# Patient Record
Sex: MALE | Race: WHITE | NOT HISPANIC OR LATINO
[De-identification: names, ages, dates, MRNs, and addresses within clinical notes are randomized per-mention and may not be internally consistent; named-entity substitution may affect disease eponyms.]

---

## 2017-01-18 ENCOUNTER — APPOINTMENT (OUTPATIENT)
Dept: CARDIOLOGY | Facility: CLINIC | Age: 52
End: 2017-01-18

## 2017-01-18 VITALS
OXYGEN SATURATION: 95 % | SYSTOLIC BLOOD PRESSURE: 99 MMHG | HEART RATE: 79 BPM | DIASTOLIC BLOOD PRESSURE: 67 MMHG | BODY MASS INDEX: 39.84 KG/M2 | WEIGHT: 302 LBS

## 2017-01-20 LAB
ALBUMIN SERPL ELPH-MCNC: 2.7 G/DL
ALP BLD-CCNC: 256 U/L
ALT SERPL-CCNC: 22 U/L
ANION GAP SERPL CALC-SCNC: 12 MMOL/L
AST SERPL-CCNC: 90 U/L
BILIRUB SERPL-MCNC: 7.3 MG/DL
BUN SERPL-MCNC: 21 MG/DL
CALCIUM SERPL-MCNC: 9.2 MG/DL
CHLORIDE SERPL-SCNC: 102 MMOL/L
CO2 SERPL-SCNC: 22 MMOL/L
CREAT SERPL-MCNC: 0.92 MG/DL
GLUCOSE SERPL-MCNC: 166 MG/DL
HBA1C MFR BLD HPLC: 7.2 %
POTASSIUM SERPL-SCNC: 5.3 MMOL/L
PROT SERPL-MCNC: 7.5 G/DL
SODIUM SERPL-SCNC: 136 MMOL/L

## 2017-02-07 ENCOUNTER — OUTPATIENT (OUTPATIENT)
Dept: OUTPATIENT SERVICES | Facility: HOSPITAL | Age: 52
LOS: 1 days | End: 2017-02-07
Payer: MEDICAID

## 2017-02-07 ENCOUNTER — APPOINTMENT (OUTPATIENT)
Dept: GASTROENTEROLOGY | Facility: HOSPITAL | Age: 52
End: 2017-02-07

## 2017-02-07 VITALS
WEIGHT: 302 LBS | HEART RATE: 68 BPM | BODY MASS INDEX: 40.02 KG/M2 | HEIGHT: 73 IN | DIASTOLIC BLOOD PRESSURE: 72 MMHG | SYSTOLIC BLOOD PRESSURE: 109 MMHG | RESPIRATION RATE: 14 BRPM

## 2017-02-07 DIAGNOSIS — K46.9 UNSPECIFIED ABDOMINAL HERNIA WITHOUT OBSTRUCTION OR GANGRENE: Chronic | ICD-10-CM

## 2017-02-07 DIAGNOSIS — Z86.69 PERSONAL HISTORY OF OTHER DISEASES OF THE NERVOUS SYSTEM AND SENSE ORGANS: Chronic | ICD-10-CM

## 2017-02-07 DIAGNOSIS — K70.30 ALCOHOLIC CIRRHOSIS OF LIVER WITHOUT ASCITES: ICD-10-CM

## 2017-02-07 DIAGNOSIS — I85.00 ESOPHAGEAL VARICES WITHOUT BLEEDING: ICD-10-CM

## 2017-02-07 DIAGNOSIS — K70.31 ALCOHOLIC CIRRHOSIS OF LIVER WITH ASCITES: ICD-10-CM

## 2017-02-07 DIAGNOSIS — K31.9 DISEASE OF STOMACH AND DUODENUM, UNSPECIFIED: ICD-10-CM

## 2017-02-07 LAB
ALBUMIN SERPL ELPH-MCNC: 3 G/DL — LOW (ref 3.3–5)
ALP SERPL-CCNC: 253 U/L — HIGH (ref 40–120)
ALT FLD-CCNC: 25 U/L — SIGNIFICANT CHANGE UP (ref 10–45)
ANION GAP SERPL CALC-SCNC: 12 MMOL/L — SIGNIFICANT CHANGE UP (ref 5–17)
AST SERPL-CCNC: 95 U/L — HIGH (ref 10–40)
BILIRUB SERPL-MCNC: 8.4 MG/DL — HIGH (ref 0.2–1.2)
BUN SERPL-MCNC: 22 MG/DL — SIGNIFICANT CHANGE UP (ref 7–23)
CALCIUM SERPL-MCNC: 9.3 MG/DL — SIGNIFICANT CHANGE UP (ref 8.4–10.5)
CHLORIDE SERPL-SCNC: 101 MMOL/L — SIGNIFICANT CHANGE UP (ref 96–108)
CO2 SERPL-SCNC: 25 MMOL/L — SIGNIFICANT CHANGE UP (ref 22–31)
CREAT SERPL-MCNC: 0.9 MG/DL — SIGNIFICANT CHANGE UP (ref 0.5–1.3)
GLUCOSE SERPL-MCNC: 158 MG/DL — HIGH (ref 70–99)
INR BLD: 1.69 RATIO — HIGH (ref 0.88–1.16)
POTASSIUM SERPL-MCNC: 4.9 MMOL/L — SIGNIFICANT CHANGE UP (ref 3.5–5.3)
POTASSIUM SERPL-SCNC: 4.9 MMOL/L — SIGNIFICANT CHANGE UP (ref 3.5–5.3)
PROT SERPL-MCNC: 8.1 G/DL — SIGNIFICANT CHANGE UP (ref 6–8.3)
PROTHROM AB SERPL-ACNC: 19.2 SEC — HIGH (ref 10–13.1)
SODIUM SERPL-SCNC: 138 MMOL/L — SIGNIFICANT CHANGE UP (ref 135–145)

## 2017-02-07 PROCEDURE — 85610 PROTHROMBIN TIME: CPT

## 2017-02-07 PROCEDURE — 80053 COMPREHEN METABOLIC PANEL: CPT

## 2017-02-07 PROCEDURE — 36415 COLL VENOUS BLD VENIPUNCTURE: CPT

## 2017-02-07 PROCEDURE — G0463: CPT

## 2017-02-14 ENCOUNTER — RX RENEWAL (OUTPATIENT)
Age: 52
End: 2017-02-14

## 2017-03-15 ENCOUNTER — NON-APPOINTMENT (OUTPATIENT)
Age: 52
End: 2017-03-15

## 2017-03-15 ENCOUNTER — APPOINTMENT (OUTPATIENT)
Dept: CARDIOLOGY | Facility: CLINIC | Age: 52
End: 2017-03-15

## 2017-03-15 VITALS
OXYGEN SATURATION: 92 % | WEIGHT: 296 LBS | HEART RATE: 62 BPM | BODY MASS INDEX: 39.05 KG/M2 | DIASTOLIC BLOOD PRESSURE: 68 MMHG | TEMPERATURE: 98 F | SYSTOLIC BLOOD PRESSURE: 106 MMHG

## 2017-04-11 ENCOUNTER — APPOINTMENT (OUTPATIENT)
Dept: GASTROENTEROLOGY | Facility: HOSPITAL | Age: 52
End: 2017-04-11

## 2017-04-11 ENCOUNTER — OUTPATIENT (OUTPATIENT)
Dept: OUTPATIENT SERVICES | Facility: HOSPITAL | Age: 52
LOS: 1 days | End: 2017-04-11
Payer: MEDICAID

## 2017-04-11 VITALS
HEIGHT: 73 IN | BODY MASS INDEX: 38.17 KG/M2 | RESPIRATION RATE: 14 BRPM | HEART RATE: 98 BPM | SYSTOLIC BLOOD PRESSURE: 101 MMHG | WEIGHT: 288 LBS | DIASTOLIC BLOOD PRESSURE: 71 MMHG

## 2017-04-11 DIAGNOSIS — Z86.69 PERSONAL HISTORY OF OTHER DISEASES OF THE NERVOUS SYSTEM AND SENSE ORGANS: Chronic | ICD-10-CM

## 2017-04-11 DIAGNOSIS — K31.9 DISEASE OF STOMACH AND DUODENUM, UNSPECIFIED: ICD-10-CM

## 2017-04-11 DIAGNOSIS — K46.9 UNSPECIFIED ABDOMINAL HERNIA WITHOUT OBSTRUCTION OR GANGRENE: Chronic | ICD-10-CM

## 2017-04-11 DIAGNOSIS — K70.31 ALCOHOLIC CIRRHOSIS OF LIVER WITH ASCITES: ICD-10-CM

## 2017-04-11 PROCEDURE — G0463: CPT

## 2017-04-12 ENCOUNTER — APPOINTMENT (OUTPATIENT)
Dept: ULTRASOUND IMAGING | Facility: CLINIC | Age: 52
End: 2017-04-12

## 2017-04-12 ENCOUNTER — OUTPATIENT (OUTPATIENT)
Dept: OUTPATIENT SERVICES | Facility: HOSPITAL | Age: 52
LOS: 1 days | End: 2017-04-12
Payer: MEDICAID

## 2017-04-12 DIAGNOSIS — K46.9 UNSPECIFIED ABDOMINAL HERNIA WITHOUT OBSTRUCTION OR GANGRENE: Chronic | ICD-10-CM

## 2017-04-12 DIAGNOSIS — Z86.69 PERSONAL HISTORY OF OTHER DISEASES OF THE NERVOUS SYSTEM AND SENSE ORGANS: Chronic | ICD-10-CM

## 2017-04-12 DIAGNOSIS — K70.31 ALCOHOLIC CIRRHOSIS OF LIVER WITH ASCITES: ICD-10-CM

## 2017-04-12 PROCEDURE — 76700 US EXAM ABDOM COMPLETE: CPT

## 2017-05-18 ENCOUNTER — MEDICATION RENEWAL (OUTPATIENT)
Age: 52
End: 2017-05-18

## 2017-06-06 ENCOUNTER — OTHER (OUTPATIENT)
Age: 52
End: 2017-06-06

## 2017-06-07 ENCOUNTER — APPOINTMENT (OUTPATIENT)
Dept: CARDIOLOGY | Facility: CLINIC | Age: 52
End: 2017-06-07

## 2017-06-07 VITALS
OXYGEN SATURATION: 91 % | HEIGHT: 73 IN | HEART RATE: 66 BPM | SYSTOLIC BLOOD PRESSURE: 107 MMHG | TEMPERATURE: 97.9 F | WEIGHT: 281.2 LBS | BODY MASS INDEX: 37.27 KG/M2 | DIASTOLIC BLOOD PRESSURE: 67 MMHG

## 2017-06-08 LAB
ALBUMIN SERPL ELPH-MCNC: 3.1 G/DL
ALP BLD-CCNC: 158 U/L
ALT SERPL-CCNC: 17 U/L
ANION GAP SERPL CALC-SCNC: 20 MMOL/L
AST SERPL-CCNC: 75 U/L
BILIRUB SERPL-MCNC: 8.6 MG/DL
BUN SERPL-MCNC: 18 MG/DL
CALCIUM SERPL-MCNC: 9.6 MG/DL
CHLORIDE SERPL-SCNC: 105 MMOL/L
CO2 SERPL-SCNC: 20 MMOL/L
CREAT SERPL-MCNC: 0.91 MG/DL
GLUCOSE SERPL-MCNC: 176 MG/DL
HBA1C MFR BLD HPLC: 5.8 %
INR PPP: 1.85 RATIO
POTASSIUM SERPL-SCNC: 5 MMOL/L
PROT SERPL-MCNC: 7.2 G/DL
PT BLD: 21.2 SEC
SODIUM SERPL-SCNC: 145 MMOL/L

## 2017-07-11 ENCOUNTER — APPOINTMENT (OUTPATIENT)
Dept: GASTROENTEROLOGY | Facility: HOSPITAL | Age: 52
End: 2017-07-11

## 2017-07-11 ENCOUNTER — OUTPATIENT (OUTPATIENT)
Dept: OUTPATIENT SERVICES | Facility: HOSPITAL | Age: 52
LOS: 1 days | End: 2017-07-11
Payer: MEDICAID

## 2017-07-11 VITALS
RESPIRATION RATE: 16 BRPM | DIASTOLIC BLOOD PRESSURE: 65 MMHG | HEART RATE: 67 BPM | HEIGHT: 73 IN | SYSTOLIC BLOOD PRESSURE: 100 MMHG | WEIGHT: 279 LBS | BODY MASS INDEX: 36.98 KG/M2

## 2017-07-11 DIAGNOSIS — I85.00 ESOPHAGEAL VARICES WITHOUT BLEEDING: ICD-10-CM

## 2017-07-11 DIAGNOSIS — K31.9 DISEASE OF STOMACH AND DUODENUM, UNSPECIFIED: ICD-10-CM

## 2017-07-11 DIAGNOSIS — K70.31 ALCOHOLIC CIRRHOSIS OF LIVER WITH ASCITES: ICD-10-CM

## 2017-07-11 DIAGNOSIS — Z86.69 PERSONAL HISTORY OF OTHER DISEASES OF THE NERVOUS SYSTEM AND SENSE ORGANS: Chronic | ICD-10-CM

## 2017-07-11 DIAGNOSIS — K46.9 UNSPECIFIED ABDOMINAL HERNIA WITHOUT OBSTRUCTION OR GANGRENE: Chronic | ICD-10-CM

## 2017-07-11 PROCEDURE — G0463: CPT

## 2017-07-26 ENCOUNTER — APPOINTMENT (OUTPATIENT)
Dept: INTERNAL MEDICINE | Facility: CLINIC | Age: 52
End: 2017-07-26

## 2017-07-26 ENCOUNTER — OUTPATIENT (OUTPATIENT)
Dept: OUTPATIENT SERVICES | Facility: HOSPITAL | Age: 52
LOS: 1 days | End: 2017-07-26
Payer: MEDICAID

## 2017-07-26 ENCOUNTER — RESULT CHARGE (OUTPATIENT)
Age: 52
End: 2017-07-26

## 2017-07-26 VITALS
DIASTOLIC BLOOD PRESSURE: 64 MMHG | SYSTOLIC BLOOD PRESSURE: 110 MMHG | HEIGHT: 73 IN | BODY MASS INDEX: 36.05 KG/M2 | OXYGEN SATURATION: 96 % | WEIGHT: 272 LBS | HEART RATE: 60 BPM

## 2017-07-26 DIAGNOSIS — L98.9 DISORDER OF THE SKIN AND SUBCUTANEOUS TISSUE, UNSPECIFIED: ICD-10-CM

## 2017-07-26 DIAGNOSIS — H57.8 OTHER SPECIFIED DISORDERS OF EYE AND ADNEXA: ICD-10-CM

## 2017-07-26 DIAGNOSIS — Z86.69 PERSONAL HISTORY OF OTHER DISEASES OF THE NERVOUS SYSTEM AND SENSE ORGANS: Chronic | ICD-10-CM

## 2017-07-26 DIAGNOSIS — I10 ESSENTIAL (PRIMARY) HYPERTENSION: ICD-10-CM

## 2017-07-26 DIAGNOSIS — K46.9 UNSPECIFIED ABDOMINAL HERNIA WITHOUT OBSTRUCTION OR GANGRENE: Chronic | ICD-10-CM

## 2017-07-26 PROCEDURE — G0463: CPT

## 2017-07-27 LAB
ALBUMIN: 30
CREATININE: 50
MICROALBUMIN/CREAT UR TEST STR-RTO: NORMAL

## 2017-07-28 DIAGNOSIS — E11.9 TYPE 2 DIABETES MELLITUS WITHOUT COMPLICATIONS: ICD-10-CM

## 2017-08-02 PROBLEM — H57.8 EYE LUMP: Status: ACTIVE | Noted: 2017-07-26

## 2017-08-02 PROBLEM — L98.9 SKIN LESION OF FACE: Status: ACTIVE | Noted: 2017-08-02

## 2017-08-09 ENCOUNTER — APPOINTMENT (OUTPATIENT)
Dept: OPHTHALMOLOGY | Facility: CLINIC | Age: 52
End: 2017-08-09
Payer: MEDICAID

## 2017-08-09 ENCOUNTER — APPOINTMENT (OUTPATIENT)
Dept: OPHTHALMOLOGY | Facility: CLINIC | Age: 52
End: 2017-08-09

## 2017-08-09 PROCEDURE — 99203 OFFICE O/P NEW LOW 30 MIN: CPT

## 2017-08-14 ENCOUNTER — EMERGENCY (EMERGENCY)
Facility: HOSPITAL | Age: 52
LOS: 1 days | Discharge: HOME | End: 2017-08-14
Attending: EMERGENCY MEDICINE | Admitting: EMERGENCY MEDICINE
Payer: MEDICAID

## 2017-08-14 VITALS
DIASTOLIC BLOOD PRESSURE: 70 MMHG | RESPIRATION RATE: 19 BRPM | OXYGEN SATURATION: 95 % | HEART RATE: 66 BPM | SYSTOLIC BLOOD PRESSURE: 113 MMHG | TEMPERATURE: 98 F

## 2017-08-14 DIAGNOSIS — K46.9 UNSPECIFIED ABDOMINAL HERNIA WITHOUT OBSTRUCTION OR GANGRENE: Chronic | ICD-10-CM

## 2017-08-14 DIAGNOSIS — Z86.69 PERSONAL HISTORY OF OTHER DISEASES OF THE NERVOUS SYSTEM AND SENSE ORGANS: Chronic | ICD-10-CM

## 2017-08-14 LAB
ALBUMIN SERPL ELPH-MCNC: 2.8 G/DL — LOW (ref 3.3–5)
ALP SERPL-CCNC: 168 U/L — HIGH (ref 40–120)
ALT FLD-CCNC: 16 U/L RC — SIGNIFICANT CHANGE UP (ref 10–45)
ANION GAP SERPL CALC-SCNC: 11 MMOL/L — SIGNIFICANT CHANGE UP (ref 5–17)
APTT BLD: 43.5 SEC — HIGH (ref 27.5–37.4)
AST SERPL-CCNC: 65 U/L — HIGH (ref 10–40)
BILIRUB DIRECT SERPL-MCNC: 2.1 MG/DL — HIGH (ref 0–0.2)
BILIRUB INDIRECT FLD-MCNC: 5.6 MG/DL — HIGH (ref 0.2–1)
BILIRUB SERPL-MCNC: 7.7 MG/DL — HIGH (ref 0.2–1.2)
BILIRUB SERPL-MCNC: 7.7 MG/DL — HIGH (ref 0.2–1.2)
BUN SERPL-MCNC: 17 MG/DL — SIGNIFICANT CHANGE UP (ref 7–23)
CALCIUM SERPL-MCNC: 9.3 MG/DL — SIGNIFICANT CHANGE UP (ref 8.4–10.5)
CHLORIDE SERPL-SCNC: 101 MMOL/L — SIGNIFICANT CHANGE UP (ref 96–108)
CK MB CFR SERPL CALC: 1 NG/ML — SIGNIFICANT CHANGE UP (ref 0–6.7)
CK SERPL-CCNC: 24 U/L — LOW (ref 30–200)
CO2 SERPL-SCNC: 27 MMOL/L — SIGNIFICANT CHANGE UP (ref 22–31)
CREAT SERPL-MCNC: 0.84 MG/DL — SIGNIFICANT CHANGE UP (ref 0.5–1.3)
GLUCOSE SERPL-MCNC: 169 MG/DL — HIGH (ref 70–99)
HCT VFR BLD CALC: 35 % — LOW (ref 39–50)
HGB BLD-MCNC: 12.3 G/DL — LOW (ref 13–17)
INR BLD: 2.03 RATIO — HIGH (ref 0.88–1.16)
LIDOCAIN IGE QN: 15 U/L — SIGNIFICANT CHANGE UP (ref 7–60)
MCHC RBC-ENTMCNC: 35.2 GM/DL — SIGNIFICANT CHANGE UP (ref 32–36)
MCHC RBC-ENTMCNC: 37.7 PG — HIGH (ref 27–34)
MCV RBC AUTO: 107 FL — HIGH (ref 80–100)
POTASSIUM SERPL-MCNC: 4.5 MMOL/L — SIGNIFICANT CHANGE UP (ref 3.5–5.3)
POTASSIUM SERPL-SCNC: 4.5 MMOL/L — SIGNIFICANT CHANGE UP (ref 3.5–5.3)
PROT SERPL-MCNC: 6.7 G/DL — SIGNIFICANT CHANGE UP (ref 6–8.3)
PROTHROM AB SERPL-ACNC: 22.5 SEC — HIGH (ref 9.8–12.7)
RBC # BLD: 3.26 M/UL — LOW (ref 4.2–5.8)
RBC # FLD: 13.7 % — SIGNIFICANT CHANGE UP (ref 10.3–14.5)
SODIUM SERPL-SCNC: 139 MMOL/L — SIGNIFICANT CHANGE UP (ref 135–145)
TROPONIN T SERPL-MCNC: <0.01 NG/ML — SIGNIFICANT CHANGE UP (ref 0–0.06)
WBC # BLD: 7.1 K/UL — SIGNIFICANT CHANGE UP (ref 3.8–10.5)
WBC # FLD AUTO: 7.1 K/UL — SIGNIFICANT CHANGE UP (ref 3.8–10.5)

## 2017-08-14 PROCEDURE — 76705 ECHO EXAM OF ABDOMEN: CPT | Mod: 26

## 2017-08-14 PROCEDURE — 99285 EMERGENCY DEPT VISIT HI MDM: CPT | Mod: 25

## 2017-08-14 PROCEDURE — 71010: CPT | Mod: 26

## 2017-08-14 PROCEDURE — 93010 ELECTROCARDIOGRAM REPORT: CPT

## 2017-08-14 RX ORDER — FAMOTIDINE 10 MG/ML
20 INJECTION INTRAVENOUS ONCE
Qty: 0 | Refills: 0 | Status: COMPLETED | OUTPATIENT
Start: 2017-08-14 | End: 2017-08-14

## 2017-08-14 RX ORDER — SODIUM CHLORIDE 9 MG/ML
1000 INJECTION INTRAMUSCULAR; INTRAVENOUS; SUBCUTANEOUS ONCE
Qty: 0 | Refills: 0 | Status: COMPLETED | OUTPATIENT
Start: 2017-08-14 | End: 2017-08-14

## 2017-08-14 RX ADMIN — SODIUM CHLORIDE 1000 MILLILITER(S): 9 INJECTION INTRAMUSCULAR; INTRAVENOUS; SUBCUTANEOUS at 22:28

## 2017-08-14 NOTE — ED ADULT NURSE NOTE - OBJECTIVE STATEMENT
Pt is a 52YOM hx of liver cirrhosis and ascites received ambulatory A&Ox4 complaining of LUQ pain radiating to epigastric and right side of abdomen. Pt stated that felt similar symptoms without relief. . Pt denies any headache, dizziness chest pain SOB nausea or vomiting.  20G IVs B/L AC places, labs drawn.. Will continue to monitor closely. safety and support provided.

## 2017-08-14 NOTE — ED PROCEDURE NOTE - PROCEDURE ADDITIONAL DETAILS
POCUS: limited abdominal ultrasound. scant pericholecystitic fluid, distended gallbladder, no visible stone

## 2017-08-14 NOTE — ED PROVIDER NOTE - MEDICAL DECISION MAKING DETAILS
Attending Fisher: 53 y/o male with multiple medical problems including liver cirrhosis with h/o previous paracentesis presenting with epigastric pain and abdominal discomfort. no fever or chills. labs not sig changed from prior. no evidence of ascites on exam to suggest SBP. ultrasound shows no evidence of acute cholecystitis. will obtain CT scan of abdomen to further evaluate. no sob or chest discomfort to suggest cardiac etiology. will re-eval after imaging. pt denies any black or bloody stools to suggest gib

## 2017-08-14 NOTE — ED PROVIDER NOTE - CARE PLAN
Instructions for follow-up, activity and diet:	1. return for worsening symptoms or anything concerning to you  2. take all home meds as prescribed  3. follow up with your pmd call to make an appointment  4. follow up with your GI call to make an appointment Principal Discharge DX:	Abdominal pain  Instructions for follow-up, activity and diet:	1. return for worsening symptoms or anything concerning to you  2. take all home meds as prescribed  3. follow up with your pmd call to make an appointment  4. follow up with your GI call to make an appointment  Secondary Diagnosis:	Liver cirrhosis

## 2017-08-14 NOTE — ED PROVIDER NOTE - OBJECTIVE STATEMENT
Attending Fisher: 51 y/o male h/o liver cirrhosis with h/o ascites with previous previous IR drainage presenting with abdominal pain. pt states pain initially located in the lUQ and now radiating to the epigastric and right abdomen. no vomiting. pt states pain rogressively worse. no h/o similar. no black or bloody stools. GI is Dr rodriguez and cardiologist is dr morales. pt states he has worsening sob wth exertion. pt states has had similar in the past and unchanged. no weight loss. pt states denies any itchiness. no diarrhea. Attending Fisher: 51 y/o male h/o liver cirrhosis with h/o ascites with previous previous IR drainage presenting with abdominal pain. pt states pain initially located in the rUQ and now radiating to the epigastric and right abdomen. no vomiting. pt states pain rogressively worse. no h/o similar. no black or bloody stools. GI is Dr rodriguez and cardiologist is dr morales. pt states he has worsening sob wth exertion. pt states has had similar in the past and unchanged. no weight loss. pt states denies any itchiness. no diarrhea.

## 2017-08-14 NOTE — ED PROVIDER NOTE - PHYSICAL EXAMINATION
Attending Fisher: Gen: NAD, heent: atrauamtic, eomi, perrla, dry mucous membranes, op pink, uvula midline, neck; nttp, no nuchal rigidity, chest: nttp, no crepitus, cv: rrr, no murmurs, lungs: ctab, abd: soft, ttp epigastric and RUQ, no peritoneal signs, +BS, no guarding, ext: wwp, neg homans, skin: no rash, neuro: awake and alert, following commands, speech clear, sensation and strength intact, no focal deficits

## 2017-08-14 NOTE — ED PROVIDER NOTE - PLAN OF CARE
1. return for worsening symptoms or anything concerning to you  2. take all home meds as prescribed  3. follow up with your pmd call to make an appointment  4. follow up with your GI call to make an appointment

## 2017-08-14 NOTE — ED ADULT TRIAGE NOTE - CHIEF COMPLAINT QUOTE
pt c/o "abd pain (burning) x 1 day with sob while climbing stairs" pt c/o "abd pain (burning) x 1 day with sob while climbing stairs. had 1 episode of a shocking like discomfort to chest but none now"

## 2017-08-14 NOTE — ED PROVIDER NOTE - PROGRESS NOTE DETAILS
Patient was endorsed to me by Dr. Fisher    at 21     to follow up results of  labs/ct       and dispo pending these results and re evaluation. Upon my re evaluation of the patient I found that pt feeling better. tolerating po, ambulating. pt offered admission but feeling better wants to go home. repeated lactate went from 3.5 to 3.4. gave 1L - was going to repeat lactate but patient refusing more blood draws at this time -will follow up with GI doc today. Soham Ho M.D., Attending Physician

## 2017-08-15 VITALS
OXYGEN SATURATION: 100 % | DIASTOLIC BLOOD PRESSURE: 68 MMHG | RESPIRATION RATE: 17 BRPM | TEMPERATURE: 99 F | SYSTOLIC BLOOD PRESSURE: 125 MMHG | HEART RATE: 70 BPM

## 2017-08-15 LAB
APPEARANCE UR: CLEAR — SIGNIFICANT CHANGE UP
BACTERIA # UR AUTO: ABNORMAL /HPF
BASE EXCESS BLDV CALC-SCNC: 0.2 MMOL/L — SIGNIFICANT CHANGE UP (ref -2–2)
BASOPHILS # BLD AUTO: 0.1 K/UL — SIGNIFICANT CHANGE UP (ref 0–0.2)
BILIRUB UR-MCNC: NEGATIVE — SIGNIFICANT CHANGE UP
CA-I SERPL-SCNC: 1.16 MMOL/L — SIGNIFICANT CHANGE UP (ref 1.12–1.3)
CHLORIDE BLDV-SCNC: 106 MMOL/L — SIGNIFICANT CHANGE UP (ref 96–108)
CK MB CFR SERPL CALC: 1 NG/ML — SIGNIFICANT CHANGE UP (ref 0–6.7)
CK SERPL-CCNC: 29 U/L — LOW (ref 30–200)
CO2 BLDV-SCNC: 26 MMOL/L — SIGNIFICANT CHANGE UP (ref 22–30)
COLOR SPEC: YELLOW — SIGNIFICANT CHANGE UP
DIFF PNL FLD: NEGATIVE — SIGNIFICANT CHANGE UP
EOSINOPHIL # BLD AUTO: 0.5 K/UL — SIGNIFICANT CHANGE UP (ref 0–0.5)
EOSINOPHIL NFR BLD AUTO: 4 % — SIGNIFICANT CHANGE UP (ref 0–6)
EPI CELLS # UR: SIGNIFICANT CHANGE UP /HPF
GAS PNL BLDV: 133 MMOL/L — LOW (ref 136–145)
GAS PNL BLDV: SIGNIFICANT CHANGE UP
GLUCOSE BLDV-MCNC: 137 MG/DL — HIGH (ref 70–99)
GLUCOSE UR QL: NEGATIVE — SIGNIFICANT CHANGE UP
HCO3 BLDV-SCNC: 25 MMOL/L — SIGNIFICANT CHANGE UP (ref 21–29)
HCT VFR BLDA CALC: 34 % — LOW (ref 39–50)
HGB BLD CALC-MCNC: 11.2 G/DL — LOW (ref 13–17)
HOROWITZ INDEX BLDV+IHG-RTO: SIGNIFICANT CHANGE UP
KETONES UR-MCNC: NEGATIVE — SIGNIFICANT CHANGE UP
LACTATE BLDV-MCNC: 3.5 MMOL/L — HIGH (ref 0.7–2)
LEUKOCYTE ESTERASE UR-ACNC: NEGATIVE — SIGNIFICANT CHANGE UP
LYMPHOCYTES # BLD AUTO: 1.8 K/UL — SIGNIFICANT CHANGE UP (ref 1–3.3)
LYMPHOCYTES # BLD AUTO: 25 % — SIGNIFICANT CHANGE UP (ref 13–44)
MACROCYTES BLD QL: SLIGHT — SIGNIFICANT CHANGE UP
MONOCYTES # BLD AUTO: 0.9 K/UL — SIGNIFICANT CHANGE UP (ref 0–0.9)
MONOCYTES NFR BLD AUTO: 13 % — SIGNIFICANT CHANGE UP (ref 2–14)
MYELOCYTES NFR BLD: 1 % — HIGH (ref 0–0)
NEUTROPHILS # BLD AUTO: 3.9 K/UL — SIGNIFICANT CHANGE UP (ref 1.8–7.4)
NEUTROPHILS NFR BLD AUTO: 57 % — SIGNIFICANT CHANGE UP (ref 43–77)
NITRITE UR-MCNC: NEGATIVE — SIGNIFICANT CHANGE UP
PCO2 BLDV: 45 MMHG — SIGNIFICANT CHANGE UP (ref 35–50)
PH BLDV: 7.37 — SIGNIFICANT CHANGE UP (ref 7.35–7.45)
PH UR: 6.5 — SIGNIFICANT CHANGE UP (ref 5–8)
PLAT MORPH BLD: NORMAL — SIGNIFICANT CHANGE UP
PLATELET # BLD AUTO: 53 K/UL — LOW (ref 150–400)
PO2 BLDV: 37 MMHG — SIGNIFICANT CHANGE UP (ref 25–45)
POTASSIUM BLDV-SCNC: 4.2 MMOL/L — SIGNIFICANT CHANGE UP (ref 3.5–5)
PROT UR-MCNC: SIGNIFICANT CHANGE UP
RBC BLD AUTO: SIGNIFICANT CHANGE UP
RBC CASTS # UR COMP ASSIST: SIGNIFICANT CHANGE UP /HPF (ref 0–2)
SAO2 % BLDV: 60 % — LOW (ref 67–88)
SP GR SPEC: >1.03 — HIGH (ref 1.01–1.02)
TROPONIN T SERPL-MCNC: <0.01 NG/ML — SIGNIFICANT CHANGE UP (ref 0–0.06)
UROBILINOGEN FLD QL: NEGATIVE — SIGNIFICANT CHANGE UP
WBC UR QL: SIGNIFICANT CHANGE UP /HPF (ref 0–5)

## 2017-08-15 PROCEDURE — 96374 THER/PROPH/DIAG INJ IV PUSH: CPT | Mod: XU

## 2017-08-15 PROCEDURE — 82330 ASSAY OF CALCIUM: CPT

## 2017-08-15 PROCEDURE — 83690 ASSAY OF LIPASE: CPT

## 2017-08-15 PROCEDURE — 85610 PROTHROMBIN TIME: CPT

## 2017-08-15 PROCEDURE — 81001 URINALYSIS AUTO W/SCOPE: CPT

## 2017-08-15 PROCEDURE — 74177 CT ABD & PELVIS W/CONTRAST: CPT

## 2017-08-15 PROCEDURE — 80053 COMPREHEN METABOLIC PANEL: CPT

## 2017-08-15 PROCEDURE — 82947 ASSAY GLUCOSE BLOOD QUANT: CPT

## 2017-08-15 PROCEDURE — 85027 COMPLETE CBC AUTOMATED: CPT

## 2017-08-15 PROCEDURE — 76705 ECHO EXAM OF ABDOMEN: CPT

## 2017-08-15 PROCEDURE — 84295 ASSAY OF SERUM SODIUM: CPT

## 2017-08-15 PROCEDURE — 93005 ELECTROCARDIOGRAM TRACING: CPT

## 2017-08-15 PROCEDURE — 82247 BILIRUBIN TOTAL: CPT

## 2017-08-15 PROCEDURE — 84484 ASSAY OF TROPONIN QUANT: CPT

## 2017-08-15 PROCEDURE — 82435 ASSAY OF BLOOD CHLORIDE: CPT

## 2017-08-15 PROCEDURE — 82553 CREATINE MB FRACTION: CPT

## 2017-08-15 PROCEDURE — 82248 BILIRUBIN DIRECT: CPT

## 2017-08-15 PROCEDURE — 84132 ASSAY OF SERUM POTASSIUM: CPT

## 2017-08-15 PROCEDURE — 76700 US EXAM ABDOM COMPLETE: CPT

## 2017-08-15 PROCEDURE — 85014 HEMATOCRIT: CPT

## 2017-08-15 PROCEDURE — 87040 BLOOD CULTURE FOR BACTERIA: CPT

## 2017-08-15 PROCEDURE — 82550 ASSAY OF CK (CPK): CPT

## 2017-08-15 PROCEDURE — 71045 X-RAY EXAM CHEST 1 VIEW: CPT

## 2017-08-15 PROCEDURE — 85730 THROMBOPLASTIN TIME PARTIAL: CPT

## 2017-08-15 PROCEDURE — 83605 ASSAY OF LACTIC ACID: CPT

## 2017-08-15 PROCEDURE — 74177 CT ABD & PELVIS W/CONTRAST: CPT | Mod: 26

## 2017-08-15 PROCEDURE — 99284 EMERGENCY DEPT VISIT MOD MDM: CPT | Mod: 25

## 2017-08-15 PROCEDURE — 96375 TX/PRO/DX INJ NEW DRUG ADDON: CPT

## 2017-08-15 PROCEDURE — 76700 US EXAM ABDOM COMPLETE: CPT | Mod: 26

## 2017-08-15 PROCEDURE — 82803 BLOOD GASES ANY COMBINATION: CPT

## 2017-08-15 RX ORDER — SODIUM CHLORIDE 9 MG/ML
1000 INJECTION, SOLUTION INTRAVENOUS
Qty: 0 | Refills: 0 | Status: DISCONTINUED | OUTPATIENT
Start: 2017-08-15 | End: 2017-08-18

## 2017-08-15 RX ORDER — SODIUM CHLORIDE 9 MG/ML
1000 INJECTION INTRAMUSCULAR; INTRAVENOUS; SUBCUTANEOUS ONCE
Qty: 0 | Refills: 0 | Status: COMPLETED | OUTPATIENT
Start: 2017-08-15 | End: 2017-08-15

## 2017-08-15 RX ADMIN — SODIUM CHLORIDE 125 MILLILITER(S): 9 INJECTION, SOLUTION INTRAVENOUS at 02:05

## 2017-08-15 RX ADMIN — FAMOTIDINE 20 MILLIGRAM(S): 10 INJECTION INTRAVENOUS at 01:32

## 2017-08-15 RX ADMIN — SODIUM CHLORIDE 2000 MILLILITER(S): 9 INJECTION INTRAMUSCULAR; INTRAVENOUS; SUBCUTANEOUS at 05:43

## 2017-08-15 NOTE — ED ADULT NURSE REASSESSMENT NOTE - NS ED NURSE REASSESS COMMENT FT1
Patient res tin gin bed vital signs stable, appears more comfortable than initial arrival to ED, pain free , awaiting disposition. support and safety provided.

## 2017-08-20 LAB
CULTURE RESULTS: SIGNIFICANT CHANGE UP
CULTURE RESULTS: SIGNIFICANT CHANGE UP
SPECIMEN SOURCE: SIGNIFICANT CHANGE UP
SPECIMEN SOURCE: SIGNIFICANT CHANGE UP

## 2017-08-24 ENCOUNTER — APPOINTMENT (OUTPATIENT)
Dept: DERMATOLOGY | Facility: CLINIC | Age: 52
End: 2017-08-24

## 2017-09-06 ENCOUNTER — OUTPATIENT (OUTPATIENT)
Dept: OUTPATIENT SERVICES | Facility: HOSPITAL | Age: 52
LOS: 1 days | End: 2017-09-06
Payer: MEDICAID

## 2017-09-06 DIAGNOSIS — K46.9 UNSPECIFIED ABDOMINAL HERNIA WITHOUT OBSTRUCTION OR GANGRENE: Chronic | ICD-10-CM

## 2017-09-06 DIAGNOSIS — I85.00 ESOPHAGEAL VARICES WITHOUT BLEEDING: ICD-10-CM

## 2017-09-06 DIAGNOSIS — Z86.69 PERSONAL HISTORY OF OTHER DISEASES OF THE NERVOUS SYSTEM AND SENSE ORGANS: Chronic | ICD-10-CM

## 2017-09-06 DIAGNOSIS — K70.31 ALCOHOLIC CIRRHOSIS OF LIVER WITH ASCITES: ICD-10-CM

## 2017-09-06 PROCEDURE — 43235 EGD DIAGNOSTIC BRUSH WASH: CPT

## 2017-10-10 ENCOUNTER — OUTPATIENT (OUTPATIENT)
Dept: OUTPATIENT SERVICES | Facility: HOSPITAL | Age: 52
LOS: 1 days | End: 2017-10-10
Payer: MEDICAID

## 2017-10-10 ENCOUNTER — APPOINTMENT (OUTPATIENT)
Dept: GASTROENTEROLOGY | Facility: HOSPITAL | Age: 52
End: 2017-10-10
Payer: MEDICAID

## 2017-10-10 VITALS
BODY MASS INDEX: 35.52 KG/M2 | SYSTOLIC BLOOD PRESSURE: 110 MMHG | WEIGHT: 268 LBS | HEIGHT: 73 IN | HEART RATE: 56 BPM | DIASTOLIC BLOOD PRESSURE: 70 MMHG | RESPIRATION RATE: 14 BRPM

## 2017-10-10 DIAGNOSIS — K31.9 DISEASE OF STOMACH AND DUODENUM, UNSPECIFIED: ICD-10-CM

## 2017-10-10 DIAGNOSIS — I85.00 ESOPHAGEAL VARICES WITHOUT BLEEDING: ICD-10-CM

## 2017-10-10 DIAGNOSIS — K70.31 ALCOHOLIC CIRRHOSIS OF LIVER WITH ASCITES: ICD-10-CM

## 2017-10-10 DIAGNOSIS — Z86.69 PERSONAL HISTORY OF OTHER DISEASES OF THE NERVOUS SYSTEM AND SENSE ORGANS: Chronic | ICD-10-CM

## 2017-10-10 DIAGNOSIS — E66.9 OBESITY, UNSPECIFIED: ICD-10-CM

## 2017-10-10 DIAGNOSIS — K46.9 UNSPECIFIED ABDOMINAL HERNIA WITHOUT OBSTRUCTION OR GANGRENE: Chronic | ICD-10-CM

## 2017-10-10 LAB
BASOPHILS # BLD AUTO: 0.09 K/UL — SIGNIFICANT CHANGE UP (ref 0–0.2)
BASOPHILS NFR BLD AUTO: 1.1 % — SIGNIFICANT CHANGE UP (ref 0–2)
EOSINOPHIL # BLD AUTO: 0.67 K/UL — HIGH (ref 0–0.5)
EOSINOPHIL NFR BLD AUTO: 8.3 % — HIGH (ref 0–6)
HCT VFR BLD CALC: 37.1 % — LOW (ref 39–50)
HGB BLD-MCNC: 12.7 G/DL — LOW (ref 13–17)
IMM GRANULOCYTES NFR BLD AUTO: 0.5 % — SIGNIFICANT CHANGE UP (ref 0–1.5)
INR BLD: 1.9 RATIO — HIGH (ref 0.88–1.16)
LYMPHOCYTES # BLD AUTO: 2.23 K/UL — SIGNIFICANT CHANGE UP (ref 1–3.3)
LYMPHOCYTES # BLD AUTO: 27.7 % — SIGNIFICANT CHANGE UP (ref 13–44)
MANUAL SMEAR VERIFICATION: SIGNIFICANT CHANGE UP
MCHC RBC-ENTMCNC: 34.2 GM/DL — SIGNIFICANT CHANGE UP (ref 32–36)
MCHC RBC-ENTMCNC: 34.3 PG — HIGH (ref 27–34)
MCV RBC AUTO: 100.3 FL — HIGH (ref 80–100)
MONOCYTES # BLD AUTO: 0.94 K/UL — HIGH (ref 0–0.9)
MONOCYTES NFR BLD AUTO: 11.7 % — SIGNIFICANT CHANGE UP (ref 2–14)
NEUTROPHILS # BLD AUTO: 4.08 K/UL — SIGNIFICANT CHANGE UP (ref 1.8–7.4)
NEUTROPHILS NFR BLD AUTO: 50.7 % — SIGNIFICANT CHANGE UP (ref 43–77)
PLAT MORPH BLD: NORMAL — SIGNIFICANT CHANGE UP
PLATELET # BLD AUTO: 91 K/UL — LOW (ref 150–400)
PROTHROM AB SERPL-ACNC: 21.7 SEC — HIGH (ref 10–13.1)
RBC # BLD: 3.7 M/UL — LOW (ref 4.2–5.8)
RBC # FLD: 15.6 % — HIGH (ref 10.3–14.5)
RBC BLD AUTO: NORMAL — SIGNIFICANT CHANGE UP
WBC # BLD: 8.05 K/UL — SIGNIFICANT CHANGE UP (ref 3.8–10.5)
WBC # FLD AUTO: 8.05 K/UL — SIGNIFICANT CHANGE UP (ref 3.8–10.5)

## 2017-10-10 PROCEDURE — 80053 COMPREHEN METABOLIC PANEL: CPT

## 2017-10-10 PROCEDURE — 85610 PROTHROMBIN TIME: CPT

## 2017-10-10 PROCEDURE — 85027 COMPLETE CBC AUTOMATED: CPT

## 2017-10-10 PROCEDURE — 36415 COLL VENOUS BLD VENIPUNCTURE: CPT

## 2017-10-10 PROCEDURE — 99213 OFFICE O/P EST LOW 20 MIN: CPT | Mod: GC

## 2017-10-10 PROCEDURE — G0463: CPT

## 2017-10-11 ENCOUNTER — NON-APPOINTMENT (OUTPATIENT)
Age: 52
End: 2017-10-11

## 2017-10-11 ENCOUNTER — APPOINTMENT (OUTPATIENT)
Dept: CARDIOLOGY | Facility: CLINIC | Age: 52
End: 2017-10-11
Payer: MEDICAID

## 2017-10-11 VITALS
BODY MASS INDEX: 35.36 KG/M2 | WEIGHT: 268 LBS | OXYGEN SATURATION: 91 % | TEMPERATURE: 97.9 F | SYSTOLIC BLOOD PRESSURE: 96 MMHG | HEART RATE: 60 BPM | DIASTOLIC BLOOD PRESSURE: 59 MMHG

## 2017-10-11 VITALS — SYSTOLIC BLOOD PRESSURE: 105 MMHG | DIASTOLIC BLOOD PRESSURE: 70 MMHG

## 2017-10-11 DIAGNOSIS — Z01.810 ENCOUNTER FOR PREPROCEDURAL CARDIOVASCULAR EXAMINATION: ICD-10-CM

## 2017-10-11 LAB
ALBUMIN SERPL ELPH-MCNC: 2.9 G/DL — LOW (ref 3.3–5)
ALP SERPL-CCNC: 183 U/L — HIGH (ref 40–120)
ALT FLD-CCNC: 16 U/L — SIGNIFICANT CHANGE UP (ref 10–45)
ANION GAP SERPL CALC-SCNC: 12 MMOL/L — SIGNIFICANT CHANGE UP (ref 5–17)
AST SERPL-CCNC: 75 U/L — HIGH (ref 10–40)
BILIRUB SERPL-MCNC: 9.1 MG/DL — HIGH (ref 0.2–1.2)
BUN SERPL-MCNC: 16 MG/DL — SIGNIFICANT CHANGE UP (ref 7–23)
CALCIUM SERPL-MCNC: 9.4 MG/DL — SIGNIFICANT CHANGE UP (ref 8.4–10.5)
CHLORIDE SERPL-SCNC: 100 MMOL/L — SIGNIFICANT CHANGE UP (ref 96–108)
CO2 SERPL-SCNC: 24 MMOL/L — SIGNIFICANT CHANGE UP (ref 22–31)
CREAT SERPL-MCNC: 0.9 MG/DL — SIGNIFICANT CHANGE UP (ref 0.5–1.3)
GLUCOSE SERPL-MCNC: 122 MG/DL — HIGH (ref 70–99)
POTASSIUM SERPL-MCNC: 4.5 MMOL/L — SIGNIFICANT CHANGE UP (ref 3.5–5.3)
POTASSIUM SERPL-SCNC: 4.5 MMOL/L — SIGNIFICANT CHANGE UP (ref 3.5–5.3)
PROT SERPL-MCNC: 7.8 G/DL — SIGNIFICANT CHANGE UP (ref 6–8.3)
SODIUM SERPL-SCNC: 136 MMOL/L — SIGNIFICANT CHANGE UP (ref 135–145)

## 2017-10-11 PROCEDURE — 93000 ELECTROCARDIOGRAM COMPLETE: CPT

## 2017-10-11 PROCEDURE — 99214 OFFICE O/P EST MOD 30 MIN: CPT

## 2017-10-12 ENCOUNTER — RX RENEWAL (OUTPATIENT)
Age: 52
End: 2017-10-12

## 2017-10-13 ENCOUNTER — RX RENEWAL (OUTPATIENT)
Age: 52
End: 2017-10-13

## 2017-10-13 RX ORDER — METFORMIN ER 500 MG 500 MG/1
500 TABLET ORAL
Qty: 60 | Refills: 9 | Status: ACTIVE | COMMUNITY
Start: 2017-03-20 | End: 1900-01-01

## 2017-10-16 RX ORDER — NADOLOL 20 MG/1
20 TABLET ORAL DAILY
Qty: 30 | Refills: 5 | Status: ACTIVE | COMMUNITY
Start: 2017-07-11 | End: 1900-01-01

## 2017-10-17 ENCOUNTER — RX RENEWAL (OUTPATIENT)
Age: 52
End: 2017-10-17

## 2017-11-05 ENCOUNTER — EMERGENCY (EMERGENCY)
Facility: HOSPITAL | Age: 52
LOS: 1 days | Discharge: HOME | End: 2017-11-05
Attending: EMERGENCY MEDICINE | Admitting: EMERGENCY MEDICINE
Payer: MEDICAID

## 2017-11-05 VITALS
OXYGEN SATURATION: 97 % | RESPIRATION RATE: 18 BRPM | TEMPERATURE: 98 F | HEART RATE: 60 BPM | SYSTOLIC BLOOD PRESSURE: 105 MMHG | DIASTOLIC BLOOD PRESSURE: 67 MMHG

## 2017-11-05 VITALS
OXYGEN SATURATION: 95 % | HEART RATE: 58 BPM | DIASTOLIC BLOOD PRESSURE: 68 MMHG | RESPIRATION RATE: 18 BRPM | TEMPERATURE: 98 F | SYSTOLIC BLOOD PRESSURE: 112 MMHG

## 2017-11-05 DIAGNOSIS — Z86.69 PERSONAL HISTORY OF OTHER DISEASES OF THE NERVOUS SYSTEM AND SENSE ORGANS: Chronic | ICD-10-CM

## 2017-11-05 DIAGNOSIS — K46.9 UNSPECIFIED ABDOMINAL HERNIA WITHOUT OBSTRUCTION OR GANGRENE: Chronic | ICD-10-CM

## 2017-11-05 LAB
ALBUMIN SERPL ELPH-MCNC: 2.9 G/DL — LOW (ref 3.3–5)
ALP SERPL-CCNC: 173 U/L — HIGH (ref 40–120)
ALT FLD-CCNC: 15 U/L RC — SIGNIFICANT CHANGE UP (ref 10–45)
ANION GAP SERPL CALC-SCNC: 9 MMOL/L — SIGNIFICANT CHANGE UP (ref 5–17)
APTT BLD: 41.1 SEC — HIGH (ref 27.5–37.4)
AST SERPL-CCNC: 69 U/L — HIGH (ref 10–40)
BASOPHILS # BLD AUTO: 0.1 K/UL — SIGNIFICANT CHANGE UP (ref 0–0.2)
BASOPHILS NFR BLD AUTO: 0.8 % — SIGNIFICANT CHANGE UP (ref 0–2)
BILIRUB SERPL-MCNC: 6.8 MG/DL — HIGH (ref 0.2–1.2)
BUN SERPL-MCNC: 18 MG/DL — SIGNIFICANT CHANGE UP (ref 7–23)
CALCIUM SERPL-MCNC: 9.1 MG/DL — SIGNIFICANT CHANGE UP (ref 8.4–10.5)
CHLORIDE SERPL-SCNC: 98 MMOL/L — SIGNIFICANT CHANGE UP (ref 96–108)
CO2 SERPL-SCNC: 27 MMOL/L — SIGNIFICANT CHANGE UP (ref 22–31)
CREAT SERPL-MCNC: 0.78 MG/DL — SIGNIFICANT CHANGE UP (ref 0.5–1.3)
EOSINOPHIL # BLD AUTO: 0.6 K/UL — HIGH (ref 0–0.5)
EOSINOPHIL NFR BLD AUTO: 7.4 % — HIGH (ref 0–6)
GLUCOSE SERPL-MCNC: 171 MG/DL — HIGH (ref 70–99)
HCT VFR BLD CALC: 32 % — LOW (ref 39–50)
HGB BLD-MCNC: 11.3 G/DL — LOW (ref 13–17)
INR BLD: 2.14 RATIO — HIGH (ref 0.88–1.16)
LYMPHOCYTES # BLD AUTO: 1.9 K/UL — SIGNIFICANT CHANGE UP (ref 1–3.3)
LYMPHOCYTES # BLD AUTO: 24.3 % — SIGNIFICANT CHANGE UP (ref 13–44)
MCHC RBC-ENTMCNC: 35.2 GM/DL — SIGNIFICANT CHANGE UP (ref 32–36)
MCHC RBC-ENTMCNC: 37.7 PG — HIGH (ref 27–34)
MCV RBC AUTO: 107 FL — HIGH (ref 80–100)
MONOCYTES # BLD AUTO: 0.9 K/UL — SIGNIFICANT CHANGE UP (ref 0–0.9)
MONOCYTES NFR BLD AUTO: 11.2 % — SIGNIFICANT CHANGE UP (ref 2–14)
NEUTROPHILS # BLD AUTO: 4.3 K/UL — SIGNIFICANT CHANGE UP (ref 1.8–7.4)
NEUTROPHILS NFR BLD AUTO: 56.3 % — SIGNIFICANT CHANGE UP (ref 43–77)
PLATELET # BLD AUTO: 74 K/UL — LOW (ref 150–400)
POTASSIUM SERPL-MCNC: 4.9 MMOL/L — SIGNIFICANT CHANGE UP (ref 3.5–5.3)
POTASSIUM SERPL-SCNC: 4.9 MMOL/L — SIGNIFICANT CHANGE UP (ref 3.5–5.3)
PROT SERPL-MCNC: 6.9 G/DL — SIGNIFICANT CHANGE UP (ref 6–8.3)
PROTHROM AB SERPL-ACNC: 23.5 SEC — HIGH (ref 9.8–12.7)
RBC # BLD: 2.99 M/UL — LOW (ref 4.2–5.8)
RBC # FLD: 14 % — SIGNIFICANT CHANGE UP (ref 10.3–14.5)
SODIUM SERPL-SCNC: 134 MMOL/L — LOW (ref 135–145)
WBC # BLD: 7.7 K/UL — SIGNIFICANT CHANGE UP (ref 3.8–10.5)
WBC # FLD AUTO: 7.7 K/UL — SIGNIFICANT CHANGE UP (ref 3.8–10.5)

## 2017-11-05 PROCEDURE — 99284 EMERGENCY DEPT VISIT MOD MDM: CPT

## 2017-11-05 PROCEDURE — 80053 COMPREHEN METABOLIC PANEL: CPT

## 2017-11-05 PROCEDURE — 85610 PROTHROMBIN TIME: CPT

## 2017-11-05 PROCEDURE — 85027 COMPLETE CBC AUTOMATED: CPT

## 2017-11-05 PROCEDURE — 64450 NJX AA&/STRD OTHER PN/BRANCH: CPT

## 2017-11-05 PROCEDURE — 85730 THROMBOPLASTIN TIME PARTIAL: CPT

## 2017-11-05 NOTE — ED PROVIDER NOTE - OBJECTIVE STATEMENT
51 yo pmhx of cirrhosis 2/2 etoh?, htn, DMII not on insulin, on ciprofloxin for unknown reason, on propanolol pw bleeding from left upper gum  sp dental procedure last week. Was seen by dentist and had additional sutures placed on thursday. has continued to bleed. 53 yo pmhx of cirrhosis 2/2 etoh?, htn, DMII not on insulin, on ciprofloxacin for unknown reason, on propanolol pw bleeding from left upper gum  sp dental procedure last week. Was seen by dentist and had additional sutures placed on Thursday. has continued to bleed. no fevers. no discharge other than blood.

## 2017-11-05 NOTE — ED ADULT NURSE REASSESSMENT NOTE - NS ED NURSE REASSESS COMMENT FT1
patient care received from previous RN. patient currently in dental clinic at this time. RN to assess patient when patient back on the unit

## 2017-11-05 NOTE — ED PROVIDER NOTE - NS ED ROS FT
No fever/chills, no change in vision, no odynophagia, no dysphagia, no cough, no throat pain, no chest pain, no abdominal pain, no nausea/vomiting,  no dysuria, no joint pain, no rashes, no focal numbness or weakness, no known mental health issues, no latex.

## 2017-11-05 NOTE — ED ADULT NURSE NOTE - OBJECTIVE STATEMENT
Received pt c/o gum bleeding s/o tooth extraction. Pt with liver cirrhosis. No distress. Breathing easy and non labored. Pt ambulatory. Breathing easy and non labored. Skin warm and dry to touch. No chills. No diaphoresis.

## 2017-11-05 NOTE — PROGRESS NOTE ADULT - SUBJECTIVE AND OBJECTIVE BOX
Patient is a 52y old  Male who presents with a chief complaint of "I had a tooth pulled 5 days ago and it won't stop bleeding"    HPI: Patient states he had a tooth extracted 5 days prior and the dentist used gauze pressure for hemostasis. Two days later, the socket was still bleeding and the patient went to a different dental clinic to "see a specialist," where the dentist placed sutures. Patient states the bleeding has been constant. Denies pain or fever.      PAST MEDICAL & SURGICAL HISTORY:  Cirrhosis possible secondary to past alcohol use, HTN, DM2  Abdominal hernia: S/P repair x2  H/O cataract    MEDICATIONS  (STANDING):  · 	propranolol 20 mg oral tablet: 1 tab(s) orally 2 times a day  · 	metolazone 5 mg oral tablet: 1 tab(s) orally once a day  · 	spironolactone 25 mg oral tablet: 1 tab(s) orally once a day  · 	Multiple Vitamins oral tablet: 1 tab(s) orally once a day  · 	ciprofloxacin 500 mg oral tablet: 1 tab(s) orally once a day  · 	folic acid 1 mg oral tablet: 1 tab(s) orally once a day  · 	thiamine 100 mg oral tablet: 1 tab(s) orally once a day  · 	furosemide 80 mg oral tablet: 1 tab(s) orally once a day  · 	clindamycin 1% topical solution: 1 application topically 2 times a day  · 	hydrocortisone 1% topical cream: 1 application topically 2 times a day    MEDICATIONS  (PRN):      Allergies    No Known Allergies    FAMILY HISTORY:  Family history of hypercholesterolemia (Mother)  Family history of diabetes mellitus (Mother)      *SOCIAL HISTORY: Patient came to the ED with his brother and father. Non-smoker.    *Last Dental Visit: 3 days prior for follow-up after extraction    Vital Signs Last 24 Hrs  T(C): 36.9 (05 Nov 2017 15:08), Max: 36.9 (05 Nov 2017 15:08)  T(F): 98.4 (05 Nov 2017 15:08), Max: 98.4 (05 Nov 2017 15:08)  HR: 58 (05 Nov 2017 15:08) (58 - 58)  BP: 112/68 (05 Nov 2017 15:08) (112/68 - 112/68)  BP(mean): --  RR: 18 (05 Nov 2017 15:08) (18 - 18)  SpO2: 95% (05 Nov 2017 15:08) (95% - 95%)    EOE:  TMJ ( -  ) clicks                    ( -   ) pops                    (  -  ) crepitus             Mandible FROM             Facial bones and MOM grossly intact             ( -  ) trismus             ( -  ) LAD             ( -  ) swelling             ( -  ) asymmetry             ( -  ) palpation             ( -  ) SOB             ( -  ) dysphagia             Noted yellowed sclera/skin    IOE:  permanent dentition: grossly intact           hard/soft palate:  ( -  ) palatal torus           tongue/FOM WNL           labial/buccal mucosa WNL           ( -  ) percussion           ( -  ) palpation           ( -  ) swelling           Extraction site #14 with liver clot and sutures present, no new gingival growth/closure. Active bleeding from socket. No signs of infection.     Radiographs: None taken    LABS:                        11.3   7.7   )-----------( 74       ( 05 Nov 2017 17:13 )             32.0     11-05    134<L>  |  98  |  18  ----------------------------<  171<H>  4.9   |  27  |  0.78    Ca    9.1      05 Nov 2017 17:13    TPro  6.9  /  Alb  2.9<L>  /  TBili  6.8<H>  /  DBili  x   /  AST  69<H>  /  ALT  15  /  AlkPhos  173<H>  11-05    WBC Count: 7.7 K/uL [3.8 - 10.5] (11-05 @ 17:13)    Platelet Count - Automated: 74 K/uL <L> [150 - 400] (11-05 @ 17:13)    INR: 2.14 ratio <H> [0.88 - 1.16] (11-05 @ 17:13)      ASSESSMENT: Patient has unsatisfactory healing with liver clot present 5 days s/p extraction tooth #14.    PROCEDURE:  Verbal consent given. 1.5 x carpules 4% articaine with epi 1:100k administered as buccal/palatal infiltration around extraction site #14. Existing sutures removed. Socket curetted, granulation tissue and bone fragments removed with curette and rongeur. Socket actively bleeding. Irrigated with normal saline. Placed surgicel into socket, placed 1 x 3-0 chromic gut figure eight suture, silver nitrate sticks to cauterize. Pressure with tea bag and gauze for 45 minutes. Hemostasis achieved. Post op instructions given.    RECOMMENDATIONS:   1) If site oozes tonight, bite on a wet tea bag with gauze  2) If bleeding continues, return to the ED  3) No spitting, rinsing, drinking through a straw  4) Soft food diet for several days, chew on the right side  5) Dental F/U with outpatient dentist for comprehensive dental care.     Blanca Chester DDS  Pager [phone]

## 2017-11-05 NOTE — ED PROVIDER NOTE - ATTENDING CONTRIBUTION TO CARE
Dr. Rapp : I have personally seen and examined this patient at the bedside. I have fully participated in the care of this patient. I have reviewed all pertinent clinical information, including history, physical exam, plan and the Resident's note and agree except as noted.   51yo M p/w left upper molar tooth extracted last week now with bleeding - unable to stop at home.   Denies f/c/n/v/cp/sob/palpitations/cough/abd.pain/d/c/dysuria/hematuria. no sick contacts/recent travel.    PE:  mouth: left upper molar gums c/d/i sp silver nitrate no longer bleeding     -->+bleeding to tooth sp extraction no dry socket - bleeding stopped by Dental with silver nitrate--no complaints at this time will dc

## 2018-01-02 ENCOUNTER — APPOINTMENT (OUTPATIENT)
Dept: GASTROENTEROLOGY | Facility: HOSPITAL | Age: 53
End: 2018-01-02

## 2018-02-14 ENCOUNTER — APPOINTMENT (OUTPATIENT)
Dept: CARDIOLOGY | Facility: CLINIC | Age: 53
End: 2018-02-14
Payer: MEDICAID

## 2018-02-14 VITALS
HEIGHT: 73 IN | SYSTOLIC BLOOD PRESSURE: 105 MMHG | WEIGHT: 258 LBS | BODY MASS INDEX: 34.19 KG/M2 | DIASTOLIC BLOOD PRESSURE: 68 MMHG | HEART RATE: 66 BPM

## 2018-02-14 PROCEDURE — 99214 OFFICE O/P EST MOD 30 MIN: CPT

## 2018-06-22 NOTE — ED ADULT NURSE NOTE - CAS TRG GENERAL AIRWAY, MLM
Patent

## 2018-08-17 ENCOUNTER — APPOINTMENT (OUTPATIENT)
Dept: CARDIOLOGY | Facility: CLINIC | Age: 53
End: 2018-08-17
Payer: MEDICAID

## 2018-08-17 VITALS
DIASTOLIC BLOOD PRESSURE: 72 MMHG | OXYGEN SATURATION: 94 % | HEART RATE: 60 BPM | BODY MASS INDEX: 30.35 KG/M2 | SYSTOLIC BLOOD PRESSURE: 107 MMHG | WEIGHT: 230 LBS

## 2018-08-17 DIAGNOSIS — I49.3 VENTRICULAR PREMATURE DEPOLARIZATION: ICD-10-CM

## 2018-08-17 PROCEDURE — 99214 OFFICE O/P EST MOD 30 MIN: CPT

## 2018-09-27 ENCOUNTER — INPATIENT (INPATIENT)
Facility: HOSPITAL | Age: 53
LOS: 3 days | Discharge: ANOTHER HOSPITAL | DRG: 871 | End: 2018-10-01
Attending: INTERNAL MEDICINE | Admitting: HOSPITALIST
Payer: MEDICAID

## 2018-09-27 VITALS
OXYGEN SATURATION: 90 % | TEMPERATURE: 99 F | RESPIRATION RATE: 20 BRPM | HEART RATE: 70 BPM | DIASTOLIC BLOOD PRESSURE: 50 MMHG | SYSTOLIC BLOOD PRESSURE: 81 MMHG

## 2018-09-27 DIAGNOSIS — K46.9 UNSPECIFIED ABDOMINAL HERNIA WITHOUT OBSTRUCTION OR GANGRENE: Chronic | ICD-10-CM

## 2018-09-27 DIAGNOSIS — Z86.69 PERSONAL HISTORY OF OTHER DISEASES OF THE NERVOUS SYSTEM AND SENSE ORGANS: Chronic | ICD-10-CM

## 2018-09-27 LAB
ALBUMIN SERPL ELPH-MCNC: 2.3 G/DL — LOW (ref 3.3–5)
ALP SERPL-CCNC: 153 U/L — HIGH (ref 40–120)
ALT FLD-CCNC: 19 U/L — SIGNIFICANT CHANGE UP (ref 10–45)
ANION GAP SERPL CALC-SCNC: 12 MMOL/L — SIGNIFICANT CHANGE UP (ref 5–17)
APTT BLD: 46.1 SEC — HIGH (ref 27.5–37.4)
AST SERPL-CCNC: 106 U/L — HIGH (ref 10–40)
BILIRUB SERPL-MCNC: 10.9 MG/DL — HIGH (ref 0.2–1.2)
BUN SERPL-MCNC: 26 MG/DL — HIGH (ref 7–23)
CALCIUM SERPL-MCNC: 8.7 MG/DL — SIGNIFICANT CHANGE UP (ref 8.4–10.5)
CHLORIDE SERPL-SCNC: 101 MMOL/L — SIGNIFICANT CHANGE UP (ref 96–108)
CO2 SERPL-SCNC: 19 MMOL/L — LOW (ref 22–31)
CREAT SERPL-MCNC: 1.09 MG/DL — SIGNIFICANT CHANGE UP (ref 0.5–1.3)
GAS PNL BLDV: SIGNIFICANT CHANGE UP
GLUCOSE SERPL-MCNC: 61 MG/DL — LOW (ref 70–99)
HCT VFR BLD CALC: 33.9 % — LOW (ref 39–50)
HGB BLD-MCNC: 11.2 G/DL — LOW (ref 13–17)
INR BLD: 2.56 RATIO — HIGH (ref 0.88–1.16)
LIDOCAIN IGE QN: 12 U/L — SIGNIFICANT CHANGE UP (ref 7–60)
MCHC RBC-ENTMCNC: 33.1 GM/DL — SIGNIFICANT CHANGE UP (ref 32–36)
MCHC RBC-ENTMCNC: 34.9 PG — HIGH (ref 27–34)
MCV RBC AUTO: 106 FL — HIGH (ref 80–100)
PLATELET # BLD AUTO: 68 K/UL — LOW (ref 150–400)
POTASSIUM SERPL-MCNC: 5.2 MMOL/L — SIGNIFICANT CHANGE UP (ref 3.5–5.3)
POTASSIUM SERPL-SCNC: 5.2 MMOL/L — SIGNIFICANT CHANGE UP (ref 3.5–5.3)
PROT SERPL-MCNC: 6.9 G/DL — SIGNIFICANT CHANGE UP (ref 6–8.3)
PROTHROM AB SERPL-ACNC: 28.2 SEC — HIGH (ref 9.8–12.7)
RBC # BLD: 3.21 M/UL — LOW (ref 4.2–5.8)
RBC # FLD: 15.1 % — HIGH (ref 10.3–14.5)
SODIUM SERPL-SCNC: 132 MMOL/L — LOW (ref 135–145)
WBC # BLD: 10.9 K/UL — HIGH (ref 3.8–10.5)
WBC # FLD AUTO: 10.9 K/UL — HIGH (ref 3.8–10.5)

## 2018-09-27 PROCEDURE — 49082 ABD PARACENTESIS: CPT

## 2018-09-27 PROCEDURE — 99291 CRITICAL CARE FIRST HOUR: CPT | Mod: 25

## 2018-09-27 RX ORDER — ALBUMIN HUMAN 25 %
50 VIAL (ML) INTRAVENOUS ONCE
Qty: 0 | Refills: 0 | Status: COMPLETED | OUTPATIENT
Start: 2018-09-27 | End: 2018-09-27

## 2018-09-27 RX ORDER — CEFTRIAXONE 500 MG/1
2 INJECTION, POWDER, FOR SOLUTION INTRAMUSCULAR; INTRAVENOUS ONCE
Qty: 0 | Refills: 0 | Status: COMPLETED | OUTPATIENT
Start: 2018-09-27 | End: 2018-09-27

## 2018-09-27 RX ORDER — DEXTROSE 50 % IN WATER 50 %
50 SYRINGE (ML) INTRAVENOUS ONCE
Qty: 0 | Refills: 0 | Status: COMPLETED | OUTPATIENT
Start: 2018-09-27 | End: 2018-09-27

## 2018-09-27 RX ORDER — SODIUM CHLORIDE 9 MG/ML
1000 INJECTION INTRAMUSCULAR; INTRAVENOUS; SUBCUTANEOUS ONCE
Qty: 0 | Refills: 0 | Status: COMPLETED | OUTPATIENT
Start: 2018-09-27 | End: 2018-09-27

## 2018-09-27 RX ORDER — CEFTRIAXONE 500 MG/1
1 INJECTION, POWDER, FOR SOLUTION INTRAMUSCULAR; INTRAVENOUS ONCE
Qty: 0 | Refills: 0 | Status: DISCONTINUED | OUTPATIENT
Start: 2018-09-27 | End: 2018-09-27

## 2018-09-27 RX ADMIN — Medication 50 MILLILITER(S): at 23:30

## 2018-09-27 RX ADMIN — CEFTRIAXONE 2 GRAM(S): 500 INJECTION, POWDER, FOR SOLUTION INTRAMUSCULAR; INTRAVENOUS at 23:30

## 2018-09-27 RX ADMIN — Medication 50 MILLILITER(S): at 22:50

## 2018-09-27 RX ADMIN — CEFTRIAXONE 100 GRAM(S): 500 INJECTION, POWDER, FOR SOLUTION INTRAMUSCULAR; INTRAVENOUS at 23:11

## 2018-09-27 RX ADMIN — SODIUM CHLORIDE 1000 MILLILITER(S): 9 INJECTION INTRAMUSCULAR; INTRAVENOUS; SUBCUTANEOUS at 23:11

## 2018-09-27 NOTE — ED ADULT NURSE NOTE - NSIMPLEMENTINTERV_GEN_ALL_ED
Implemented All Fall Risk Interventions:  [place] to call system. Call bell, personal items and telephone within reach. Instruct patient to call for assistance. Room bathroom lighting operational. Non-slip footwear when patient is off stretcher. Physically safe environment: no spills, clutter or unnecessary equipment. Stretcher in lowest position, wheels locked, appropriate side rails in place. Provide visual cue, wrist band, yellow gown, etc. Monitor gait and stability. Monitor for mental status changes and reorient to person, place, and time. Review medications for side effects contributing to fall risk. Reinforce activity limits and safety measures with patient and family.

## 2018-09-27 NOTE — ED PROVIDER NOTE - PROGRESS NOTE DETAILS
Lactate decreased from 4 to 3.6. SBP 90s will give 1L NS. Discussed with Dr. Boyle regarding impression section of CT read that states "incident." Dr Boyle states there was no other further finding in the impression section. Will admit for SBP.

## 2018-09-27 NOTE — ED PROVIDER NOTE - OBJECTIVE STATEMENT
54yo male pmh EtOH, cirrhosis c/b varices, hepatic encephalopathy on transplant list at [facility] p/w AMS since last night as per brother. Pt c/o abdominal pain today, dizziness x several days, "almost passed out in the shower today." C/o diarrhea, although he is on lactulose. Denies fevers, vomiting, chest pain, dyspnea, cough, LE swelling worse than baseline, recent infections or hospitalizations. On lactulose, lasix, spironolactone. 54yo male pmh EtOH, cirrhosis c/b varices, hepatic encephalopathy on transplant list at [facility] p/w AMS since last night as per brother. Pt c/o abdominal pain today, dizziness x several days, "almost passed out in the shower today." C/o diarrhea, although he is on lactulose. Denies fevers, vomiting, chest pain, dyspnea, cough, LE swelling worse than baseline, recent infections or hospitalizations. On lactulose, lasix, spironolactone.  PMD Dejuan silvestre ([facility])  Hepatology Makeda Stokes ([facility])

## 2018-09-27 NOTE — ED PROVIDER NOTE - MEDICAL DECISION MAKING DETAILS
53M etoh cirrhosis p/w AMS, hypotension, hypoxic on room air with jaundice, abdominal TTP concern for SBP, hepatic encephalopathy, infection. Ceftriaxone, IVF, D50 for FS 60s, labs with ammonia, lactate. Will perform paracentesis.

## 2018-09-27 NOTE — ED ADULT NURSE NOTE - OBJECTIVE STATEMENT
53 year old male presents to the ED via triage walk in with c/o AMS, lethargy and weakness x multiple days. Pt has a hx of liver cirrhosis r/t ETOH abuse, last drink was 3 years ago according to patient and family at bedside. Patient states he has felt increased weakness and inability to stand without feeling near syncope for 2-3 days. According to family, patient's mental status has been decreasing and patient recently has been confused more than normal. Upon arrival to ED, patient knows his name and birthday, aware he is in the hospital for feeling weak. Pt skin is jaundice, with + tenderness and pain to the abd upon palpation. Pt O2 sat 88% in triage, 97% on 4LNC with SOB and SHERIDAN in ED treatment room, with rectal temp of 100.6F. Patient denies c/o CP, N/V/D, back pain at this time. Comfort and safety measures maintained. 53 year old male presents to the ED via triage walk in with c/o AMS, lethargy and weakness x multiple days. Pt has a hx of liver cirrhosis r/t ETOH abuse, last drink was 3 years ago according to patient and family at bedside. Patient states he has felt increased weakness and inability to stand without feeling near syncope for 2-3 days. According to family, patient's mental status has been decreasing and patient recently has been confused more than normal. Upon arrival to ED, patient knows his name and birthday, aware he is in the hospital for feeling weak. Pt skin is jaundice, with + tenderness and pain to the abd upon palpation. Pt O2 sat 88% in triage, 97% on 4LNC with SOB and SHERIDAN in ED treatment room, with rectal temp of 100.6F. Pt has minor amounts of dried blood in the mouth, pt states, "it is due to gingivitis."  Patient denies c/o CP, N/V/D, back pain at this time. Comfort and safety measures maintained.

## 2018-09-27 NOTE — ED PROVIDER NOTE - ATTENDING CONTRIBUTION TO CARE
attending Pollack: 53yM appears older than stated age, h/o EtOH (last drink years ago), cirrhosis c/b varices, hepatic encephalopathy on transplant list p/w AMS x 2 days as per family. Also complaining of 1 day of abdominal pain and generalized weakness/dizziness. On exam, pt hypotensive, febrile rectally, jaundice and scleral icterus, abdomen soft, diffusely tender with +ascites. Concern fro SBP. Will obtain labs, abx, diagnostic paracentesis and admit

## 2018-09-27 NOTE — ED PROCEDURE NOTE - CPROC ED INFORMED CONSENT1
Benefits, risks, and possible complications of procedure explained to patient/caregiver who verbalized understanding and gave verbal consent./verbal consent from patient's brother

## 2018-09-28 DIAGNOSIS — K65.2 SPONTANEOUS BACTERIAL PERITONITIS: ICD-10-CM

## 2018-09-28 LAB
ALBUMIN SERPL ELPH-MCNC: 2 G/DL — LOW (ref 3.3–5)
ALP SERPL-CCNC: 113 U/L — SIGNIFICANT CHANGE UP (ref 40–120)
ALT FLD-CCNC: 18 U/L — SIGNIFICANT CHANGE UP (ref 10–45)
AMMONIA BLD-MCNC: 36 UMOL/L — SIGNIFICANT CHANGE UP (ref 11–55)
ANION GAP SERPL CALC-SCNC: 10 MMOL/L — SIGNIFICANT CHANGE UP (ref 5–17)
APPEARANCE UR: ABNORMAL
APTT BLD: 50.4 SEC — HIGH (ref 27.5–37.4)
AST SERPL-CCNC: 89 U/L — HIGH (ref 10–40)
B PERT IGG+IGM PNL SER: ABNORMAL
BASOPHILS # BLD AUTO: 0 K/UL — SIGNIFICANT CHANGE UP (ref 0–0.2)
BASOPHILS # BLD AUTO: 0.02 K/UL — SIGNIFICANT CHANGE UP (ref 0–0.2)
BASOPHILS NFR BLD AUTO: 0.2 % — SIGNIFICANT CHANGE UP (ref 0–2)
BILIRUB DIRECT SERPL-MCNC: 4.5 MG/DL — HIGH (ref 0–0.2)
BILIRUB SERPL-MCNC: 9.9 MG/DL — HIGH (ref 0.2–1.2)
BILIRUB UR-MCNC: ABNORMAL
BLD GP AB SCN SERPL QL: NEGATIVE — SIGNIFICANT CHANGE UP
BUN SERPL-MCNC: 27 MG/DL — HIGH (ref 7–23)
CALCIUM SERPL-MCNC: 7.8 MG/DL — LOW (ref 8.4–10.5)
CHLORIDE SERPL-SCNC: 104 MMOL/L — SIGNIFICANT CHANGE UP (ref 96–108)
CO2 SERPL-SCNC: 16 MMOL/L — LOW (ref 22–31)
COLOR FLD: SIGNIFICANT CHANGE UP
COLOR SPEC: ABNORMAL
CREAT SERPL-MCNC: 1 MG/DL — SIGNIFICANT CHANGE UP (ref 0.5–1.3)
DIFF PNL FLD: NEGATIVE — SIGNIFICANT CHANGE UP
E COLI DNA BLD POS QL NAA+NON-PROBE: SIGNIFICANT CHANGE UP
EOSINOPHIL # BLD AUTO: 0.07 K/UL — SIGNIFICANT CHANGE UP (ref 0–0.5)
EOSINOPHIL # BLD AUTO: 0.1 K/UL — SIGNIFICANT CHANGE UP (ref 0–0.5)
EOSINOPHIL # FLD: 1 % — SIGNIFICANT CHANGE UP
EOSINOPHIL NFR BLD AUTO: 0.8 % — SIGNIFICANT CHANGE UP (ref 0–6)
FLUID INTAKE SUBSTANCE CLASS: SIGNIFICANT CHANGE UP
FLUID SEGMENTED GRANULOCYTES: 84 % — SIGNIFICANT CHANGE UP
GAS PNL BLDA: SIGNIFICANT CHANGE UP
GAS PNL BLDV: SIGNIFICANT CHANGE UP
GAS PNL BLDV: SIGNIFICANT CHANGE UP
GLUCOSE BLDC GLUCOMTR-MCNC: 116 MG/DL — HIGH (ref 70–99)
GLUCOSE FLD-MCNC: 86 MG/DL — SIGNIFICANT CHANGE UP
GLUCOSE SERPL-MCNC: 140 MG/DL — HIGH (ref 70–99)
GLUCOSE UR QL: ABNORMAL
GRAM STN FLD: SIGNIFICANT CHANGE UP
HCT VFR BLD CALC: 25.5 % — LOW (ref 39–50)
HGB BLD-MCNC: 9 G/DL — LOW (ref 13–17)
IMM GRANULOCYTES NFR BLD AUTO: 0.4 % — SIGNIFICANT CHANGE UP (ref 0–1.5)
INR BLD: 2.92 RATIO — HIGH (ref 0.88–1.16)
KETONES UR-MCNC: SIGNIFICANT CHANGE UP
LEUKOCYTE ESTERASE UR-ACNC: NEGATIVE — SIGNIFICANT CHANGE UP
LYMPHOCYTES # BLD AUTO: 0.8 K/UL — LOW (ref 1–3.3)
LYMPHOCYTES # BLD AUTO: 0.96 K/UL — LOW (ref 1–3.3)
LYMPHOCYTES # BLD AUTO: 10.4 % — LOW (ref 13–44)
LYMPHOCYTES # BLD AUTO: 6 % — LOW (ref 13–44)
LYMPHOCYTES # FLD: 5 % — SIGNIFICANT CHANGE UP
MAGNESIUM SERPL-MCNC: 1.7 MG/DL — SIGNIFICANT CHANGE UP (ref 1.6–2.6)
MCHC RBC-ENTMCNC: 35.3 GM/DL — SIGNIFICANT CHANGE UP (ref 32–36)
MCHC RBC-ENTMCNC: 35.6 PG — HIGH (ref 27–34)
MCV RBC AUTO: 100.8 FL — HIGH (ref 80–100)
METHOD TYPE: SIGNIFICANT CHANGE UP
MONOCYTES # BLD AUTO: 0.6 K/UL — SIGNIFICANT CHANGE UP (ref 0–0.9)
MONOCYTES # BLD AUTO: 0.99 K/UL — HIGH (ref 0–0.9)
MONOCYTES NFR BLD AUTO: 10.7 % — SIGNIFICANT CHANGE UP (ref 2–14)
MONOCYTES NFR BLD AUTO: 3 % — SIGNIFICANT CHANGE UP (ref 2–14)
MONOS+MACROS # FLD: 10 % — SIGNIFICANT CHANGE UP
NEUTROPHILS # BLD AUTO: 7.17 K/UL — SIGNIFICANT CHANGE UP (ref 1.8–7.4)
NEUTROPHILS # BLD AUTO: 9.4 K/UL — HIGH (ref 1.8–7.4)
NEUTROPHILS NFR BLD AUTO: 77.5 % — HIGH (ref 43–77)
NEUTROPHILS NFR BLD AUTO: 80 % — HIGH (ref 43–77)
NITRITE UR-MCNC: POSITIVE
PH UR: 5.5 — SIGNIFICANT CHANGE UP (ref 5–8)
PHOSPHATE SERPL-MCNC: 3 MG/DL — SIGNIFICANT CHANGE UP (ref 2.5–4.5)
PLATELET # BLD AUTO: 47 K/UL — LOW (ref 150–400)
POTASSIUM SERPL-MCNC: 4.8 MMOL/L — SIGNIFICANT CHANGE UP (ref 3.5–5.3)
POTASSIUM SERPL-SCNC: 4.8 MMOL/L — SIGNIFICANT CHANGE UP (ref 3.5–5.3)
PROT FLD-MCNC: 1.1 G/DL — SIGNIFICANT CHANGE UP
PROT SERPL-MCNC: 5.8 G/DL — LOW (ref 6–8.3)
PROT UR-MCNC: ABNORMAL
PROTHROM AB SERPL-ACNC: 32.2 SEC — HIGH (ref 9.8–12.7)
RBC # BLD: 2.53 M/UL — LOW (ref 4.2–5.8)
RBC # FLD: 16.4 % — HIGH (ref 10.3–14.5)
RCV VOL RI: HIGH /UL (ref 0–5)
RH IG SCN BLD-IMP: POSITIVE — SIGNIFICANT CHANGE UP
SODIUM SERPL-SCNC: 130 MMOL/L — LOW (ref 135–145)
SP GR SPEC: 1.02 — SIGNIFICANT CHANGE UP (ref 1.01–1.02)
SPECIMEN SOURCE: SIGNIFICANT CHANGE UP
TOTAL NUCLEATED CELL COUNT, BODY FLUID: HIGH /UL (ref 0–5)
TUBE TYPE: SIGNIFICANT CHANGE UP
UROBILINOGEN FLD QL: NEGATIVE — SIGNIFICANT CHANGE UP
WBC # BLD: 9.25 K/UL — SIGNIFICANT CHANGE UP (ref 3.8–10.5)
WBC # FLD AUTO: 9.25 K/UL — SIGNIFICANT CHANGE UP (ref 3.8–10.5)

## 2018-09-28 PROCEDURE — 74177 CT ABD & PELVIS W/CONTRAST: CPT | Mod: 26

## 2018-09-28 PROCEDURE — 99223 1ST HOSP IP/OBS HIGH 75: CPT | Mod: GC

## 2018-09-28 PROCEDURE — 93975 VASCULAR STUDY: CPT | Mod: 26

## 2018-09-28 PROCEDURE — 99222 1ST HOSP IP/OBS MODERATE 55: CPT

## 2018-09-28 PROCEDURE — 71045 X-RAY EXAM CHEST 1 VIEW: CPT | Mod: 26

## 2018-09-28 RX ORDER — ALBUMIN HUMAN 25 %
100 VIAL (ML) INTRAVENOUS EVERY 6 HOURS
Qty: 0 | Refills: 0 | Status: COMPLETED | OUTPATIENT
Start: 2018-09-28 | End: 2018-09-29

## 2018-09-28 RX ORDER — ALBUMIN HUMAN 25 %
100 VIAL (ML) INTRAVENOUS ONCE
Qty: 0 | Refills: 0 | Status: COMPLETED | OUTPATIENT
Start: 2018-09-28 | End: 2018-09-28

## 2018-09-28 RX ORDER — PIPERACILLIN AND TAZOBACTAM 4; .5 G/20ML; G/20ML
3.38 INJECTION, POWDER, LYOPHILIZED, FOR SOLUTION INTRAVENOUS EVERY 8 HOURS
Qty: 0 | Refills: 0 | Status: DISCONTINUED | OUTPATIENT
Start: 2018-09-28 | End: 2018-09-30

## 2018-09-28 RX ORDER — PIPERACILLIN AND TAZOBACTAM 4; .5 G/20ML; G/20ML
3.38 INJECTION, POWDER, LYOPHILIZED, FOR SOLUTION INTRAVENOUS ONCE
Qty: 0 | Refills: 0 | Status: COMPLETED | OUTPATIENT
Start: 2018-09-28 | End: 2018-09-28

## 2018-09-28 RX ORDER — ALBUMIN HUMAN 25 %
250 VIAL (ML) INTRAVENOUS ONCE
Qty: 0 | Refills: 0 | Status: COMPLETED | OUTPATIENT
Start: 2018-09-28 | End: 2018-09-28

## 2018-09-28 RX ORDER — SODIUM CHLORIDE 9 MG/ML
1000 INJECTION INTRAMUSCULAR; INTRAVENOUS; SUBCUTANEOUS ONCE
Qty: 0 | Refills: 0 | Status: COMPLETED | OUTPATIENT
Start: 2018-09-28 | End: 2018-09-28

## 2018-09-28 RX ORDER — FOLIC ACID 0.8 MG
1 TABLET ORAL DAILY
Qty: 0 | Refills: 0 | Status: DISCONTINUED | OUTPATIENT
Start: 2018-09-28 | End: 2018-09-28

## 2018-09-28 RX ORDER — SODIUM CHLORIDE 9 MG/ML
1000 INJECTION, SOLUTION INTRAVENOUS
Qty: 0 | Refills: 0 | Status: DISCONTINUED | OUTPATIENT
Start: 2018-09-28 | End: 2018-09-28

## 2018-09-28 RX ORDER — GENTAMICIN SULFATE 40 MG/ML
390 VIAL (ML) INJECTION ONCE
Qty: 0 | Refills: 0 | Status: COMPLETED | OUTPATIENT
Start: 2018-09-28 | End: 2018-09-28

## 2018-09-28 RX ORDER — LACTULOSE 10 G/15ML
20 SOLUTION ORAL THREE TIMES A DAY
Qty: 0 | Refills: 0 | Status: DISCONTINUED | OUTPATIENT
Start: 2018-09-28 | End: 2018-10-01

## 2018-09-28 RX ORDER — SODIUM CHLORIDE 9 MG/ML
500 INJECTION INTRAMUSCULAR; INTRAVENOUS; SUBCUTANEOUS ONCE
Qty: 0 | Refills: 0 | Status: COMPLETED | OUTPATIENT
Start: 2018-09-28 | End: 2018-09-28

## 2018-09-28 RX ORDER — THIAMINE MONONITRATE (VIT B1) 100 MG
100 TABLET ORAL DAILY
Qty: 0 | Refills: 0 | Status: DISCONTINUED | OUTPATIENT
Start: 2018-09-28 | End: 2018-10-01

## 2018-09-28 RX ORDER — ALBUMIN HUMAN 25 %
100 VIAL (ML) INTRAVENOUS EVERY 6 HOURS
Qty: 0 | Refills: 0 | Status: DISCONTINUED | OUTPATIENT
Start: 2018-09-28 | End: 2018-09-28

## 2018-09-28 RX ORDER — GENTAMICIN SULFATE 40 MG/ML
300 VIAL (ML) INJECTION ONCE
Qty: 0 | Refills: 0 | Status: DISCONTINUED | OUTPATIENT
Start: 2018-09-28 | End: 2018-09-28

## 2018-09-28 RX ADMIN — SODIUM CHLORIDE 100 MILLILITER(S): 9 INJECTION, SOLUTION INTRAVENOUS at 06:12

## 2018-09-28 RX ADMIN — Medication 100 MILLIGRAM(S): at 13:25

## 2018-09-28 RX ADMIN — PIPERACILLIN AND TAZOBACTAM 25 GRAM(S): 4; .5 INJECTION, POWDER, LYOPHILIZED, FOR SOLUTION INTRAVENOUS at 16:46

## 2018-09-28 RX ADMIN — Medication 100 MILLILITER(S): at 14:17

## 2018-09-28 RX ADMIN — SODIUM CHLORIDE 1000 MILLILITER(S): 9 INJECTION INTRAMUSCULAR; INTRAVENOUS; SUBCUTANEOUS at 04:28

## 2018-09-28 RX ADMIN — Medication 300 MILLIGRAM(S): at 13:25

## 2018-09-28 RX ADMIN — Medication 100 MILLILITER(S): at 17:30

## 2018-09-28 RX ADMIN — SODIUM CHLORIDE 500 MILLILITER(S): 9 INJECTION INTRAMUSCULAR; INTRAVENOUS; SUBCUTANEOUS at 06:12

## 2018-09-28 RX ADMIN — PIPERACILLIN AND TAZOBACTAM 25 GRAM(S): 4; .5 INJECTION, POWDER, LYOPHILIZED, FOR SOLUTION INTRAVENOUS at 23:29

## 2018-09-28 RX ADMIN — SODIUM CHLORIDE 80 MILLILITER(S): 9 INJECTION, SOLUTION INTRAVENOUS at 02:27

## 2018-09-28 RX ADMIN — Medication 100 MILLILITER(S): at 23:28

## 2018-09-28 RX ADMIN — Medication 50 MILLILITER(S): at 06:27

## 2018-09-28 RX ADMIN — SODIUM CHLORIDE 1000 MILLILITER(S): 9 INJECTION INTRAMUSCULAR; INTRAVENOUS; SUBCUTANEOUS at 00:00

## 2018-09-28 RX ADMIN — Medication 125 MILLILITER(S): at 08:10

## 2018-09-28 RX ADMIN — PIPERACILLIN AND TAZOBACTAM 200 GRAM(S): 4; .5 INJECTION, POWDER, LYOPHILIZED, FOR SOLUTION INTRAVENOUS at 10:20

## 2018-09-28 NOTE — H&P ADULT - NSHPLABSRESULTS_GEN_ALL_CORE
All labs personally reviewed:                      11.2   10.9  )-----------( 68       ( 27 Sep 2018 23:12 )             33.9     09-28    130<L>  |  104  |  27<H>  ----------------------------<  140<H>  4.8   |  16<L>  |  1.00    Ca    7.8<L>      28 Sep 2018 06:49  Phos  3.0     09-28  Mg     1.7     09-28    TPro  5.8<L>  /  Alb  2.0<L>  /  TBili  9.9<H>  /  DBili  4.5<H>  /  AST  89<H>  /  ALT  18  /  AlkPhos  113  09-28    PT/INR - ( 28 Sep 2018 07:22 )   PT: 32.2 sec;   INR: 2.92 ratio         PTT - ( 28 Sep 2018 07:22 )  PTT:50.4 sec

## 2018-09-28 NOTE — ED ADULT NURSE REASSESSMENT NOTE - NS ED NURSE REASSESS COMMENT FT1
Patient A&Ox4 at this time, refusing to use bedpan for bowel movement and attempting to get out of bed on his own. Pt instructed that it is unsafe to get up to use the restroom due to weakness and unsteady gait, recent near-syncopal episodes x 2-3 days, hypotension, and fever. Red socks on for fall risk protocol, yellow fall risk band on, both side rails up, call bell at bedside, family at bedside to also instruct patient on dangers of getting out of bed without assistance. Comfort and safety measures maintained.

## 2018-09-28 NOTE — H&P ADULT - HISTORY OF PRESENT ILLNESS
53M PMH EtOH cirrhosis (no alcohol in 3.5 yrs, on transplant list at [facility]) c/b varices and hepatic encephalopathy who presented w/AMS since last night, as per brother. He had reported abdominal pain that started a few days prior with associated dizziness and an episode of lightheadedness today that caused him to fall. He has been having loose, but not watery, BMs at home and is adherent to his home lactulose. Denies fevers/chills, CP, SOB, palpitations, headaches, vision changes     In the ED, he was found to be hypotensive to SBP 80's, febrile to T 100.6 F, 90% on RA. He also complained of abdominal pain and was noted to have gingival bleeding. CT A/P demonstrated esophageal varices, moderate ascites, and portal hypertensive colopathy. He had a diag paracentesis w/ 37K WBC, 26K RBC, (+) UTI,   - received, NS 2.5L total, albumin 25% x 250cc, CTX 2g, zosyn. 53M PMH EtOH cirrhosis (no alcohol in 3.5 yrs, on transplant list at [facility]) c/b varices and hepatic encephalopathy who presented w/AMS since last night, as per brother. He had reported abdominal pain that started a few days prior with associated dizziness and an episode of lightheadedness today that caused him to fall. He has been having loose, but not watery, BMs at home and is adherent to his home lactulose. Denies fevers/chills, CP, SOB, palpitations, headaches, vision changes     In the ED, he was found to be hypotensive to SBP 80's, febrile to T 100.6 F, 90% on RA. He also complained of abdominal pain and was noted to have gingival bleeding. CT A/P demonstrated esophageal varices, moderate ascites, and portal hypertensive colopathy. He had a diag paracentesis w/ 37K WBC. (+) UTI. He received, NS 2.5L total, albumin 25% x 250cc, CTX 2g, zosyn.

## 2018-09-28 NOTE — CONSULT NOTE ADULT - ATTENDING COMMENTS
This is a 53m PMHx as detailed prior including  EtOH cirrhosis c/b varices, hepatic encephalopathy on transplant list at [facility] who  p/w AMS since last night as per brother. Pt reported abdominal pain started a few days ago, dizziness, and day of admit had an episode of lightheadedness. Patient now with -  1. AMS- multifactorial - transient cerebral hypoperfusion , metabolic- sepsis, possible hyperammonemia - nonfocal improved with resuscitation, will aim to correct reversible factors, neuro checks Q 3 hours, fall precautions.   2. Severe sepsis- urinary as well as SBP and advance gingivitis ( dental c/s, oral care)- will panculture and broaden antibiotic coverage to include the aforementioned pathology, will tailor according to C/S and clinical response   3. Shock- multimodal ( hypovolemic/ distributive) severe dehydration coupled with sepsis as well as relative adrenal insufficiency - would give IV fluids, colloids, stress dose steroids, keep MAP > 65 mmHg, monitor UOP and lactate   4. Acute decompensated hepatic failure - patient currently on transplant list , will f/u with hepatology, meld 29 , will monitor lactate and synthetic function, SBP now s/p diagnostic paracentesis- abx , f/u cultures, serial abdominal exams, Alb 1.5g/kg  5. Coagulopathy- mixed- malnutrition, vitamin K def as well as  hepatic foci- T&S and with vitamin K for now, but correct if gingival hemorrhage returns .  6. Mild ULICES for nutritional profile- likely pre-renal, IVF, strict I/O's, colloids, MAP > 70 to avoid HRS, avoid nephrotoxins, monitor  7. Hypo-osmolar hyponatremia with mild hyperkalemia- possibly hypovolemic/ adrenal- get renal lytes, serum and urine Osmo with fluid replacement accordingly   8. hypoglycemia- low hepatic reserve, poor PO intake coupled with sepsis - stable on D5 regimen will continue to monitor   Care and treatment as detailed prior unless otherwise stated. Case discussed extensively with patient, staff, team and family. This is a 53m PMHx as detailed prior including  EtOH cirrhosis c/b varices, hepatic encephalopathy on transplant list at [facility] who  p/w AMS since last night as per brother. Pt reported abdominal pain started a few days ago, dizziness, and day of admit had an episode of lightheadedness. Patient now with -  1. AMS- multifactorial - transient cerebral hypoperfusion , metabolic- sepsis, possible hyperammonemia - nonfocal improved with resuscitation, will aim to correct reversible factors, neuro checks Q 3 hours, fall precautions.   2. Severe sepsis- urinary as well as SBP and advance gingivitis ( dental c/s, oral care)- will panculture and broaden antibiotic coverage to include the aforementioned pathology, will tailor according to C/S and clinical response   3. Shock- multimodal ( hypovolemic/ distributive) severe dehydration coupled with sepsis as well as relative adrenal insufficiency - would give IV fluids, colloids, stress dose steroids, keep MAP > 65 mmHg, monitor UOP and lactate   4. Acute decompensated hepatic failure - patient currently on transplant list , will f/u with hepatology, meld 29 , will monitor lactate and synthetic function, SBP now s/p diagnostic paracentesis- abx , f/u cultures, serial abdominal exams, Alb 1.5g/kg  5. Coagulopathy- mixed- malnutrition ( gingivitis as well as portal hypertensive colopathy) , vitamin K def as well as  hepatic foci- T&S and with vitamin K for now, but correct if gingival hemorrhage returns .  6. Mild ULICES for nutritional profile- likely pre-renal, IVF, strict I/O's, colloids, MAP > 70 to avoid HRS, avoid nephrotoxins, monitor  7. Hypo-osmolar hyponatremia with mild hyperkalemia- possibly hypovolemic/ adrenal- get renal lytes, serum and urine Osmo with fluid replacement accordingly   8. hypoglycemia- low hepatic reserve, poor PO intake coupled with sepsis - stable on D5 regimen will continue to monitor   Care and treatment as detailed prior unless otherwise stated. Case discussed extensively with patient, staff, team and family. Patient now upgraded to the MICU for higher level of care. Prognosis guarded. Family is aware of the gravity of the situation.

## 2018-09-28 NOTE — CONSULT NOTE ADULT - CONSULT REASON
Poor dentition History of gingivitis and tooth extraction, dried blood in mouth, r/o infection   Pt has e coli bacteremia

## 2018-09-28 NOTE — H&P ADULT - ASSESSMENT
53M PMH EtOH cirrhosis c/b varices (no alc in 3.5 yrs), hepatic encephalopathy on transplant list at [facility] p/w AMS since last night as per brother, found to be hypotensive to SBP 80's, febrile to 100.6 F, and hypoxic to 90% on RA with associated symptoms of abdominal pain/distension/ascites, gingival bleeding, portal colopathy on CT, and found to have SBP on dx paracentesis (WBC 37K) and urinalysis (+)      Neuro  # Hepatic encephalopathy w/ SBP: Mild, AOx2-3 below baseline per brothers. ammonia 36.   - cont w/ CTX 2g q8hrs for SBP, start rifaxim, lactulose titrating to 2-3 stools per day,   - check zinc level, replete as necessary to enhance ammonia conversion     Pulm  # Hypoxic respiratory failure: mild, sat'ing well on 2L, CXR w/o radiolographic of pna  - may be 2/2 fluid overload in setting of moderate ascites and hypotension req'ing fluids  - will cont w/ o2 as needed, would consider checking RVP   	  CVS  # Hypotension w/ possible distributive shock in setting of SBP  - cont w/ albumin repletion up to 1.5g/kg on day, vitals q4, place grover, strict I/Os.   - assess for further albumin needs    GI   # Cirrhosis w/ varcies c/b SBP. on txp list on [facility], hep A, B, C all non reactive in 2015.   - SBP tx w/ CTX, MELD at 29, needs hepatology consult,   - cont w/ albumin repletion, total on day 1 should be 1.5g/kg, start   - would BB for varices in setting of hypotension, would consider starting if HDS  - cont w/ lactulose and titrate to BMs 2-3 per day   # Portal hypertensive colopathy  - found on CT a/p, serial abdominals exams, current abdom soft w/ mild LQ TTP.     Renal  # HypoNa: mild at 132, likely represent hypervolemic in setting of cirrhosis w/ ascites.   - check U osm, U cr, U Na, serum Osm, cont to trend for now.   # Metabolic acidosis w/ bicarb 19-> 16. Lactate nusrat 4  - likely 2/2 lactic acidosis in setting of sepsis w/ SBP.   # at high risk of decompensation into HRS, however sCr currently wnl.   - strict I/Os, albumin supplementation should help ppx against HRS    ID  # SBP, 36K nucleated cells on diag para, received CTX and zosyn  - given severity of inflammatory state, will upgrade abx to meropenam and will start vanco.   - follow blood cx and urine cx.     Heme  # Coagulopathic w/ thrombocytopenia in setting of cirrhosis w/ gingival hemorrhage  - dental consult to optimize dentition.   # Macrocytic anemia   - likely 2/2 chronic EtOH abuse c/b cirrhosis, b12 level in 2015 wnl, would repeat     Endo  # no acute issues.     # DVT ppx: hold pharm AC given varices and high risk of bleed. ICD's for now. 53M PMH EtOH cirrhosis c/b varices (no alc in 3.5 yrs), hepatic encephalopathy on transplant list at [facility] p/w AMS since last night as per brother, found to be hypotensive to SBP 80's, febrile to 100.6 F, and hypoxic to 90% on RA with associated symptoms of abdominal pain/distension/ascites, gingival bleeding, portal colopathy on CT, and found to have SBP on dx paracentesis (WBC 37K) and urinalysis (+)      Neuro  # Hepatic encephalopathy w/ SBP: Mild, AOx2-3 below baseline per brothers. ammonia 36.   - Patient started on Zosyn to cover anaerobes  - check zinc level, replete as necessary to enhance ammonia conversion     Pulm  # Hypoxic respiratory failure: mild, sat'ing well on 2L, CXR w/o radiolographic of pna  - may be 2/2 fluid overload in setting of moderate ascites and hypotension req'ing fluids  - will cont w/ o2 as needed, would consider checking RVP   	  CVS  # Hypotension w/ possible distributive shock in setting of SBP  - cont w/ albumin repletion up to 1.5g/kg on day, vitals q4, place grover, strict I/Os.   - assess for further albumin needs    GI   # Cirrhosis w/ varcies c/b SBP. on txp list on [facility], hep A, B, C all non reactive in 2015.   - SBP tx w/ CTX, MELD at 29, needs hepatology consult  - cont w/ albumin repletion, total on day 1 should be 1.5g/kg, start   - would BB for varices in setting of hypotension, would consider starting if HDS  - cont w/ lactulose and titrate to BMs 2-3 per day     # Portal hypertensive colopathy  - Found on CT a/p, serial abdominals exams, current abdom soft w/ mild LQ TTP.     Renal  # HypoNa: mild at 132, likely represent hypervolemic in setting of cirrhosis w/ ascites.   - check U osm, U cr, U Na, serum Osm, cont to trend for now.   # Metabolic acidosis w/ bicarb 19-> 16. Lactate nusrat 4  - likely 2/2 lactic acidosis in setting of sepsis w/ SBP.   # at high risk of decompensation into HRS, however sCr currently wnl.   - strict I/Os, albumin supplementation should help ppx against HRS    ID  # SBP w/36K nucleated cells on diagnostic paracentesis, received CTX and zosyn  - C/w Zosyn  - F/u blood cultures and urine cultures  - Dental consult for possible infection source  - ID Consult    Heme  # Coagulopathic w/ thrombocytopenia in setting of cirrhosis w/ gingival hemorrhage  - dental consult to optimize dentition.   # Macrocytic anemia: likely 2/2 chronic EtOH abuse c/b cirrhosis, b12 level in 2015 wnl  - Repeat B12 w/methylmalonic acid, folate    Endo  # no acute issues.     # DVT ppx: hold pharm AC given varices and high risk of bleed. ICD's for now.

## 2018-09-28 NOTE — H&P ADULT - NSHPSOCIALHISTORY_GEN_ALL_CORE
No tobacco or alcohol in 3.5 years.   On transplant list at [facility].

## 2018-09-28 NOTE — CONSULT NOTE ADULT - SUBJECTIVE AND OBJECTIVE BOX
Patient is a 53y old  Male who presents with a chief complaint of SBP, hypotension (28 Sep 2018 09:54)      HPI:  53M PMH EtOH cirrhosis (no alcohol in 3.5 yrs, on transplant list at [facility]) c/b varices and hepatic encephalopathy who presented w/AMS since last night, as per brother. He had reported abdominal pain that started a few days prior with associated dizziness and an episode of lightheadedness today that caused him to fall. He has been having loose, but not watery, BMs at home and is adherent to his home lactulose. Denies fevers/chills, CP, SOB, palpitations, headaches, vision changes     In the ED, he was found to be hypotensive to SBP 80's, febrile to T 100.6 F, 90% on RA. He also complained of abdominal pain and was noted to have gingival bleeding. CT A/P demonstrated esophageal varices, moderate ascites, and portal hypertensive colopathy. He had a diag paracentesis w/ 37K WBC. (+) UTI. He received, NS 2.5L total, albumin 25% x 250cc, CTX 2g, zosyn. (28 Sep 2018 09:54)  Above reviewed:  Pt feels better, abdominal pain improved. Denies N/V. No diarrhea, no cough, says I am trying to sleep. Not on pressors.       PAST MEDICAL & SURGICAL HISTORY:  Esophageal varices  Alcoholic cirrhosis of liver with ascites  Abdominal hernia: S/P repair x2  H/O cataract      REVIEW OF SYSTEMS: ROS limited as pt answering in yes and no, not sure if appropriate.     General : + chills and Fever resolved.    Skin: No rash, pale.   	  Ophthalmologic: Denies any visual complaints, discharge, redness.  	  ENMT: No nasal congestion or throat pain.     Respiratory and Thorax: No cough, sputum. Denies shortness of breath.  	  Cardiovascular: No chest pain, palpitations.    Gastrointestinal: No nausea, improved abdominal pain, no diarrhea.    Genitourinary: No dysuria, frequency.     Musculoskeletal: No joint swelling or pain.    Neurological:  No extremity weakness.    Endocrine: No abnormal heat/cold intolerance    Allergic/Immunologic: No hives or rash       Social history:  Single, former smoker, former EtOH abuse.       FAMILY HISTORY:  Family history of hypercholesterolemia (Father)  Family history of diabetes mellitus (Mother)      Allergies  No Known Allergies      Antimicrobials:  piperacillin/tazobactam IVPB. 3.375 Gram(s) IV Intermittent every 8 hours        Vital Signs Last 24 Hrs  T(C): 37.1 (28 Sep 2018 09:15), Max: 38.1 (27 Sep 2018 23:00)  T(F): 98.8 (28 Sep 2018 09:15), Max: 100.6 (27 Sep 2018 23:00)  HR: 67 (28 Sep 2018 11:30) (63 - 84)  BP: 90/53 (28 Sep 2018 11:30) (74/43 - 100/47)  BP(mean): 69 (28 Sep 2018 11:30) (59 - 69)  RR: 24 (28 Sep 2018 11:30) (18 - 35)  SpO2: 94% (28 Sep 2018 11:30) (90% - 100%)      PHYSICAL EXAM: Patient in no acute distress.    Constitutional: Comfortable. Sleepy but easily arousable.     Eyes: No discharge or conjunctival injection, + icterus     ENMT: Blood crusting the teeth, poor dentition.     Neck: Supple,     Respiratory: + air entry bilaterally.    Cardiovascular: S1 S2 wnl, No murmurs.    Gastrointestinal: Soft BS(+) mild tenderness lower abdominal area, non distended.    Genitourinary: No CVA tenderness     Extremities: No edema.    Vascular: peripheral pulses felt    Neurological:  No grossly focal deficits.    Skin: Jaundiced.     Musculoskeletal: No joint swelling.                            9.0    9.25  )-----------( 47       ( 28 Sep 2018 09:30 )             25.5       09-28    130<L>  |  104  |  27<H>  ----------------------------<  140<H>  4.8   |  16<L>  |  1.00    Ca    7.8<L>      28 Sep 2018 06:49  Phos  3.0     09-28  Mg     1.7     09-28    TPro  5.8<L>  /  Alb  2.0<L>  /  TBili  9.9<H>  /  DBili  4.5<H>  /  AST  89<H>  /  ALT  18  /  AlkPhos  113  09-28      Urinalysis (09.28.18 @ 01:49)    Nitrite: Positive    Leukocyte Esterase Concentration: Negative    Bacteria: Few    Red Blood Cell - Urine: 0-2 /hpf    White Blood Cell - Urine: 0-2 /hpf      Cell Count, Body Fluid (09.27.18 @ 23:44)    Eosinophil Count - Body Fluid: 1 %    Monocyte/Macrophage Count - Body Fluid: 10 %    Fluid Segmented Granulocytes: 84 %    Fluid Type: Ascites fluid    Body Fluid Appearance: Cloudy    BF Lymphocytes: 5 %    Tube Type: Lavender    Color - Body Fluid: Orange    Total Nucleated Cell Count, Body Fluid: 68215: Includes WBC and other nucleated cells if present. /uL    Total RBC Count: 58543 /uL      Culture - Body Fluid with Gram Stain (collected 28 Sep 2018 00:49)  Source: Peritoneal Peritoneal Fluid  Gram Stain (28 Sep 2018 03:03):    polymorphonuclear leukocytes seen    No organisms seen    by cytocentrifuge    Culture - Blood (collected 28 Sep 2018 00:41)  Source: .Blood Blood-Venous  Gram Stain (28 Sep 2018 11:24):    Growth in aerobic and anaerobic bottles:    Gram Negative Rods  Preliminary Report (28 Sep 2018 11:24):    Growth in aerobic and anaerobic bottles:    Gram Negative Rods    Culture - Blood (collected 28 Sep 2018 00:41)  Source: .Blood Blood-Peripheral  Gram Stain (28 Sep 2018 11:22):    Growth in aerobic bottle:    Gram Negative Rods    Growth in anaerobic bottle:    Gram Negative Rods  Preliminary Report (28 Sep 2018 11:23):    Growth in aerobic bottle:    Gram Negative Rods    Growth in anaerobic bottle:    Gram Negative Rods      Radiology: Imaging reviewed personally [ x]  < from: Xray Chest 1 View- PORTABLE-Urgent (09.28.18 @ 02:37) >  IMPRESSION:    Clear lungs.       < from: CT Abdomen and Pelvis w/ IV Cont (09.28.18 @ 02:00) >    IMPRESSION:  1.  Cirrhotic liver with portal venous hypertension and uphill  gastroesophageal varices as discussed above.  Incidentally noted the  recanalized umbilical vein insinuates anterior to the ventral abdominal   wall hernia mesh and enters the umbilicus.  Moderate abdominal and pelvic   ascites.    2.  Diffuse thickening of the right hemicolon compatible with portal  hypertensive colopathy; infectious colitis should be excluded clinically.

## 2018-09-28 NOTE — CONSULT NOTE ADULT - SUBJECTIVE AND OBJECTIVE BOX
Chief Complaint: Abdominal pain    HPI:    53 M w/ hx of cirrhosis due to alcohol dependence, c/b varices and hepatic encephalopathy who was brought to [facility]ED by brother for AMS. The patient follows at [facility] with Dr. Candelaria Stokes and is on list for liver transplantation The patient saw Dr. Stokes this past Wednesday and was feeling well at the time. He endorses abdominal pain starting on Thursday, associated with lightheadedness and which caused to experience a mechanical fall. He denies N/V, bloody emesis, coffee ground emesis, bloody stools, and black tarry stools. He has been having loose stools, but states he is on a medication to help him go to the bathroom (presumably lactulose). He endorses chronic gingival bleeding, due to "very bad" gingivitis. He otherwise denies fevers/chills and shortness of breath.    The patient was found to be febrile and hypotensive upon evaluation in the ED. He was diagnosed with SBP after diagnostic paracentesis with WBC > 37k. He was given 2.5 L of normal saline, albumin, and ceftriaxone in the ED. The MICU team continued albumin and escalated his antibiotics to Zosyn. Blood cultures were noted to be positive with Gram negative rods.    Allergies:  No Known Allergies    Home Medications:    · 	propranolol 20 mg oral tablet: 1 tab(s) orally 2 times a day  · 	metolazone 5 mg oral tablet: 1 tab(s) orally once a day  · 	spironolactone 25 mg oral tablet: 1 tab(s) orally once a day  · 	Multiple Vitamins oral tablet: 1 tab(s) orally once a day  · 	ciprofloxacin 500 mg oral tablet: 1 tab(s) orally once a day  · 	folic acid 1 mg oral tablet: 1 tab(s) orally once a day  · 	thiamine 100 mg oral tablet: 1 tab(s) orally once a day  · 	furosemide 80 mg oral tablet: 1 tab(s) orally once a day  · 	clindamycin 1% topical solution: 1 application topically 2 times a day  · 	hydrocortisone 1% topical cream: 1 application topically 2 times a day    Hospital Medications:  albumin human 25% IVPB 100 milliLiter(s) IV Intermittent every 6 hours  aluminum hydroxide/magnesium hydroxide/simethicone Suspension 30 milliLiter(s) Oral every 4 hours PRN  lactulose Syrup 20 Gram(s) Oral three times a day PRN  piperacillin/tazobactam IVPB. 3.375 Gram(s) IV Intermittent every 8 hours  thiamine 100 milliGRAM(s) Oral daily    PMHX/PSHX:  Esophageal varices  Alcoholic cirrhosis of liver with ascites  No pertinent past medical history  Abdominal hernia  H/O cataract    Family history:  Family history of hypercholesterolemia (Father)  Family history of diabetes mellitus (Mother)  No pertinent family history in first degree relatives    Social History:   Tob: Denies  Alcohol: Former drinker (endorses sobriety for the past 3.5 years)  Illicit Drugs: Denies    ROS:     General:  No wt loss, fevers, chills, night sweats, fatigue,   Eyes:  Good vision, no reported pain  ENT:  No sore throat, pain, runny nose, dysphagia  CV:  No pain, palpitations, hypo/hypertension  Pulm:  No dyspnea, cough, tachypnea, wheezing  GI:  See HPI  :  No pain, bleeding, incontinence, nocturia  Skin:  No rash, tattoos, scars, edema    PHYSICAL EXAM:     GENERAL:  No acute distress  HEENT:  Normocephalic/atraumatic, no scleral icterus, dried blood and bloody gums throughout oral cavity  CHEST:  Clear to auscultation bilaterally, no wheezes/rales/ronchi, no accessory muscle use  HEART:  Regular rate and rhythm, no murmurs/rubs/gallops  ABDOMEN:  Soft, non-tender, mildly-distended, normoactive bowel sounds,  no masses, no hepato-splenomegaly, no signs of chronic liver disease  EXTREMITIES: No cyanosis, clubbing, or edema  SKIN:  No rash/erythema  NEURO:  Alert and oriented x 3, no asterixis    Vital Signs:  Vital Signs Last 24 Hrs  T(C): 35.9 (28 Sep 2018 12:00), Max: 38.1 (27 Sep 2018 23:00)  T(F): 96.7 (28 Sep 2018 12:00), Max: 100.6 (27 Sep 2018 23:00)  HR: 69 (28 Sep 2018 14:00) (62 - 84)  BP: 85/51 (28 Sep 2018 14:00) (74/43 - 100/47)  BP(mean): 62 (28 Sep 2018 14:00) (59 - 69)  RR: 22 (28 Sep 2018 14:00) (18 - 35)  SpO2: 83% (28 Sep 2018 14:00) (83% - 100%)  Daily Height in cm: 187.96 (28 Sep 2018 09:10)    Daily Weight in k.6 (28 Sep 2018 09:10)    LABS:                        9.0    9.25  )-----------( 47       ( 28 Sep 2018 09:30 )             25.5     Mean Cell Volume: 100.8 fl (-18 @ 09:30)        130<L>  |  104  |  27<H>  ----------------------------<  140<H>  4.8   |  16<L>  |  1.00    Ca    7.8<L>      28 Sep 2018 06:49  Phos  3.0       Mg     1.7         TPro  5.8<L>  /  Alb  2.0<L>  /  TBili  9.9<H>  /  DBili  4.5<H>  /  AST  89<H>  /  ALT  18  /  AlkPhos  113      LIVER FUNCTIONS - ( 28 Sep 2018 06:49 )  Alb: 2.0 g/dL / Pro: 5.8 g/dL / ALK PHOS: 113 U/L / ALT: 18 U/L / AST: 89 U/L / GGT: x           PT/INR - ( 28 Sep 2018 07:22 )   PT: 32.2 sec;   INR: 2.92 ratio      PTT - ( 28 Sep 2018 07:22 )  PTT:50.4 sec  Urinalysis Basic - ( 28 Sep 2018 01:49 )    Color: Jodie / Appearance: Slightly Turbid / S.022 / pH: x  Gluc: x / Ketone: Trace  / Bili: Large / Urobili: Negative   Blood: x / Protein: 30 mg/dL / Nitrite: Positive   Leuk Esterase: Negative / RBC: 0-2 /hpf / WBC 0-2 /hpf   Sq Epi: x / Non Sq Epi: x / Bacteria: Few    Amylase Serum--      Lipase serum12       [id number]                          9.0    9.25  )-----------( 47       ( 28 Sep 2018 09:30 )             25.5                         11.2   10.9  )-----------( 68       ( 27 Sep 2018 23:12 )             33.9       Imaging: Chief Complaint: Abdominal pain    HPI:    53 M w/ hx of cirrhosis due to alcohol dependence, c/b varices and hepatic encephalopathy who was brought to [facility]ED by brother for AMS. The patient follows at [facility] with Dr. Candelaria Stokes and is on list for liver transplantation The patient saw Dr. Stokes this past Wednesday and was feeling well at the time. He endorses abdominal pain starting on Thursday, associated with lightheadedness and which caused to experience a mechanical fall. He denies N/V, bloody emesis, coffee ground emesis, bloody stools, and black tarry stools. He has been having loose stools, but states he is on a medication to help him go to the bathroom (presumably lactulose). He endorses chronic gingival bleeding, due to "very bad" gingivitis. He otherwise denies fevers/chills and shortness of breath.    The patient was found to be febrile and hypotensive upon evaluation in the ED. He was diagnosed with SBP after diagnostic paracentesis with WBC > 37k. He was given 2.5 L of normal saline, albumin, and ceftriaxone in the ED. The MICU team continued albumin and escalated his antibiotics to Zosyn. Blood cultures were noted to be positive with Gram negative rods.    Allergies:  No Known Allergies    Home Medications:    · 	propranolol 20 mg oral tablet: 1 tab(s) orally 2 times a day  · 	metolazone 5 mg oral tablet: 1 tab(s) orally once a day  · 	spironolactone 25 mg oral tablet: 1 tab(s) orally once a day  · 	Multiple Vitamins oral tablet: 1 tab(s) orally once a day  · 	ciprofloxacin 500 mg oral tablet: 1 tab(s) orally once a day  · 	folic acid 1 mg oral tablet: 1 tab(s) orally once a day  · 	thiamine 100 mg oral tablet: 1 tab(s) orally once a day  · 	furosemide 80 mg oral tablet: 1 tab(s) orally once a day  · 	clindamycin 1% topical solution: 1 application topically 2 times a day  · 	hydrocortisone 1% topical cream: 1 application topically 2 times a day    Hospital Medications:  albumin human 25% IVPB 100 milliLiter(s) IV Intermittent every 6 hours  aluminum hydroxide/magnesium hydroxide/simethicone Suspension 30 milliLiter(s) Oral every 4 hours PRN  lactulose Syrup 20 Gram(s) Oral three times a day PRN  piperacillin/tazobactam IVPB. 3.375 Gram(s) IV Intermittent every 8 hours  thiamine 100 milliGRAM(s) Oral daily    PMHX/PSHX:  Esophageal varices  Alcoholic cirrhosis of liver with ascites  No pertinent past medical history  Abdominal hernia  H/O cataract    Family history:  Family history of hypercholesterolemia (Father)  Family history of diabetes mellitus (Mother)  No pertinent family history in first degree relatives    Social History:   Tob: Denies  Alcohol: Former drinker (endorses sobriety for the past 3.5 years)  Illicit Drugs: Denies    ROS:     General:  No wt loss, fevers, chills, night sweats, fatigue,   Eyes:  Good vision, no reported pain  ENT:  No sore throat, pain, runny nose, dysphagia  CV:  No pain, palpitations, hypo/hypertension  Pulm:  No dyspnea, cough, tachypnea, wheezing  GI:  See HPI  :  No pain, bleeding, incontinence, nocturia  Skin:  No rash, tattoos, scars, edema    PHYSICAL EXAM:     GENERAL:  No acute distress  HEENT:  Normocephalic/atraumatic, no scleral icterus, dried blood and bloody gums throughout oral cavity  CHEST:  Clear to auscultation bilaterally, no wheezes/rales/ronchi, no accessory muscle use  HEART:  Regular rate and rhythm, no murmurs/rubs/gallops  ABDOMEN:  Soft, non-tender, mildly-distended, normoactive bowel sounds,  no masses, no hepato-splenomegaly, no signs of chronic liver disease  EXTREMITIES: No cyanosis, clubbing, or edema  SKIN:  No rash/erythema  NEURO:  Alert and oriented x 3, no asterixis    Vital Signs:  Vital Signs Last 24 Hrs  T(C): 35.9 (28 Sep 2018 12:00), Max: 38.1 (27 Sep 2018 23:00)  T(F): 96.7 (28 Sep 2018 12:00), Max: 100.6 (27 Sep 2018 23:00)  HR: 69 (28 Sep 2018 14:00) (62 - 84)  BP: 85/51 (28 Sep 2018 14:00) (74/43 - 100/47)  BP(mean): 62 (28 Sep 2018 14:00) (59 - 69)  RR: 22 (28 Sep 2018 14:00) (18 - 35)  SpO2: 83% (28 Sep 2018 14:00) (83% - 100%)  Daily Height in cm: 187.96 (28 Sep 2018 09:10)    Daily Weight in k.6 (28 Sep 2018 09:10)    LABS:                        9.0    9.25  )-----------( 47       ( 28 Sep 2018 09:30 )             25.5     Mean Cell Volume: 100.8 fl (-18 @ 09:30)        130<L>  |  104  |  27<H>  ----------------------------<  140<H>  4.8   |  16<L>  |  1.00    Ca    7.8<L>      28 Sep 2018 06:49  Phos  3.0       Mg     1.7         TPro  5.8<L>  /  Alb  2.0<L>  /  TBili  9.9<H>  /  DBili  4.5<H>  /  AST  89<H>  /  ALT  18  /  AlkPhos  113      LIVER FUNCTIONS - ( 28 Sep 2018 06:49 )  Alb: 2.0 g/dL / Pro: 5.8 g/dL / ALK PHOS: 113 U/L / ALT: 18 U/L / AST: 89 U/L / GGT: x           PT/INR - ( 28 Sep 2018 07:22 )   PT: 32.2 sec;   INR: 2.92 ratio      PTT - ( 28 Sep 2018 07:22 )  PTT:50.4 sec  Urinalysis Basic - ( 28 Sep 2018 01:49 )    Color: Jodie / Appearance: Slightly Turbid / S.022 / pH: x  Gluc: x / Ketone: Trace  / Bili: Large / Urobili: Negative   Blood: x / Protein: 30 mg/dL / Nitrite: Positive   Leuk Esterase: Negative / RBC: 0-2 /hpf / WBC 0-2 /hpf   Sq Epi: x / Non Sq Epi: x / Bacteria: Few    Amylase Serum--      Lipase serum12       [id number]                          9.0    9.25  )-----------( 47       ( 28 Sep 2018 09:30 )             25.5                         11.2   10.9  )-----------( 68       ( 27 Sep 2018 23:12 )             33.9       Imaging:    < from: CT Abdomen and Pelvis w/ IV Cont (18 @ 02:00) >    EXAM:  CT ABDOMEN AND PELVIS IC                            *** ADDENDUM 2018  ***    Addendum:  There are voice-recognition transcription errors in the Impression .    Please see correction below.    IMPRESSION:  1.  Cirrhotic liver with portal venous hypertension and uphill  gastroesophageal varices as discussed above.  Incidentally noted the  recanalized umbilical vein insinuates anterior to the ventral abdominal   wall hernia mesh and enters the umbilicus.  Moderate abdominal and pelvic   ascites.    2.  Diffuse thickening of the right hemicolon compatible with portal  hypertensive colopathy; infectious colitis should be excluded clinically.      *** END OF ADDENDUM 2018  ***      PROCEDURE DATE:  2018            INTERPRETATION:  CLINICAL INFORMATION:  L pain.    TECHNIQUE:   Axial CT images were acquired through the abdomen and pelvis.  Intravenous contrast:  90 cc of Omnipaque-350 mg/ml were administered,   and 10 cc were discarded.  Oral contrast:  None.  [place] sagittal reformats were generated.     COMPARISON STUDY:  08/15/2017.    FINDINGS:  Visualized lower chest: There are uphill gastroesophageal varices.    Liver: Cirrhotic morphology of the liver.  Bile ducts: Normal caliber.  Gallbladder: No CT evidence of gallstones.  Spleen: Splenomegaly.  Spleen measures approximately 16.8 cm in AP   dimension (2:35)  Pancreas: Atrophic.  Adrenal glands: Within normal limits.  Kidneys/ureters: Within normal limits.    Bladder: Underdistended.  Reproductiveorgans: Prostate is not enlarged.      Bowel: No bowel obstruction.  Normal appendix.  Diffuse wall thickening   of the right hemicolon.  Peritoneum: Moderate abdominal and pelvic ascites.    Retroperitoneum: Nonspecific upper abdominal lymph nodes measure up to 20   x 12 mm at the level of the proximal SMA (2:36)  Vasculature: Splenic vein, main portal vein and SMV are patent.    Recanalized umbilical vein.  Large mesenteric varices and large   splenorenal shunt.    Abdominal wall/soft tissues: Patient appears to be status post mesh   repair of umbilical hernia.  Incidentally noted the patient recanalized   umbilical vein insinuates anterior to the hernia mesh and enters the   umbilicus.  Bones: Severe left hip osteoarthrosis and mild right hip osteoarthrosis.      IMPRESSION:  1.  Cirrhotic liver with portal venous hypertension are and uphill   gastroesophageal varices as discussed above.  Incidentally noted the   recanalized umbilical vein insinuates anterior to the ventral abdominal   wall hernia mesh and enters the umbilicus.  Moderate abdominal and pelvic   ascites.      2.  Diffuse thickening of the right hemicolon compatible with portal   hypertensive colopathy; infectious colitis should be excluded clinically.    Incident    < end of copied text >

## 2018-09-28 NOTE — CONSULT NOTE ADULT - ASSESSMENT
Impression:    1. Cirrhosis, decompensated, due to alcohol dependence  Varices: Prior varices, on propanolol 20 mg PO BID  HE: On lactulose 20 mg PO BID  Ascites: Moderate on CT A/P on 9/28/18, + for SBP on 9/27/18  HCC: No liver lesions seen on CT A/P on 9/28/18  Meld-Na: 30  Transplant Candidacy: Listed at [facility], patient of Dr. Candelaria Stokes  2. SBP: > 36k WBCs on diagnostic paracentesis  3. Bacteremia: Gram negative rods on blood culture. Likely source intraperitoneal. qSOFA 2.  4. Alcohol dependence: Sober for the past 3.5 years    Recommendations:  - Monitor daily CBC, CMP, Mg, P, INR to calculate daily Meld-Na  - F/U blood cultures  - Continue IV Zosyn for treatment of SBP / bacteremia  - Repeat paracentesis 48 hours after initial paracentesis  - Continue albumin 25g q6h (1.5mg/kg)   - Hold diuretics in setting of SBP  - Continue lactulose 20 mg PO BID  - Re-start propanolol 20 mg PO BID when no longer septic / hypotensive  - Transplant hepatology fellow at [facility] contacted; once patient is transferred to medical floor, patient is to be transferred to [facility] for continued medical management    Anish Mcbride MD  Gastroenterology Fellow  Pager number: [phone] / [phone]

## 2018-09-28 NOTE — ED ADULT NURSE REASSESSMENT NOTE - NS ED NURSE REASSESS COMMENT FT1
patient has intermittent periods of hypotension. When the patient is sitting up, he is systolic 100s and distolic 40s. Patient when laying down drops his pressures to 60s systolic with 40s diastolic. MAR was at bedside. Patient is to receive another 250cc of albumin. Will continue to monitor.

## 2018-09-28 NOTE — CONSULT NOTE ADULT - ATTENDING COMMENTS
Patient was seen and examined with GI fellow at [facility]. Agree with above.  A 53 M w/ Hx of alcoholic cirrhosis. complicated by  varices and hepatic encephalopathy was seen for hypotension and AMS.   Patient is on liver transplant list at [facility].   He developed abdominal pain and lightheadedness followed by a fall. Patient found to have fever and hypotension and diagnostic paracentesis was consistent with SBP. Blood cultures positive for Gm negative rods E. coli. MELD-Na 30, , aLT 19, , TB 10.9.   will recommend  - antibiotics, and albumin day 1 (1.5 gm /kg) and day 3 (1gm/kg) or 25 gm q6h for 3 days.   -daily MELD-Na  - Hold diuretics in setting of SBP  - Continue lactulose 20 mg PO BID  - Transplant hepatology fellow at [facility] contacted

## 2018-09-28 NOTE — H&P ADULT - NSHPREVIEWOFSYSTEMS_GEN_ALL_CORE
Review of Systems:   CONSTITUTIONAL: No fever, chills. + fatigue  EYES: No eye pain or discharge  ENMT:  No difficulty hearing, tinnitus, vertigo; No sinus or throat pain  NECK: No pain or stiffness  RESPIRATORY: No cough, wheezing, chills or hemoptysis; No shortness of breath  CARDIOVASCULAR: No chest pain, palpitations. + dizziness  GASTROINTESTINAL: + loose BMs. No abdominal or epigastric pain.   GENITOURINARY: No dysuria, frequency  NEUROLOGICAL: No headaches. + disorientation  SKIN: No rashes  MUSCULOSKELETAL: No joint pain or swelling  HEME/LYMPH: +bleeding gums  ALLERY AND IMMUNOLOGIC: No hives or eczema

## 2018-09-28 NOTE — CONSULT NOTE ADULT - ASSESSMENT
53m PMHx EtOH cirrhosis c/b varices, hepatic encephalopathy on transplant list at [facility] p/w AMS since last night as per brother, found w/ hypotension to SBP 80s, temp 100.6, 90% on RA, gingival bleeding, abdominal pain w/ CT a/p showing esoph varices, moderate ascites, portal hypertensive colopathy, s/p diag paracentesis w/ 37K WBC, 26K RBC, (+) UTI,     Neuro  # Hepatic encephalopathy w/ SBP: Mild, AOx2-3 below baseline per brothers. ammonia 36.   - cont w/ CTX 2g q8hrs for SBP, start rifaxim, lactulose titrating to 2-3 stools per day,   - check zinc level, replete as necessary to enhance ammonia conversion     Pulm  # Hypoxic respiratory failure: mild, sat'ing well on 2L, CXR w/o radiolographic of pna  - may be 2/2 fluid overload in setting of moderate ascites and hypotension req'ing fluids  - will cont w/ o2 as needed, would consider checking RVP   	  CVS  # Hypotension w/ possible distributive shock in setting of SBP  - cont w/ albumin repletion, vitals q4, place grover, strict I/Os.   - assess for further albumin     GI   # Cirrhosis w/ varcies c/b SBP. on txp list on [facility]  - SBP tx w/ CTX, MELD at 29, needs hepatology consult,   - cont w/ albumin repletion, total on day 1 should be 1.5g/kg, start   - would BB for varices in setting of hypotension, would consider starting if HDS    Renal  # at high risk of decompensation into HRS, however sCr currently wnl.   - strict I/Os, albumin supplementation should help ppx against HRS    ID  # SBP, 36K nucleated cells on diag para, cont w/ CTX, consider RVP check    Heme  # Coagulopathic w/ thrombocytopenia in setting of cirrhosis w/ gingival hemorrhage  - dental consult to optimize dentition.     Endo  - no acute issues.     # DVT ppx: hold pharm AC given varices and high risk of bleed. ICD's for now. 53m PMHx EtOH cirrhosis c/b varices, hepatic encephalopathy on transplant list at [facility] p/w AMS since last night as per brother, found w/ hypotension to SBP 80s, temp 100.6, 90% on RA, gingival bleeding, abdominal pain w/ CT a/p showing esoph varices, moderate ascites, portal hypertensive colopathy, s/p diag paracentesis w/ 37K WBC, 26K RBC, (+) UTI,     Neuro  # Hepatic encephalopathy w/ SBP: Mild, AOx2-3 below baseline per brothers. ammonia 36.   - cont w/ CTX 2g q8hrs for SBP, start rifaxim, lactulose titrating to 2-3 stools per day,   - check zinc level, replete as necessary to enhance ammonia conversion     Pulm  # Hypoxic respiratory failure: mild, sat'ing well on 2L, CXR w/o radiolographic of pna  - may be 2/2 fluid overload in setting of moderate ascites and hypotension req'ing fluids  - will cont w/ o2 as needed, would consider checking RVP   	  CVS  # Hypotension w/ possible distributive shock in setting of SBP  - cont w/ albumin repletion, vitals q4, place grover, strict I/Os.   - assess for further albumin     GI   # Cirrhosis w/ varcies c/b SBP. on txp list on [facility]  - SBP tx w/ CTX, MELD at 29, needs hepatology consult,   - cont w/ albumin repletion, total on day 1 should be 1.5g/kg, start   - would BB for varices in setting of hypotension, would consider starting if HDS    Renal  # HypoNa: mild at 132, likely represent hypervolemic in setting of cirrhosis w/ ascites.   - cont to trend for now.   # at high risk of decompensation into HRS, however sCr currently wnl.   - strict I/Os, albumin supplementation should help ppx against HRS    ID  # SBP, 36K nucleated cells on diag para, cont w/ CTX, consider RVP check    Heme  # Coagulopathic w/ thrombocytopenia in setting of cirrhosis w/ gingival hemorrhage  - dental consult to optimize dentition.     Endo  - no acute issues.     # DVT ppx: hold pharm AC given varices and high risk of bleed. ICD's for now. 53m PMHx EtOH cirrhosis c/b varices, hepatic encephalopathy on transplant list at [facility] p/w AMS since last night as per brother, found w/ hypotension to SBP 80s, temp 100.6, 90% on RA, gingival bleeding, abdominal pain w/ CT a/p showing esoph varices, moderate ascites, portal hypertensive colopathy, s/p diag paracentesis w/ 37K WBC, 26K RBC, (+) UTI,     Neuro  # Hepatic encephalopathy w/ SBP: Mild, AOx2-3 below baseline per brothers. ammonia 36.   - cont w/ CTX 2g q8hrs for SBP, start rifaxim, lactulose titrating to 2-3 stools per day,   - check zinc level, replete as necessary to enhance ammonia conversion     Pulm  # Hypoxic respiratory failure: mild, sat'ing well on 2L, CXR w/o radiolographic of pna  - may be 2/2 fluid overload in setting of moderate ascites and hypotension req'ing fluids  - will cont w/ o2 as needed, would consider checking RVP   	  CVS  # Hypotension w/ possible distributive shock in setting of SBP  - cont w/ albumin repletion up to 1.5g/kg on day, vitals q4, place grover, strict I/Os.   - assess for further albumin needs    GI   # Cirrhosis w/ varcies c/b SBP. on txp list on [facility]  - SBP tx w/ CTX, MELD at 29, needs hepatology consult,   - cont w/ albumin repletion, total on day 1 should be 1.5g/kg, start   - would BB for varices in setting of hypotension, would consider starting if HDS  - cont w/ lactulose and titrate to BMs 2-3 per day   # Portal hypertensive colopathy  - found on CT a/p, serial abdominals exams, current abdom soft w/ mild LQ TTP.     Renal  # HypoNa: mild at 132, likely represent hypervolemic in setting of cirrhosis w/ ascites.   - check U osm, U cr, U Na, serum Osm, cont to trend for now.   # at high risk of decompensation into HRS, however sCr currently wnl.   - strict I/Os, albumin supplementation should help ppx against HRS    ID  # SBP, 36K nucleated cells on diag para, received CTX and zosyn  - given severity of inflammatory state, will upgrade abx to meropenam and will start vanco.   - follow blood cx and urine cx.     Heme  # Coagulopathic w/ thrombocytopenia in setting of cirrhosis w/ gingival hemorrhage  - dental consult to optimize dentition.   # Macrocytic anemia   - likely 2/2 chronic EtOH abuse c/b cirrhosis, b12 level in 2015 wnl, would repeat     Endo  # no acute issues.     # DVT ppx: hold pharm AC given varices and high risk of bleed. ICD's for now. 53m PMHx EtOH cirrhosis c/b varices, hepatic encephalopathy on transplant list at [facility] p/w AMS since last night as per brother, found w/ hypotension to SBP 80s, temp 100.6, 90% on RA, gingival bleeding, abdominal pain w/ CT a/p showing esoph varices, moderate ascites, portal hypertensive colopathy, s/p diag paracentesis w/ 37K WBC, 26K RBC, (+) UTI,     Neuro  # Hepatic encephalopathy w/ SBP: Mild, AOx2-3 below baseline per brothers. ammonia 36.   - cont w/ CTX 2g q8hrs for SBP, start rifaxim, lactulose titrating to 2-3 stools per day,   - check zinc level, replete as necessary to enhance ammonia conversion     Pulm  # Hypoxic respiratory failure: mild, sat'ing well on 2L, CXR w/o radiolographic of pna  - may be 2/2 fluid overload in setting of moderate ascites and hypotension req'ing fluids  - will cont w/ o2 as needed, would consider checking RVP   	  CVS  # Hypotension w/ possible distributive shock in setting of SBP  - cont w/ albumin repletion up to 1.5g/kg on day, vitals q4, place grover, strict I/Os.   - assess for further albumin needs    GI   # Cirrhosis w/ varcies c/b SBP. on txp list on [facility], hep A, B, C all non reactive in 2015.   - SBP tx w/ CTX, MELD at 29, needs hepatology consult,   - cont w/ albumin repletion, total on day 1 should be 1.5g/kg, start   - would BB for varices in setting of hypotension, would consider starting if HDS  - cont w/ lactulose and titrate to BMs 2-3 per day   # Portal hypertensive colopathy  - found on CT a/p, serial abdominals exams, current abdom soft w/ mild LQ TTP.     Renal  # HypoNa: mild at 132, likely represent hypervolemic in setting of cirrhosis w/ ascites.   - check U osm, U cr, U Na, serum Osm, cont to trend for now.   # Metabolic acidosis w/ bicarb 19-> 16. Lactate nusrat 4  - likely 2/2 lactic acidosis in setting of sepsis w/ SBP.   # at high risk of decompensation into HRS, however sCr currently wnl.   - strict I/Os, albumin supplementation should help ppx against HRS    ID  # SBP, 36K nucleated cells on diag para, received CTX and zosyn  - given severity of inflammatory state, will upgrade abx to meropenam and will start vanco.   - follow blood cx and urine cx.     Heme  # Coagulopathic w/ thrombocytopenia in setting of cirrhosis w/ gingival hemorrhage  - dental consult to optimize dentition.   # Macrocytic anemia   - likely 2/2 chronic EtOH abuse c/b cirrhosis, b12 level in 2015 wnl, would repeat     Endo  # no acute issues.     # DVT ppx: hold pharm AC given varices and high risk of bleed. ICD's for now.

## 2018-09-28 NOTE — H&P ADULT - NSHPPHYSICALEXAM_GEN_ALL_CORE
General: NAD, well-developed  HEENT: EOMI, (+) scleral icteris, dried blood in oral cavity. no active bleeding  Neck: Supple, No JVD  Chest/Lung: (+) mild crackles at BL lung bases  Heart: Regular rate and rhythm; No murmurs, rubs, or gallops.   Abdom: Soft, Nondistended; Bowel sounds present. (+) diffuse TTP, worse in lower quandrants.   Extremities: No lower extremity edema. No (+) asterixis   Psych: AAOx2-3  Neurology: non-focal, strength and sensation grossly intact UE and LE BL. No spinal TTP  Skin: No rashes or lesions

## 2018-09-28 NOTE — CONSULT NOTE ADULT - SUBJECTIVE AND OBJECTIVE BOX
Patient is a 53y old  Male who presents with a chief complaint of SBP (28 Sep 2018 15:22)      HPI:  53M PMH EtOH cirrhosis (no alcohol in 3.5 yrs, on transplant list at [facility]) c/b varices and hepatic encephalopathy who presented w/AMS since last night, as per brother. He had reported abdominal pain that started a few days prior with associated dizziness and an episode of lightheadedness today that caused him to fall. He has been having loose, but not watery, BMs at home and is adherent to his home lactulose. Denies fevers/chills, CP, SOB, palpitations, headaches, vision changes     In the ED, he was found to be hypotensive to SBP 80's, febrile to T 100.6 F, 90% on RA. He also complained of abdominal pain and was noted to have gingival bleeding. CT A/P demonstrated esophageal varices, moderate ascites, and portal hypertensive colopathy. He had a diag paracentesis w/ 37K WBC. (+) UTI. He received, NS 2.5L total, albumin 25% x 250cc, CTX 2g, zosyn. (28 Sep 2018 09:54)      PAST MEDICAL & SURGICAL HISTORY:  Esophageal varices  Alcoholic cirrhosis of liver with ascites  Abdominal hernia: S/P repair x2  H/O cataract      MEDICATIONS  (STANDING):  albumin human 25% IVPB 100 milliLiter(s) IV Intermittent every 6 hours  piperacillin/tazobactam IVPB. 3.375 Gram(s) IV Intermittent every 8 hours  thiamine 100 milliGRAM(s) Oral daily    MEDICATIONS  (PRN):  aluminum hydroxide/magnesium hydroxide/simethicone Suspension 30 milliLiter(s) Oral every 4 hours PRN Dyspepsia  lactulose Syrup 20 Gram(s) Oral three times a day PRN Titrate to 2-3 BMs daily      Allergies No Known Allergies    FAMILY HISTORY:  Family history of hypercholesterolemia (Father)  Family history of diabetes mellitus (Mother)    Vital Signs Last 24 Hrs  T(C): 36.6 (28 Sep 2018 16:00), Max: 38.1 (27 Sep 2018 23:00)  T(F): 97.8 (28 Sep 2018 16:00), Max: 100.6 (27 Sep 2018 23:00)  HR: 70 (28 Sep 2018 18:00) (62 - 84)  BP: 103/52 (28 Sep 2018 18:00) (74/43 - 103/52)  BP(mean): 73 (28 Sep 2018 18:00) (59 - 73)  RR: 31 (28 Sep 2018 18:00) (18 - 35)  SpO2: 94% (28 Sep 2018 18:00) (83% - 100%)    EOE:  TMJ (- ) clicks                    (  -  ) pops                    (  -  ) crepitus             Mandible <<FROM>>             Facial bones and MOM <<grossly intact>>             ( -  ) trismus             ( -  ) LAD             ( -  ) swelling             ( -  ) asymmetry             ( -  ) palpation             ( -  ) SOB             ( -  ) dysphagia             ( -  ) LOC    IOE:  permanent dentition: multiple carious teeth and multiple missing teeth           hard/soft palate:  ( -  ) palatal torus           tongue/FOM WNL           labial/buccal mucosa WNL           ( -  ) percussion           ( -  ) palpation           ( -  ) swelling     Mobility:  #3 and #31    LABS:                        9.0    9.25  )-----------( 47       ( 28 Sep 2018 09:30 )             25.5         130<L>  |  104  |  27<H>  ----------------------------<  140<H>  4.8   |  16<L>  |  1.00    Ca    7.8<L>      28 Sep 2018 06:49  Phos  3.0       Mg     1.7         TPro  5.8<L>  /  Alb  2.0<L>  /  TBili  9.9<H>  /  DBili  4.5<H>  /  AST  89<H>  /  ALT  18  /  AlkPhos  113      WBC Count: 9.25 K/uL [3.80 - 10.50] ( @ 09:30)  WBC Count: 10.9 K/uL <H> [3.8 - 10.5] ( @ 23:12)    Platelet Count - Automated: 47 K/uL <L> [150 - 400] ( @ 09:30)  Platelet Count - Automated: 68 K/uL <L> [150 - 400] ( @ 23:12)    INR: 2.92 ratio <H> [0.88 - 1.16] ( @ 07:22)  INR: 2.56 ratio <H> [0.88 - 1.16] ( @ 23:12)    Culture Results:   Growth in aerobic bottle:  Gram Negative Rods  Growth in anaerobic bottle:  Gram Negative Rods  "Due to technical problems, Proteus sp. will Not be reported as part of  the BCID panel until further notice"  ***Blood Panel PCR results on this specimen are available  approximately 3 hours after the Gram stain result.***  Gram stain, PCR, and/or culture results may not always  correspond due to difference in methodologies.  ************************************************************  This PCR assay was performed using [employer].  The following targets are tested for: Enterococcus,  vancomycin resistant enterococci, Listeria monocytogenes,  coagulase negative staphylococci, S. aureus,  methicillin resistant S. aureus, Streptococcus agalactiae  (Group B), S. pneumoniae, S. pyogenes (Group A),  Acinetobacter baumannii, Enterobacter cloacae, E. coli,  Klebsiella oxytoca, K. pneumoniae, Proteus sp.,  Serratia marcescens, Haemophilus influenzae,  Neisseria meningitidis, Pseudomonas aeruginosa, Candida  albicans, C. glabrata, C krusei, C parapsilosis,  C. tropicalis and the KPC resistance gene. ( @ 00:41)  Culture Results:   Growth in aerobic and anaerobic bottles:  Gram Negative Rods ( @ 00:41)    Urinalysis Basic - ( 28 Sep 2018 01:49 )    Color: Jodie / Appearance: Slightly Turbid / S.022 / pH: x  Gluc: x / Ketone: Trace  / Bili: Large / Urobili: Negative   Blood: x / Protein: 30 mg/dL / Nitrite: Positive   Leuk Esterase: Negative / RBC: 0-2 /hpf / WBC 0-2 /hpf   Sq Epi: x / Non Sq Epi: x / Bacteria: Few    ASSESSMENT: 53 year old male present in MICU consulted to rule out dental as source of infection. Pt is non-transportable. Past medical history significant for alcohol cirrhosis and on liver transplant list. Pt is admitted in hospital for spontaneous bacterial peritonitis. Pt reported to have mutiple extractions recently and has bleeding gums. Pt is untransportable to dental ED. Bedside exam was performed. EOE reveals no signs of LAD, TMD, asymmetry, or swelling. IOE reveals no signficant findings on buccal vestibule, lateral border of tongue, hard and soft palate, or uvula. Multiple missing teeth with grade 2 mobility on tooth #3 and 31 and distal fracture on #31. Since radiographs were not able to be taken, we can't rule out dental as source of infection. However, clinically unlikely source of dental etiology.     PROCEDURE: Limited oral exam  Verbal and written consent given.     RECOMMENDATIONS:   1) Dental F/U with outpatient dentist for comprehensive dental care.   2) If any difficulty swallowing/breathing, fever occur, page dental.     Dr. Robert Davis, DDS, pager #[phone]  Oral surgeon consulted: Dr. Lau Patient is a 53y old  Male who presents with a chief complaint of SBP (28 Sep 2018 15:22)      HPI:  53M PMH EtOH cirrhosis (no alcohol in 3.5 yrs, on transplant list at [facility]) c/b varices and hepatic encephalopathy who presented w/AMS since last night, as per brother. He had reported abdominal pain that started a few days prior with associated dizziness and an episode of lightheadedness today that caused him to fall. He has been having loose, but not watery, BMs at home and is adherent to his home lactulose. Denies fevers/chills, CP, SOB, palpitations, headaches, vision changes     In the ED, he was found to be hypotensive to SBP 80's, febrile to T 100.6 F, 90% on RA. He also complained of abdominal pain and was noted to have gingival bleeding. CT A/P demonstrated esophageal varices, moderate ascites, and portal hypertensive colopathy. He had a diag paracentesis w/ 37K WBC. (+) UTI. He received, NS 2.5L total, albumin 25% x 250cc, CTX 2g, zosyn. (28 Sep 2018 09:54)      PAST MEDICAL & SURGICAL HISTORY:  Esophageal varices  Alcoholic cirrhosis of liver with ascites  Abdominal hernia: S/P repair x2  H/O cataract      MEDICATIONS  (STANDING):  albumin human 25% IVPB 100 milliLiter(s) IV Intermittent every 6 hours  piperacillin/tazobactam IVPB. 3.375 Gram(s) IV Intermittent every 8 hours  thiamine 100 milliGRAM(s) Oral daily    MEDICATIONS  (PRN):  aluminum hydroxide/magnesium hydroxide/simethicone Suspension 30 milliLiter(s) Oral every 4 hours PRN Dyspepsia  lactulose Syrup 20 Gram(s) Oral three times a day PRN Titrate to 2-3 BMs daily      Allergies No Known Allergies    FAMILY HISTORY:  Family history of hypercholesterolemia (Father)  Family history of diabetes mellitus (Mother)    Vital Signs Last 24 Hrs  T(C): 36.6 (28 Sep 2018 16:00), Max: 38.1 (27 Sep 2018 23:00)  T(F): 97.8 (28 Sep 2018 16:00), Max: 100.6 (27 Sep 2018 23:00)  HR: 70 (28 Sep 2018 18:00) (62 - 84)  BP: 103/52 (28 Sep 2018 18:00) (74/43 - 103/52)  BP(mean): 73 (28 Sep 2018 18:00) (59 - 73)  RR: 31 (28 Sep 2018 18:00) (18 - 35)  SpO2: 94% (28 Sep 2018 18:00) (83% - 100%)    EOE:  TMJ (- ) clicks                    (  -  ) pops                    (  -  ) crepitus             Mandible <<FROM>>             Facial bones and MOM <<grossly intact>>             ( -  ) trismus             ( -  ) LAD             ( -  ) swelling             ( -  ) asymmetry             ( -  ) palpation             ( -  ) SOB             ( -  ) dysphagia             ( -  ) LOC    IOE:  permanent dentition: multiple carious teeth and multiple missing teeth           hard/soft palate:  ( -  ) palatal torus           tongue/FOM WNL           labial/buccal mucosa WNL           ( -  ) percussion           ( -  ) palpation           ( -  ) swelling     Mobility:  #3 and #31    LABS:                        9.0    9.25  )-----------( 47       ( 28 Sep 2018 09:30 )             25.5         130<L>  |  104  |  27<H>  ----------------------------<  140<H>  4.8   |  16<L>  |  1.00    Ca    7.8<L>      28 Sep 2018 06:49  Phos  3.0       Mg     1.7         TPro  5.8<L>  /  Alb  2.0<L>  /  TBili  9.9<H>  /  DBili  4.5<H>  /  AST  89<H>  /  ALT  18  /  AlkPhos  113      WBC Count: 9.25 K/uL [3.80 - 10.50] ( @ 09:30)  WBC Count: 10.9 K/uL <H> [3.8 - 10.5] ( @ 23:12)    Platelet Count - Automated: 47 K/uL <L> [150 - 400] ( @ 09:30)  Platelet Count - Automated: 68 K/uL <L> [150 - 400] ( @ 23:12)    INR: 2.92 ratio <H> [0.88 - 1.16] ( @ 07:22)  INR: 2.56 ratio <H> [0.88 - 1.16] ( @ 23:12)    Culture Results:   Growth in aerobic bottle:  Gram Negative Rods  Growth in anaerobic bottle:  Gram Negative Rods  "Due to technical problems, Proteus sp. will Not be reported as part of  the BCID panel until further notice"  ***Blood Panel PCR results on this specimen are available  approximately 3 hours after the Gram stain result.***  Gram stain, PCR, and/or culture results may not always  correspond due to difference in methodologies.  ************************************************************  This PCR assay was performed using [employer].  The following targets are tested for: Enterococcus,  vancomycin resistant enterococci, Listeria monocytogenes,  coagulase negative staphylococci, S. aureus,  methicillin resistant S. aureus, Streptococcus agalactiae  (Group B), S. pneumoniae, S. pyogenes (Group A),  Acinetobacter baumannii, Enterobacter cloacae, E. coli,  Klebsiella oxytoca, K. pneumoniae, Proteus sp.,  Serratia marcescens, Haemophilus influenzae,  Neisseria meningitidis, Pseudomonas aeruginosa, Candida  albicans, C. glabrata, C krusei, C parapsilosis,  C. tropicalis and the KPC resistance gene. ( @ 00:41)  Culture Results:   Growth in aerobic and anaerobic bottles:  Gram Negative Rods ( @ 00:41)    Urinalysis Basic - ( 28 Sep 2018 01:49 )    Color: Jodie / Appearance: Slightly Turbid / S.022 / pH: x  Gluc: x / Ketone: Trace  / Bili: Large / Urobili: Negative   Blood: x / Protein: 30 mg/dL / Nitrite: Positive   Leuk Esterase: Negative / RBC: 0-2 /hpf / WBC 0-2 /hpf   Sq Epi: x / Non Sq Epi: x / Bacteria: Few    ASSESSMENT: 53 year old male present in MICU consulted for poor dentition and rule out dental as source of E-coli infection. Pt is non-transportable. Past medical history significant for alcohol cirrhosis and on liver transplant list. Pt is admitted in hospital for spontaneous bacterial peritonitis. Pt reported to have mutiple extractions recently and has bleeding gums. Pt is untransportable to dental ED. Bedside exam was performed. EOE reveals no signs of LAD, TMD, asymmetry, or swelling. IOE reveals no signficant findings on buccal vestibule, lateral border of tongue, hard and soft palate, or uvula. Multiple missing teeth with grade 2 mobility on tooth #3 and 31 and distal fracture on #31. Pt had gingival bleeding last night that achieved hemostasis. Dried blood present on surfaces of teeth and palate. Since radiographs were not able to be taken, we can't rule out dental as source of infection. However, clinically unlikely source of dental etiology.     PROCEDURE: Limited oral exam  Verbal and written consent given.     RECOMMENDATIONS:   1) Dental F/U with outpatient dentist for comprehensive dental care.   2) If any difficulty swallowing/breathing, fever occur, page dental.     Dr. Robert Davis, DDS, pager #[phone]  Oral surgeon consulted: Dr. Lau

## 2018-09-28 NOTE — ED ADULT NURSE REASSESSMENT NOTE - NS ED NURSE REASSESS COMMENT FT1
MICU MD at bedside states patient will go to MICU. Patient a&ox3 albumin still infusing. Will continue to monitor.

## 2018-09-28 NOTE — ED ADULT NURSE REASSESSMENT NOTE - NS ED NURSE REASSESS COMMENT FT1
report called to MICU. Patient tolerating albumin well. BPs are very labile. Patient mentating, denies dizziness. No change in mental status. Awaiting MICU resident to come to ED and escort patient upstairs.

## 2018-09-28 NOTE — H&P ADULT - FAMILY HISTORY
Mother  Still living? Unknown  Family history of diabetes mellitus, Age at diagnosis: Age Unknown     Father  Still living? Unknown  Family history of hypercholesterolemia, Age at diagnosis: Age Unknown

## 2018-09-28 NOTE — CONSULT NOTE ADULT - ASSESSMENT
Severe sepsis   Hypotension  EtOH cirrhosis, on transplant list.   SBP  E coli bacteremia    Bandemia   Transaminitis   Lactic acidosis       Plan:   Continue with Zosyn 3.375 gm iv q8h   Follow up sensitivities  Follow up urine cx  Repeat blood cx ordered.

## 2018-09-28 NOTE — ED ADULT NURSE REASSESSMENT NOTE - NS ED NURSE REASSESS COMMENT FT1
assessed patient at start of shift. Patient is laying in bed alert. MAR is at patients bedside. Patient is a&ox3. He was made aware that due to his weakness he is a fall risk. He was made aware he must ask for assistance if he needs anything. Patient verbalized understanding. Bedrails up for patient safety. Patient has IV fluids and albumin infusing at this time. IV sites are patent. MICU evaluated patient, and they will be returning to reasess. POPPY LIVINGSTON at bedside states patient is not go to to medicine floor at this time. Will continue to monitor.

## 2018-09-29 LAB
ALBUMIN SERPL ELPH-MCNC: 2.9 G/DL — LOW (ref 3.3–5)
ALP SERPL-CCNC: 91 U/L — SIGNIFICANT CHANGE UP (ref 40–120)
ALT FLD-CCNC: 12 U/L — SIGNIFICANT CHANGE UP (ref 10–45)
ANION GAP SERPL CALC-SCNC: 9 MMOL/L — SIGNIFICANT CHANGE UP (ref 5–17)
APTT BLD: 66.3 SEC — HIGH (ref 27.5–37.4)
AST SERPL-CCNC: 69 U/L — HIGH (ref 10–40)
BASOPHILS # BLD AUTO: 0 K/UL — SIGNIFICANT CHANGE UP (ref 0–0.2)
BASOPHILS NFR BLD AUTO: 0.1 % — SIGNIFICANT CHANGE UP (ref 0–2)
BILIRUB SERPL-MCNC: 11.8 MG/DL — HIGH (ref 0.2–1.2)
BUN SERPL-MCNC: 24 MG/DL — HIGH (ref 7–23)
CALCIUM SERPL-MCNC: 7.9 MG/DL — LOW (ref 8.4–10.5)
CHLORIDE SERPL-SCNC: 106 MMOL/L — SIGNIFICANT CHANGE UP (ref 96–108)
CO2 SERPL-SCNC: 18 MMOL/L — LOW (ref 22–31)
CREAT SERPL-MCNC: 0.89 MG/DL — SIGNIFICANT CHANGE UP (ref 0.5–1.3)
CULTURE RESULTS: NO GROWTH — SIGNIFICANT CHANGE UP
EOSINOPHIL # BLD AUTO: 0.2 K/UL — SIGNIFICANT CHANGE UP (ref 0–0.5)
EOSINOPHIL NFR BLD AUTO: 2.8 % — SIGNIFICANT CHANGE UP (ref 0–6)
FOLATE SERPL-MCNC: 13.5 NG/ML — SIGNIFICANT CHANGE UP
GLUCOSE SERPL-MCNC: 220 MG/DL — HIGH (ref 70–99)
HCT VFR BLD CALC: 25.5 % — LOW (ref 39–50)
HGB BLD-MCNC: 8.4 G/DL — LOW (ref 13–17)
INR BLD: 3.22 RATIO — HIGH (ref 0.88–1.16)
LYMPHOCYTES # BLD AUTO: 0.6 K/UL — LOW (ref 1–3.3)
LYMPHOCYTES # BLD AUTO: 7.8 % — LOW (ref 13–44)
MAGNESIUM SERPL-MCNC: 1.9 MG/DL — SIGNIFICANT CHANGE UP (ref 1.6–2.6)
MCHC RBC-ENTMCNC: 33.1 GM/DL — SIGNIFICANT CHANGE UP (ref 32–36)
MCHC RBC-ENTMCNC: 35.3 PG — HIGH (ref 27–34)
MCV RBC AUTO: 107 FL — HIGH (ref 80–100)
MONOCYTES # BLD AUTO: 0.9 K/UL — SIGNIFICANT CHANGE UP (ref 0–0.9)
MONOCYTES NFR BLD AUTO: 11.1 % — SIGNIFICANT CHANGE UP (ref 2–14)
NEUTROPHILS # BLD AUTO: 6.3 K/UL — SIGNIFICANT CHANGE UP (ref 1.8–7.4)
NEUTROPHILS NFR BLD AUTO: 78.2 % — HIGH (ref 43–77)
PHOSPHATE SERPL-MCNC: 2.1 MG/DL — LOW (ref 2.5–4.5)
PLATELET # BLD AUTO: 44 K/UL — LOW (ref 150–400)
POTASSIUM SERPL-MCNC: 4.7 MMOL/L — SIGNIFICANT CHANGE UP (ref 3.5–5.3)
POTASSIUM SERPL-SCNC: 4.7 MMOL/L — SIGNIFICANT CHANGE UP (ref 3.5–5.3)
PROT SERPL-MCNC: 6.1 G/DL — SIGNIFICANT CHANGE UP (ref 6–8.3)
PROTHROM AB SERPL-ACNC: 35.9 SEC — HIGH (ref 9.8–12.7)
RBC # BLD: 2.39 M/UL — LOW (ref 4.2–5.8)
RBC # FLD: 15 % — HIGH (ref 10.3–14.5)
SODIUM SERPL-SCNC: 133 MMOL/L — LOW (ref 135–145)
SPECIMEN SOURCE: SIGNIFICANT CHANGE UP
VIT B12 SERPL-MCNC: >2000 PG/ML — HIGH (ref 232–1245)
WBC # BLD: 8.1 K/UL — SIGNIFICANT CHANGE UP (ref 3.8–10.5)
WBC # FLD AUTO: 8.1 K/UL — SIGNIFICANT CHANGE UP (ref 3.8–10.5)

## 2018-09-29 PROCEDURE — 99232 SBSQ HOSP IP/OBS MODERATE 35: CPT | Mod: GC

## 2018-09-29 PROCEDURE — 99232 SBSQ HOSP IP/OBS MODERATE 35: CPT

## 2018-09-29 PROCEDURE — 99233 SBSQ HOSP IP/OBS HIGH 50: CPT | Mod: GC

## 2018-09-29 RX ORDER — MORPHINE SULFATE 50 MG/1
1 CAPSULE, EXTENDED RELEASE ORAL ONCE
Qty: 0 | Refills: 0 | Status: DISCONTINUED | OUTPATIENT
Start: 2018-09-29 | End: 2018-09-29

## 2018-09-29 RX ORDER — PHYTONADIONE (VIT K1) 5 MG
5 TABLET ORAL ONCE
Qty: 0 | Refills: 0 | Status: COMPLETED | OUTPATIENT
Start: 2018-09-29 | End: 2018-09-29

## 2018-09-29 RX ORDER — NOREPINEPHRINE BITARTRATE/D5W 8 MG/250ML
0.01 PLASTIC BAG, INJECTION (ML) INTRAVENOUS
Qty: 8 | Refills: 0 | Status: DISCONTINUED | OUTPATIENT
Start: 2018-09-29 | End: 2018-09-30

## 2018-09-29 RX ORDER — ALBUMIN HUMAN 25 %
50 VIAL (ML) INTRAVENOUS ONCE
Qty: 0 | Refills: 0 | Status: COMPLETED | OUTPATIENT
Start: 2018-09-29 | End: 2018-09-29

## 2018-09-29 RX ORDER — ALBUMIN HUMAN 25 %
100 VIAL (ML) INTRAVENOUS EVERY 6 HOURS
Qty: 0 | Refills: 0 | Status: COMPLETED | OUTPATIENT
Start: 2018-09-29 | End: 2018-09-30

## 2018-09-29 RX ADMIN — Medication 100 MILLIGRAM(S): at 12:08

## 2018-09-29 RX ADMIN — Medication 101 MILLIGRAM(S): at 04:12

## 2018-09-29 RX ADMIN — Medication 100 MILLILITER(S): at 12:08

## 2018-09-29 RX ADMIN — PIPERACILLIN AND TAZOBACTAM 25 GRAM(S): 4; .5 INJECTION, POWDER, LYOPHILIZED, FOR SOLUTION INTRAVENOUS at 16:16

## 2018-09-29 RX ADMIN — MORPHINE SULFATE 1 MILLIGRAM(S): 50 CAPSULE, EXTENDED RELEASE ORAL at 12:25

## 2018-09-29 RX ADMIN — Medication 62.5 MILLIMOLE(S): at 04:23

## 2018-09-29 RX ADMIN — Medication 2.06 MICROGRAM(S)/KG/MIN: at 16:22

## 2018-09-29 RX ADMIN — MORPHINE SULFATE 1 MILLIGRAM(S): 50 CAPSULE, EXTENDED RELEASE ORAL at 12:08

## 2018-09-29 RX ADMIN — Medication 50 MILLILITER(S): at 13:39

## 2018-09-29 RX ADMIN — Medication 100 MILLILITER(S): at 17:45

## 2018-09-29 RX ADMIN — Medication 100 MILLILITER(S): at 05:48

## 2018-09-29 RX ADMIN — Medication 50 MILLILITER(S): at 13:25

## 2018-09-29 RX ADMIN — PIPERACILLIN AND TAZOBACTAM 25 GRAM(S): 4; .5 INJECTION, POWDER, LYOPHILIZED, FOR SOLUTION INTRAVENOUS at 08:30

## 2018-09-29 NOTE — PROGRESS NOTE ADULT - ASSESSMENT
Impression:    1. Cirrhosis, decompensated, due to alcohol dependence  Varices: Prior varices, on propanolol 20 mg PO BID  HE: On lactulose 20 mg PO BID  Ascites: Moderate on CT A/P on 9/28/18, + for SBP on 9/27/18  HCC: No liver lesions seen on CT A/P on 9/28/18  Meld-Na: 30  Transplant Candidacy: Listed at [facility], patient of Dr. Candelaria Stokes  2. SBP: > 36k WBCs on diagnostic paracentesis  3. Bacteremia: Gram negative rods on blood culture. Likely source intraperitoneal. qSOFA 2.  4. Alcohol dependence: Sober for the past 3.5 years    Recommendations:  - Monitor daily CBC, CMP, Mg, P, INR to calculate daily Meld-Na  - F/U blood cultures  - Continue IV Zosyn for treatment of SBP / bacteremia  - Repeat paracentesis 48 hours after initial paracentesis only if safe pocket available  - avoid narcotics if possible   - Continue albumin 25g q6h (1.5mg/kg)   - Hold diuretics in setting of SBP  - Continue lactulose 20 mg PO BID  - Re-start propanolol 20 mg PO BID when no longer septic / hypotensive  - Transplant hepatology fellow at [facility] contacted; once patient is transferred to medical floor, patient is to be transferred to [facility] for continued medical management    Katelyn Simmons, PGY-4  Gastroenterology Fellow  Pager x [phone] or [phone]  (After 5 pm or on weekends please page GI on call)

## 2018-09-29 NOTE — PROGRESS NOTE ADULT - ASSESSMENT
cirrhosis  E coli bacteremia  SBP      clinically stabilizing on Zosyn  Discussed with MICU team: he will be transferred to floor and will have floor to floor transfer to [facility]  Monitor susceptibilities

## 2018-09-29 NOTE — PROGRESS NOTE ADULT - SUBJECTIVE AND OBJECTIVE BOX
Follow Up:  SBP, E coli bacteremia    Interval History/ROS: sitting up in chair, taking po, notes back pain, reports he is improved since yesterday - which is enthusiastically endorsed by family members present    Allergies  No Known Allergies    ANTIMICROBIALS:  piperacillin/tazobactam IVPB. 3.375 every 8 hours      OTHER MEDS:  MEDICATIONS  (STANDING):  aluminum hydroxide/magnesium hydroxide/simethicone Suspension 30 every 4 hours PRN  lactulose Syrup 20 three times a day PRN      Vital Signs Last 24 Hrs  T(C): 36.9 (29 Sep 2018 08:00), Max: 37 (29 Sep 2018 00:00)  T(F): 98.4 (29 Sep 2018 08:00), Max: 98.6 (29 Sep 2018 00:00)  HR: 73 (29 Sep 2018 11:00) (62 - 77)  BP: 105/55 (29 Sep 2018 11:00) (85/51 - 107/60)  BP(mean): 76 (29 Sep 2018 11:00) (62 - 79)  RR: 22 (29 Sep 2018 11:00) (21 - 34)  SpO2: 97% (29 Sep 2018 11:00) (83% - 97%)    PHYSICAL EXAM:  General: NAD, Non-toxic  Neurology: A&Ox3, appears mildly distracted  Respiratory: Clear to auscultation bilaterally  CV: RRR, S1S2, no murmurs, rubs or gallops  Abdominal: Soft, Non-tender, distended  Extremities: No edema, + peripheral pulses  Line Sites: Clear  Skin: jaundice               8.4    8.1   )-----------( 44       ( 29 Sep 2018 00:46 )             25.5   WBC Count: 8.1 ( @ 00:46)  WBC Count: 9.25 ( @ 09:30)  WBC Count: 10.9 ( @ 23:12)        133<L>  |  106  |  24<H>  ----------------------------<  220<H>  4.7   |  18<L>  |  0.89    Ca    7.9<L>      29 Sep 2018 00:46  Phos  2.1       Mg     1.9         TPro  6.1  /  Alb  2.9<L>  /  TBili  11.8<H>  /  DBili  x   /  AST  69<H>  /  ALT  12  /  AlkPhos  91      Urinalysis Basic - ( 28 Sep 2018 01:49 )    Color: Jodie / Appearance: Slightly Turbid / S.022 / pH: x  Gluc: x / Ketone: Trace  / Bili: Large / Urobili: Negative   Blood: x / Protein: 30 mg/dL / Nitrite: Positive   Leuk Esterase: Negative / RBC: 0-2 /hpf / WBC 0-2 /hpf   Sq Epi: x / Non Sq Epi: x / Bacteria: Few    Cell Count, Body Fluid (18 @ 23:44)    BF Lymphocytes: 5 %    Body Fluid Appearance: Cloudy    Fluid Type: Ascites fluid    Fluid Segmented Granulocytes: 84 %    Monocyte/Macrophage Count - Body Fluid: 10 %    Eosinophil Count - Body Fluid: 1 %    Tube Type: Lavender    Color - Body Fluid: Orange    Total Nucleated Cell Count, Body Fluid: 92345: Includes WBC and other nucleated cells if present. /uL    Total RBC Count: 64848 /uL      MICROBIOLOGY:  .Urine Clean Catch (Midstream)  18   No growth  --  --      Peritoneal Peritoneal Fluid  18   No growth  --    polymorphonuclear leukocytes seen  No organisms seen  by cytocentrifuge      .Blood Blood-Peripheral  18   Growth in aerobic and anaerobic bottles: Escherichia coli      RADIOLOGY:  < from: US Abdomen Doppler (18 @ 16:23) >  Evidence of portal venous hypertension with reversal flow in the right   portal vein and shunting of flow in the left portal vein towards a   recannulated umbilical vein, as seen on CT.    Splenic varices.    Splenomegaly.    Cirrhosis and moderate abdominal ascites.    < end of copied text >      Levy Jaramillo MD; Division of Infectious Disease; Pager: [phone]; nights and weekends: [phone]

## 2018-09-29 NOTE — PROGRESS NOTE ADULT - SUBJECTIVE AND OBJECTIVE BOX
Chief Complaint:  Patient is a 53y old  Male who presents with a chief complaint of SBP, hypotension (29 Sep 2018 08:24)      Interval Events:   Patient continues to be treated for SBP.  [facility] would like patient transferred to there facility ASAP.       Allergies:  No Known Allergies        Hospital Medications:  albumin human 25% IVPB 100 milliLiter(s) IV Intermittent every 6 hours  aluminum hydroxide/magnesium hydroxide/simethicone Suspension 30 milliLiter(s) Oral every 4 hours PRN  lactulose Syrup 20 Gram(s) Oral three times a day PRN  piperacillin/tazobactam IVPB. 3.375 Gram(s) IV Intermittent every 8 hours  thiamine 100 milliGRAM(s) Oral daily      PMHX/PSHX:  Esophageal varices  Alcoholic cirrhosis of liver with ascites  No pertinent past medical history  Abdominal hernia  H/O cataract      Family history:  Family history of hypercholesterolemia (Father)  Family history of diabetes mellitus (Mother)  No pertinent family history in first degree relatives      PHYSICAL EXAM:   Vital Signs:  Vital Signs Last 24 Hrs  T(C): 36.9 (29 Sep 2018 08:00), Max: 37.1 (28 Sep 2018 09:10)  T(F): 98.4 (29 Sep 2018 08:00), Max: 98.8 (28 Sep 2018 09:10)  HR: 66 (29 Sep 2018 08:00) (62 - 77)  BP: 106/54 (29 Sep 2018 08:00) (81/47 - 107/60)  BP(mean): 73 (29 Sep 2018 08:00) (59 - 79)  RR: 24 (29 Sep 2018 08:00) (21 - 35)  SpO2: 95% (29 Sep 2018 08:00) (83% - 97%)  Daily Height in cm: 187.96 (28 Sep 2018 09:10)    Daily Weight in k.7 (29 Sep 2018 04:00)    GENERAL:  Appears stated age, well-groomed, well-nourished, no distress  CHEST:  no increased effort  ABDOMEN:  Soft, non-tender, non-distended, normoactive bowel sounds,  no masses , no hepato-splenomegaly, no signs of chronic liver disease  EXTEREMITIES:  no cyanosis, clubbing or edema  NEURO:  Alert, oriented, no tremor, no asterixis    LABS:                        8.4    8.1   )-----------( 44       ( 29 Sep 2018 00:46 )             25.5     Mean Cell Volume: 107.0 fl (-18 @ 00:46)        133<L>  |  106  |  24<H>  ----------------------------<  220<H>  4.7   |  18<L>  |  0.89    Ca    7.9<L>      29 Sep 2018 00:46  Phos  2.1       Mg     1.9         TPro  6.1  /  Alb  2.9<L>  /  TBili  11.8<H>  /  DBili  x   /  AST  69<H>  /  ALT  12  /  AlkPhos  91      LIVER FUNCTIONS - ( 29 Sep 2018 00:46 )  Alb: 2.9 g/dL / Pro: 6.1 g/dL / ALK PHOS: 91 U/L / ALT: 12 U/L / AST: 69 U/L / GGT: x           PT/INR - ( 29 Sep 2018 00:46 )   PT: 35.9 sec;   INR: 3.22 ratio         PTT - ( 29 Sep 2018 00:46 )  PTT:66.3 sec  Urinalysis Basic - ( 28 Sep 2018 01:49 )    Color: Jodie / Appearance: Slightly Turbid / S.022 / pH: x  Gluc: x / Ketone: Trace  / Bili: Large / Urobili: Negative   Blood: x / Protein: 30 mg/dL / Nitrite: Positive   Leuk Esterase: Negative / RBC: 0-2 /hpf / WBC 0-2 /hpf   Sq Epi: x / Non Sq Epi: x / Bacteria: Few                              8.4    8.1   )-----------( 44       ( 29 Sep 2018 00:46 )             25.5                         9.0    9.25  )-----------( 47       ( 28 Sep 2018 09:30 )             25.5                         11.2   10.9  )-----------( 68       ( 27 Sep 2018 23:12 )             33.9     Imaging: Chief Complaint:  Patient is a 53y old  Male who presents with a chief complaint of SBP, hypotension (29 Sep 2018 08:24)      Interval Events:   Patient continues to be treated for SBP.  [facility] would like patient transferred to there facility [facility] bur needs to be transferred to floor first.     Allergies:  No Known Allergies        Hospital Medications:  albumin human 25% IVPB 100 milliLiter(s) IV Intermittent every 6 hours  aluminum hydroxide/magnesium hydroxide/simethicone Suspension 30 milliLiter(s) Oral every 4 hours PRN  lactulose Syrup 20 Gram(s) Oral three times a day PRN  piperacillin/tazobactam IVPB. 3.375 Gram(s) IV Intermittent every 8 hours  thiamine 100 milliGRAM(s) Oral daily      PMHX/PSHX:  Esophageal varices  Alcoholic cirrhosis of liver with ascites  No pertinent past medical history  Abdominal hernia  H/O cataract      Family history:  Family history of hypercholesterolemia (Father)  Family history of diabetes mellitus (Mother)  No pertinent family history in first degree relatives      PHYSICAL EXAM:   Vital Signs:  Vital Signs Last 24 Hrs  T(C): 36.9 (29 Sep 2018 08:00), Max: 37.1 (28 Sep 2018 09:10)  T(F): 98.4 (29 Sep 2018 08:00), Max: 98.8 (28 Sep 2018 09:10)  HR: 66 (29 Sep 2018 08:00) (62 - 77)  BP: 106/54 (29 Sep 2018 08:00) (81/47 - 107/60)  BP(mean): 73 (29 Sep 2018 08:00) (59 - 79)  RR: 24 (29 Sep 2018 08:00) (21 - 35)  SpO2: 95% (29 Sep 2018 08:00) (83% - 97%)  Daily Height in cm: 187.96 (28 Sep 2018 09:10)    Daily Weight in k.7 (29 Sep 2018 04:00)    GENERAL:  Appears stated age, well-groomed, well-nourished, no distress  CHEST:  no increased effort  ABDOMEN:  Soft, non-tender, non-distended, normoactive bowel sounds,  no masses , no hepato-splenomegaly, no signs of chronic liver disease  EXTEREMITIES:  no cyanosis, clubbing or edema  NEURO:  Alert, oriented, no tremor, no asterixis    LABS:                        8.4    8.1   )-----------( 44       ( 29 Sep 2018 00:46 )             25.5     Mean Cell Volume: 107.0 fl (-18 @ 00:46)        133<L>  |  106  |  24<H>  ----------------------------<  220<H>  4.7   |  18<L>  |  0.89    Ca    7.9<L>      29 Sep 2018 00:46  Phos  2.1       Mg     1.9         TPro  6.1  /  Alb  2.9<L>  /  TBili  11.8<H>  /  DBili  x   /  AST  69<H>  /  ALT  12  /  AlkPhos  91      LIVER FUNCTIONS - ( 29 Sep 2018 00:46 )  Alb: 2.9 g/dL / Pro: 6.1 g/dL / ALK PHOS: 91 U/L / ALT: 12 U/L / AST: 69 U/L / GGT: x           PT/INR - ( 29 Sep 2018 00:46 )   PT: 35.9 sec;   INR: 3.22 ratio         PTT - ( 29 Sep 2018 00:46 )  PTT:66.3 sec  Urinalysis Basic - ( 28 Sep 2018 01:49 )    Color: Jodie / Appearance: Slightly Turbid / S.022 / pH: x  Gluc: x / Ketone: Trace  / Bili: Large / Urobili: Negative   Blood: x / Protein: 30 mg/dL / Nitrite: Positive   Leuk Esterase: Negative / RBC: 0-2 /hpf / WBC 0-2 /hpf   Sq Epi: x / Non Sq Epi: x / Bacteria: Few                              8.4    8.1   )-----------( 44       ( 29 Sep 2018 00:46 )             25.5                         9.0    9.25  )-----------( 47       ( 28 Sep 2018 09:30 )             25.5                         11.2   10.9  )-----------( 68       ( 27 Sep 2018 23:12 )             33.9     Imaging: Chief Complaint:  Patient is a 53y old  Male who presents with a chief complaint of SBP, hypotension (29 Sep 2018 08:24)      Interval Events:   Patient continues to be treated for SBP.  [facility] would like patient transferred to there facility ASAP bur needs to be transferred to floor first.     Allergies:  No Known Allergies        Hospital Medications:  albumin human 25% IVPB 100 milliLiter(s) IV Intermittent every 6 hours  aluminum hydroxide/magnesium hydroxide/simethicone Suspension 30 milliLiter(s) Oral every 4 hours PRN  lactulose Syrup 20 Gram(s) Oral three times a day PRN  piperacillin/tazobactam IVPB. 3.375 Gram(s) IV Intermittent every 8 hours  thiamine 100 milliGRAM(s) Oral daily      PMHX/PSHX:  Esophageal varices  Alcoholic cirrhosis of liver with ascites  No pertinent past medical history  Abdominal hernia  H/O cataract      Family history:  Family history of hypercholesterolemia (Father)  Family history of diabetes mellitus (Mother)  No pertinent family history in first degree relatives      PHYSICAL EXAM:   Vital Signs:  Vital Signs Last 24 Hrs  T(C): 36.9 (29 Sep 2018 08:00), Max: 37.1 (28 Sep 2018 09:10)  T(F): 98.4 (29 Sep 2018 08:00), Max: 98.8 (28 Sep 2018 09:10)  HR: 66 (29 Sep 2018 08:00) (62 - 77)  BP: 106/54 (29 Sep 2018 08:00) (81/47 - 107/60)  BP(mean): 73 (29 Sep 2018 08:00) (59 - 79)  RR: 24 (29 Sep 2018 08:00) (21 - 35)  SpO2: 95% (29 Sep 2018 08:00) (83% - 97%)  Daily Height in cm: 187.96 (28 Sep 2018 09:10)    Daily Weight in k.7 (29 Sep 2018 04:00)    GENERAL:  Appears stated age, well-groomed, well-nourished, no distress  CHEST:  no increased effort  ABDOMEN:  Soft, non-tender  EXTEREMITIES:  no cyanosis, clubbing or edema  NEURO:  Alert, oriented, no tremor, no asterixis    LABS:                        8.4    8.1   )-----------( 44       ( 29 Sep 2018 00:46 )             25.5     Mean Cell Volume: 107.0 fl (-18 @ 00:46)        133<L>  |  106  |  24<H>  ----------------------------<  220<H>  4.7   |  18<L>  |  0.89    Ca    7.9<L>      29 Sep 2018 00:46  Phos  2.1       Mg     1.9         TPro  6.1  /  Alb  2.9<L>  /  TBili  11.8<H>  /  DBili  x   /  AST  69<H>  /  ALT  12  /  AlkPhos  91      LIVER FUNCTIONS - ( 29 Sep 2018 00:46 )  Alb: 2.9 g/dL / Pro: 6.1 g/dL / ALK PHOS: 91 U/L / ALT: 12 U/L / AST: 69 U/L / GGT: x           PT/INR - ( 29 Sep 2018 00:46 )   PT: 35.9 sec;   INR: 3.22 ratio         PTT - ( 29 Sep 2018 00:46 )  PTT:66.3 sec  Urinalysis Basic - ( 28 Sep 2018 01:49 )    Color: Jodie / Appearance: Slightly Turbid / S.022 / pH: x  Gluc: x / Ketone: Trace  / Bili: Large / Urobili: Negative   Blood: x / Protein: 30 mg/dL / Nitrite: Positive   Leuk Esterase: Negative / RBC: 0-2 /hpf / WBC 0-2 /hpf   Sq Epi: x / Non Sq Epi: x / Bacteria: Few                              8.4    8.1   )-----------( 44       ( 29 Sep 2018 00:46 )             25.5                         9.0    9.25  )-----------( 47       ( 28 Sep 2018 09:30 )             25.5                         11.2   10.9  )-----------( 68       ( 27 Sep 2018 23:12 )             33.9     Imaging:

## 2018-09-29 NOTE — PROGRESS NOTE ADULT - SUBJECTIVE AND OBJECTIVE BOX
Overnight events:    No acute events overnight. The patient reports he is doing well, saturating well on room air and sitting in a chair this AM. He denies any fevers, chills, abdominal pain, nausea/vomiting.     OBJECTIVE:  ICU Vital Signs Last 24 Hrs  T(C): 36.9 (29 Sep 2018 08:00), Max: 37.1 (28 Sep 2018 09:10)  T(F): 98.4 (29 Sep 2018 08:00), Max: 98.8 (28 Sep 2018 09:10)  HR: 66 (29 Sep 2018 08:00) (62 - 84)  BP: 106/54 (29 Sep 2018 08:00) (81/47 - 107/60)  BP(mean): 73 (29 Sep 2018 08:00) (59 - 79)  ABP: --  ABP(mean): --  RR: 24 (29 Sep 2018 08:00) (20 - 35)  SpO2: 95% (29 Sep 2018 08:00) (83% - 100%)         @ : @ 07:00  --------------------------------------------------------  IN: 1287.5 mL / OUT: 1050 mL / NET: 237.5 mL     @ 07: @ 08:24  --------------------------------------------------------  IN: 72.5 mL / OUT: 25 mL / NET: 47.5 mL      CAPILLARY BLOOD GLUCOSE      POCT Blood Glucose.: 116 mg/dL (28 Sep 2018 13:34)      PHYSICAL EXAM:  General: NAD, well-developed  	HEENT: EOMI, dried blood in oral cavity. no active bleeding  	Neck: Supple, No JVD  	Chest/Lung: lungs clear to auscultation bilaterally   	Heart: Regular rate and rhythm; No murmurs, rubs, or gallops.   	Abdom: Soft, Nondistended; Bowel sounds present. mild tenderness to palpation.   	Extremities: No lower extremity edema. No (+) asterixis   	Psych: AAOx3   	Neurology: non-focal, strength and sensation grossly intact UE and LE BL.  Skin: No rashes or lesions    HOSPITAL MEDICATIONS:    piperacillin/tazobactam IVPB. 3.375 Gram(s) IV Intermittent every 8 hours            aluminum hydroxide/magnesium hydroxide/simethicone Suspension 30 milliLiter(s) Oral every 4 hours PRN  lactulose Syrup 20 Gram(s) Oral three times a day PRN        albumin human 25% IVPB 100 milliLiter(s) IV Intermittent every 6 hours  thiamine 100 milliGRAM(s) Oral daily            LABS:                        8.4    8.1   )-----------( 44       ( 29 Sep 2018 00:46 )             25.5     Hgb Trend: 8.4<--, 9.0<--, 11.2<--      133<L>  |  106  |  24<H>  ----------------------------<  220<H>  4.7   |  18<L>  |  0.89    Ca    7.9<L>      29 Sep 2018 00:46  Phos  2.1       Mg     1.9         TPro  6.1  /  Alb  2.9<L>  /  TBili  11.8<H>  /  DBili  x   /  AST  69<H>  /  ALT  12  /  AlkPhos  91      Creatinine Trend: 0.89<--, 1.00<--, 1.09<--  PT/INR - ( 29 Sep 2018 00:46 )   PT: 35.9 sec;   INR: 3.22 ratio         PTT - ( 29 Sep 2018 00:46 )  PTT:66.3 sec  Urinalysis Basic - ( 28 Sep 2018 01:49 )    Color: Jodie / Appearance: Slightly Turbid / S.022 / pH: x  Gluc: x / Ketone: Trace  / Bili: Large / Urobili: Negative   Blood: x / Protein: 30 mg/dL / Nitrite: Positive   Leuk Esterase: Negative / RBC: 0-2 /hpf / WBC 0-2 /hpf   Sq Epi: x / Non Sq Epi: x / Bacteria: Few      Arterial Blood Gas:   @ 17:28  7.41/31/69/19/94/-4.3  ABG lactate: --    Venous Blood Gas:   @ 06:49  7.35/38/39/20/64  VBG Lactate: 3.0  Venous Blood Gas:   @ 02:40  7.34/43/21/22/24  VBG Lactate: 3.6  Venous Blood Gas:   @ 23:12  7.37/40/19//19  VBG Lactate: 4.0 Overnight events:    No acute events overnight. The patient was given Vitamin K for INR 3.22. The patient reports he is doing well, saturating well on room air and sitting in a chair this AM. He denies any fevers, chills, abdominal pain, nausea/vomiting.     OBJECTIVE:  ICU Vital Signs Last 24 Hrs  T(C): 36.9 (29 Sep 2018 08:00), Max: 37.1 (28 Sep 2018 09:10)  T(F): 98.4 (29 Sep 2018 08:00), Max: 98.8 (28 Sep 2018 09:10)  HR: 66 (29 Sep 2018 08:00) (62 - 84)  BP: 106/54 (29 Sep 2018 08:00) (81/47 - 107/60)  BP(mean): 73 (29 Sep 2018 08:00) (59 - 79)  ABP: --  ABP(mean): --  RR: 24 (29 Sep 2018 08:00) (20 - 35)  SpO2: 95% (29 Sep 2018 08:00) (83% - 100%)         @ 07: @ 07:00  --------------------------------------------------------  IN: 1287.5 mL / OUT: 1050 mL / NET: 237.5 mL     @ 07: @ 08:24  --------------------------------------------------------  IN: 72.5 mL / OUT: 25 mL / NET: 47.5 mL      CAPILLARY BLOOD GLUCOSE      POCT Blood Glucose.: 116 mg/dL (28 Sep 2018 13:34)      PHYSICAL EXAM:  General: NAD, well-developed  	HEENT: EOMI, dried blood in oral cavity. no active bleeding  	Neck: Supple, No JVD  	Chest/Lung: lungs clear to auscultation bilaterally   	Heart: Regular rate and rhythm; No murmurs, rubs, or gallops.   	Abdom: Soft, Nondistended; Bowel sounds present. mild tenderness to palpation.   	Extremities: No lower extremity edema. No (+) asterixis   	Psych: AAOx3   	Neurology: non-focal, strength and sensation grossly intact UE and LE BL.  Skin: No rashes or lesions    HOSPITAL MEDICATIONS:    piperacillin/tazobactam IVPB. 3.375 Gram(s) IV Intermittent every 8 hours            aluminum hydroxide/magnesium hydroxide/simethicone Suspension 30 milliLiter(s) Oral every 4 hours PRN  lactulose Syrup 20 Gram(s) Oral three times a day PRN        albumin human 25% IVPB 100 milliLiter(s) IV Intermittent every 6 hours  thiamine 100 milliGRAM(s) Oral daily            LABS:                        8.4    8.1   )-----------( 44       ( 29 Sep 2018 00:46 )             25.5     Hgb Trend: 8.4<--, 9.0<--, 11.2<--      133<L>  |  106  |  24<H>  ----------------------------<  220<H>  4.7   |  18<L>  |  0.89    Ca    7.9<L>      29 Sep 2018 00:46  Phos  2.1       Mg     1.9         TPro  6.1  /  Alb  2.9<L>  /  TBili  11.8<H>  /  DBili  x   /  AST  69<H>  /  ALT  12  /  AlkPhos  91      Creatinine Trend: 0.89<--, 1.00<--, 1.09<--  PT/INR - ( 29 Sep 2018 00:46 )   PT: 35.9 sec;   INR: 3.22 ratio         PTT - ( 29 Sep 2018 00:46 )  PTT:66.3 sec  Urinalysis Basic - ( 28 Sep 2018 01:49 )    Color: Jodie / Appearance: Slightly Turbid / S.022 / pH: x  Gluc: x / Ketone: Trace  / Bili: Large / Urobili: Negative   Blood: x / Protein: 30 mg/dL / Nitrite: Positive   Leuk Esterase: Negative / RBC: 0-2 /hpf / WBC 0-2 /hpf   Sq Epi: x / Non Sq Epi: x / Bacteria: Few      Arterial Blood Gas:   @ 17:28  7.41/31/69/19/94/-4.3  ABG lactate: --    Venous Blood Gas:   @ 06:49  7.35/38/39/20/64  VBG Lactate: 3.0  Venous Blood Gas:   @ 02:40  7.34/43/21/22/24  VBG Lactate: 3.6  Venous Blood Gas:   @ 23:12  7.37/40/19//  VBG Lactate: 4.0

## 2018-09-29 NOTE — CHART NOTE - [id number]_GEN_A_CORE
MICU Transfer Note    Transfer from: MICU    Transfer to: (  ) Medicine    (  ) Telemetry     (   ) RCU        (    ) Palliative         (   ) Stroke Unit          (   ) __________________    Accepting Physician:  Signout given to:     MICU COURSE:    53M PMH EtOH cirrhosis c/b varices (no alc in 3.5 yrs), hepatic encephalopathy on transplant list at [facility] p/[facility], hypotension to SBP 80's, febrile to 100.6 F, and hypoxic to 90% on RA with associated symptoms of abdominal pain/distension/ascites, gingival bleeding, portal colopathy on CT, and found to have SBP on dx paracentesis (WBC 37K) and E coli bacteremia (likely from the intraabdominal pathology). Patient was admitted to the MICU for observation and management. He did not require pressors and was weaned off nasal cannula. The patient was given albumin, Lactulose and Zosyn for management. [facility] was contacted, and they have requested the patient be transferred to [facility] via a floor to floor transfer.     To Do's  - Repeat paracentesis 48 hrs after initial tap (11 PM today)  - Continue Zosyn, wait for sensitivities on blood cultures. Send repeat blood cultures  - Transfer back to [facility]  - FU hepatology recs regarding albumin, etc   - Trend lactate, BMP to monitor lytes       ASSESSMENT & PLAN:     53M PMH EtOH cirrhosis c/b varices (no alc in 3.5 yrs), hepatic encephalopathy on transplant list at [facility], hypotension to SBP 80's, febrile to 100.6 F, and hypoxic to 90% on RA with associated symptoms of abdominal pain/distension/ascites, gingival bleeding, portal colopathy on CT, and found to have SBP on dx paracentesis (WBC 37K) and E coli bacteremia.       Neuro  # Hepatic encephalopathy w/ SBP: Mild, AOx2-3 below baseline per brothers, now improving. ammonia 36.   - zinc level pending     Pulm  # Hypoxic respiratory failure: mild, saturating well on room air. CXR w/o radiolographic of pna  - may be 2/2 fluid overload in setting of moderate ascites and hypotension req'ing fluids  	  CVS  # Hypotension w/ possible distributive shock in setting of SBP  - cont w/ albumin     GI   # Cirrhosis w/ varcies c/b SBP. on txp list on [facility], hep A, B, C all non reactive in 2015.   - SBP with 37K WBCs,  MELD at 30  - FU hepatology recs for repeat tap in 48 hours (tonight at 11 PM)   - cont w/ albumin repletion  - cont w/ lactulose and titrate to BMs 2-3 per day   - Abdominal US Doppler:   Evidence of portal venous hypertension with reversal flow in the right   portal vein and shunting of flow in the left portal vein towards a   recannulated umbilical vein, as seen on CT. Splenic varices. Splenomegaly. Cirrhosis and moderate abdominal ascites.  - Advance to regular diet     # Portal hypertensive colopathy  - Found on CT a/p, serial abdominals exams, current abdom soft w/ mild LQ TTP.     Renal  # HypoNa: mild at 133, likely represent hypervolemic in setting of cirrhosis w/ ascites.     # Metabolic acidosis. Lactate initially 4, most recently 2.7   - likely 2/2 lactic acidosis in setting of sepsis w/ SBP.   # at high risk of decompensation into HRS, however sCr currently wnl.   - strict I/Os, albumin supplementation should help ppx against HRS    ID  # SBP w/37K nucleated cells on diagnostic paracentesis, received CTX and zosyn  - E coli bacteremia likely from intraabdominal pathology   - C/w Zosyn  - F/u blood culture sensitivities   - repeat blood cultures pending   - ID on board  - Dental recs - no indication of oral infection, FU as outpt     Heme  # Coagulopathic w/ thrombocytopenia in setting of cirrhosis w/ gingival hemorrhage (dental recs to FU as outpt)   # Macrocytic anemia: likely 2/2 chronic EtOH abuse c/b cirrhosis, b12 level in 2015 wnl  - B12, folate WNL.     Endo  # no acute issues.     # DVT ppx: hold pharm AC given varices and high risk of bleed. ICD's for now.           FOR FOLLOW UP: MICU Transfer Note    Transfer from: MICU    Transfer to: (  ) Medicine    (  ) Telemetry     (   ) RCU        (    ) Palliative         (   ) Stroke Unit          (   ) __________________    Accepting Physician:  Signout given to:     MICU COURSE:    53M PMH EtOH cirrhosis c/b varices (no alc in 3.5 yrs), hepatic encephalopathy on transplant list at [facility] p/[facility], hypotension to SBP 80's, febrile to 100.6 F, and hypoxic to 90% on RA with associated symptoms of abdominal pain/distension/ascites, gingival bleeding, portal colopathy on CT, and found to have SBP on dx paracentesis (WBC 37K) and E coli bacteremia (likely from the intraabdominal pathology). Patient was admitted to the MICU for observation and management. He did not require pressors and was weaned off nasal cannula. The patient was given albumin, Lactulose and Zosyn for management. Hepatology and ID were consulted. [facility] was contacted, and they have requested the patient be transferred to [facility] via a floor to floor transfer. On 9/29, the patient reports he feels better, was advanced to a regular diet, out of bed to chair.     To Do's  - Repeat paracentesis 48 hrs after initial tap (11 PM today)  - Continue Zosyn, wait for sensitivities on blood cultures. Send repeat blood cultures  - Transfer back to [facility]  - FU hepatology recs regarding albumin, etc   - Trend lactate, BMP to monitor lytes       ASSESSMENT & PLAN:     53M PMH EtOH cirrhosis c/b varices (no alc in 3.5 yrs), hepatic encephalopathy on transplant list at [facility], hypotension to SBP 80's, febrile to 100.6 F, and hypoxic to 90% on RA with associated symptoms of abdominal pain/distension/ascites, gingival bleeding, portal colopathy on CT, and found to have SBP on dx paracentesis (WBC 37K) and E coli bacteremia.       Neuro  # Hepatic encephalopathy w/ SBP: Mild, AOx2-3 below baseline per brothers, now improving. ammonia 36.   - zinc level pending     Pulm  # Hypoxic respiratory failure: mild, saturating well on room air. CXR w/o radiolographic of pna  - may be 2/2 fluid overload in setting of moderate ascites and hypotension req'ing fluids  	  CVS  # Hypotension w/ possible distributive shock in setting of SBP  - cont w/ albumin     GI   # Cirrhosis w/ varcies c/b SBP. on txp list on [facility], hep A, B, C all non reactive in 2015.   - SBP with 37K WBCs,  MELD at 30  - FU hepatology recs for repeat tap in 48 hours (tonight at 11 PM)   - cont w/ albumin repletion  - cont w/ lactulose and titrate to BMs 2-3 per day   - Abdominal US Doppler:   Evidence of portal venous hypertension with reversal flow in the right   portal vein and shunting of flow in the left portal vein towards a   recannulated umbilical vein, as seen on CT. Splenic varices. Splenomegaly. Cirrhosis and moderate abdominal ascites.  - Advance to regular diet     # Portal hypertensive colopathy  - Found on CT a/p, serial abdominals exams, current abdom soft w/ mild LQ TTP.     Renal  # HypoNa: mild at 133, likely represent hypervolemic in setting of cirrhosis w/ ascites.     # Metabolic acidosis. Lactate initially 4, most recently 2.7   - likely 2/2 lactic acidosis in setting of sepsis w/ SBP.   # at high risk of decompensation into HRS, however sCr currently wnl.   - strict I/Os, albumin supplementation should help ppx against HRS    ID  # SBP w/37K nucleated cells on diagnostic paracentesis, received CTX and zosyn  - E coli bacteremia likely from intraabdominal pathology   - C/w Zosyn  - F/u blood culture sensitivities   - repeat blood cultures pending   - ID on board  - Dental recs - no indication of oral infection, FU as outpt     Heme  # Coagulopathic w/ thrombocytopenia in setting of cirrhosis w/ gingival hemorrhage (dental recs to FU as outpt)   # Macrocytic anemia: likely 2/2 chronic EtOH abuse c/b cirrhosis, b12 level in 2015 wnl  - B12, folate WNL.     Endo  # no acute issues.     # DVT ppx: hold pharm AC given varices and high risk of bleed. ICD's for now.           FOR FOLLOW UP: MICU Transfer Note    Transfer from: MICU    Transfer to: (  ) Medicine    (  ) Telemetry     (   ) RCU        (    ) Palliative         (   ) Stroke Unit          (   ) __________________    Accepting Physician:  Signout given to:     MICU COURSE:    53M PMH EtOH cirrhosis c/b varices (no alc in 3.5 yrs), hepatic encephalopathy on transplant list at [facility] p/[facility], hypotension to SBP 80's, febrile to 100.6 F, and hypoxic to 90% on RA with associated symptoms of abdominal pain/distension/ascites, gingival bleeding, portal colopathy on CT, and found to have SBP on dx paracentesis (WBC 37K) and E coli bacteremia (likely from the intraabdominal pathology). Patient was admitted to the MICU for observation and management. He did not require pressors and was weaned off nasal cannula. The patient was given albumin, Lactulose and Zosyn for management. Hepatology and ID were consulted. [facility] was contacted, and they have requested the patient be transferred to [facility] via a floor to floor transfer. On 9/29, the patient reports he feels better, was advanced to a regular diet, out of bed to chair.     To Do's  - Repeat paracentesis 48 hrs after initial tap (11 PM today), however bedside echo shows no reaccumulation of fluid   - Continue Zosyn, wait for sensitivities on blood cultures. Send repeat blood cultures  - Transfer back to [facility]  -  hepatology recs regarding albumin, etc   - Trend lactate, BMP to monitor lytes       ASSESSMENT & PLAN:     53M PMH EtOH cirrhosis c/b varices (no alc in 3.5 yrs), hepatic encephalopathy on transplant list at [facility] p/[facility], hypotension to SBP 80's, febrile to 100.6 F, and hypoxic to 90% on RA with associated symptoms of abdominal pain/distension/ascites, gingival bleeding, portal colopathy on CT, and found to have SBP on dx paracentesis (WBC 37K) and E coli bacteremia.       Neuro  # Hepatic encephalopathy w/ SBP: Mild, AOx2-3 below baseline per brothers, now improving. ammonia 36.   - zinc level pending     Pulm  # Hypoxic respiratory failure: mild, saturating well on room air. CXR w/o radiolographic of pna  - may be 2/2 fluid overload in setting of moderate ascites and hypotension req'ing fluids  	  CVS  # Hypotension w/ possible distributive shock in setting of SBP  - cont w/ albumin     GI   # Cirrhosis w/ varcies c/b SBP. on txp list on [facility], hep A, B, C all non reactive in 2015.   - SBP with 37K WBCs,  MELD at 30  - FU hepatology recs for repeat tap in 48 hours (tonight at 11 PM)   - cont w/ albumin repletion  - cont w/ lactulose and titrate to BMs 2-3 per day   - Abdominal US Doppler:   Evidence of portal venous hypertension with reversal flow in the right   portal vein and shunting of flow in the left portal vein towards a   recannulated umbilical vein, as seen on CT. Splenic varices. Splenomegaly. Cirrhosis and moderate abdominal ascites.  - Advance to regular diet     # Portal hypertensive colopathy  - Found on CT a/p, serial abdominals exams, current abdom soft w/ mild LQ TTP.     Renal  # HypoNa: mild at 133, likely represent hypervolemic in setting of cirrhosis w/ ascites.     # Metabolic acidosis. Lactate initially 4, most recently 2.7   - likely 2/2 lactic acidosis in setting of sepsis w/ SBP.   # at high risk of decompensation into HRS, however sCr currently wnl.   - strict I/Os, albumin supplementation should help ppx against HRS    ID  # SBP w/37K nucleated cells on diagnostic paracentesis, received CTX and zosyn  - E coli bacteremia likely from intraabdominal pathology   - C/w Zosyn  - F/u blood culture sensitivities   - repeat blood cultures pending   - ID on board  - Dental recs - no indication of oral infection, FU as outpt     Heme  # Coagulopathic w/ thrombocytopenia in setting of cirrhosis w/ gingival hemorrhage (dental recs to FU as outpt)   # Macrocytic anemia: likely 2/2 chronic EtOH abuse c/b cirrhosis, b12 level in 2015 wnl  - B12, folate WNL.     Endo  # no acute issues.     # DVT ppx: hold pharm AC given varices and high risk of bleed. ICD's for now.           FOR FOLLOW UP: MICU Transfer Note    Transfer from: MICU    Transfer to: (X) Medicine    (  ) Telemetry     (   ) RCU        (    ) Palliative         (   ) Stroke Unit          (   ) __________________    Accepting Physician:  Signout given to:     MICU COURSE:    53M PMH EtOH cirrhosis c/b varices (no alc in 3.5 yrs), hepatic encephalopathy on transplant list at [facility] p/ [facility], hypotension to SBP 80's, febrile to 100.6 F, and hypoxic to 90% on RA with associated symptoms of abdominal pain/distension/ascites, gingival bleeding, portal colopathy on CT, and found to have SBP on dx paracentesis (WBC 37K) and E coli bacteremia (likely from the intraabdominal pathology). Patient was admitted to the MICU for observation and management. He did not require pressors and was weaned off nasal cannula. The patient was given albumin, Lactulose and Zosyn for management. Hepatology and ID were consulted. [facility] was contacted, and they have requested the patient be transferred to [facility] via a floor to floor transfer. On 9/29, the patient reports he feels better, was advanced to a regular diet, out of bed to chair. Was given Vit K this AM due INR of 3.22.     To Do's  - Bedside echo shows no reaccumulation of fluid this AM. Hepatology recommended repeat paracentesis 48 hrs after initial tap, however in setting of no pocket on echo, per hepatology no need to do paracentesis.   - Continue Zosyn, wait for sensitivities on blood cultures. Send repeat blood cultures  - Transfer back to [facility]  - FU hepatology recs - continue albumin 25% Q6 for 3 days (This is Day 2)   - Trend lactate, BMP to monitor lytes   - Trend INR, give Vit K as needed       ASSESSMENT & PLAN:     53M PMH EtOH cirrhosis c/b varices (no alc in 3.5 yrs), hepatic encephalopathy on transplant list at [facility] p/w [facility], hypotension to SBP 80's, febrile to 100.6 F, and hypoxic to 90% on RA with associated symptoms of abdominal pain/distension/ascites, gingival bleeding, portal colopathy on CT, and found to have SBP on dx paracentesis (WBC 37K) and E coli bacteremia.       Neuro  # Hepatic encephalopathy w/ SBP: Mild, AOx2-3 below baseline per brothers, now improving. ammonia 36.   - zinc level pending     Pulm  # Hypoxic respiratory failure: mild, saturating well on room air. CXR w/o radiolographic of pna  - may be 2/2 fluid overload in setting of moderate ascites and hypotension req'ing fluids  	  CVS  # Hypotension w/ possible distributive shock in setting of SBP  - cont w/ albumin     GI   # Cirrhosis w/ varcies c/b SBP. on txp list on [facility], hep A, B, C all non reactive in 2015.   - SBP with 37K WBCs,  MELD at 30  - FU hepatology recs for repeat tap in 48 hours (tonight at 11 PM)   - cont w/ albumin repletion  - cont w/ lactulose and titrate to BMs 2-3 per day   - Abdominal US Doppler:   Evidence of portal venous hypertension with reversal flow in the right   portal vein and shunting of flow in the left portal vein towards a   recannulated umbilical vein, as seen on CT. Splenic varices. Splenomegaly. Cirrhosis and moderate abdominal ascites.  - Advance to regular diet     # Portal hypertensive colopathy  - Found on CT a/p, serial abdominals exams, current abdom soft w/ mild LQ TTP.     Renal  # HypoNa: mild at 133, likely represent hypervolemic in setting of cirrhosis w/ ascites.     # Metabolic acidosis. Lactate initially 4, most recently 2.7   - likely 2/2 lactic acidosis in setting of sepsis w/ SBP.   # at high risk of decompensation into HRS, however sCr currently wnl.   - strict I/Os, albumin supplementation should help ppx against HRS    ID  # SBP w/37K nucleated cells on diagnostic paracentesis, received CTX and zosyn  - E coli bacteremia likely from intraabdominal pathology   - C/w Zosyn  - F/u blood culture sensitivities   - repeat blood cultures pending   - ID on board  - Dental recs - no indication of oral infection, FU as outpt     Heme  # Coagulopathic w/ thrombocytopenia in setting of cirrhosis w/ gingival hemorrhage (dental recs to FU as outpt)   # Macrocytic anemia: likely 2/2 chronic EtOH abuse c/b cirrhosis, b12 level in 2015 wnl  - B12, folate WNL.     Endo  # no acute issues.     # DVT ppx: hold pharm AC given varices and high risk of bleed. ICD's for now.           FOR FOLLOW UP: MICU Transfer Note    Transfer from: MICU    Transfer to: (X) Medicine    (  ) Telemetry     (   ) RCU        (    ) Palliative         (   ) Stroke Unit          (   ) __________________    Accepting Physician:  Signout given to:     MICU COURSE:    53M PMH EtOH cirrhosis c/b varices (no alc in 3.5 yrs), hepatic encephalopathy on transplant list at [facility] p/w AMS, hypotension to SBP 80's, febrile to 100.6 F, and hypoxic to 90% on RA with associated symptoms of abdominal pain/distension/ascites, gingival bleeding, portal colopathy on CT, and found to have SBP on dx paracentesis (WBC 37K) and E coli bacteremia (likely from the intraabdominal pathology). Patient was admitted to the MICU for observation and management. He did not require pressors and was weaned off nasal cannula. The patient was given albumin, Lactulose and Zosyn for management. Hepatology and ID were consulted. [facility] was contacted, and they have requested the patient be transferred to [facility] via a floor to floor transfer. On 9/29, the patient reports he feels better, was advanced to a regular diet, out of bed to chair. Was given Vit K this AM due INR of 3.22. The patient reported arthritic hip and back pain for which he received 1 mg morphine IV which improved his symptoms but caused hypotension to the 70s systolic. He was given an extra dose of albumin and placed on Levophed for a little over one hour, and then weaned off around 4 PM.     To Do's  - Bedside echo shows no reaccumulation of fluid this AM. Hepatology recommended repeat paracentesis 48 hrs after initial tap, however in setting of no pocket on echo, per hepatology no need to do paracentesis.   - Continue Zosyn, wait for sensitivities on blood cultures. Send repeat blood cultures  - Transfer back to [facility]  -  hepatology recs - continue albumin 25% Q6 for 3 days (This is Day 2)   - Trend lactate, BMP to monitor lytes   - Trend INR, give Vit K as needed         ASSESSMENT & PLAN:     53M PMH EtOH cirrhosis c/b varices (no alc in 3.5 yrs), hepatic encephalopathy on transplant list at [facility] p/w [facility], hypotension to SBP 80's, febrile to 100.6 F, and hypoxic to 90% on RA with associated symptoms of abdominal pain/distension/ascites, gingival bleeding, portal colopathy on CT, and found to have SBP on dx paracentesis (WBC 37K) and E coli bacteremia.       Neuro  # Hepatic encephalopathy w/ SBP: Mild, AOx2-3 below baseline per brothers, now improving. ammonia 36.   - zinc level pending     Pulm  # Hypoxic respiratory failure: mild, saturating well on room air. CXR w/o radiolographic of pna  - may be 2/2 fluid overload in setting of moderate ascites and hypotension req'ing fluids  	  CVS  # Hypotension w/ possible distributive shock in setting of SBP  - cont w/ albumin     GI   # Cirrhosis w/ varcies c/b SBP. on txp list on [facility], hep A, B, C all non reactive in 2015.   - SBP with 37K WBCs,  MELD at 30  - FU hepatology recs for repeat tap in 48 hours (tonight at 11 PM)   - cont w/ albumin repletion  - cont w/ lactulose and titrate to BMs 2-3 per day   - Abdominal US Doppler:   Evidence of portal venous hypertension with reversal flow in the right   portal vein and shunting of flow in the left portal vein towards a   recannulated umbilical vein, as seen on CT. Splenic varices. Splenomegaly. Cirrhosis and moderate abdominal ascites.  - Advance to regular diet     # Portal hypertensive colopathy  - Found on CT a/p, serial abdominals exams, current abdom soft w/ mild LQ TTP.     Renal  # HypoNa: mild at 133, likely represent hypervolemic in setting of cirrhosis w/ ascites.     # Metabolic acidosis. Lactate initially 4, most recently 2.7   - likely 2/2 lactic acidosis in setting of sepsis w/ SBP.   # at high risk of decompensation into HRS, however sCr currently wnl.   - strict I/Os, albumin supplementation should help ppx against HRS    ID  # SBP w/37K nucleated cells on diagnostic paracentesis, received CTX and zosyn  - E coli bacteremia likely from intraabdominal pathology   - C/w Zosyn  - F/u blood culture sensitivities   - repeat blood cultures pending   - ID on board  - Dental recs - no indication of oral infection, FU as outpt     Heme  # Coagulopathic w/ thrombocytopenia in setting of cirrhosis w/ gingival hemorrhage (dental recs to FU as outpt)   # Macrocytic anemia: likely 2/2 chronic EtOH abuse c/b cirrhosis, b12 level in 2015 wnl  - B12, folate WNL.     Endo  # no acute issues.     # DVT ppx: hold pharm AC given varices and high risk of bleed. ICD's for now.           FOR FOLLOW UP: MICU Transfer Note    Transfer from: MICU    Transfer to: (X) Medicine    (  ) Telemetry     (   ) RCU        (    ) Palliative         (   ) Stroke Unit          (   ) __________________    Accepting Physician:  Signout given to:     MICU COURSE:    53M PMH EtOH cirrhosis c/b varices (no alc in 3.5 yrs), hepatic encephalopathy on transplant list at [facility], hypotension to SBP 80's, febrile to 100.6 F, and hypoxic to 90% on RA with associated symptoms of abdominal pain/distension/ascites, gingival bleeding, portal colopathy on CT, and found to have SBP on dx paracentesis (WBC 37K) and E coli bacteremia (likely from the intraabdominal pathology). Patient was admitted to the MICU for observation and management. He did not require pressors and was weaned off nasal cannula. The patient was given albumin, Lactulose and Zosyn for management. Hepatology and ID were consulted. [facility] was contacted, and they have requested the patient be transferred to [facility] via a floor to floor transfer. On 9/29, the patient reports he feels better, was advanced to a regular diet, out of bed to chair. Was given Vit K this AM due INR of 3.22. The patient reported arthritic hip and back pain for which he received 1 mg morphine IV which improved his symptoms but caused hypotension to the 70s systolic. He was given an extra dose of albumin and placed on Levophed for a little over one hour, and then weaned off around 4 PM.     To Do's  - Bedside echo shows no reaccumulation of fluid this AM; per hepatology no need to do paracentesis.   - Continue Zosyn, wait for sensitivities on blood cultures. Send repeat blood cultures  - Transfer back to [facility]  - FU hepatology recs - continue albumin 25% Q6 for 3 days (This is Day 2)   - Trend lactate, BMP to monitor lytes   - Trend INR, give Vit K as needed         ASSESSMENT & PLAN:     53M PMH EtOH cirrhosis c/b varices (no alc in 3.5 yrs), hepatic encephalopathy on transplant list at [facility], hypotension to SBP 80's, febrile to 100.6 F, and hypoxic to 90% on RA with associated symptoms of abdominal pain/distension/ascites, gingival bleeding, portal colopathy on CT, and found to have SBP on dx paracentesis (WBC 37K) and E coli bacteremia.       Neuro  # Hepatic encephalopathy w/ SBP: Mild, AOx2-3 below baseline per brothers, now improving. ammonia 36.   - zinc level pending     Pulm  # Hypoxic respiratory failure: mild, saturating well on room air. CXR w/o radiolographic of pna  - may be 2/2 fluid overload in setting of moderate ascites and hypotension req'ing fluids  	  CVS  # Hypotension w/ possible distributive shock in setting of SBP  - cont w/ albumin     GI   # Cirrhosis w/ varcies c/b SBP. on txp list on [facility], hep A, B, C all non reactive in 2015.   - SBP with 37K WBCs,  MELD at 30  - as per hepatology no need for repeat tap   - cont w/ albumin repletion  - cont w/ lactulose and titrate to BMs 2-3 per day   - Abdominal US Doppler:   Evidence of portal venous hypertension with reversal flow in the right   portal vein and shunting of flow in the left portal vein towards a   recannulated umbilical vein, as seen on CT. Splenic varices. Splenomegaly. Cirrhosis and moderate abdominal ascites.  - Advance to regular diet     # Portal hypertensive colopathy  - Found on CT a/p, serial abdominals exams, current abdom soft w/ mild LQ TTP.     Renal  # HypoNa: mild at 133, likely represent hypervolemic in setting of cirrhosis w/ ascites.     # Metabolic acidosis. Lactate initially 4, most recently 2.7   - likely 2/2 lactic acidosis in setting of sepsis w/ SBP.   # at high risk of decompensation into HRS, however sCr currently wnl.   - strict I/Os, albumin supplementation should help ppx against HRS    ID  # SBP w/37K nucleated cells on diagnostic paracentesis, received CTX and zosyn  - E coli bacteremia likely from intraabdominal pathology   - C/w Zosyn  - F/u blood culture sensitivities   - repeat blood cultures pending   - ID on board  - Dental recs - no indication of oral infection, FU as outpt     Heme  # Coagulopathic w/ thrombocytopenia in setting of cirrhosis w/ gingival hemorrhage (dental recs to FU as outpt)   # Macrocytic anemia: likely 2/2 chronic EtOH abuse c/b cirrhosis, b12 level in 2015 wnl  - B12, folate WNL.     Endo  # no acute issues.     # DVT ppx: hold pharm AC given varices and high risk of bleed. ICD's for now.           FOR FOLLOW UP: MICU Transfer Note    Transfer from: MICU    Transfer to: (X) Medicine    (  ) Telemetry     (   ) RCU        (    ) Palliative         (   ) Stroke Unit          (   ) __________________    Accepting Physician:  Signout given to:     MICU COURSE:    53M PMH EtOH cirrhosis c/b varices (no alc in 3.5 yrs), hepatic encephalopathy on transplant list at [facility], hypotension to SBP 80's, febrile to 100.6 F, and hypoxic to 90% on RA with associated symptoms of abdominal pain/distension/ascites, gingival bleeding, portal colopathy on CT, and found to have SBP on dx paracentesis (WBC 37K) and E coli bacteremia (likely from the intraabdominal pathology). Patient was admitted to the MICU for observation and management. He did not require pressors and was weaned off nasal cannula. The patient was given albumin, Lactulose and Zosyn for management. Hepatology and ID were consulted. [facility] was contacted, and they have requested the patient be transferred to [facility] via a floor to floor transfer. On 9/29, the patient reports he feels better, was advanced to a regular diet, out of bed to chair. Was given Vit K this AM due INR of 3.22. The patient reported arthritic hip and back pain for which he received 1 mg morphine IV which improved his symptoms but caused hypotension to the 70s systolic. He was given an extra dose of albumin and placed on Levophed for a little over one hour, and then weaned off around 4 PM.     To Do's  - Bedside echo shows no reaccumulation of fluid this AM; per hepatology no need to do paracentesis.   - Continue Zosyn, wait for sensitivities on blood cultures. Send repeat blood cultures  - Transfer back to [facility]  - FU hepatology recs - continue albumin 25% Q6 for 3 days (This is Day 2)   - Trend lactate, BMP to monitor lytes   - Trend INR, give Vit K as needed         ASSESSMENT & PLAN:     53M PMH EtOH cirrhosis c/b varices (no alc in 3.5 yrs), hepatic encephalopathy on transplant list at [facility], hypotension to SBP 80's, febrile to 100.6 F, and hypoxic to 90% on RA with associated symptoms of abdominal pain/distension/ascites, gingival bleeding, portal colopathy on CT, and found to have SBP on dx paracentesis (WBC 37K) and E coli bacteremia.       Neuro  # Hepatic encephalopathy w/ SBP: Mild, AOx2-3 below baseline per brothers, now improving. ammonia 36.   - zinc level pending     Pulm  # Hypoxic respiratory failure: mild, saturating well on room air. CXR w/o radiolographic of pna  - may be 2/2 fluid overload in setting of moderate ascites and hypotension req'ing fluids  	  CVS  # Hypotension w/ possible distributive shock in setting of SBP  - cont w/ albumin     GI   # Cirrhosis w/ varcies c/b SBP. on txp list on [facility], hep A, B, C all non reactive in 2015.   - SBP with 37K WBCs,  MELD at 30  - as per hepatology no need for repeat tap   - cont w/ albumin repletion  - cont w/ lactulose and titrate to BMs 2-3 per day   - Abdominal US Doppler:   Evidence of portal venous hypertension with reversal flow in the right   portal vein and shunting of flow in the left portal vein towards a   recannulated umbilical vein, as seen on CT. Splenic varices. Splenomegaly. Cirrhosis and moderate abdominal ascites.  - Advance to regular diet     # Portal hypertensive colopathy  - Found on CT a/p, serial abdominals exams, current abdom soft w/ mild LQ TTP.     Renal  # HypoNa: mild at 133, likely represent hypervolemic in setting of cirrhosis w/ ascites.     # Metabolic acidosis. Lactate initially 4, most recently 2.7   - likely 2/2 lactic acidosis in setting of sepsis w/ SBP.   # at high risk of decompensation into HRS, however sCr currently wnl.   - strict I/Os, albumin supplementation should help ppx against HRS    ID  # SBP w/37K nucleated cells on diagnostic paracentesis, received CTX and zosyn  - E coli bacteremia likely from intraabdominal pathology   - Discontinued Zosyn and started CTX on 9/30 given E. coli sensitivities.   - Will plan to c/w antibiotics until 10/4 or as per ID  - Follow-up repeat BCx for clearance.   - Dental recs - no indication of oral infection, FU as outpt     Heme  # Coagulopathic w/ thrombocytopenia in setting of cirrhosis w/ gingival hemorrhage (dental recs to FU as outpt)   # Macrocytic anemia: likely 2/2 chronic EtOH abuse c/b cirrhosis, b12 level in 2015 wnl  - B12, folate WNL.     Endo  # no acute issues.     # DVT ppx: hold pharm AC given varices and high risk of bleed. ICD's for now.           FOR FOLLOW UP: MICU Transfer Note    Transfer from: MICU    Transfer to: (X) Medicine    (  ) Telemetry     (   ) RCU        (    ) Palliative         (   ) Stroke Unit          (   ) __________________    Accepting Physician: Dr. Edgar Olivoout given to:  MAR    MICU COURSE:    53M PMH EtOH cirrhosis c/b varices (no alc in 3.5 yrs), hepatic encephalopathy on transplant list at [facility] p/w AMS, hypotension to SBP 80's, febrile to 100.6 F, and hypoxic to 90% on RA with associated symptoms of abdominal pain/distension/ascites, gingival bleeding, portal colopathy on CT, and found to have SBP on dx paracentesis (WBC 37K) and E coli bacteremia (likely from the intraabdominal pathology). Patient was admitted to the MICU for observation and management. He did not require pressors and was weaned off nasal cannula. The patient was given albumin, Lactulose and Zosyn for management. Hepatology and ID were consulted. [facility] was contacted, and they have requested the patient be transferred to [facility] via a floor to floor transfer. On 9/29, the patient reports he feels better, was advanced to a regular diet, out of bed to chair. Was given Vit K this AM due INR of 3.22. The patient reported arthritic hip and back pain for which he received 1 mg morphine IV which improved his symptoms but caused hypotension to the 70s systolic. He was given an extra dose of albumin and placed on Levophed for a little over one hour, and then weaned off around 4 PM.     To Do's  - Bedside echo shows no reaccumulation of fluid this AM; per hepatology no need to do paracentesis  - Continue Ceftriaxone. Repeat blood cultures pending from 9/30  - Transfer back to [facility] when able to do floor to floor transfer  - FU hepatology recs - continue albumin 25% Q6 for 3 days to be completed 9/30  - Trend lactate, BMP to monitor lytes   - Trend INR, give Vit K as needed         ASSESSMENT & PLAN:     53M PMH EtOH cirrhosis c/b varices (no alc in 3.5 yrs), hepatic encephalopathy on transplant list at [facility] p/w AMS, hypotension to SBP 80's, febrile to 100.6 F, and hypoxic to 90% on RA with associated symptoms of abdominal pain/distension/ascites, gingival bleeding, portal colopathy on CT, and found to have SBP on dx paracentesis (WBC 37K) and E coli bacteremia.       Neuro  # Hepatic encephalopathy w/ SBP: Mild, AOx2-3 below baseline per brothers, now improving. ammonia 36.   - zinc level pending     Pulm  # Hypoxic respiratory failure: mild, saturating well on room air. CXR w/o radiolographic of pna  - may be 2/2 fluid overload in setting of moderate ascites and hypotension req'ing fluids  	  CVS  # Hypotension w/ possible distributive shock in setting of SBP  - cont w/ albumin   - SBP stable in 90s throughout day    GI   # Cirrhosis w/ varcies c/b SBP. on txp list on [facility], hep A, B, C all non reactive in 2015.   - SBP with 37K WBCs,  MELD at 30  - as per hepatology no need for repeat tap   - cont w/ albumin repletion  - cont w/ lactulose and titrate to BMs 2-3 per day   - Abdominal US Doppler:   Evidence of portal venous hypertension with reversal flow in the right   portal vein and shunting of flow in the left portal vein towards a   recannulated umbilical vein, as seen on CT. Splenic varices. Splenomegaly. Cirrhosis and moderate abdominal ascites.  - Advance to regular diet     # Portal hypertensive colopathy  - Found on CT a/p, serial abdominals exams, current abdom soft w/ mild LQ TTP.     Renal  # HypoNa: mild at 133, likely represent hypervolemic in setting of cirrhosis w/ ascites.     # Metabolic acidosis. Lactate initially 4, most recently 2.7   - likely 2/2 lactic acidosis in setting of sepsis w/ SBP.   # at high risk of decompensation into HRS, however sCr currently wnl.   - strict I/Os, albumin supplementation should help ppx against HRS    ID  # SBP w/37K nucleated cells on diagnostic paracentesis, received CTX and zosyn  - E coli bacteremia likely from intraabdominal pathology   - Discontinued Zosyn and started CTX on 9/30 given E. coli sensitivities.   - Will plan to c/w antibiotics until 10/4 or as per ID  - Follow-up repeat BCx for clearance.   - Dental recs - no indication of oral infection, FU as outpt     Heme  # Coagulopathic w/ thrombocytopenia in setting of cirrhosis w/ gingival hemorrhage (dental recs to FU as outpt)   # Macrocytic anemia: likely 2/2 chronic EtOH abuse c/b cirrhosis, b12 level in 2015 wnl  - B12, folate WNL.     Endo  # no acute issues.     # DVT ppx: hold pharm AC given varices and high risk of bleed. ICD's for now.

## 2018-09-30 LAB
-  AMIKACIN: SIGNIFICANT CHANGE UP
-  AMPICILLIN/SULBACTAM: SIGNIFICANT CHANGE UP
-  AMPICILLIN: SIGNIFICANT CHANGE UP
-  AZTREONAM: SIGNIFICANT CHANGE UP
-  CEFAZOLIN: SIGNIFICANT CHANGE UP
-  CEFEPIME: SIGNIFICANT CHANGE UP
-  CEFOXITIN: SIGNIFICANT CHANGE UP
-  CEFTRIAXONE: SIGNIFICANT CHANGE UP
-  CIPROFLOXACIN: SIGNIFICANT CHANGE UP
-  ERTAPENEM: SIGNIFICANT CHANGE UP
-  GENTAMICIN: SIGNIFICANT CHANGE UP
-  IMIPENEM: SIGNIFICANT CHANGE UP
-  LEVOFLOXACIN: SIGNIFICANT CHANGE UP
-  MEROPENEM: SIGNIFICANT CHANGE UP
-  PIPERACILLIN/TAZOBACTAM: SIGNIFICANT CHANGE UP
-  TOBRAMYCIN: SIGNIFICANT CHANGE UP
-  TRIMETHOPRIM/SULFAMETHOXAZOLE: SIGNIFICANT CHANGE UP
ALBUMIN SERPL ELPH-MCNC: 3.1 G/DL — LOW (ref 3.3–5)
ALP SERPL-CCNC: 73 U/L — SIGNIFICANT CHANGE UP (ref 40–120)
ALT FLD-CCNC: 11 U/L — SIGNIFICANT CHANGE UP (ref 10–45)
ANION GAP SERPL CALC-SCNC: 9 MMOL/L — SIGNIFICANT CHANGE UP (ref 5–17)
APTT BLD: 73.8 SEC — HIGH (ref 27.5–37.4)
AST SERPL-CCNC: 70 U/L — HIGH (ref 10–40)
BASOPHILS # BLD AUTO: 0 K/UL — SIGNIFICANT CHANGE UP (ref 0–0.2)
BASOPHILS NFR BLD AUTO: 0.2 % — SIGNIFICANT CHANGE UP (ref 0–2)
BILIRUB SERPL-MCNC: 10.9 MG/DL — HIGH (ref 0.2–1.2)
BLD GP AB SCN SERPL QL: NEGATIVE — SIGNIFICANT CHANGE UP
BUN SERPL-MCNC: 17 MG/DL — SIGNIFICANT CHANGE UP (ref 7–23)
CALCIUM SERPL-MCNC: 8 MG/DL — LOW (ref 8.4–10.5)
CHLORIDE SERPL-SCNC: 106 MMOL/L — SIGNIFICANT CHANGE UP (ref 96–108)
CO2 SERPL-SCNC: 18 MMOL/L — LOW (ref 22–31)
CREAT SERPL-MCNC: 0.78 MG/DL — SIGNIFICANT CHANGE UP (ref 0.5–1.3)
CULTURE RESULTS: SIGNIFICANT CHANGE UP
CULTURE RESULTS: SIGNIFICANT CHANGE UP
EOSINOPHIL # BLD AUTO: 0.4 K/UL — SIGNIFICANT CHANGE UP (ref 0–0.5)
EOSINOPHIL NFR BLD AUTO: 5.1 % — SIGNIFICANT CHANGE UP (ref 0–6)
GLUCOSE BLDC GLUCOMTR-MCNC: 157 MG/DL — HIGH (ref 70–99)
GLUCOSE BLDC GLUCOMTR-MCNC: 179 MG/DL — HIGH (ref 70–99)
GLUCOSE BLDC GLUCOMTR-MCNC: 182 MG/DL — HIGH (ref 70–99)
GLUCOSE BLDC GLUCOMTR-MCNC: 190 MG/DL — HIGH (ref 70–99)
GLUCOSE SERPL-MCNC: 180 MG/DL — HIGH (ref 70–99)
HCT VFR BLD CALC: 20 % — CRITICAL LOW (ref 39–50)
HCT VFR BLD CALC: 21 % — CRITICAL LOW (ref 39–50)
HCT VFR BLD CALC: 22.4 % — LOW (ref 39–50)
HGB BLD-MCNC: 6.8 G/DL — CRITICAL LOW (ref 13–17)
HGB BLD-MCNC: 7.3 G/DL — LOW (ref 13–17)
HGB BLD-MCNC: 7.6 G/DL — LOW (ref 13–17)
INR BLD: 3.36 RATIO — HIGH (ref 0.88–1.16)
LYMPHOCYTES # BLD AUTO: 0.8 K/UL — LOW (ref 1–3.3)
LYMPHOCYTES # BLD AUTO: 10.6 % — LOW (ref 13–44)
MAGNESIUM SERPL-MCNC: 2 MG/DL — SIGNIFICANT CHANGE UP (ref 1.6–2.6)
MCHC RBC-ENTMCNC: 33.9 GM/DL — SIGNIFICANT CHANGE UP (ref 32–36)
MCHC RBC-ENTMCNC: 34.2 GM/DL — SIGNIFICANT CHANGE UP (ref 32–36)
MCHC RBC-ENTMCNC: 34.6 GM/DL — SIGNIFICANT CHANGE UP (ref 32–36)
MCHC RBC-ENTMCNC: 35.7 PG — HIGH (ref 27–34)
MCHC RBC-ENTMCNC: 36.1 PG — HIGH (ref 27–34)
MCHC RBC-ENTMCNC: 36.2 PG — HIGH (ref 27–34)
MCV RBC AUTO: 105 FL — HIGH (ref 80–100)
MCV RBC AUTO: 105 FL — HIGH (ref 80–100)
MCV RBC AUTO: 106 FL — HIGH (ref 80–100)
METHOD TYPE: SIGNIFICANT CHANGE UP
MONOCYTES # BLD AUTO: 0.9 K/UL — SIGNIFICANT CHANGE UP (ref 0–0.9)
MONOCYTES NFR BLD AUTO: 12.9 % — SIGNIFICANT CHANGE UP (ref 2–14)
NEUTROPHILS # BLD AUTO: 5.1 K/UL — SIGNIFICANT CHANGE UP (ref 1.8–7.4)
NEUTROPHILS NFR BLD AUTO: 71.2 % — SIGNIFICANT CHANGE UP (ref 43–77)
ORGANISM # SPEC MICROSCOPIC CNT: SIGNIFICANT CHANGE UP
PHOSPHATE SERPL-MCNC: 1.4 MG/DL — LOW (ref 2.5–4.5)
PLATELET # BLD AUTO: 40 K/UL — LOW (ref 150–400)
PLATELET # BLD AUTO: 41 K/UL — LOW (ref 150–400)
PLATELET # BLD AUTO: 46 K/UL — LOW (ref 150–400)
POTASSIUM SERPL-MCNC: 4.5 MMOL/L — SIGNIFICANT CHANGE UP (ref 3.5–5.3)
POTASSIUM SERPL-SCNC: 4.5 MMOL/L — SIGNIFICANT CHANGE UP (ref 3.5–5.3)
PROT SERPL-MCNC: 5.9 G/DL — LOW (ref 6–8.3)
PROTHROM AB SERPL-ACNC: 37.2 SEC — HIGH (ref 9.8–12.7)
RBC # BLD: 1.89 M/UL — LOW (ref 4.2–5.8)
RBC # BLD: 2.01 M/UL — LOW (ref 4.2–5.8)
RBC # BLD: 2.12 M/UL — LOW (ref 4.2–5.8)
RBC # FLD: 14.6 % — HIGH (ref 10.3–14.5)
RBC # FLD: 14.7 % — HIGH (ref 10.3–14.5)
RBC # FLD: 14.8 % — HIGH (ref 10.3–14.5)
RH IG SCN BLD-IMP: POSITIVE — SIGNIFICANT CHANGE UP
SODIUM SERPL-SCNC: 133 MMOL/L — LOW (ref 135–145)
SPECIMEN SOURCE: SIGNIFICANT CHANGE UP
SPECIMEN SOURCE: SIGNIFICANT CHANGE UP
VIT B12 SERPL-MCNC: >2000 PG/ML — HIGH (ref 232–1245)
WBC # BLD: 6.1 K/UL — SIGNIFICANT CHANGE UP (ref 3.8–10.5)
WBC # BLD: 6.3 K/UL — SIGNIFICANT CHANGE UP (ref 3.8–10.5)
WBC # BLD: 7.2 K/UL — SIGNIFICANT CHANGE UP (ref 3.8–10.5)
WBC # FLD AUTO: 6.1 K/UL — SIGNIFICANT CHANGE UP (ref 3.8–10.5)
WBC # FLD AUTO: 6.3 K/UL — SIGNIFICANT CHANGE UP (ref 3.8–10.5)
WBC # FLD AUTO: 7.2 K/UL — SIGNIFICANT CHANGE UP (ref 3.8–10.5)

## 2018-09-30 PROCEDURE — 99233 SBSQ HOSP IP/OBS HIGH 50: CPT | Mod: GC

## 2018-09-30 RX ORDER — CHLORHEXIDINE GLUCONATE 213 G/1000ML
15 SOLUTION TOPICAL
Qty: 0 | Refills: 0 | Status: DISCONTINUED | OUTPATIENT
Start: 2018-09-30 | End: 2018-10-01

## 2018-09-30 RX ORDER — POTASSIUM PHOSPHATE, MONOBASIC POTASSIUM PHOSPHATE, DIBASIC 236; 224 MG/ML; MG/ML
30 INJECTION, SOLUTION INTRAVENOUS ONCE
Qty: 0 | Refills: 0 | Status: COMPLETED | OUTPATIENT
Start: 2018-09-30 | End: 2018-09-30

## 2018-09-30 RX ORDER — PHYTONADIONE (VIT K1) 5 MG
10 TABLET ORAL ONCE
Qty: 0 | Refills: 0 | Status: COMPLETED | OUTPATIENT
Start: 2018-09-30 | End: 2018-09-30

## 2018-09-30 RX ORDER — FENTANYL CITRATE 50 UG/ML
25 INJECTION INTRAVENOUS EVERY 4 HOURS
Qty: 0 | Refills: 0 | Status: DISCONTINUED | OUTPATIENT
Start: 2018-09-30 | End: 2018-10-01

## 2018-09-30 RX ORDER — FENTANYL CITRATE 50 UG/ML
25 INJECTION INTRAVENOUS EVERY 4 HOURS
Qty: 0 | Refills: 0 | Status: DISCONTINUED | OUTPATIENT
Start: 2018-09-30 | End: 2018-09-30

## 2018-09-30 RX ORDER — CEFTRIAXONE 500 MG/1
1 INJECTION, POWDER, FOR SOLUTION INTRAMUSCULAR; INTRAVENOUS EVERY 24 HOURS
Qty: 0 | Refills: 0 | Status: DISCONTINUED | OUTPATIENT
Start: 2018-09-30 | End: 2018-10-01

## 2018-09-30 RX ORDER — MIDODRINE HYDROCHLORIDE 2.5 MG/1
5 TABLET ORAL THREE TIMES A DAY
Qty: 0 | Refills: 0 | Status: DISCONTINUED | OUTPATIENT
Start: 2018-09-30 | End: 2018-10-01

## 2018-09-30 RX ADMIN — FENTANYL CITRATE 25 MICROGRAM(S): 50 INJECTION INTRAVENOUS at 15:55

## 2018-09-30 RX ADMIN — MIDODRINE HYDROCHLORIDE 5 MILLIGRAM(S): 2.5 TABLET ORAL at 19:25

## 2018-09-30 RX ADMIN — PIPERACILLIN AND TAZOBACTAM 25 GRAM(S): 4; .5 INJECTION, POWDER, LYOPHILIZED, FOR SOLUTION INTRAVENOUS at 07:19

## 2018-09-30 RX ADMIN — FENTANYL CITRATE 25 MICROGRAM(S): 50 INJECTION INTRAVENOUS at 06:13

## 2018-09-30 RX ADMIN — POTASSIUM PHOSPHATE, MONOBASIC POTASSIUM PHOSPHATE, DIBASIC 83.33 MILLIMOLE(S): 236; 224 INJECTION, SOLUTION INTRAVENOUS at 03:36

## 2018-09-30 RX ADMIN — CHLORHEXIDINE GLUCONATE 15 MILLILITER(S): 213 SOLUTION TOPICAL at 22:48

## 2018-09-30 RX ADMIN — FENTANYL CITRATE 25 MICROGRAM(S): 50 INJECTION INTRAVENOUS at 15:40

## 2018-09-30 RX ADMIN — Medication 100 MILLILITER(S): at 18:59

## 2018-09-30 RX ADMIN — Medication 100 MILLILITER(S): at 13:42

## 2018-09-30 RX ADMIN — PIPERACILLIN AND TAZOBACTAM 25 GRAM(S): 4; .5 INJECTION, POWDER, LYOPHILIZED, FOR SOLUTION INTRAVENOUS at 00:08

## 2018-09-30 RX ADMIN — FENTANYL CITRATE 25 MICROGRAM(S): 50 INJECTION INTRAVENOUS at 05:58

## 2018-09-30 RX ADMIN — Medication 100 MILLILITER(S): at 05:29

## 2018-09-30 RX ADMIN — Medication 100 MILLILITER(S): at 00:07

## 2018-09-30 RX ADMIN — Medication 100 MILLIGRAM(S): at 13:50

## 2018-09-30 RX ADMIN — Medication 102 MILLIGRAM(S): at 02:40

## 2018-09-30 RX ADMIN — CEFTRIAXONE 100 GRAM(S): 500 INJECTION, POWDER, FOR SOLUTION INTRAMUSCULAR; INTRAVENOUS at 14:41

## 2018-09-30 NOTE — PROGRESS NOTE PEDS - ASSESSMENT
Impression:    1. Cirrhosis, decompensated, due to alcohol dependence  Varices: Prior varices, on propanolol 20 mg PO BID  HE: On lactulose 20 mg PO BID  Ascites: Moderate on CT A/P on 9/28/18, + for SBP on 9/27/18  HCC: No liver lesions seen on CT A/P on 9/28/18  Transplant Candidacy: Listed at [facility], patient of Dr. Candelaria Stokes  2. SBP: > 36k WBCs on diagnostic paracentesis  3. Bacteremia: Gram negative rods on blood culture. Likely source intraperitoneal. qSOFA 2.  4. Alcohol dependence: Sober for the past 3.5 years    Recommendations:  - Monitor daily CBC, CMP, Mg, P, INR to calculate daily Meld-Na  - F/U blood cultures  - Continue IV Zosyn for treatment of SBP / bacteremia  - Repeat paracentesis 48 hours after initial paracentesis only if safe pocket available  - avoid narcotics if possible   - Continue albumin 25g q6h (1.5mg/kg)   - Hold diuretics in setting of SBP  - Continue lactulose 20 mg PO BID  - Re-start propanolol 20 mg PO BID when no longer septic / hypotensive  - Transplant hepatology fellow at [facility] contacted; once patient is transferred to medical floor, patient is to be transferred to [facility] for continued medical management    Katelyn Simmons, PGY-4  Gastroenterology Fellow  Pager x [phone] or [phone]  (After 5 pm or on weekends please page GI on call)

## 2018-09-30 NOTE — PROGRESS NOTE ADULT - ASSESSMENT
53M PMH EtOH cirrhosis c/b varices (no alc in 3.5 yrs), hepatic encephalopathy on transplant list at [facility] p/w AMS, hypotension to SBP 80's, febrile to 100.6 F, and hypoxic to 90% on RA with associated symptoms of abdominal pain/distension/ascites, gingival bleeding, portal colopathy on CT, and found to have SBP on dx paracentesis (WBC 37K) and E coli bacteremia.       Neuro  # Hepatic encephalopathy w/ SBP: Mild, AOx2-3 below baseline per brothers, now improving. ammonia 36.   - zinc level pending     Pulm  # Hypoxic respiratory failure: mild, saturating well on room air. CXR w/o radiolographic of pna  - may be 2/2 fluid overload in setting of moderate ascites and hypotension req'ing fluids  	  CVS  # Hypotension w/ possible distributive shock in setting of SBP  - cont w/ albumin     GI   # Cirrhosis w/ varcies c/b SBP. on txp list on [facility], hep A, B, C all non reactive in 2015.   - SBP with 37K WBCs,  MELD at 30  - as per hepatology no need for repeat tap   - cont w/ albumin repletion  - cont w/ lactulose and titrate to BMs 2-3 per day   - Abdominal US Doppler:   Evidence of portal venous hypertension with reversal flow in the right   portal vein and shunting of flow in the left portal vein towards a   recannulated umbilical vein, as seen on CT. Splenic varices. Splenomegaly. Cirrhosis and moderate abdominal ascites.  - Advance to regular diet     # Portal hypertensive colopathy  - Found on CT a/p, serial abdominals exams, current abdom soft w/ mild LQ TTP.     Renal  # HypoNa: mild at 133, likely represent hypervolemic in setting of cirrhosis w/ ascites.     # Metabolic acidosis. Lactate initially 4, most recently 2.7   - likely 2/2 lactic acidosis in setting of sepsis w/ SBP.   # at high risk of decompensation into HRS, however sCr currently wnl.   - strict I/Os, albumin supplementation should help ppx against HRS    ID  # SBP w/37K nucleated cells on diagnostic paracentesis, received CTX and zosyn  - E coli bacteremia likely from intraabdominal pathology   - Will transition Zosyn to Ceftriaxone based on sensitivities.  - repeat blood cultures pending   - ID on board  - Dental recs - no indication of oral infection, FU as outpt     Heme  # Coagulopathic w/ thrombocytopenia in setting of cirrhosis w/ gingival hemorrhage (dental recs to FU as outpt)   # Macrocytic anemia: likely 2/2 chronic EtOH abuse c/b cirrhosis, b12 level in 2015 wnl  - B12, folate WNL.     Endo  # no acute issues.     # DVT ppx: hold pharm AC given varices and high risk of bleed. ICD's for now.

## 2018-09-30 NOTE — CHART NOTE - NSCHARTNOTEFT_GEN_A_CORE
MAR ACCEPT NIGHT: MICU TRANSFER    Pt is a 53M PMH EtOH cirrhosis c/b varices (no alc in 3.5 yrs), hepatic encephalopathy on transplant list at [facility] p/w AMS, hypotension to SBP 80's, febrile to 100.6 F, and hypoxic to 90% on RA with associated symptoms of abdominal pain/distension/ascites, gingival bleeding, portal colopathy on CT, and found to have SBP on dx paracentesis (WBC 37K) and E coli bacteremia (likely from the intraabdominal pathology). Patient was admitted to the MICU for observation and management. He did not require pressors and was weaned off nasal cannula. The patient was given albumin, Lactulose and Zosyn for management. Hepatology and ID were consulted. [facility] was contacted, and they have requested the patient be transferred to [facility] via a floor to floor transfer. On 9/29, the patient reports he feels better, was advanced to a regular diet, out of bed to chair. Was given Vit K this AM due INR of 3.22. The patient reported arthritic hip and back pain for which he received 1 mg morphine IV which improved his symptoms but caused hypotension to the 70s systolic. He was given an extra dose of albumin and placed on Levophed for a little over one hour, and then weaned off around 4 PM.  At time of this evaluation patient is hemodynamically stable and feeling better than on admission. He has no complaints or additional questions.     ASSESSMENT & PLAN:     53M PMH EtOH cirrhosis c/b varices (no alc in 3.5 yrs), hepatic encephalopathy on transplant list at [facility] p/[facility], hypotension to SBP 80's, febrile to 100.6 F, and hypoxic to 90% on RA with associated symptoms of abdominal pain/distension/ascites, gingival bleeding, portal colopathy on CT, and found to have SBP on dx paracentesis (WBC 37K) and E coli bacteremia.       Neuro  # Hepatic encephalopathy w/ SBP: Mild, AOx2-3 below baseline per brothers, now improving. ammonia 36.   - zinc level pending     Pulm  # Hypoxic respiratory failure: mild, saturating well on room air. CXR w/o radiolographic of pna  - may be 2/2 fluid overload in setting of moderate ascites and hypotension req'ing fluids  	  CVS  # Hypotension w/ possible distributive shock in setting of SBP  - cont w/ albumin   - SBP stable in 90s throughout day    GI   # Cirrhosis w/ varcies c/b SBP. on txp list on [facility], hep A, B, C all non reactive in 2015.   - SBP with 37K WBCs,  MELD at 30  - as per hepatology no need for repeat tap   - cont w/ albumin repletion  - cont w/ lactulose and titrate to BMs 2-3 per day   - Abdominal US Doppler:   Evidence of portal venous hypertension with reversal flow in the right   portal vein and shunting of flow in the left portal vein towards a   recannulated umbilical vein, as seen on CT. Splenic varices. Splenomegaly. Cirrhosis and moderate abdominal ascites.  - Advance to regular diet     # Portal hypertensive colopathy  - Found on CT a/p, serial abdominals exams, current abdom soft w/ mild LQ TTP.     Renal  # HypoNa: mild at 133, likely represent hypervolemic in setting of cirrhosis w/ ascites.     # Metabolic acidosis. Lactate initially 4, most recently 2.7   - likely 2/2 lactic acidosis in setting of sepsis w/ SBP.   # at high risk of decompensation into HRS, however sCr currently wnl.   - strict I/Os, albumin supplementation should help ppx against HRS    ID  # SBP w/37K nucleated cells on diagnostic paracentesis, received CTX and zosyn  - E coli bacteremia likely from intraabdominal pathology   - Discontinued Zosyn and started CTX on 9/30 given E. coli sensitivities.   - Will plan to c/w antibiotics until 10/4 or as per ID  - Follow-up repeat BCx for clearance.   - Dental recs - no indication of oral infection, FU as outpt     Heme  # Coagulopathic w/ thrombocytopenia in setting of cirrhosis w/ gingival hemorrhage (dental recs to FU as outpt)   # Macrocytic anemia: likely 2/2 chronic EtOH abuse c/b cirrhosis, b12 level in 2015 wnl  - B12, folate WNL.     Endo  # no acute issues.     # DVT ppx: hold pharm AC given varices and high risk of bleed. ICD's for now.      To Do's per MICU:  [ ] f/u 9/30 repeat blood cultures  [ ] transfer to [facility] when [id number] transfer possible   [ ] albumin to complete 9/30 per hepatology  [ ] trend INR    Ricardo MCWILLIAMS, PGY-3  [phone] MAR ACCEPT NIGHT: MICU TRANSFER    Pt is a 53M PMH EtOH cirrhosis c/b varices (no alc in 3.5 yrs), hepatic encephalopathy on transplant list at [facility] p/w [facility], hypotension to SBP 80's, febrile to 100.6 F, and hypoxic to 90% on RA with associated symptoms of abdominal pain/distension/ascites, gingival bleeding, portal colopathy on CT, and found to have SBP on dx paracentesis (WBC 37K) and E coli bacteremia (likely from the intraabdominal pathology). Patient was admitted to the MICU for observation and management. He did not require pressors and was weaned off nasal cannula. The patient was given albumin, Lactulose and Zosyn for management. Hepatology and ID were consulted. [facility] was contacted, and they have requested the patient be transferred to [facility] via a floor to floor transfer. On 9/29, the patient reports he feels better, was advanced to a regular diet, out of bed to chair. Was given Vit K this AM due INR of 3.22. The patient reported arthritic hip and back pain for which he received 1 mg morphine IV which improved his symptoms but caused hypotension to the 70s systolic. He was given an extra dose of albumin and placed on Levophed for a little over one hour, and then weaned off around 4 PM.  At time of this evaluation patient is hemodynamically stable and feeling better than on admission. He has no complaints or additional questions.     ASSESSMENT & PLAN:     53M PMH EtOH cirrhosis c/b varices (no alc in 3.5 yrs), hepatic encephalopathy on transplant list at [facility] p/[facility], hypotension to SBP 80's, febrile to 100.6 F, and hypoxic to 90% on RA with associated symptoms of abdominal pain/distension/ascites, gingival bleeding, portal colopathy on CT, and found to have SBP on dx paracentesis (WBC 37K) and E coli bacteremia.       Neuro  # Hepatic encephalopathy w/ SBP: Mild, AOx2-3 below baseline per brothers, now improving. ammonia 36.   - zinc level pending     Pulm  # Hypoxic respiratory failure: mild, saturating well on room air. CXR w/o radiolographic of pna  - may be 2/2 fluid overload in setting of moderate ascites and hypotension req'ing fluids  	  CVS  # Hypotension w/ possible distributive shock in setting of SBP  - c/w midodrine 5 TID  - cont w/ albumin   - SBP stable in 90s throughout day    GI   # Cirrhosis w/ varcies c/b SBP. on txp list on [facility], hep A, B, C all non reactive in 2015.   - SBP with 37K WBCs,  MELD at 30  - as per hepatology no need for repeat tap   - cont w/ albumin repletion  - cont w/ lactulose and titrate to BMs 2-3 per day   - Abdominal US Doppler:   Evidence of portal venous hypertension with reversal flow in the right   portal vein and shunting of flow in the left portal vein towards a   recannulated umbilical vein, as seen on CT. Splenic varices. Splenomegaly. Cirrhosis and moderate abdominal ascites.  - Advance to regular diet     # Portal hypertensive colopathy  - Found on CT a/p, serial abdominals exams, current abdom soft w/ mild LQ TTP.     Renal  # HypoNa: mild at 133, likely represent hypervolemic in setting of cirrhosis w/ ascites.     # Metabolic acidosis. Lactate initially 4, most recently 2.7   - likely 2/2 lactic acidosis in setting of sepsis w/ SBP.   # at high risk of decompensation into HRS, however sCr currently wnl.   - strict I/Os, albumin supplementation should help ppx against HRS    ID  # SBP w/37K nucleated cells on diagnostic paracentesis, received CTX and zosyn  - E coli bacteremia likely from intraabdominal pathology   - Discontinued Zosyn and started CTX on 9/30 given E. coli sensitivities.   - Will plan to c/w antibiotics until 10/4 or as per ID  - Follow-up repeat BCx for clearance.   - Dental recs - no indication of oral infection, FU as outpt     Heme  # Coagulopathic w/ thrombocytopenia in setting of cirrhosis w/ gingival hemorrhage (dental recs to FU as outpt)   # Macrocytic anemia: likely 2/2 chronic EtOH abuse c/b cirrhosis, b12 level in 2015 wnl  - B12, folate WNL.     Endo  # no acute issues.     # DVT ppx: hold pharm AC given varices and high risk of bleed. ICD's for now.      To Do's per MICU:  [ ] f/u 9/30 repeat blood cultures  [ ] transfer to [facility] when [id number] transfer possible   [ ] albumin to complete 9/30 per hepatology  [ ] trend INR    Ricardo MCWILLIAMS, PGY-3  [phone]

## 2018-09-30 NOTE — PROGRESS NOTE PEDS - SUBJECTIVE AND OBJECTIVE BOX
Chief Complaint:  Patient is a 53y old  Male who presents with a chief complaint of SBP, hypotension (30 Sep 2018 07:41)      Interval Events:   MICU team unable to tap abdomen due to inability to find safe pocket of fluid to check for SBP resolution.  Patient given morphine yeterday for hip pain with subsequent drop in BP requring intermittent pressure sppot.    Allergies:  No Known Allergies        Hospital Medications:  albumin human 25% IVPB 100 milliLiter(s) IV Intermittent every 6 hours  aluminum hydroxide/magnesium hydroxide/simethicone Suspension 30 milliLiter(s) Oral every 4 hours PRN  fentaNYL    Injectable 25 MICROGram(s) IV Push every 4 hours PRN  lactulose Syrup 20 Gram(s) Oral three times a day PRN  norepinephrine Infusion 0.01 MICROgram(s)/kG/Min IV Continuous <Continuous>  piperacillin/tazobactam IVPB. 3.375 Gram(s) IV Intermittent every 8 hours  thiamine 100 milliGRAM(s) Oral daily      PMHX/PSHX:  Esophageal varices  Alcoholic cirrhosis of liver with ascites  No pertinent past medical history  Abdominal hernia  H/O cataract      Family history:  Family history of hypercholesterolemia (Father)  Family history of diabetes mellitus (Mother)  No pertinent family history in first degree relatives      PHYSICAL EXAM:   Vital Signs:  Vital Signs Last 24 Hrs  T(C): 37 (30 Sep 2018 08:00), Max: 37.6 (29 Sep 2018 20:00)  T(F): 98.6 (30 Sep 2018 08:00), Max: 99.6 (29 Sep 2018 20:00)  HR: 61 (30 Sep 2018 09:00) (61 - 81)  BP: 103/53 (30 Sep 2018 08:00) (69/40 - 109/56)  BP(mean): 73 (30 Sep 2018 08:00) (49 - 77)  RR: 25 (30 Sep 2018 09:00) (22 - 38)  SpO2: 92% (30 Sep 2018 09:00) (88% - 97%)  Daily     Daily Weight in k.7 (30 Sep 2018 04:00)    GENERAL:  Appears stated age, well-groomed, well-nourished, no distress  CHEST:  no increased effort  ABDOMEN:  Soft, non-tender, non-distended, normoactive bowel sounds,  no masses , no hepato-splenomegaly, no signs of chronic liver disease  EXTEREMITIES:  no cyanosis, clubbing or edema  NEURO:  Alert, oriented, no tremor, no asterixis    LABS:                        7.6    6.3   )-----------( 41       ( 30 Sep 2018 08:49 )             22.4     Mean Cell Volume: 105.0 fl (-18 @ 08:49)        133<L>  |  106  |  17  ----------------------------<  180<H>  4.5   |  18<L>  |  0.78    Ca    8.0<L>      30 Sep 2018 00:45  Phos  1.4       Mg     2.0         TPro  5.9<L>  /  Alb  3.1<L>  /  TBili  10.9<H>  /  DBili  x   /  AST  70<H>  /  ALT  11  /  AlkPhos  73      LIVER FUNCTIONS - ( 30 Sep 2018 00:45 )  Alb: 3.1 g/dL / Pro: 5.9 g/dL / ALK PHOS: 73 U/L / ALT: 11 U/L / AST: 70 U/L / GGT: x           PT/INR - ( 30 Sep 2018 01:55 )   PT: 37.2 sec;   INR: 3.36 ratio         PTT - ( 30 Sep 2018 01:55 )  PTT:73.8 sec                            7.6    6.3   )-----------( 41       ( 30 Sep 2018 08:49 )             22.4                         7.3    6.1   )-----------( 40       ( 30 Sep 2018 01:55 )             21.0                         6.8    7.2   )-----------( 46       ( 30 Sep 2018 00:45 )             20.0                         8.4    8.1   )-----------( 44       ( 29 Sep 2018 00:46 )             25.5                         9.0    9.25  )-----------( 47       ( 28 Sep 2018 09:30 )             25.5     Imaging:

## 2018-09-30 NOTE — PROGRESS NOTE ADULT - SUBJECTIVE AND OBJECTIVE BOX
Interval events: Patient's Hgb dropped to 6.8, plan to re-check this morning, possible CT A/P if suspicious of RP bleed.    Review of Systems:  Constitutional: no fever, chills, fatigue  Neuro: no headache, numbness, weakness  Resp: no cough, wheezing, shortness of breath  CVS: no chest pain, palpitations, leg swelling  GI: no abdominal pain, nausea, vomiting, diarrhea   : no dysuria, frequency, incontinence  Skin: no itching, burning, rashes, or lesions   Msk: no joint pain or swelling  Psych: no depression, anxiety    T(F): 98.6 (09-30-18 @ 04:00), Max: 99.6 (09-29-18 @ 20:00)  HR: 63 (09-30-18 @ 07:00) (63 - 81)  BP: 102/57 (09-30-18 @ 07:00) (69/40 - 109/56)  RR: 25 (09-30-18 @ 07:00) (22 - 38)  SpO2: 95% (09-30-18 @ 07:00) (88% - 97%)  Wt(kg): --        CAPILLARY BLOOD GLUCOSE      POCT Blood Glucose.: 157 mg/dL (30 Sep 2018 05:46)    I&O's Summary    29 Sep 2018 07:01  -  30 Sep 2018 07:00  --------------------------------------------------------  IN: 1781 mL / OUT: 1575 mL / NET: 206 mL        Physical Exam:     Gen:  Neuro:  HEENT:  CV:  Pulm:  GI:  Ext:  Skin:    Meds:    piperacillin/tazobactam IVPB. 3.375 Gram(s) IV Intermittent every 8 hours    norepinephrine Infusion 0.01 MICROgram(s)/kG/Min IV Continuous <Continuous>        fentaNYL    Injectable 25 MICROGram(s) IV Push every 4 hours PRN    aluminum hydroxide/magnesium hydroxide/simethicone Suspension 30 milliLiter(s) Oral every 4 hours PRN  lactulose Syrup 20 Gram(s) Oral three times a day PRN        albumin human 25% IVPB 100 milliLiter(s) IV Intermittent every 6 hours  thiamine 100 milliGRAM(s) Oral daily                                      7.3    6.1   )-----------( 40       ( 30 Sep 2018 01:55 )             21.0       09-30    133<L>  |  106  |  17  ----------------------------<  180<H>  4.5   |  18<L>  |  0.78    Ca    8.0<L>      30 Sep 2018 00:45  Phos  1.4     09-30  Mg     2.0     09-30    TPro  5.9<L>  /  Alb  3.1<L>  /  TBili  10.9<H>  /  DBili  x   /  AST  70<H>  /  ALT  11  /  AlkPhos  73  09-30          PT/INR - ( 30 Sep 2018 01:55 )   PT: 37.2 sec;   INR: 3.36 ratio         PTT - ( 30 Sep 2018 01:55 )  PTT:73.8 sec    .Urine Clean Catch (Midstream)   No growth -- 09-28 @ 06:39  Peritoneal Peritoneal Fluid   No growth   polymorphonuclear leukocytes seen  No organisms seen  by cytocentrifuge 09-28 @ 00:49  .Blood Blood-Peripheral   Growth in aerobic and anaerobic bottles: Escherichia coli  "Due to technical problems, Proteus sp. will Not be reported as part of  the BCID panel until further notice"  ***Blood Panel PCR results on this specimen are available  approximately 3 hours after the Gram stain result.***  Gram stain, PCR, and/or culture results may not always  correspond due to difference in methodologies.  ************************************************************  This PCR assay was performed using [employer].  The following targets are tested for: Enterococcus,  vancomycin resistant enterococci, Listeria monocytogenes,  coagulase negative staphylococci, S. aureus,  methicillin resistant S. aureus, Streptococcus agalactiae  (Group B), S. pneumoniae, S.pyogenes (Group A),  Acinetobacter baumannii, Enterobacter cloacae, E. coli,  Klebsiella oxytoca, K. pneumoniae, Proteus sp.,  Serratia marcescens, Haemophilus influenzae,  Neisseria meningitidis, Pseudomonas aeruginosa, Candida  albicans, C. glabrata, C krusei, C parapsilosis,  C. tropicalis and the KPC resistance gene.   Growth in aerobic bottle:  Gram Negative Rods  Growth in anaerobic bottle:  Gram Negative Rods 09-28 @ 00:41        ABG - ( 28 Sep 2018 17:28 )  pH, Arterial: 7.41  pH, Blood: x     /  pCO2: 31    /  pO2: 69    / HCO3: 19    / Base Excess: -4.3  /  SaO2: 94                  CENTRAL LINE: Y/N   DATE INSERTED:   REMOVE: Y/N    DE SANTIAGO: Y/N      DATE INSERTED:        REMOVE: Y/N    A-LINE: Y/N     DATE INSERTED:              REMOVE: Y/N    GLOBAL ISSUE/BEST PRACTICE:  Analgesia:  Sedation:  HOB elevation: yes  Stress ulcer prophylaxis:  VTE prophylaxis:  Glycemic control:  Nutrition:    CODE STATUS: ***  [facility] discussion: Y  Critical care time spent (mins): *** Interval events: Patient's Hgb dropped to 6.8, plan to re-check this morning, possible CT A/P if suspicious of RP bleed.    Review of Systems:  Constitutional: no fever, chills, fatigue  Neuro: no headache, numbness, weakness  Resp: no cough, wheezing, shortness of breath  CVS: no chest pain, palpitations, leg swelling  GI: no abdominal pain, nausea, vomiting, diarrhea   : no dysuria, frequency, incontinence  Skin: no itching, burning, rashes, or lesions   Msk: no joint pain or swelling  Psych: no depression, anxiety    T(F): 98.6 (09-30-18 @ 04:00), Max: 99.6 (09-29-18 @ 20:00)  HR: 63 (09-30-18 @ 07:00) (63 - 81)  BP: 102/57 (09-30-18 @ 07:00) (69/40 - 109/56)  RR: 25 (09-30-18 @ 07:00) (22 - 38)  SpO2: 95% (09-30-18 @ 07:00) (88% - 97%)  Wt(kg): --        CAPILLARY BLOOD GLUCOSE      POCT Blood Glucose.: 157 mg/dL (30 Sep 2018 05:46)    I&O's Summary    29 Sep 2018 07:01  -  30 Sep 2018 07:00  --------------------------------------------------------  IN: 1781 mL / OUT: 1575 mL / NET: 206 mL        Physical Exam:     Gen: No acute distress, A&Ox3	  Neuro: No focal deficits  HEENT: EOMI, PERRL  CV: RRR  Pulm: CTAB  GI: distended, Non-tender    Meds:    piperacillin/tazobactam IVPB. 3.375 Gram(s) IV Intermittent every 8 hours    norepinephrine Infusion 0.01 MICROgram(s)/kG/Min IV Continuous <Continuous>        fentaNYL    Injectable 25 MICROGram(s) IV Push every 4 hours PRN    aluminum hydroxide/magnesium hydroxide/simethicone Suspension 30 milliLiter(s) Oral every 4 hours PRN  lactulose Syrup 20 Gram(s) Oral three times a day PRN        albumin human 25% IVPB 100 milliLiter(s) IV Intermittent every 6 hours  thiamine 100 milliGRAM(s) Oral daily                                      7.3    6.1   )-----------( 40       ( 30 Sep 2018 01:55 )             21.0       09-30    133<L>  |  106  |  17  ----------------------------<  180<H>  4.5   |  18<L>  |  0.78    Ca    8.0<L>      30 Sep 2018 00:45  Phos  1.4     09-30  Mg     2.0     09-30    TPro  5.9<L>  /  Alb  3.1<L>  /  TBili  10.9<H>  /  DBili  x   /  AST  70<H>  /  ALT  11  /  AlkPhos  73  09-30          PT/INR - ( 30 Sep 2018 01:55 )   PT: 37.2 sec;   INR: 3.36 ratio         PTT - ( 30 Sep 2018 01:55 )  PTT:73.8 sec    .Urine Clean Catch (Midstream)   No growth -- 09-28 @ 06:39  Peritoneal Peritoneal Fluid   No growth   polymorphonuclear leukocytes seen  No organisms seen  by cytocentrifuge 09-28 @ 00:49  .Blood Blood-Peripheral   Growth in aerobic and anaerobic bottles: Escherichia coli  "Due to technical problems, Proteus sp. will Not be reported as part of  the BCID panel until further notice"  ***Blood Panel PCR results on this specimen are available  approximately 3 hours after the Gram stain result.***  Gram stain, PCR, and/or culture results may not always  correspond due to difference in methodologies.  ************************************************************  This PCR assay was performed using [employer].  The following targets are tested for: Enterococcus,  vancomycin resistant enterococci, Listeria monocytogenes,  coagulase negative staphylococci, S. aureus,  methicillin resistant S. aureus, Streptococcus agalactiae  (Group B), S. pneumoniae, S.pyogenes (Group A),  Acinetobacter baumannii, Enterobacter cloacae, E. coli,  Klebsiella oxytoca, K. pneumoniae, Proteus sp.,  Serratia marcescens, Haemophilus influenzae,  Neisseria meningitidis, Pseudomonas aeruginosa, Candida  albicans, C. glabrata, C krusei, C parapsilosis,  C. tropicalis and the KPC resistance gene.   Growth in aerobic bottle:  Gram Negative Rods  Growth in anaerobic bottle:  Gram Negative Rods 09-28 @ 00:41        ABG - ( 28 Sep 2018 17:28 )  pH, Arterial: 7.41  pH, Blood: x     /  pCO2: 31    /  pO2: 69    / HCO3: 19    / Base Excess: -4.3  /  SaO2: 94 Interval events: Off pressors since yesterday afternoon.    Review of Systems:  Constitutional: no fever, chills, fatigue  Neuro: no headache, numbness, weakness  Resp: no cough, wheezing, shortness of breath  CVS: no chest pain, palpitations, leg swelling  GI: no abdominal pain, nausea, vomiting, diarrhea   : no dysuria, frequency, incontinence  Skin: no itching, burning, rashes, or lesions   Msk: no joint pain or swelling  Psych: no depression, anxiety    T(F): 98.6 (09-30-18 @ 04:00), Max: 99.6 (09-29-18 @ 20:00)  HR: 63 (09-30-18 @ 07:00) (63 - 81)  BP: 102/57 (09-30-18 @ 07:00) (69/40 - 109/56)  RR: 25 (09-30-18 @ 07:00) (22 - 38)  SpO2: 95% (09-30-18 @ 07:00) (88% - 97%)  Wt(kg): --        CAPILLARY BLOOD GLUCOSE      POCT Blood Glucose.: 157 mg/dL (30 Sep 2018 05:46)    I&O's Summary    29 Sep 2018 07:01  -  30 Sep 2018 07:00  --------------------------------------------------------  IN: 1781 mL / OUT: 1575 mL / NET: 206 mL        Physical Exam:     Gen: No acute distress, A&Ox3	  Neuro: No focal deficits  HEENT: EOMI, PERRL  CV: RRR  Pulm: CTAB  GI: distended, Non-tender    Meds:    piperacillin/tazobactam IVPB. 3.375 Gram(s) IV Intermittent every 8 hours    norepinephrine Infusion 0.01 MICROgram(s)/kG/Min IV Continuous <Continuous>        fentaNYL    Injectable 25 MICROGram(s) IV Push every 4 hours PRN    aluminum hydroxide/magnesium hydroxide/simethicone Suspension 30 milliLiter(s) Oral every 4 hours PRN  lactulose Syrup 20 Gram(s) Oral three times a day PRN        albumin human 25% IVPB 100 milliLiter(s) IV Intermittent every 6 hours  thiamine 100 milliGRAM(s) Oral daily                                      7.3    6.1   )-----------( 40       ( 30 Sep 2018 01:55 )             21.0       09-30    133<L>  |  106  |  17  ----------------------------<  180<H>  4.5   |  18<L>  |  0.78    Ca    8.0<L>      30 Sep 2018 00:45  Phos  1.4     09-30  Mg     2.0     09-30    TPro  5.9<L>  /  Alb  3.1<L>  /  TBili  10.9<H>  /  DBili  x   /  AST  70<H>  /  ALT  11  /  AlkPhos  73  09-30          PT/INR - ( 30 Sep 2018 01:55 )   PT: 37.2 sec;   INR: 3.36 ratio         PTT - ( 30 Sep 2018 01:55 )  PTT:73.8 sec    .Urine Clean Catch (Midstream)   No growth -- 09-28 @ 06:39  Peritoneal Peritoneal Fluid   No growth   polymorphonuclear leukocytes seen  No organisms seen  by cytocentrifuge 09-28 @ 00:49  .Blood Blood-Peripheral   Growth in aerobic and anaerobic bottles: Escherichia coli  "Due to technical problems, Proteus sp. will Not be reported as part of  the BCID panel until further notice"  ***Blood Panel PCR results on this specimen are available  approximately 3 hours after the Gram stain result.***  Gram stain, PCR, and/or culture results may not always  correspond due to difference in methodologies.  ************************************************************  This PCR assay was performed using [employer].  The following targets are tested for: Enterococcus,  vancomycin resistant enterococci, Listeria monocytogenes,  coagulase negative staphylococci, S. aureus,  methicillin resistant S. aureus, Streptococcus agalactiae  (Group B), S. pneumoniae, S.pyogenes (Group A),  Acinetobacter baumannii, Enterobacter cloacae, E. coli,  Klebsiella oxytoca, K. pneumoniae, Proteus sp.,  Serratia marcescens, Haemophilus influenzae,  Neisseria meningitidis, Pseudomonas aeruginosa, Candida  albicans, C. glabrata, C krusei, C parapsilosis,  C. tropicalis and the KPC resistance gene.   Growth in aerobic bottle:  Gram Negative Rods  Growth in anaerobic bottle:  Gram Negative Rods 09-28 @ 00:41        ABG - ( 28 Sep 2018 17:28 )  pH, Arterial: 7.41  pH, Blood: x     /  pCO2: 31    /  pO2: 69    / HCO3: 19    / Base Excess: -4.3  /  SaO2: 94

## 2018-10-01 ENCOUNTER — OUTPATIENT (OUTPATIENT)
Dept: OUTPATIENT SERVICES | Facility: HOSPITAL | Age: 53
LOS: 1 days | End: 2018-10-01
Payer: MEDICAID

## 2018-10-01 ENCOUNTER — TRANSCRIPTION ENCOUNTER (OUTPATIENT)
Age: 53
End: 2018-10-01

## 2018-10-01 VITALS
TEMPERATURE: 99 F | SYSTOLIC BLOOD PRESSURE: 104 MMHG | HEART RATE: 62 BPM | OXYGEN SATURATION: 95 % | DIASTOLIC BLOOD PRESSURE: 88 MMHG | RESPIRATION RATE: 18 BRPM

## 2018-10-01 DIAGNOSIS — Z86.69 PERSONAL HISTORY OF OTHER DISEASES OF THE NERVOUS SYSTEM AND SENSE ORGANS: Chronic | ICD-10-CM

## 2018-10-01 DIAGNOSIS — Z71.89 OTHER SPECIFIED COUNSELING: ICD-10-CM

## 2018-10-01 DIAGNOSIS — K46.9 UNSPECIFIED ABDOMINAL HERNIA WITHOUT OBSTRUCTION OR GANGRENE: Chronic | ICD-10-CM

## 2018-10-01 PROBLEM — K70.31 ALCOHOLIC CIRRHOSIS OF LIVER WITH ASCITES: Chronic | Status: ACTIVE | Noted: 2018-09-27

## 2018-10-01 PROBLEM — I85.00 ESOPHAGEAL VARICES WITHOUT BLEEDING: Chronic | Status: ACTIVE | Noted: 2018-09-28

## 2018-10-01 LAB
ANION GAP SERPL CALC-SCNC: 9 MMOL/L — SIGNIFICANT CHANGE UP (ref 5–17)
APTT BLD: 75.9 SEC — HIGH (ref 27.5–37.4)
BASOPHILS # BLD AUTO: 0 K/UL — SIGNIFICANT CHANGE UP (ref 0–0.2)
BASOPHILS NFR BLD AUTO: 0.7 % — SIGNIFICANT CHANGE UP (ref 0–2)
BUN SERPL-MCNC: 17 MG/DL — SIGNIFICANT CHANGE UP (ref 7–23)
CALCIUM SERPL-MCNC: 7.8 MG/DL — LOW (ref 8.4–10.5)
CHLORIDE SERPL-SCNC: 107 MMOL/L — SIGNIFICANT CHANGE UP (ref 96–108)
CO2 SERPL-SCNC: 20 MMOL/L — LOW (ref 22–31)
CREAT SERPL-MCNC: 0.78 MG/DL — SIGNIFICANT CHANGE UP (ref 0.5–1.3)
EOSINOPHIL # BLD AUTO: 0.4 K/UL — SIGNIFICANT CHANGE UP (ref 0–0.5)
EOSINOPHIL NFR BLD AUTO: 8.1 % — HIGH (ref 0–6)
GLUCOSE BLDC GLUCOMTR-MCNC: 133 MG/DL — HIGH (ref 70–99)
GLUCOSE BLDC GLUCOMTR-MCNC: 140 MG/DL — HIGH (ref 70–99)
GLUCOSE BLDC GLUCOMTR-MCNC: 181 MG/DL — HIGH (ref 70–99)
GLUCOSE BLDC GLUCOMTR-MCNC: 197 MG/DL — HIGH (ref 70–99)
GLUCOSE BLDC GLUCOMTR-MCNC: 199 MG/DL — HIGH (ref 70–99)
GLUCOSE SERPL-MCNC: 131 MG/DL — HIGH (ref 70–99)
HCT VFR BLD CALC: 22.1 % — LOW (ref 39–50)
HGB BLD-MCNC: 7.6 G/DL — LOW (ref 13–17)
INR BLD: 3.49 RATIO — HIGH (ref 0.88–1.16)
LYMPHOCYTES # BLD AUTO: 0.8 K/UL — LOW (ref 1–3.3)
LYMPHOCYTES # BLD AUTO: 15.3 % — SIGNIFICANT CHANGE UP (ref 13–44)
MCHC RBC-ENTMCNC: 34.4 GM/DL — SIGNIFICANT CHANGE UP (ref 32–36)
MCHC RBC-ENTMCNC: 36.1 PG — HIGH (ref 27–34)
MCV RBC AUTO: 105 FL — HIGH (ref 80–100)
MONOCYTES # BLD AUTO: 0.7 K/UL — SIGNIFICANT CHANGE UP (ref 0–0.9)
MONOCYTES NFR BLD AUTO: 13.4 % — SIGNIFICANT CHANGE UP (ref 2–14)
NEUTROPHILS # BLD AUTO: 3.4 K/UL — SIGNIFICANT CHANGE UP (ref 1.8–7.4)
NEUTROPHILS NFR BLD AUTO: 62.4 % — SIGNIFICANT CHANGE UP (ref 43–77)
PHOSPHATE SERPL-MCNC: 1.6 MG/DL — LOW (ref 2.5–4.5)
PLATELET # BLD AUTO: 45 K/UL — LOW (ref 150–400)
POTASSIUM SERPL-MCNC: 4.1 MMOL/L — SIGNIFICANT CHANGE UP (ref 3.5–5.3)
POTASSIUM SERPL-SCNC: 4.1 MMOL/L — SIGNIFICANT CHANGE UP (ref 3.5–5.3)
PROTHROM AB SERPL-ACNC: 39 SEC — HIGH (ref 9.8–12.7)
RBC # BLD: 2.11 M/UL — LOW (ref 4.2–5.8)
RBC # FLD: 14.6 % — HIGH (ref 10.3–14.5)
SODIUM SERPL-SCNC: 136 MMOL/L — SIGNIFICANT CHANGE UP (ref 135–145)
WBC # BLD: 5.5 K/UL — SIGNIFICANT CHANGE UP (ref 3.8–10.5)
WBC # FLD AUTO: 5.5 K/UL — SIGNIFICANT CHANGE UP (ref 3.8–10.5)

## 2018-10-01 PROCEDURE — 82803 BLOOD GASES ANY COMBINATION: CPT

## 2018-10-01 PROCEDURE — 74177 CT ABD & PELVIS W/CONTRAST: CPT

## 2018-10-01 PROCEDURE — 99232 SBSQ HOSP IP/OBS MODERATE 35: CPT | Mod: GC

## 2018-10-01 PROCEDURE — 85610 PROTHROMBIN TIME: CPT

## 2018-10-01 PROCEDURE — 82565 ASSAY OF CREATININE: CPT

## 2018-10-01 PROCEDURE — 96375 TX/PRO/DX INJ NEW DRUG ADDON: CPT | Mod: XU

## 2018-10-01 PROCEDURE — 86900 BLOOD TYPING SEROLOGIC ABO: CPT

## 2018-10-01 PROCEDURE — 84132 ASSAY OF SERUM POTASSIUM: CPT

## 2018-10-01 PROCEDURE — 85014 HEMATOCRIT: CPT

## 2018-10-01 PROCEDURE — 83690 ASSAY OF LIPASE: CPT

## 2018-10-01 PROCEDURE — 99232 SBSQ HOSP IP/OBS MODERATE 35: CPT

## 2018-10-01 PROCEDURE — 87186 SC STD MICRODIL/AGAR DIL: CPT

## 2018-10-01 PROCEDURE — 89051 BODY FLUID CELL COUNT: CPT

## 2018-10-01 PROCEDURE — 87150 DNA/RNA AMPLIFIED PROBE: CPT

## 2018-10-01 PROCEDURE — 82248 BILIRUBIN DIRECT: CPT

## 2018-10-01 PROCEDURE — 84630 ASSAY OF ZINC: CPT

## 2018-10-01 PROCEDURE — G9001: CPT

## 2018-10-01 PROCEDURE — 82140 ASSAY OF AMMONIA: CPT

## 2018-10-01 PROCEDURE — 93975 VASCULAR STUDY: CPT

## 2018-10-01 PROCEDURE — 96361 HYDRATE IV INFUSION ADD-ON: CPT

## 2018-10-01 PROCEDURE — P9045: CPT

## 2018-10-01 PROCEDURE — 80048 BASIC METABOLIC PNL TOTAL CA: CPT

## 2018-10-01 PROCEDURE — 84295 ASSAY OF SERUM SODIUM: CPT

## 2018-10-01 PROCEDURE — 87075 CULTR BACTERIA EXCEPT BLOOD: CPT

## 2018-10-01 PROCEDURE — 71045 X-RAY EXAM CHEST 1 VIEW: CPT

## 2018-10-01 PROCEDURE — 87086 URINE CULTURE/COLONY COUNT: CPT

## 2018-10-01 PROCEDURE — 99238 HOSP IP/OBS DSCHRG MGMT 30/<: CPT

## 2018-10-01 PROCEDURE — 86901 BLOOD TYPING SEROLOGIC RH(D): CPT

## 2018-10-01 PROCEDURE — P9047: CPT

## 2018-10-01 PROCEDURE — 82947 ASSAY GLUCOSE BLOOD QUANT: CPT

## 2018-10-01 PROCEDURE — 87205 SMEAR GRAM STAIN: CPT

## 2018-10-01 PROCEDURE — 49082 ABD PARACENTESIS: CPT

## 2018-10-01 PROCEDURE — 87040 BLOOD CULTURE FOR BACTERIA: CPT

## 2018-10-01 PROCEDURE — 84157 ASSAY OF PROTEIN OTHER: CPT

## 2018-10-01 PROCEDURE — 83605 ASSAY OF LACTIC ACID: CPT

## 2018-10-01 PROCEDURE — 82607 VITAMIN B-12: CPT

## 2018-10-01 PROCEDURE — 82746 ASSAY OF FOLIC ACID SERUM: CPT

## 2018-10-01 PROCEDURE — 84100 ASSAY OF PHOSPHORUS: CPT

## 2018-10-01 PROCEDURE — 85027 COMPLETE CBC AUTOMATED: CPT

## 2018-10-01 PROCEDURE — 82945 GLUCOSE OTHER FLUID: CPT

## 2018-10-01 PROCEDURE — 81001 URINALYSIS AUTO W/SCOPE: CPT

## 2018-10-01 PROCEDURE — 85730 THROMBOPLASTIN TIME PARTIAL: CPT

## 2018-10-01 PROCEDURE — 83921 ORGANIC ACID SINGLE QUANT: CPT

## 2018-10-01 PROCEDURE — 99285 EMERGENCY DEPT VISIT HI MDM: CPT | Mod: 25

## 2018-10-01 PROCEDURE — 80053 COMPREHEN METABOLIC PANEL: CPT

## 2018-10-01 PROCEDURE — 86850 RBC ANTIBODY SCREEN: CPT

## 2018-10-01 PROCEDURE — 82962 GLUCOSE BLOOD TEST: CPT

## 2018-10-01 PROCEDURE — 87449 NOS EACH ORGANISM AG IA: CPT

## 2018-10-01 PROCEDURE — 82330 ASSAY OF CALCIUM: CPT

## 2018-10-01 PROCEDURE — 87070 CULTURE OTHR SPECIMN AEROBIC: CPT

## 2018-10-01 PROCEDURE — 82435 ASSAY OF BLOOD CHLORIDE: CPT

## 2018-10-01 PROCEDURE — 96365 THER/PROPH/DIAG IV INF INIT: CPT | Mod: XU

## 2018-10-01 PROCEDURE — 83735 ASSAY OF MAGNESIUM: CPT

## 2018-10-01 RX ORDER — DEXTROSE 50 % IN WATER 50 %
25 SYRINGE (ML) INTRAVENOUS ONCE
Qty: 0 | Refills: 0 | Status: DISCONTINUED | OUTPATIENT
Start: 2018-10-01 | End: 2018-10-01

## 2018-10-01 RX ORDER — CEFTRIAXONE 500 MG/1
1 INJECTION, POWDER, FOR SOLUTION INTRAMUSCULAR; INTRAVENOUS
Qty: 0 | Refills: 0 | COMMUNITY
Start: 2018-10-01

## 2018-10-01 RX ORDER — DEXTROSE 50 % IN WATER 50 %
15 SYRINGE (ML) INTRAVENOUS ONCE
Qty: 0 | Refills: 0 | Status: DISCONTINUED | OUTPATIENT
Start: 2018-10-01 | End: 2018-10-01

## 2018-10-01 RX ORDER — DEXTROSE 50 % IN WATER 50 %
12.5 SYRINGE (ML) INTRAVENOUS ONCE
Qty: 0 | Refills: 0 | Status: DISCONTINUED | OUTPATIENT
Start: 2018-10-01 | End: 2018-10-01

## 2018-10-01 RX ORDER — GLUCAGON INJECTION, SOLUTION 0.5 MG/.1ML
1 INJECTION, SOLUTION SUBCUTANEOUS ONCE
Qty: 0 | Refills: 0 | Status: DISCONTINUED | OUTPATIENT
Start: 2018-10-01 | End: 2018-10-01

## 2018-10-01 RX ORDER — LACTULOSE 10 G/15ML
30 SOLUTION ORAL
Qty: 0 | Refills: 0 | COMMUNITY
Start: 2018-10-01

## 2018-10-01 RX ORDER — INSULIN LISPRO 100/ML
VIAL (ML) SUBCUTANEOUS
Qty: 0 | Refills: 0 | Status: DISCONTINUED | OUTPATIENT
Start: 2018-10-01 | End: 2018-10-01

## 2018-10-01 RX ORDER — SODIUM CHLORIDE 9 MG/ML
1000 INJECTION, SOLUTION INTRAVENOUS
Qty: 0 | Refills: 0 | Status: DISCONTINUED | OUTPATIENT
Start: 2018-10-01 | End: 2018-10-01

## 2018-10-01 RX ORDER — INSULIN LISPRO 100/ML
VIAL (ML) SUBCUTANEOUS AT BEDTIME
Qty: 0 | Refills: 0 | Status: DISCONTINUED | OUTPATIENT
Start: 2018-10-01 | End: 2018-10-01

## 2018-10-01 RX ORDER — PROPRANOLOL HCL 160 MG
1 CAPSULE, EXTENDED RELEASE 24HR ORAL
Qty: 1 | Refills: 0 | OUTPATIENT
Start: 2018-10-01 | End: 2018-10-30

## 2018-10-01 RX ORDER — MIDODRINE HYDROCHLORIDE 2.5 MG/1
1 TABLET ORAL
Qty: 0 | Refills: 0 | COMMUNITY
Start: 2018-10-01

## 2018-10-01 RX ADMIN — MIDODRINE HYDROCHLORIDE 5 MILLIGRAM(S): 2.5 TABLET ORAL at 21:33

## 2018-10-01 RX ADMIN — CEFTRIAXONE 100 GRAM(S): 500 INJECTION, POWDER, FOR SOLUTION INTRAMUSCULAR; INTRAVENOUS at 17:37

## 2018-10-01 RX ADMIN — MIDODRINE HYDROCHLORIDE 5 MILLIGRAM(S): 2.5 TABLET ORAL at 05:36

## 2018-10-01 RX ADMIN — Medication 100 MILLIGRAM(S): at 13:05

## 2018-10-01 RX ADMIN — CHLORHEXIDINE GLUCONATE 15 MILLILITER(S): 213 SOLUTION TOPICAL at 17:37

## 2018-10-01 RX ADMIN — Medication 2: at 18:12

## 2018-10-01 RX ADMIN — CHLORHEXIDINE GLUCONATE 15 MILLILITER(S): 213 SOLUTION TOPICAL at 05:36

## 2018-10-01 RX ADMIN — MIDODRINE HYDROCHLORIDE 5 MILLIGRAM(S): 2.5 TABLET ORAL at 13:05

## 2018-10-01 NOTE — DISCHARGE NOTE ADULT - HOSPITAL COURSE
53M PMH EtOH cirrhosis c/b varices (no alc in 3.5 yrs), hepatic encephalopathy on transplant list at [facility] p/w AMS, hypotension to SBP 80's, febrile to 100.6 F, and hypoxic to 90% on RA with associated symptoms of abdominal pain/distension/ascites, gingival bleeding, portal colopathy on CT, and found to have SBP on dx paracentesis (WBC 37K) and E coli bacteremia (likely from the intraabdominal pathology). Patient was admitted to the MICU for observation and management. He did not require pressors and was weaned off nasal cannula. The patient was given albumin, Lactulose and Zosyn for management. Hepatology and ID were consulted. [facility] was contacted, and they have requested the patient be transferred to [facility] via a floor to floor transfer. On 9/29, pt reported arthritic hip and back pain for which he received 1 mg morphine IV which improved his symptoms but caused hypotension to the 70s systolic. He was given an extra dose of albumin and placed on Levophed for a little over one hour, and then weaned off around 4 PM. Pt was transferred from ICU to floors for transfer to [facility]. He was deemed hemodynamically stable for transfer. 53M PMH EtOH cirrhosis c/b varices (no alc in 3.5 yrs), hepatic encephalopathy on transplant list at [facility] p/w AMS, hypotension to SBP 80's, febrile to 100.6 F, and hypoxic to 90% on RA with associated symptoms of abdominal pain/distension/ascites, gingival bleeding, portal colopathy on CT, and found to have SBP on dx paracentesis (WBC 37K) and E coli bacteremia (likely from the intraabdominal pathology). Patient was admitted to the MICU for observation and management. He did not require pressors and was weaned off nasal cannula. The patient was given albumin, Lactulose, Ceftriaxone and Zosyn for management. Hepatology and ID were consulted. [facility] was contacted, and they have requested the patient be transferred to [facility] via a floor to floor transfer. On 9/29, pt reported arthritic hip and back pain for which he received 1 mg morphine IV which improved his symptoms but caused hypotension to the 70s systolic. He was given an extra dose of albumin and placed on Levophed for a little over one hour, and then weaned off around 4 PM. Pt was transferred from ICU to floors for transfer to [facility]. He was deemed hemodynamically stable for transfer. 53M PMH EtOH cirrhosis c/b varices (no alc in 3.5 yrs), hepatic encephalopathy on transplant list at [facility] p/w AMS, hypotension to SBP 80's, febrile to 100.6 F, and hypoxic to 90% on RA with associated symptoms of abdominal pain/distension/ascites, gingival bleeding, portal colopathy on CT, and found to have SBP on dx paracentesis (WBC 37K) and E coli bacteremia (likely from the intraabdominal pathology). Patient was admitted to the MICU for observation and management. He did not require pressors and was weaned off nasal cannula. The patient was given albumin, Lactulose, Ceftriaxone and Zosyn for management. Hepatology and ID were consulted. [facility] was contacted, and they have requested the patient be transferred to [facility] via a floor to floor transfer. On 9/29, pt reported arthritic hip and back pain for which he received 1 mg morphine IV which improved his symptoms but caused hypotension to the 70s systolic. He was given an extra dose of albumin and placed on Levophed for a little over one hour, and then weaned off around 4 PM. Pt was transferred from ICU to floors for transfer to [facility]. He was deemed hemodynamically stable for transfer.     Pt. being discharged on ceftriaxone; last blood culture resulted on 9/30 showed no growth in two bottles; pt. to continue 9 more days of ceftriaxone for a total of 10 day tx post clear blood cx.

## 2018-10-01 NOTE — DISCHARGE NOTE ADULT - MEDICATION SUMMARY - MEDICATIONS TO STOP TAKING
I will STOP taking the medications listed below when I get home from the hospital:  None I will STOP taking the medications listed below when I get home from the hospital:    metolazone 5 mg oral tablet  -- 1 tab(s) by mouth once a day    spironolactone 25 mg oral tablet  -- 1 tab(s) by mouth once a day    ciprofloxacin 500 mg oral tablet  -- 1 tab(s) by mouth once a day    furosemide 80 mg oral tablet  -- 1 tab(s) by mouth once a day

## 2018-10-01 NOTE — PROGRESS NOTE ADULT - REASON FOR ADMISSION
SBP
SBP, hypotension

## 2018-10-01 NOTE — DISCHARGE NOTE ADULT - ADDITIONAL INSTRUCTIONS
To be transferred to [facility] for Liver transplant To be transferred to [facility] for Liver transplant. Please restart Lasix, Spironolactone, and Metolazone if blood pressure tolerates and Hepatology team agreeable at [facility].

## 2018-10-01 NOTE — DISCHARGE NOTE ADULT - PATIENT PORTAL LINK FT
You can access the [email or web address] Patient Portal, offered by [facility], by registering with the following website: [email or web address]

## 2018-10-01 NOTE — DISCHARGE NOTE ADULT - MEDICATION SUMMARY - MEDICATIONS TO TAKE
I will START or STAY ON the medications listed below when I get home from the hospital:    spironolactone 25 mg oral tablet  -- 1 tab(s) by mouth once a day  -- Indication: For Alcoholic cirrhosis of liver with ascites    aluminum hydroxide-magnesium hydroxide 200 mg-200 mg/5 mL oral suspension  -- 30 milliliter(s) by mouth every 4 hours, As needed, Dyspepsia  -- Indication: For Reflux    propranolol 20 mg oral tablet  -- 1 tab(s) by mouth 2 times a day  -- Indication: For Alcoholic cirrhosis of liver with ascites    cefTRIAXone 1 g intravenous injection  -- 1 gram(s) intravenous every 24 hours  -- Indication: For Alcoholic cirrhosis of liver with ascites    clindamycin 1% topical solution  -- 1 application on skin 2 times a day  -- Indication: For Skin care    hydrocortisone 1% topical cream  -- 1 application on skin 2 times a day  -- Indication: For Skin care    metolazone 5 mg oral tablet  -- 1 tab(s) by mouth once a day  -- Indication: For Alcoholic cirrhosis of liver with ascites    furosemide 80 mg oral tablet  -- 1 tab(s) by mouth once a day  -- Indication: For Alcoholic cirrhosis of liver with ascites    lactulose 10 g/15 mL oral syrup  -- 30 milliliter(s) by mouth 3 times a day, As needed, Titrate to 2-3 BMs daily  -- Indication: For Alcoholic cirrhosis of liver with ascites    midodrine 5 mg oral tablet  -- 1 tab(s) by mouth 3 times a day  -- Indication: For Alcoholic cirrhosis of liver with ascites    ciprofloxacin 500 mg oral tablet  -- 1 tab(s) by mouth once a day  -- Indication: For Alcoholic cirrhosis of liver with ascites    Multiple Vitamins oral tablet  -- 1 tab(s) by mouth once a day  -- Indication: For Need for prophylactic measure    folic acid 1 mg oral tablet  -- 1 tab(s) by mouth once a day  -- Indication: For Need for prophylactic measure    thiamine 100 mg oral tablet  -- 1 tab(s) by mouth once a day  -- Indication: For Alcoholic cirrhosis of liver with ascites I will START or STAY ON the medications listed below when I get home from the hospital:    aluminum hydroxide-magnesium hydroxide 200 mg-200 mg/5 mL oral suspension  -- 30 milliliter(s) by mouth every 4 hours, As needed, Dyspepsia  -- Indication: For Reflux    propranolol 10 mg oral tablet  -- 1 tab(s) by mouth 2 times a day  -- Indication: For Esophageal varices    cefTRIAXone 1 g intravenous injection  -- 1 gram(s) intravenous every 24 hours  -- Indication: For Alcoholic cirrhosis of liver with ascites    clindamycin 1% topical solution  -- 1 application on skin 2 times a day  -- Indication: For Skin care    hydrocortisone 1% topical cream  -- 1 application on skin 2 times a day  -- Indication: For Skin care    lactulose 10 g/15 mL oral syrup  -- 30 milliliter(s) by mouth 3 times a day, As needed, Titrate to 2-3 BMs daily  -- Indication: For Alcoholic cirrhosis of liver with ascites    midodrine 5 mg oral tablet  -- 1 tab(s) by mouth 3 times a day  -- Indication: For Alcoholic cirrhosis of liver with ascites    Multiple Vitamins oral tablet  -- 1 tab(s) by mouth once a day  -- Indication: For Need for prophylactic measure    folic acid 1 mg oral tablet  -- 1 tab(s) by mouth once a day  -- Indication: For Need for prophylactic measure    thiamine 100 mg oral tablet  -- 1 tab(s) by mouth once a day  -- Indication: For Alcoholic cirrhosis of liver with ascites

## 2018-10-01 NOTE — PROGRESS NOTE ADULT - SUBJECTIVE AND OBJECTIVE BOX
Negar Lopez MD  PGY1 | Dept of Internal Medicine  Pager [phone]        Patient is a 53y old  Male who presents with a chief complaint of SBP, hypotension (30 Sep 2018 07:41)      SUBJECTIVE / OVERNIGHT EVENTS: no acute events o/n.     MEDICATIONS  (STANDING):  cefTRIAXone   IVPB 1 Gram(s) IV Intermittent every 24 hours  chlorhexidine 0.12% Liquid 15 milliLiter(s) Swish and Spit two times a day  midodrine 5 milliGRAM(s) Oral three times a day  thiamine 100 milliGRAM(s) Oral daily    MEDICATIONS  (PRN):  aluminum hydroxide/magnesium hydroxide/simethicone Suspension 30 milliLiter(s) Oral every 4 hours PRN Dyspepsia  fentaNYL    Injectable 25 MICROGram(s) IV Push every 4 hours PRN Severe Pain (7 - 10)  lactulose Syrup 20 Gram(s) Oral three times a day PRN Titrate to 2-3 BMs daily      Vital Signs Last 24 Hrs  T(C): 36.9 (01 Oct 2018 05:02), Max: 37.5 (30 Sep 2018 17:00)  T(F): 98.4 (01 Oct 2018 05:02), Max: 99.5 (30 Sep 2018 17:00)  HR: 62 (01 Oct 2018 06:00) (61 - 71)  BP: 90/53 (01 Oct 2018 06:00) (85/46 - 106/55)  BP(mean): 74 (30 Sep 2018 21:00) (62 - 78)  RR: 20 (01 Oct 2018 05:02) (20 - 38)  SpO2: 92% (01 Oct 2018 05:02) (91% - 95%)    CAPILLARY BLOOD GLUCOSE      POCT Blood Glucose.: 140 mg/dL (01 Oct 2018 05:49)  POCT Blood Glucose.: 181 mg/dL (01 Oct 2018 01:30)  POCT Blood Glucose.: 182 mg/dL (30 Sep 2018 18:12)  POCT Blood Glucose.: 179 mg/dL (30 Sep 2018 11:43)    I&O's Summary    30 Sep 2018 07:01  -  01 Oct 2018 07:00  --------------------------------------------------------  IN: 1408.3 mL / OUT: 650 mL / NET: 758.3 mL        PHYSICAL EXAM:  GENERAL: NAD, well-developed  HEAD:  Atraumatic, Normocephalic  EYES: EOMI, PERRLA, conjunctiva and sclera clear  NECK: Supple, No JVD  CHEST/LUNG: Clear to auscultation bilaterally; No wheeze  HEART: Regular rate and rhythm; No murmurs, rubs, or gallops  ABDOMEN: Soft, Nontender, Nondistended; Bowel sounds present  EXTREMITIES:  2+ Peripheral Pulses, No clubbing, cyanosis, or edema  PSYCH: AAOx3  NEUROLOGY: non-focal  SKIN: No rashes or lesions    LABS:                        7.6    5.5   )-----------( 45       ( 01 Oct 2018 07:24 )             22.1     Auto Eosinophil # 0.4   / Auto Eosinophil % 8.1   / Auto Neutrophil # 3.4   / Auto Neutrophil % 62.4  / BANDS % x                            7.6    6.3   )-----------( 41       ( 30 Sep 2018 08:49 )             22.4     Auto Eosinophil # x     / Auto Eosinophil % x     / Auto Neutrophil # x     / Auto Neutrophil % x     / BANDS % x                            7.3    6.1   )-----------( 40       ( 30 Sep 2018 01:55 )             21.0     Auto Eosinophil # x     / Auto Eosinophil % x     / Auto Neutrophil # x     / Auto Neutrophil % x     / BANDS % x        10-01    136  |  107  |  17  ----------------------------<  131<H>  4.1   |  20<L>  |  0.78  09-30    133<L>  |  106  |  17  ----------------------------<  180<H>  4.5   |  18<L>  |  0.78    Ca    7.8<L>      01 Oct 2018 07:24  Mg     2.0     09-30  Phos  1.6     10-01  TPro  5.9<L>  /  Alb  3.1<L>  /  TBili  10.9<H>  /  DBili  x   /  AST  70<H>  /  ALT  11  /  AlkPhos  73  09-30    PT/INR - ( 30 Sep 2018 01:55 )   PT: 37.2 sec;   INR: 3.36 ratio         PTT - ( 30 Sep 2018 01:55 )  PTT:73.8 sec            RESPIRATORY  VENT:    ABG: ( 28 Sep 2018 17:28 ) pH: 7.41  /  pCO2: 31    /  pO2: 69    / HCO3: 19    / Base Excess: -4.3  /  SaO2: 94                  VBG:     RADIOLOGY & ADDITIONAL TESTS:  (Imaging Personally Reviewed)    Consultant(s) Notes Reviewed:      Care Discussed with Consultants/Other Providers:

## 2018-10-01 NOTE — PROGRESS NOTE ADULT - SUBJECTIVE AND OBJECTIVE BOX
53y old  Male who presents with a chief complaint of SBP (01 Oct 2018 12:47)      Interval history:  Afebrile, feels well, denies abdominal pain, complains of ecchymosis of b/l upper extremity.     No Known Allergies    Antimicrobials:  cefTRIAXone   IVPB 1 Gram(s) IV Intermittent every 24 hours      REVIEW OF SYSTEMS:  No chest pain   Occasional cough, no SOB  No N/V, no abdominal pain  No dysuria   No rash.       Vital Signs Last 24 Hrs  T(C): 36.7 (10-01-18 @ 13:51), Max: 37.5 (09-30-18 @ 17:00)  T(F): 98.1 (10-01-18 @ 13:51), Max: 99.5 (09-30-18 @ 17:00)  HR: 64 (10-01-18 @ 13:51) (61 - 71)  BP: 92/54 (10-01-18 @ 13:51) (85/46 - 106/55)  BP(mean): 74 (09-30-18 @ 21:00) (62 - 78)  RR: 18 (10-01-18 @ 13:51) (18 - 32)  SpO2: 95% (10-01-18 @ 13:51) (91% - 95%)      PHYSICAL EXAM:  Patient in no acute distress. Alert, awake.   + icteric.   Cardiovascular: S1S2 normal.  Lungs: + air entry B/L lung fields.   Gastrointestinal: soft, nontender, nondistended.  Extremities: trace edema.  IV sites not inflamed.   Jaundiced.                           7.6    5.5   )-----------( 45       ( 01 Oct 2018 07:24 )             22.1   10-01    136  |  107  |  17  ----------------------------<  131<H>  4.1   |  20<L>  |  0.78    Ca    7.8<L>      01 Oct 2018 07:24  Phos  1.6     10-01  Mg     2.0     09-30    TPro  5.9<L>  /  Alb  3.1<L>  /  TBili  10.9<H>  /  DBili  x   /  AST  70<H>  /  ALT  11  /  AlkPhos  73  09-30      LIVER FUNCTIONS - ( 30 Sep 2018 00:45 )  Alb: 3.1 g/dL / Pro: 5.9 g/dL / ALK PHOS: 73 U/L / ALT: 11 U/L / AST: 70 U/L / GGT: x             Culture - Blood (collected 30 Sep 2018 06:04)  Source: .Blood Blood  Preliminary Report (01 Oct 2018 07:01):    No growth to date.    Culture - Blood (collected 30 Sep 2018 06:04)  Source: .Blood Blood  Preliminary Report (01 Oct 2018 07:01):    No growth to date.      Radiology:  < from: US Abdomen Doppler (09.28.18 @ 16:23) >  IMPRESSION:     Evidence of portal venous hypertension with reversal flow in the right   portal vein and shunting of flow in the left portal vein towards a   recannulated umbilical vein, as seen on CT.    Splenic varices.    Splenomegaly.    Cirrhosis and moderate abdominal ascites.

## 2018-10-01 NOTE — PROGRESS NOTE ADULT - SUBJECTIVE AND OBJECTIVE BOX
Interval Events:   - Patient transferred from MICU to floor overnight.  - The patient was noted to be hypotensive after given IV morphine for hip pain and was briefly on norepinephrine. Required continued monitoring in the MICU following initiation of vasopressors.  - MICU team unable to perform repeat paracentesis as no suitable pocket could be found   - Found to have E-coli bacteremia and antibiotics de-escalated from Zosyn to ceftriaxone  - The patient stated he felt well this morning. He denies fevers/chills, N/V, abdominal pain.   - Informed [facility] Transplant Fellow of patient's transfer to floor; patient to be transferred to [facility] pending bed availability    Allergies:  No Known Allergies    Hospital Medications:  aluminum hydroxide/magnesium hydroxide/simethicone Suspension 30 milliLiter(s) Oral every 4 hours PRN  cefTRIAXone   IVPB 1 Gram(s) IV Intermittent every 24 hours  chlorhexidine 0.12% Liquid 15 milliLiter(s) Swish and Spit two times a day  dextrose 40% Gel 15 Gram(s) Oral once PRN  dextrose 5%. 1000 milliLiter(s) IV Continuous <Continuous>  dextrose 50% Injectable 12.5 Gram(s) IV Push once  dextrose 50% Injectable 25 Gram(s) IV Push once  dextrose 50% Injectable 25 Gram(s) IV Push once  glucagon  Injectable 1 milliGRAM(s) IntraMuscular once PRN  insulin lispro (HumaLOG) corrective regimen sliding scale   SubCutaneous three times a day before meals  insulin lispro (HumaLOG) corrective regimen sliding scale   SubCutaneous at bedtime  lactulose Syrup 20 Gram(s) Oral three times a day PRN  midodrine 5 milliGRAM(s) Oral three times a day  thiamine 100 milliGRAM(s) Oral daily    PMHX/PSHX:  Esophageal varices  Alcoholic cirrhosis of liver with ascites  No pertinent past medical history  Abdominal hernia  H/O cataract    Family history:  Family history of hypercholesterolemia (Father)  Family history of diabetes mellitus (Mother)    ROS:     General:  No fevers, chills, fatigue  ENT:  No sore throat, pain, runny nose, dysphagia  CV:  No pain, palpitations, hypo/hypertension  Pulm:  No dyspnea, cough, tachypnea, wheezing  GI:  No pain, No nausea, No vomiting, No constipation, No weight loss, No fever, No pruritis, No rectal bleeding, No tarry stools, No dysphagia  :  No pain, bleeding, incontinence, nocturia  Skin:  No rash, tattoos, scars, edema    PHYSICAL EXAM:   Vital Signs:  Vital Signs Last 24 Hrs  T(C): 36.9 (01 Oct 2018 10:00), Max: 37.5 (30 Sep 2018 17:00)  T(F): 98.4 (01 Oct 2018 10:00), Max: 99.5 (30 Sep 2018 17:00)  HR: 61 (01 Oct 2018 10:00) (61 - 71)  BP: 92/53 (01 Oct 2018 10:00) (85/46 - 106/55)  BP(mean): 74 (30 Sep 2018 21:00) (62 - 78)  RR: 18 (01 Oct 2018 10:) (18 - 38)  SpO2: 92% (01 Oct 2018 10:00) (91% - 94%)  Daily Weight in k (01 Oct 2018 08:00)    GENERAL:  No acute distress, obese  HEENT:  Normocephalic/atraumatic, no scleral icterus, poor dentition  CHEST:  Clear to auscultation bilaterally, no wheezes/rales/ronchi, no accessory muscle use  HEART:  Regular rate and rhythm, no murmurs/rubs/gallops  ABDOMEN:  Soft, obese, non-tender, mildly-distended, normoactive bowel sounds, no appreciable fluid wave  EXTREMITIES:  Trace edema in the lower extremities bilaterally  SKIN:  No rash/erythema  NEURO:  Alert and oriented x 2 (place, time), no asterixis    LABS:                        7.6    5.5   )-----------( 45       ( 01 Oct 2018 07:24 )             22.1     Mean Cell Volume: 105.0 fl (10-01-18 @ 07:24)    10-01    136  |  107  |  17  ----------------------------<  131<H>  4.1   |  20<L>  |  0.78    Ca    7.8<L>      01 Oct 2018 07:24  Phos  1.6     10-01  Mg     2.0     30    TPro  5.9<L>  /  Alb  3.1<L>  /  TBili  10.9<H>  /  DBili  x   /  AST  70<H>  /  ALT  11  /  AlkPhos  73  09-30    LIVER FUNCTIONS - ( 30 Sep 2018 00:45 )  Alb: 3.1 g/dL / Pro: 5.9 g/dL / ALK PHOS: 73 U/L / ALT: 11 U/L / AST: 70 U/L / GGT: x           PT/INR - ( 01 Oct 2018 07:24 )   PT: 39.0 sec;   INR: 3.49 ratio         PTT - ( 01 Oct 2018 07:24 )  PTT:75.9 sec                        7.6    5.5   )-----------( 45       ( 01 Oct 2018 07:24 )             22.1                         7.6    6.3   )-----------( 41       ( 30 Sep 2018 08:49 )             22.4                         7.3    6.1   )-----------( 40       ( 30 Sep 2018 01:55 )             21.0                         6.8    7.2   )-----------( 46       ( 30 Sep 2018 00:45 )             20.0                         8.4    8.1   )-----------( 44       ( 29 Sep 2018 00:46 )             25.5     Imaging:    No new imaging

## 2018-10-01 NOTE — PROGRESS NOTE ADULT - ASSESSMENT
Severe sepsis resolved.   EtOH cirrhosis, on transplant list.   SBP  E coli bacteremia    Hyperbilirubinemia.         Plan:   Continue with Ceftriaxone 1 gm iv q24h  Abx for 10 days from 1st negative cx.   Repeat blood cx NTD.   Pt planned for transfer to [facility].

## 2018-10-01 NOTE — PROGRESS NOTE ADULT - ASSESSMENT
53M PMH EtOH cirrhosis c/b varices (no alc in 3.5 yrs), hepatic encephalopathy on transplant list at [facility] p/w AMS, hypotension to SBP 80's, febrile to 100.6 F, and hypoxic to 90% on RA with associated symptoms of abdominal pain/distension/ascites, gingival bleeding, portal colopathy on CT, and found to have SBP on dx paracentesis (WBC 37K) and E coli bacteremia.       Neuro  # Hepatic encephalopathy w/ SBP: Mild, AOx2-3 below baseline per brothers, now improving. ammonia 36.   - zinc level pending     Pulm  # Hypoxic respiratory failure: mild, saturating well on room air. CXR w/o radiolographic of pna  - may be 2/2 fluid overload in setting of moderate ascites and hypotension req'ing fluids  	  CVS  # Hypotension w/ possible distributive shock in setting of SBP  - cont w/ albumin     GI   # Cirrhosis w/ varcies c/b SBP. on txp list on [facility], hep A, B, C all non reactive in 2015.   - SBP with 37K WBCs,  MELD at 30  - as per hepatology no need for repeat tap   - cont w/ albumin repletion  - cont w/ lactulose and titrate to BMs 2-3 per day   - Abdominal US Doppler:   Evidence of portal venous hypertension with reversal flow in the right   portal vein and shunting of flow in the left portal vein towards a   recannulated umbilical vein, as seen on CT. Splenic varices. Splenomegaly. Cirrhosis and moderate abdominal ascites.  - Advance to regular diet     # Portal hypertensive colopathy  - Found on CT a/p, serial abdominals exams, current abdom soft w/ mild LQ TTP.     Renal  # HypoNa: mild at 133, likely represent hypervolemic in setting of cirrhosis w/ ascites.     # Metabolic acidosis. Lactate initially 4, most recently 2.7   - likely 2/2 lactic acidosis in setting of sepsis w/ SBP.   # at high risk of decompensation into HRS, however sCr currently wnl.   - strict I/Os, albumin supplementation should help ppx against HRS    ID  # SBP w/37K nucleated cells on diagnostic paracentesis, received CTX and zosyn  - E coli bacteremia likely from intraabdominal pathology   - Will transition Zosyn to Ceftriaxone based on sensitivities.  - repeat blood cultures pending   - ID on board  - Dental recs - no indication of oral infection, FU as outpt     Heme  # Coagulopathic w/ thrombocytopenia in setting of cirrhosis w/ gingival hemorrhage (dental recs to FU as outpt)   # Macrocytic anemia: likely 2/2 chronic EtOH abuse c/b cirrhosis, b12 level in 2015 wnl  - B12, folate WNL.     Endo  # no acute issues.     # DVT ppx: hold pharm AC given varices and high risk of bleed. ICD's for now.

## 2018-10-01 NOTE — DISCHARGE NOTE ADULT - CARE PROVIDER_API CALL
Antoine Espinosa), Internal Medicine  [address] [place] [address]  Phone: [phone]  Fax: [phone]

## 2018-10-01 NOTE — DISCHARGE NOTE ADULT - MEDICATION SUMMARY - MEDICATIONS TO CHANGE
I will SWITCH the dose or number of times a day I take the medications listed below when I get home from the hospital:  None I will SWITCH the dose or number of times a day I take the medications listed below when I get home from the hospital:    propranolol 20 mg oral tablet  -- 1 tab(s) by mouth 2 times a day

## 2018-10-01 NOTE — DISCHARGE NOTE ADULT - CARE PLAN
Principal Discharge DX:	Alcoholic cirrhosis of liver with ascites  Goal:	Management of condition  Assessment and plan of treatment:	You were treated for an infection in the fluid surrounding your abdomen which caused your blood pressure to drop requiring IV antibiotics and an admission to the ICU for pressure support. You will be transferred to [facility] for a liver transplant evaluation

## 2018-10-01 NOTE — PROGRESS NOTE ADULT - ASSESSMENT
Impression:    1. Cirrhosis, decompensated, due to alcohol dependence  Varices: Prior varices, on propanolol 20 mg PO BID  HE: On lactulose 20 mg PO BID  Ascites: Moderate on CT A/P on 9/28/18, + for SBP on 9/27/18  HCC: No liver lesions seen on CT A/P on 9/28/18  Meld-Na: 30 on 9/30/18  Coagulopathy: Plts: 45, INR: 3.49  Transplant Candidacy: Listed at [facility], patient of Dr. Candelaria Stokes, awaiting transfer  2. SBP: > 36k WBCs on diagnostic paracentesis, received albumin 1.5mg/kg on Day 1, albumin 1/mg/kg on Day 3. MICU team unable to repeat paracentesis after 48 hours as no suitable pocket could be found  3. E-coli bacteremia: +BCx on 9/28.  Repeat BCx on 9/30 with NGTD. Likely source intraperitoneal. qSOFA 2. On ceftriaxone.  4. Alcohol dependence: Sober for the past 3.5 years    Recommendations:  - Transplant hepatology fellow at [facility] contacted; awaiting transfer to [facility] pending bed availability  - Monitor daily CBC, CMP, Mg, P, INR to calculate daily Meld-Na  - F/U blood cultures  - Continue IV ceftriaxone for treatment of SBP / bacteremia  - Attempt repeat paracentesis if able to find suitable pocket  - Hold diuretics in setting of SBP  - Hold propanolol 20 mg PO BID  - Continue lactulose 20 mg PO BID    Anish Mcbride MD  Gastroenterology Fellow  Pager number: [phone] / [phone] Impression:    1. Cirrhosis, decompensated, due to alcohol dependence  Varices: Prior varices, on propanolol 20 mg PO BID  HE: On lactulose 20 mg PO BID at home, currently held  Ascites: Moderate on CT A/P on 9/28/18, + for SBP on 9/27/18; acites protein 1.1  HCC: No liver lesions seen on CT A/P on 9/28/18  Meld-Na: 30 on 9/30/18  Coagulopathy: Plts: 45, INR: 3.49  Transplant Candidacy: Listed at [facility], patient of Dr. Candelaria Stokes, awaiting transfer  2. SBP: > 36k WBCs on diagnostic paracentesis, received albumin 1.5mg/kg on Day 1, albumin 1/mg/kg on Day 3. MICU team unable to repeat paracentesis after 48 hours as no suitable pocket could be found  3. E-coli bacteremia: +BCx on 9/28.  Repeat BCx on 9/30 with NGTD. Likely source intraperitoneal. qSOFA 2. On ceftriaxone.  4. Alcohol dependence: Sober for the past 3.5 years    Recommendations:  - Transplant hepatology fellow at [facility] contacted; awaiting transfer to [facility] pending bed availability  - Monitor daily CBC, CMP, Mg, P, INR to calculate daily Meld-Na  - F/U blood cultures  - Continue IV ceftriaxone for treatment of SBP / bacteremia  - Attempt repeat diagnostic paracentesis if able to find suitable pocket  - Hold diuretics in setting of SBP  - Hold propanolol 20 mg PO BID  - Continue lactulose 20 mg PO BID    Anish Mcbride MD  Gastroenterology Fellow  Pager number: [phone] / [phone]

## 2018-10-02 LAB
CULTURE RESULTS: SIGNIFICANT CHANGE UP
FUNGITELL: 43 PG/ML — SIGNIFICANT CHANGE UP (ref 0–59)
SPECIMEN SOURCE: SIGNIFICANT CHANGE UP
ZINC SERPL-MCNC: 30 UG/DL — LOW (ref 56–134)

## 2018-10-03 LAB — METHYLMALONATE SERPL-SCNC: 231 NMOL/L — SIGNIFICANT CHANGE UP (ref 0–378)

## 2018-11-21 ENCOUNTER — APPOINTMENT (OUTPATIENT)
Dept: CARDIOLOGY | Facility: CLINIC | Age: 53
End: 2018-11-21

## 2018-12-05 ENCOUNTER — OUTPATIENT (OUTPATIENT)
Dept: OUTPATIENT SERVICES | Facility: HOSPITAL | Age: 53
LOS: 1 days | Discharge: HOME | End: 2018-12-05

## 2018-12-05 DIAGNOSIS — Z86.69 PERSONAL HISTORY OF OTHER DISEASES OF THE NERVOUS SYSTEM AND SENSE ORGANS: Chronic | ICD-10-CM

## 2018-12-05 DIAGNOSIS — K46.9 UNSPECIFIED ABDOMINAL HERNIA WITHOUT OBSTRUCTION OR GANGRENE: Chronic | ICD-10-CM

## 2018-12-05 DIAGNOSIS — E83.118 OTHER HEMOCHROMATOSIS: ICD-10-CM

## 2018-12-06 ENCOUNTER — RESULT REVIEW (OUTPATIENT)
Age: 53
End: 2018-12-06

## 2018-12-06 ENCOUNTER — LABORATORY RESULT (OUTPATIENT)
Age: 53
End: 2018-12-06

## 2018-12-06 ENCOUNTER — OUTPATIENT (OUTPATIENT)
Dept: OUTPATIENT SERVICES | Facility: HOSPITAL | Age: 53
LOS: 1 days | End: 2018-12-06

## 2018-12-06 ENCOUNTER — APPOINTMENT (OUTPATIENT)
Dept: HEMATOLOGY ONCOLOGY | Facility: CLINIC | Age: 53
End: 2018-12-06
Payer: MEDICAID

## 2018-12-06 VITALS
WEIGHT: 240.97 LBS | DIASTOLIC BLOOD PRESSURE: 65 MMHG | OXYGEN SATURATION: 97 % | TEMPERATURE: 98.1 F | BODY MASS INDEX: 31.94 KG/M2 | RESPIRATION RATE: 16 BRPM | SYSTOLIC BLOOD PRESSURE: 101 MMHG | HEART RATE: 73 BPM | HEIGHT: 72.99 IN

## 2018-12-06 DIAGNOSIS — Z86.69 PERSONAL HISTORY OF OTHER DISEASES OF THE NERVOUS SYSTEM AND SENSE ORGANS: Chronic | ICD-10-CM

## 2018-12-06 DIAGNOSIS — D68.9 COAGULATION DEFECT, UNSPECIFIED: ICD-10-CM

## 2018-12-06 DIAGNOSIS — K46.9 UNSPECIFIED ABDOMINAL HERNIA WITHOUT OBSTRUCTION OR GANGRENE: Chronic | ICD-10-CM

## 2018-12-06 DIAGNOSIS — D68.2 HEREDITARY DEFICIENCY OF OTHER CLOTTING FACTORS: ICD-10-CM

## 2018-12-06 DIAGNOSIS — E66.9 OBESITY, UNSPECIFIED: ICD-10-CM

## 2018-12-06 DIAGNOSIS — Z78.9 OTHER SPECIFIED HEALTH STATUS: ICD-10-CM

## 2018-12-06 DIAGNOSIS — E83.19 OTHER DISORDERS OF IRON METABOLISM: ICD-10-CM

## 2018-12-06 DIAGNOSIS — I85.01 ESOPHAGEAL VARICES WITH BLEEDING: ICD-10-CM

## 2018-12-06 DIAGNOSIS — D69.6 THROMBOCYTOPENIA, UNSPECIFIED: ICD-10-CM

## 2018-12-06 DIAGNOSIS — I85.00 ESOPHAGEAL VARICES W/OUT BLEEDING: ICD-10-CM

## 2018-12-06 DIAGNOSIS — Z82.49 FAMILY HISTORY OF ISCHEMIC HEART DISEASE AND OTHER DISEASES OF THE CIRCULATORY SYSTEM: ICD-10-CM

## 2018-12-06 DIAGNOSIS — Z83.3 FAMILY HISTORY OF DIABETES MELLITUS: ICD-10-CM

## 2018-12-06 DIAGNOSIS — D63.8 ANEMIA IN OTHER CHRONIC DISEASES CLASSIFIED ELSEWHERE: ICD-10-CM

## 2018-12-06 DIAGNOSIS — D68.8 OTHER SPECIFIED COAGULATION DEFECTS: ICD-10-CM

## 2018-12-06 DIAGNOSIS — Z87.891 PERSONAL HISTORY OF NICOTINE DEPENDENCE: ICD-10-CM

## 2018-12-06 DIAGNOSIS — Z87.19 PERSONAL HISTORY OF OTHER DISEASES OF THE DIGESTIVE SYSTEM: ICD-10-CM

## 2018-12-06 DIAGNOSIS — R06.09 OTHER FORMS OF DYSPNEA: ICD-10-CM

## 2018-12-06 LAB
BASOPHILS # BLD AUTO: 0.1 K/UL — SIGNIFICANT CHANGE UP (ref 0–0.2)
BASOPHILS NFR BLD AUTO: 0.7 % — SIGNIFICANT CHANGE UP (ref 0–2)
EOSINOPHIL # BLD AUTO: 0.6 K/UL — HIGH (ref 0–0.5)
EOSINOPHIL NFR BLD AUTO: 6.7 % — HIGH (ref 0–6)
HCT VFR BLD CALC: 30 % — LOW (ref 39–50)
HGB BLD-MCNC: 10.3 G/DL — LOW (ref 13–17)
LYMPHOCYTES # BLD AUTO: 1.2 K/UL — SIGNIFICANT CHANGE UP (ref 1–3.3)
LYMPHOCYTES # BLD AUTO: 13.8 % — SIGNIFICANT CHANGE UP (ref 13–44)
MCHC RBC-ENTMCNC: 34.4 G/DL — SIGNIFICANT CHANGE UP (ref 32–36)
MCHC RBC-ENTMCNC: 35.9 PG — HIGH (ref 27–34)
MCV RBC AUTO: 104 FL — HIGH (ref 80–100)
MONOCYTES # BLD AUTO: 0.8 K/UL — SIGNIFICANT CHANGE UP (ref 0–0.9)
MONOCYTES NFR BLD AUTO: 8.7 % — SIGNIFICANT CHANGE UP (ref 2–14)
NEUTROPHILS # BLD AUTO: 6.1 K/UL — SIGNIFICANT CHANGE UP (ref 1.8–7.4)
NEUTROPHILS NFR BLD AUTO: 70 % — SIGNIFICANT CHANGE UP (ref 43–77)
PLATELET # BLD AUTO: 75 K/UL — LOW (ref 150–400)
RBC # BLD: 2.88 M/UL — LOW (ref 4.2–5.8)
RBC # FLD: 15.6 % — HIGH (ref 10.3–14.5)
RETICS #: 207 K/UL — HIGH (ref 25–125)
RETICS/RBC NFR: 7.1 % — HIGH (ref 0.5–2.5)
WBC # BLD: 8.7 K/UL — SIGNIFICANT CHANGE UP (ref 3.8–10.5)
WBC # FLD AUTO: 8.7 K/UL — SIGNIFICANT CHANGE UP (ref 3.8–10.5)

## 2018-12-06 PROCEDURE — 99205 OFFICE O/P NEW HI 60 MIN: CPT

## 2018-12-07 LAB
ALBUMIN SERPL ELPH-MCNC: 2.7 G/DL
ALP BLD-CCNC: 182 U/L
ALT SERPL-CCNC: 38 U/L
ANION GAP SERPL CALC-SCNC: 12 MMOL/L
AST SERPL-CCNC: 162 U/L
BILIRUB INDIRECT SERPL-MCNC: 5.4 MG/DL
BILIRUB SERPL-MCNC: 11.6 MG/DL
BUN SERPL-MCNC: 16 MG/DL
CALCIUM SERPL-MCNC: 8.6 MG/DL
CHLORIDE SERPL-SCNC: 100 MMOL/L
CO2 SERPL-SCNC: 21 MMOL/L
CREAT SERPL-MCNC: 0.91 MG/DL
FERRITIN SERPL-MCNC: 2290 NG/ML
FIBRINOGEN PPP COAG.DERIVED-MCNC: 120 MG/DL
FOLATE RBC-MCNC: NORMAL NG/ML
FOLATE SERPL-MCNC: >20 NG/ML
GLUCOSE SERPL-MCNC: 127 MG/DL
HCT VFR BLD CALC: 30 %
INR PPP: 2.75 RATIO
IRON SATN MFR SERPL: NORMAL
IRON SERPL-MCNC: 169 UG/DL
LDH SERPL-CCNC: 295 U/L
NPP-PT: 12 SEC
POTASSIUM SERPL-SCNC: 4.6 MMOL/L
PROT SERPL-MCNC: 6.6 G/DL
PROTHROMBIN TIME/NOMAL: 14 SEC
PT BLD: 32.3 SEC
SODIUM SERPL-SCNC: 133 MMOL/L
TIBC SERPL-MCNC: NORMAL
UIBC SERPL-MCNC: <20 UG/DL
VIT B12 SERPL-MCNC: >2000 PG/ML

## 2018-12-12 ENCOUNTER — APPOINTMENT (OUTPATIENT)
Dept: CARDIOLOGY | Facility: CLINIC | Age: 53
End: 2018-12-12
Payer: MEDICAID

## 2018-12-12 ENCOUNTER — NON-APPOINTMENT (OUTPATIENT)
Age: 53
End: 2018-12-12

## 2018-12-12 VITALS
BODY MASS INDEX: 31.81 KG/M2 | OXYGEN SATURATION: 96 % | WEIGHT: 240 LBS | HEIGHT: 72.99 IN | HEART RATE: 70 BPM | DIASTOLIC BLOOD PRESSURE: 71 MMHG | SYSTOLIC BLOOD PRESSURE: 117 MMHG

## 2018-12-12 DIAGNOSIS — I95.1 ORTHOSTATIC HYPOTENSION: ICD-10-CM

## 2018-12-12 PROBLEM — D63.8 ANEMIA OF CHRONIC DISEASE: Status: ACTIVE | Noted: 2018-12-12

## 2018-12-12 PROBLEM — D68.2: Status: ACTIVE | Noted: 2018-12-12

## 2018-12-12 PROBLEM — D69.6 THROMBOPENIA: Status: ACTIVE | Noted: 2018-12-12

## 2018-12-12 PROBLEM — D68.9 COAGULOPATHY: Status: ACTIVE | Noted: 2018-12-12

## 2018-12-12 PROBLEM — R06.09 EXERTIONAL DYSPNEA: Status: ACTIVE | Noted: 2018-12-12

## 2018-12-12 PROBLEM — D68.8 HYPOFIBRINOGENEMIA: Status: ACTIVE | Noted: 2018-12-12

## 2018-12-12 PROCEDURE — 93000 ELECTROCARDIOGRAM COMPLETE: CPT

## 2018-12-12 PROCEDURE — 99214 OFFICE O/P EST MOD 30 MIN: CPT

## 2018-12-12 RX ORDER — FUROSEMIDE 40 MG/1
40 TABLET ORAL DAILY
Qty: 30 | Refills: 6 | Status: ACTIVE | COMMUNITY
Start: 2018-12-12

## 2018-12-12 RX ORDER — METOLAZONE 2.5 MG/1
2.5 TABLET ORAL DAILY
Qty: 30 | Refills: 9 | Status: ACTIVE | COMMUNITY
Start: 2018-12-12 | End: 1900-01-01

## 2018-12-12 RX ORDER — RIFAXIMIN 550 MG/1
550 TABLET ORAL
Refills: 0 | Status: ACTIVE | COMMUNITY
Start: 2018-12-12

## 2018-12-12 RX ORDER — LACTULOSE 10 G/15ML
20 SOLUTION ORAL
Refills: 0 | Status: ACTIVE | COMMUNITY
Start: 2018-12-12

## 2018-12-12 RX ORDER — SPIRONOLACTONE 50 MG/1
50 TABLET ORAL DAILY
Qty: 90 | Refills: 0 | Status: ACTIVE | COMMUNITY
Start: 2018-12-12 | End: 1900-01-01

## 2018-12-12 RX ORDER — MIDODRINE HYDROCHLORIDE 5 MG/1
5 TABLET ORAL
Refills: 0 | Status: ACTIVE | COMMUNITY
Start: 2018-12-12

## 2018-12-12 RX ORDER — GLIPIZIDE 5 MG/1
5 TABLET, EXTENDED RELEASE ORAL DAILY
Refills: 0 | Status: ACTIVE | COMMUNITY
Start: 2018-12-12

## 2018-12-12 RX ORDER — MIDODRINE HYDROCHLORIDE 5 MG/1
5 TABLET ORAL 3 TIMES DAILY
Qty: 180 | Refills: 3 | Status: ACTIVE | COMMUNITY
Start: 2018-12-12

## 2018-12-12 RX ORDER — VIT B COMP NO.3/FOLIC/C/BIOTIN 1 MG-60 MG
1 TABLET ORAL DAILY
Refills: 0 | Status: ACTIVE | COMMUNITY
Start: 2018-12-12

## 2018-12-12 NOTE — REVIEW OF SYSTEMS
[Feeling Fatigued] : feeling fatigued [Lower Ext Edema] : lower extremity edema [Change in Appetite] : change in appetite [Negative] : Heme/Lymph

## 2018-12-12 NOTE — REVIEW OF SYSTEMS
[Wheezing] : wheezing [Fatigue] : fatigue [Lower Ext Edema] : lower extremity edema [SOB on Exertion] : shortness of breath during exertion [Joint Pain] : joint pain [Easy Bruising] : a tendency for easy bruising [Negative] : Allergic/Immunologic [Chest Pain] : no chest pain [Easy Bleeding] : no tendency for easy bleeding [Swollen Glands] : no swollen glands [FreeTextEntry5] : As above [FreeTextEntry9] : Hip pain [de-identified] : As above

## 2018-12-12 NOTE — ASSESSMENT
[Palliative] : Goals of care discussed with patient: Palliative [FreeTextEntry1] : Patient with end-stage liver disease and associated coagulopathy  and thrombocytopenia.  Elevated ferritin related to underlying disease.  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  In any case, brief IV infusions of desferal are ineffective in removing  meaningful amounts of iron.  The patient would require be set up with a subcutaneous infusion for 10-12 hours nightly using  portable pump.  Is not at all clear that such an approach will make a difference with end organ damage in this case from alcohol being a major contributor to both liver and cardiac damage. He may have received several units of packed red blood cells as a result of upper GI bleeding from varices and the numbers units will have to be checked for a period is a significant transfusional component, iron chelation could be consistent could be considered.  His more recent ferritin of 2200 can be seen in inflammatory states without having to invoke physiologically relevant iron overload. More importantly if she has an assessment from hepatology and cardiology regarding his prospects for improvement.  Combined heart and liver transplant could be considered but he may not be a candidate.\par The patient will be evaluated here by the Hepatology group and continue to be followed by Dr. Chowdhury (Cardiology).\par Vitamin K is unlikely to be helpful in advanced liver disease to meaningfully reverse   the hypothrombinemia.  The fibrinogen is falling to the point where the risk of bleeding will increase.  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  Peripheral consumption may be contributing to the anemia; the retic count is elevated.  He was told to restart folate 1 mg/day.  He should take xifaxan once his insurance can cover it for him - until now he has not filled his script.

## 2018-12-12 NOTE — PHYSICAL EXAM
[Capable of only limited self care, confined to bed or chair more than 50% of waking hours] : Status 3- Capable of only limited self care, confined to bed or chair more than 50% of waking hours [Normal] : affect appropriate [Ulcers] : no ulcers [Mucositis] : no mucositis [Thrush] : no thrush [Vesicles] : no vesicles [de-identified] : Deeply jaundiced [de-identified] : Dry mucous membranes [de-identified] : Decreased breath sounds at  bases [de-identified] : Marked lower extremity edema, venous stasis changes [de-identified] : Soft, mildly distended,  hepatomegaly [de-identified] : No CVA T. [de-identified] : Scattered ecchymoses

## 2018-12-12 NOTE — CONSULT LETTER
[Dear  ___] : Dear  [unfilled], [Consult Letter:] : I had the pleasure of evaluating your patient, [unfilled]. [Please see my note below.] : Please see my note below. [Consult Closing:] : Thank you very much for allowing me to participate in the care of this patient.  If you have any questions, please do not hesitate to contact me. [Sincerely,] : Sincerely, [FreeTextEntry2] : Norma Stokes MD\par [address], \[place] [address] [FreeTextEntry3] : Michael Yuen M.D., [facility]\par  [DrNavid  ___] : Dr. IGNACIO

## 2018-12-12 NOTE — HISTORY OF PRESENT ILLNESS
[Disease:__________________________] : Disease: [unfilled] [de-identified] : Mr. Rivera is a 53 year old man with advanced alcoholic disease with cirrhosis and portal hypertension who presents for an opinion regarding further management of what has been thought to be hemochromatosis related iron overload.  He initially presented on 12- at [facility] with anasarca.  He had several paracenteses and had esophageal banding done for esophageal varices related to portal hypertension.  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  He was seen by hematology at [facility] and has recently started a course of IV deferoxamine given as twice weekly 2 hour IV infusions.  An MRI of the abdomen this year did not specifically comment on presence of iron deposition.  A recent MRI of the heart did have an abnormal T2*, possibly reflective of iron overload. The MRI describes dilated non-ischemic cardiomyopathy with dilated LV with mod decr syst function and no late Gd enhancement.  It should be noted, however, that when first seen in 2015 his ferritin was in the 500 range and the transferrin saturation was 28%.  Furthermore, heterozygosity for H63D is not associated with clinical hemochromatosis.\par \par In any case, the patient is very debilitated.  His diuretic regimen has been adjusted at [facility].  Most recently, he has gained approximately 30 pounds in a matter of 2 or 3 weeks.  He apparently has had diuretics reduced or held and he needs midodrine 15 mg 3x/d because of postural hypotension. He has edema and exertional dyspnea.  He is markedly jaundiced.  His INR was noted to be prolonged and he was given parenteral vitamin K at [facility].  A recent echocardiogram revealed a marked decrease in his ejection fraction to 30% at which point he was removed from the liver transplant list at [facility]. He has been prescribed xifaxan but has not started it because of its cost.\par \par He has had no recent fevers.  He notes increased abdominal distention but no significant abdominal pain.  Other  complications during 2018 included an admission  to [facility] 3/1 with  encephalopathy, at which time he has started on lactulose.  His brother describes admits in 9/2018 with as  hepatic decompensation  and on 10/2018 for hyperkalemia.  He was admitted to [facility] on 9/28/18 with SBP, E. coli bacteremia and sent home on iv ceftriaxone.  His renal function has been normal.  Creat. today was 0.91, BUN 16, total bili 11.6, direct bili 6.1, alb 2.7, alk phos 182, ALT 38, .  PT was 2.75, fibrinogen 120.  WBC was 8.7, Hgb 10.3, , Plt 75,000, retic 7.1%.\par \par He has had chronic pain affecting his right hip due to DJD.

## 2018-12-12 NOTE — DISCUSSION/SUMMARY
[FreeTextEntry1] : Mr. Rivera has diagnosed with CHF during the past 2 months. He is comfortable at present although somewhat congested with mild ascites in about 2+ peripheral edema. His chest is clear he is not short of breath. His cardiac exam is unremarkable and the EKG is unchanged. with poor R wave progression\par \par I think he should diuresis 10 pounds, although I do not want to get him down to 210.   Metolazone 2.5 mg per day was added to his regimen and he will return for reevaluation in a week.  His brother will forward copies of the workup from [facility] in the meantime.

## 2018-12-12 NOTE — PHYSICAL EXAM
[General Appearance - Well Developed] : well developed [Normal Appearance] : normal appearance [Well Groomed] : well groomed [General Appearance - Well Nourished] : well nourished [No Deformities] : no deformities [General Appearance - In No Acute Distress] : no acute distress [Normal Conjunctiva] : the conjunctiva exhibited no abnormalities [Eyelids - No Xanthelasma] : the eyelids demonstrated no xanthelasmas [Normal Oral Mucosa] : normal oral mucosa [No Oral Pallor] : no oral pallor [No Oral Cyanosis] : no oral cyanosis [Normal Jugular Venous A Waves Present] : normal jugular venous A waves present [Normal Jugular Venous V Waves Present] : normal jugular venous V waves present [No Jugular Venous Diaz A Waves] : no jugular venous diaz A waves [Respiration, Rhythm And Depth] : normal respiratory rhythm and effort [Exaggerated Use Of Accessory Muscles For Inspiration] : no accessory muscle use [Auscultation Breath Sounds / Voice Sounds] : lungs were clear to auscultation bilaterally [Heart Rate And Rhythm] : heart rate and rhythm were normal [Heart Sounds] : normal S1 and S2 [Murmurs] : no murmurs present [Abdomen Soft] : soft [Abdomen Tenderness] : non-tender [Abdomen Mass (___ Cm)] : no abdominal mass palpated [Abnormal Walk] : normal gait [Gait - Sufficient For Exercise Testing] : the gait was sufficient for exercise testing [Nail Clubbing] : no clubbing of the fingernails [Cyanosis, Localized] : no localized cyanosis [Petechial Hemorrhages (___cm)] : no petechial hemorrhages [Skin Color & Pigmentation] : normal skin color and pigmentation [] : no rash [No Venous Stasis] : no venous stasis [Skin Lesions] : no skin lesions [No Skin Ulcers] : no skin ulcer [No Xanthoma] : no  xanthoma was observed [Oriented To Time, Place, And Person] : oriented to person, place, and time [Affect] : the affect was normal [Mood] : the mood was normal [No Anxiety] : not feeling anxious [FreeTextEntry1] : Mild ascites

## 2018-12-12 NOTE — HISTORY OF PRESENT ILLNESS
[FreeTextEntry1] : 53-year-old male with known cirrhosis and ascites. Since his last visit in August he has been diagnosed with a congestive cardiomyopathy and iron overload at [facility].  His brother forgot the paperwork from the hospitalization so the details of that diagnosis are unclear. However, it is surprising since he had an echocardiogram and cardiac MRI done about 3 years ago at [facility] and both were unremarkable.  At the time of discharge his diuretics were stopped and he was started on midodrine for low blood pressure.  He was not started on a beta blocker or ACE inhibitor, presumably because of his low blood pressure.  He promptly began regaining fluid and gained about 30 pounds. He is now been restarted on Lasix 40 mg per day and aldactone 50 mg per day and his weight is stable although it has not dropped. He is comfortable at present.

## 2018-12-19 ENCOUNTER — APPOINTMENT (OUTPATIENT)
Dept: CARDIOLOGY | Facility: CLINIC | Age: 53
End: 2018-12-19
Payer: MEDICAID

## 2018-12-19 VITALS
BODY MASS INDEX: 31.29 KG/M2 | HEIGHT: 72 IN | HEART RATE: 73 BPM | WEIGHT: 231 LBS | SYSTOLIC BLOOD PRESSURE: 89 MMHG | OXYGEN SATURATION: 93 % | DIASTOLIC BLOOD PRESSURE: 56 MMHG

## 2018-12-19 VITALS — SYSTOLIC BLOOD PRESSURE: 105 MMHG | DIASTOLIC BLOOD PRESSURE: 75 MMHG

## 2018-12-19 DIAGNOSIS — I42.9 CARDIOMYOPATHY, UNSPECIFIED: ICD-10-CM

## 2018-12-19 PROCEDURE — 36415 COLL VENOUS BLD VENIPUNCTURE: CPT

## 2018-12-19 PROCEDURE — 99214 OFFICE O/P EST MOD 30 MIN: CPT

## 2018-12-19 NOTE — HISTORY OF PRESENT ILLNESS
[FreeTextEntry1] : 53-year-old male with a history of cirrhosis associated with ascites and peripheral edema. He was found to have decreased left ventricular function with an ejection fraction of about 30% and normal coronaries at [facility] recently. This is a new finding since his LV function was normal in 2016. He was started on metolazone 2.5 mg per day in addition to his Lasix his arrival to last week in an attempt to diuresis some of his fluid.He has diuresed about 9 pounds since starting his regimen and feels better.

## 2018-12-19 NOTE — DISCUSSION/SUMMARY
[FreeTextEntry1] : Mr. Rivera has diuresed since starting metolazone and his weight is down . 9 pounds.  His BP is a little, but he and his abdomen is much softer and he has minimal peripheral edema. Blood was drawn to check his electrolytes. He was told when his weight gets to 228 pounds to use a metolazone up on a p.r.n. basis.  I will see him again in 3 weeks. I will repeat his echocardiogram in January to see if his LV function is unchanged (he was septic shortly before the last measurements of his LVEF).

## 2018-12-20 LAB
ANION GAP SERPL CALC-SCNC: 10 MMOL/L
BUN SERPL-MCNC: 21 MG/DL
CALCIUM SERPL-MCNC: 8.7 MG/DL
CHLORIDE SERPL-SCNC: 94 MMOL/L
CO2 SERPL-SCNC: 25 MMOL/L
CREAT SERPL-MCNC: 1.01 MG/DL
GLUCOSE SERPL-MCNC: 147 MG/DL
IRON SATN MFR SERPL: NORMAL
IRON SERPL-MCNC: 159 UG/DL
POTASSIUM SERPL-SCNC: 5.3 MMOL/L
SODIUM SERPL-SCNC: 129 MMOL/L
TIBC SERPL-MCNC: NORMAL
UIBC SERPL-MCNC: <20 UG/DL

## 2018-12-21 ENCOUNTER — CHART COPY (OUTPATIENT)
Age: 53
End: 2018-12-21

## 2019-01-02 ENCOUNTER — INPATIENT (INPATIENT)
Facility: HOSPITAL | Age: 54
LOS: 14 days | Discharge: ANOTHER HOSPITAL | DRG: 432 | End: 2019-01-17
Attending: INTERNAL MEDICINE | Admitting: HOSPITALIST
Payer: MEDICAID

## 2019-01-02 ENCOUNTER — APPOINTMENT (OUTPATIENT)
Dept: CARDIOLOGY | Facility: CLINIC | Age: 54
End: 2019-01-02
Payer: MEDICAID

## 2019-01-02 VITALS
HEIGHT: 73 IN | TEMPERATURE: 97 F | SYSTOLIC BLOOD PRESSURE: 73 MMHG | RESPIRATION RATE: 18 BRPM | DIASTOLIC BLOOD PRESSURE: 78 MMHG | HEART RATE: 62 BPM | WEIGHT: 218.04 LBS

## 2019-01-02 VITALS
DIASTOLIC BLOOD PRESSURE: 50 MMHG | HEIGHT: 73 IN | HEART RATE: 62 BPM | SYSTOLIC BLOOD PRESSURE: 80 MMHG | WEIGHT: 218 LBS | BODY MASS INDEX: 28.89 KG/M2 | OXYGEN SATURATION: 92 %

## 2019-01-02 DIAGNOSIS — R60.0 LOCALIZED EDEMA: ICD-10-CM

## 2019-01-02 DIAGNOSIS — K65.2 SPONTANEOUS BACTERIAL PERITONITIS: ICD-10-CM

## 2019-01-02 DIAGNOSIS — I95.1 ORTHOSTATIC HYPOTENSION: ICD-10-CM

## 2019-01-02 DIAGNOSIS — E87.1 HYPO-OSMOLALITY AND HYPONATREMIA: ICD-10-CM

## 2019-01-02 DIAGNOSIS — K46.9 UNSPECIFIED ABDOMINAL HERNIA WITHOUT OBSTRUCTION OR GANGRENE: Chronic | ICD-10-CM

## 2019-01-02 DIAGNOSIS — K70.31 ALCOHOLIC CIRRHOSIS OF LIVER WITH ASCITES: ICD-10-CM

## 2019-01-02 DIAGNOSIS — Z86.69 PERSONAL HISTORY OF OTHER DISEASES OF THE NERVOUS SYSTEM AND SENSE ORGANS: Chronic | ICD-10-CM

## 2019-01-02 DIAGNOSIS — E11.9 TYPE 2 DIABETES MELLITUS W/OUT COMPLICATIONS: ICD-10-CM

## 2019-01-02 LAB
ALBUMIN SERPL ELPH-MCNC: 2.5 G/DL — LOW (ref 3.3–5)
ALP SERPL-CCNC: 228 U/L — HIGH (ref 40–120)
ALT FLD-CCNC: 36 U/L — SIGNIFICANT CHANGE UP (ref 10–45)
ANION GAP SERPL CALC-SCNC: 11 MMOL/L — SIGNIFICANT CHANGE UP (ref 5–17)
ANION GAP SERPL CALC-SCNC: 14 MMOL/L — SIGNIFICANT CHANGE UP (ref 5–17)
ANISOCYTOSIS BLD QL: SLIGHT — SIGNIFICANT CHANGE UP
APPEARANCE UR: ABNORMAL
APTT BLD: 44.3 SEC — HIGH (ref 27.5–36.3)
AST SERPL-CCNC: 169 U/L — HIGH (ref 10–40)
BACTERIA # UR AUTO: NEGATIVE — SIGNIFICANT CHANGE UP
BASE EXCESS BLDV CALC-SCNC: -1.6 MMOL/L — SIGNIFICANT CHANGE UP (ref -2–2)
BASOPHILS # BLD AUTO: 0.1 K/UL — SIGNIFICANT CHANGE UP (ref 0–0.2)
BASOPHILS NFR BLD AUTO: 0.5 % — SIGNIFICANT CHANGE UP (ref 0–2)
BILIRUB DIRECT SERPL-MCNC: 6.5 MG/DL — HIGH (ref 0–0.2)
BILIRUB INDIRECT FLD-MCNC: 6.3 MG/DL — HIGH (ref 0.2–1)
BILIRUB SERPL-MCNC: 12.8 MG/DL — HIGH (ref 0.2–1.2)
BILIRUB SERPL-MCNC: 12.8 MG/DL — HIGH (ref 0.2–1.2)
BILIRUB UR-MCNC: ABNORMAL
BLD GP AB SCN SERPL QL: NEGATIVE — SIGNIFICANT CHANGE UP
BUN SERPL-MCNC: 30 MG/DL — HIGH (ref 7–23)
BUN SERPL-MCNC: 30 MG/DL — HIGH (ref 7–23)
BURR CELLS BLD QL SMEAR: PRESENT — SIGNIFICANT CHANGE UP
CA-I SERPL-SCNC: 1.2 MMOL/L — SIGNIFICANT CHANGE UP (ref 1.12–1.3)
CALCIUM SERPL-MCNC: 8.7 MG/DL — SIGNIFICANT CHANGE UP (ref 8.4–10.5)
CALCIUM SERPL-MCNC: 9.3 MG/DL — SIGNIFICANT CHANGE UP (ref 8.4–10.5)
CHLORIDE BLDV-SCNC: 93 MMOL/L — LOW (ref 96–108)
CHLORIDE SERPL-SCNC: 90 MMOL/L — LOW (ref 96–108)
CHLORIDE SERPL-SCNC: 90 MMOL/L — LOW (ref 96–108)
CO2 BLDV-SCNC: 25 MMOL/L — SIGNIFICANT CHANGE UP (ref 22–30)
CO2 SERPL-SCNC: 19 MMOL/L — LOW (ref 22–31)
CO2 SERPL-SCNC: 20 MMOL/L — LOW (ref 22–31)
COLOR SPEC: YELLOW — SIGNIFICANT CHANGE UP
COMMENT - URINE: SIGNIFICANT CHANGE UP
CREAT SERPL-MCNC: 1.28 MG/DL — SIGNIFICANT CHANGE UP (ref 0.5–1.3)
CREAT SERPL-MCNC: 1.33 MG/DL — HIGH (ref 0.5–1.3)
DIFF PNL FLD: NEGATIVE — SIGNIFICANT CHANGE UP
ELLIPTOCYTES BLD QL SMEAR: SLIGHT — SIGNIFICANT CHANGE UP
EOSINOPHIL # BLD AUTO: 0.8 K/UL — HIGH (ref 0–0.5)
EOSINOPHIL NFR BLD AUTO: 6.7 % — HIGH (ref 0–6)
EPI CELLS # UR: 1 — SIGNIFICANT CHANGE UP
GAS PNL BLDV: 122 MMOL/L — LOW (ref 136–145)
GAS PNL BLDV: SIGNIFICANT CHANGE UP
GLUCOSE BLDC GLUCOMTR-MCNC: 115 MG/DL — HIGH (ref 70–99)
GLUCOSE BLDV-MCNC: 139 MG/DL — HIGH (ref 70–99)
GLUCOSE SERPL-MCNC: 134 MG/DL — HIGH (ref 70–99)
GLUCOSE SERPL-MCNC: 151 MG/DL — HIGH (ref 70–99)
GLUCOSE UR QL: NEGATIVE — SIGNIFICANT CHANGE UP
HCO3 BLDV-SCNC: 24 MMOL/L — SIGNIFICANT CHANGE UP (ref 21–29)
HCT VFR BLD CALC: 30.7 % — LOW (ref 39–50)
HCT VFR BLDA CALC: 31 % — LOW (ref 39–50)
HGB BLD CALC-MCNC: 10.1 G/DL — LOW (ref 13–17)
HGB BLD-MCNC: 10.4 G/DL — LOW (ref 13–17)
HOROWITZ INDEX BLDV+IHG-RTO: SIGNIFICANT CHANGE UP
HYALINE CASTS # UR AUTO: 1 /LPF — SIGNIFICANT CHANGE UP (ref 0–7)
INR BLD: 2.81 RATIO — HIGH (ref 0.88–1.16)
KETONES UR-MCNC: NEGATIVE — SIGNIFICANT CHANGE UP
LACTATE BLDV-MCNC: 3.3 MMOL/L — HIGH (ref 0.7–2)
LEUKOCYTE ESTERASE UR-ACNC: NEGATIVE — SIGNIFICANT CHANGE UP
LIDOCAIN IGE QN: 12 U/L — SIGNIFICANT CHANGE UP (ref 7–60)
LYMPHOCYTES # BLD AUTO: 1.6 K/UL — SIGNIFICANT CHANGE UP (ref 1–3.3)
LYMPHOCYTES # BLD AUTO: 13.3 % — SIGNIFICANT CHANGE UP (ref 13–44)
MACROCYTES BLD QL: SLIGHT — SIGNIFICANT CHANGE UP
MAGNESIUM SERPL-MCNC: 2 MG/DL — SIGNIFICANT CHANGE UP (ref 1.6–2.6)
MCHC RBC-ENTMCNC: 34 GM/DL — SIGNIFICANT CHANGE UP (ref 32–36)
MCHC RBC-ENTMCNC: 34.8 PG — HIGH (ref 27–34)
MCV RBC AUTO: 102 FL — HIGH (ref 80–100)
MICROCYTES BLD QL: SLIGHT — SIGNIFICANT CHANGE UP
MONOCYTES # BLD AUTO: 1.1 K/UL — HIGH (ref 0–0.9)
MONOCYTES NFR BLD AUTO: 9.2 % — SIGNIFICANT CHANGE UP (ref 2–14)
NEUTROPHILS # BLD AUTO: 8.3 K/UL — HIGH (ref 1.8–7.4)
NEUTROPHILS NFR BLD AUTO: 70.4 % — SIGNIFICANT CHANGE UP (ref 43–77)
NITRITE UR-MCNC: NEGATIVE — SIGNIFICANT CHANGE UP
NT-PROBNP SERPL-SCNC: 1037 PG/ML — HIGH (ref 0–300)
OVALOCYTES BLD QL SMEAR: SLIGHT — SIGNIFICANT CHANGE UP
PCO2 BLDV: 47 MMHG — SIGNIFICANT CHANGE UP (ref 35–50)
PH BLDV: 7.33 — LOW (ref 7.35–7.45)
PH UR: 7.5 — SIGNIFICANT CHANGE UP (ref 5–8)
PLAT MORPH BLD: NORMAL — SIGNIFICANT CHANGE UP
PLATELET # BLD AUTO: 77 K/UL — LOW (ref 150–400)
PO2 BLDV: <20 MMHG — LOW (ref 25–45)
POIKILOCYTOSIS BLD QL AUTO: SLIGHT — SIGNIFICANT CHANGE UP
POTASSIUM BLDV-SCNC: 5.7 MMOL/L — HIGH (ref 3.5–5.3)
POTASSIUM SERPL-MCNC: 5.8 MMOL/L — HIGH (ref 3.5–5.3)
POTASSIUM SERPL-MCNC: 5.9 MMOL/L — HIGH (ref 3.5–5.3)
POTASSIUM SERPL-SCNC: 5.8 MMOL/L — HIGH (ref 3.5–5.3)
POTASSIUM SERPL-SCNC: 5.9 MMOL/L — HIGH (ref 3.5–5.3)
PROT SERPL-MCNC: 7.3 G/DL — SIGNIFICANT CHANGE UP (ref 6–8.3)
PROT UR-MCNC: NEGATIVE — SIGNIFICANT CHANGE UP
PROTHROM AB SERPL-ACNC: 33.4 SEC — HIGH (ref 10–12.9)
RAPID RVP RESULT: SIGNIFICANT CHANGE UP
RBC # BLD: 3 M/UL — LOW (ref 4.2–5.8)
RBC # FLD: 14.8 % — HIGH (ref 10.3–14.5)
RBC BLD AUTO: ABNORMAL
RBC CASTS # UR COMP ASSIST: 0 /HPF — SIGNIFICANT CHANGE UP (ref 0–4)
RH IG SCN BLD-IMP: POSITIVE — SIGNIFICANT CHANGE UP
SAO2 % BLDV: 19 % — LOW (ref 67–88)
SCHISTOCYTES BLD QL AUTO: SLIGHT — SIGNIFICANT CHANGE UP
SODIUM SERPL-SCNC: 121 MMOL/L — LOW (ref 135–145)
SODIUM SERPL-SCNC: 123 MMOL/L — LOW (ref 135–145)
SP GR SPEC: 1.01 — SIGNIFICANT CHANGE UP (ref 1.01–1.02)
TROPONIN T, HIGH SENSITIVITY RESULT: 27 NG/L — SIGNIFICANT CHANGE UP (ref 0–51)
UROBILINOGEN FLD QL: ABNORMAL
WBC # BLD: 11.8 K/UL — HIGH (ref 3.8–10.5)
WBC # FLD AUTO: 11.8 K/UL — HIGH (ref 3.8–10.5)
WBC UR QL: 0 /HPF — SIGNIFICANT CHANGE UP (ref 0–5)

## 2019-01-02 PROCEDURE — 99215 OFFICE O/P EST HI 40 MIN: CPT

## 2019-01-02 PROCEDURE — 99233 SBSQ HOSP IP/OBS HIGH 50: CPT

## 2019-01-02 PROCEDURE — 71045 X-RAY EXAM CHEST 1 VIEW: CPT | Mod: 26

## 2019-01-02 PROCEDURE — 99223 1ST HOSP IP/OBS HIGH 75: CPT | Mod: GC

## 2019-01-02 PROCEDURE — 99285 EMERGENCY DEPT VISIT HI MDM: CPT | Mod: 25

## 2019-01-02 PROCEDURE — 93010 ELECTROCARDIOGRAM REPORT: CPT

## 2019-01-02 RX ORDER — FOLIC ACID 0.8 MG
1 TABLET ORAL DAILY
Qty: 0 | Refills: 0 | Status: DISCONTINUED | OUTPATIENT
Start: 2019-01-02 | End: 2019-01-17

## 2019-01-02 RX ORDER — DEXTROSE 50 % IN WATER 50 %
12.5 SYRINGE (ML) INTRAVENOUS ONCE
Qty: 0 | Refills: 0 | Status: DISCONTINUED | OUTPATIENT
Start: 2019-01-02 | End: 2019-01-17

## 2019-01-02 RX ORDER — SODIUM CHLORIDE 9 MG/ML
1000 INJECTION, SOLUTION INTRAVENOUS
Qty: 0 | Refills: 0 | Status: DISCONTINUED | OUTPATIENT
Start: 2019-01-02 | End: 2019-01-17

## 2019-01-02 RX ORDER — PIPERACILLIN AND TAZOBACTAM 4; .5 G/20ML; G/20ML
3.38 INJECTION, POWDER, LYOPHILIZED, FOR SOLUTION INTRAVENOUS EVERY 8 HOURS
Qty: 0 | Refills: 0 | Status: DISCONTINUED | OUTPATIENT
Start: 2019-01-02 | End: 2019-01-06

## 2019-01-02 RX ORDER — PIPERACILLIN AND TAZOBACTAM 4; .5 G/20ML; G/20ML
3.38 INJECTION, POWDER, LYOPHILIZED, FOR SOLUTION INTRAVENOUS ONCE
Qty: 0 | Refills: 0 | Status: COMPLETED | OUTPATIENT
Start: 2019-01-02 | End: 2019-01-02

## 2019-01-02 RX ORDER — THIAMINE MONONITRATE (VIT B1) 100 MG
100 TABLET ORAL DAILY
Qty: 0 | Refills: 0 | Status: DISCONTINUED | OUTPATIENT
Start: 2019-01-02 | End: 2019-01-17

## 2019-01-02 RX ORDER — VANCOMYCIN HCL 1 G
1000 VIAL (EA) INTRAVENOUS ONCE
Qty: 0 | Refills: 0 | Status: COMPLETED | OUTPATIENT
Start: 2019-01-02 | End: 2019-01-02

## 2019-01-02 RX ORDER — ALBUTEROL 90 UG/1
10 AEROSOL, METERED ORAL ONCE
Qty: 0 | Refills: 0 | Status: COMPLETED | OUTPATIENT
Start: 2019-01-02 | End: 2019-01-02

## 2019-01-02 RX ORDER — LACTULOSE 10 G/15ML
20 SOLUTION ORAL THREE TIMES A DAY
Qty: 0 | Refills: 0 | Status: DISCONTINUED | OUTPATIENT
Start: 2019-01-02 | End: 2019-01-03

## 2019-01-02 RX ORDER — DEXTROSE 50 % IN WATER 50 %
25 SYRINGE (ML) INTRAVENOUS ONCE
Qty: 0 | Refills: 0 | Status: DISCONTINUED | OUTPATIENT
Start: 2019-01-02 | End: 2019-01-17

## 2019-01-02 RX ORDER — INSULIN HUMAN 100 [IU]/ML
10 INJECTION, SOLUTION SUBCUTANEOUS ONCE
Qty: 0 | Refills: 0 | Status: COMPLETED | OUTPATIENT
Start: 2019-01-02 | End: 2019-01-02

## 2019-01-02 RX ORDER — ALBUMIN HUMAN 25 %
250 VIAL (ML) INTRAVENOUS EVERY 6 HOURS
Qty: 0 | Refills: 0 | Status: DISCONTINUED | OUTPATIENT
Start: 2019-01-02 | End: 2019-01-09

## 2019-01-02 RX ORDER — DEXTROSE 50 % IN WATER 50 %
15 SYRINGE (ML) INTRAVENOUS ONCE
Qty: 0 | Refills: 0 | Status: DISCONTINUED | OUTPATIENT
Start: 2019-01-02 | End: 2019-01-17

## 2019-01-02 RX ORDER — GLUCAGON INJECTION, SOLUTION 0.5 MG/.1ML
1 INJECTION, SOLUTION SUBCUTANEOUS ONCE
Qty: 0 | Refills: 0 | Status: DISCONTINUED | OUTPATIENT
Start: 2019-01-02 | End: 2019-01-17

## 2019-01-02 RX ORDER — DEXTROSE 50 % IN WATER 50 %
50 SYRINGE (ML) INTRAVENOUS ONCE
Qty: 0 | Refills: 0 | Status: COMPLETED | OUTPATIENT
Start: 2019-01-02 | End: 2019-01-02

## 2019-01-02 RX ORDER — MIDODRINE HYDROCHLORIDE 2.5 MG/1
5 TABLET ORAL THREE TIMES A DAY
Qty: 0 | Refills: 0 | Status: DISCONTINUED | OUTPATIENT
Start: 2019-01-02 | End: 2019-01-17

## 2019-01-02 RX ORDER — AZITHROMYCIN 500 MG/1
500 TABLET, FILM COATED ORAL EVERY 24 HOURS
Qty: 0 | Refills: 0 | Status: DISCONTINUED | OUTPATIENT
Start: 2019-01-02 | End: 2019-01-03

## 2019-01-02 RX ORDER — VANCOMYCIN HCL 1 G
VIAL (EA) INTRAVENOUS
Qty: 0 | Refills: 0 | Status: DISCONTINUED | OUTPATIENT
Start: 2019-01-02 | End: 2019-01-03

## 2019-01-02 RX ORDER — MIDODRINE HYDROCHLORIDE 2.5 MG/1
5 TABLET ORAL ONCE
Qty: 0 | Refills: 0 | Status: COMPLETED | OUTPATIENT
Start: 2019-01-02 | End: 2019-01-02

## 2019-01-02 RX ORDER — INSULIN LISPRO 100/ML
VIAL (ML) SUBCUTANEOUS AT BEDTIME
Qty: 0 | Refills: 0 | Status: DISCONTINUED | OUTPATIENT
Start: 2019-01-02 | End: 2019-01-17

## 2019-01-02 RX ORDER — INSULIN LISPRO 100/ML
VIAL (ML) SUBCUTANEOUS
Qty: 0 | Refills: 0 | Status: DISCONTINUED | OUTPATIENT
Start: 2019-01-02 | End: 2019-01-17

## 2019-01-02 RX ORDER — CEFTRIAXONE 500 MG/1
2 INJECTION, POWDER, FOR SOLUTION INTRAMUSCULAR; INTRAVENOUS EVERY 24 HOURS
Qty: 0 | Refills: 0 | Status: DISCONTINUED | OUTPATIENT
Start: 2019-01-02 | End: 2019-01-02

## 2019-01-02 RX ORDER — ALBUMIN HUMAN 25 %
250 VIAL (ML) INTRAVENOUS ONCE
Qty: 0 | Refills: 0 | Status: COMPLETED | OUTPATIENT
Start: 2019-01-02 | End: 2019-01-02

## 2019-01-02 RX ORDER — VANCOMYCIN HCL 1 G
1000 VIAL (EA) INTRAVENOUS EVERY 12 HOURS
Qty: 0 | Refills: 0 | Status: DISCONTINUED | OUTPATIENT
Start: 2019-01-03 | End: 2019-01-03

## 2019-01-02 RX ADMIN — Medication 250 MILLILITER(S): at 20:10

## 2019-01-02 RX ADMIN — CEFTRIAXONE 2 GRAM(S): 500 INJECTION, POWDER, FOR SOLUTION INTRAMUSCULAR; INTRAVENOUS at 19:39

## 2019-01-02 RX ADMIN — Medication 50 MILLILITER(S): at 22:20

## 2019-01-02 RX ADMIN — INSULIN HUMAN 10 UNIT(S): 100 INJECTION, SOLUTION SUBCUTANEOUS at 22:19

## 2019-01-02 RX ADMIN — PIPERACILLIN AND TAZOBACTAM 200 GRAM(S): 4; .5 INJECTION, POWDER, LYOPHILIZED, FOR SOLUTION INTRAVENOUS at 20:24

## 2019-01-02 RX ADMIN — Medication 250 MILLIGRAM(S): at 22:26

## 2019-01-02 RX ADMIN — Medication 125 MILLILITER(S): at 17:55

## 2019-01-02 RX ADMIN — CEFTRIAXONE 100 GRAM(S): 500 INJECTION, POWDER, FOR SOLUTION INTRAMUSCULAR; INTRAVENOUS at 18:45

## 2019-01-02 RX ADMIN — ALBUTEROL 10 MILLIGRAM(S): 90 AEROSOL, METERED ORAL at 22:21

## 2019-01-02 RX ADMIN — MIDODRINE HYDROCHLORIDE 5 MILLIGRAM(S): 2.5 TABLET ORAL at 19:35

## 2019-01-02 NOTE — H&P ADULT - PROBLEM SELECTOR PLAN 6
- check A1c in AM.  - hold home glipizide.  - FS qac and qhs, with insulin SSI. No active bleeding at this time.  Patient was previously on propranolol for prophylaxis- however states he is no longer taking it. Likely 2/2 hypotension. Clarify medication with patient's PCP or pharmacy in AM. Currently patient breathing comfortably on room air with no appreciable LE edema.  Continue to monitor. -uptrending creatinine  -nonoliguric  -ddx includes hepatorenal at this time vs. prerenal/dehydration from diuretics continue to monitor. check urine lytes/sodium/urea  -trend creatinine with fluid challenge  -consider renal eval  -likely not long term HD candidate given poor prognosis and poor liver transplant candidate, family aware.

## 2019-01-02 NOTE — H&P ADULT - PROBLEM SELECTOR PLAN 10
DVT ppx: SCDs  Diet: DASH/Consistent Carb    Goals of Care: FULL CODE. I addressed GOC with patient and his brother at bedside. Patient stated that he would like chest compressions and intubation if necessary.     Darby Goldberg MD PGY2  Medicine Night Admit Resident  Pager [phone]

## 2019-01-02 NOTE — ED PROVIDER NOTE - MEDICAL DECISION MAKING DETAILS
53m hx of etoh cirrhosis, eso varices, pw hypotension and weakness. albumin, pan cx, labs, vbg, micu consult and admit.

## 2019-01-02 NOTE — CONSULT NOTE ADULT - SUBJECTIVE AND OBJECTIVE BOX
CHIEF COMPLAINT:    HPI:  53 M with ESLD 2/2 chronic alcoholism c/b esophageal varices (last screen 2017) and severe HFrEF (EF ~35%) who presents with one week of generalized weakness. Patient seen at the bedside with brother. Brother noting that patient has been significantly weakness, poor oral intake. He endorses chills, cough, sore throat as well during this time. He is unsure if he had a true fever. Otherwise denies chest pain, shortness of breath, vomiting, abdominal pain, hematemesis melena or hematochezia. Of note patient had recent here in 09/2018-10/2018 during which he was worked up for severe sepsis found to have penicillin sensitive ecoli bacteremia with likely source bacterial peritonitis treated with ceftriaxone, thereafter transferred to [facility] where he had been enlisted for liver transplant. However, further workup there revealed significant HFrEF with EF per brother in 35% s/p cath which did not find clinically significant stenosis. However, due to the heart failure patient was removed from transplant list. Patient also recently had episode of LE edema 2 week ago was started metolazone in addition home lasix and spirnolactone which was stopped 1 week ago. He had take dose of lasix and spirnolactone at noon day of admission. Brother at bedside reports taking daily BP and HR on patient, which average in mid 80s-low 90s and HR 60s.     In ED, T 97.4, SBP low as low as 70s, 100% on RA. Patient received albumin x2 with improved BPs 95-110s. Ceftriaxone started.     PAST MEDICAL & SURGICAL HISTORY:  Esophageal varices  Alcoholic cirrhosis of liver with ascites  Abdominal hernia: S/P repair x2  H/O cataract      FAMILY HISTORY:  Family history of hypercholesterolemia (Father)  Family history of diabetes mellitus (Mother)      SOCIAL HISTORY:  No tobacco or alcohol in 3.5 years.   Former smoker    Allergies    No Known Allergies    Intolerances        HOME MEDICATIONS:      REVIEW OF SYSTEMS:    CONSTITUTIONAL: + weakness and chills  EYES/ENT: No visual changes;  No vertigo or throat pain   NECK: No pain or stiffness  RESPIRATORY: No cough, wheezing, hemoptysis; No shortness of breath  CARDIOVASCULAR: No chest pain or palpitations  GASTROINTESTINAL: No abdominal pain; nausea, vomiting;  diarrhea or constipation. No hemetemesis, melena or hematochezia.  GENITOURINARY: No dysuria, frequency or hematuria  NEUROLOGICAL: No numbness or weakness  SKIN: No itching, burning, rashes, or lesions   All other review of systems is negative unless indicated above.    OBJECTIVE:  ICU Vital Signs Last 24 Hrs  T(C): 35.9 (02 Jan 2019 18:34), Max: 36.5 (02 Jan 2019 16:50)  T(F): 96.7 (02 Jan 2019 18:34), Max: 97.7 (02 Jan 2019 16:50)  HR: 66 (02 Jan 2019 18:49) (59 - 83)  BP: 101/51 (02 Jan 2019 18:49) (73/78 - 101/51)  BP(mean): 63 (02 Jan 2019 16:39) (63 - 63)  ABP: --  ABP(mean): --  RR: 16 (02 Jan 2019 18:49) (15 - 18)  SpO2: 100% (02 Jan 2019 18:49) (98% - 100%)        CAPILLARY BLOOD GLUCOSE          PHYSICAL EXAM:  General: Awake, alert, oriented X 3. Rigoring  HEENT: + bilateral scleral icterus, dry mucous membranes  Lymph Nodes: No palpable lymphadenopathy  Neck: No JVD. No carotid bruit.   Respiratory: CTABL  Cardiovascular: S1 S2 normal.  Abdomen: Soft, non-tender, non-distended. No organomegaly.  Extremities: Warm to touch, no edema, + pulses  Skin: generalized jaundice  Neurological: Motor and sensory examination equal and normal in all four extremities.  Psychiatry: Appropriate mood and affect.      BEDSIDE POCUS  - minimal ascites  - LV function markedly down      HOSPITAL MEDICATIONS:  MEDICATIONS  (STANDING):  cefTRIAXone   IVPB 2 Gram(s) IV Intermittent every 24 hours  midodrine 5 milliGRAM(s) Oral Once  piperacillin/tazobactam IVPB. 3.375 Gram(s) IV Intermittent once    MEDICATIONS  (PRN):      LABS:                        10.4   11.8  )-----------( x        ( 02 Jan 2019 16:55 )             30.7     01-02    121<L>  |  90<L>  |  30<H>  ----------------------------<  151<H>  5.8<H>   |  20<L>  |  1.28    Ca    9.3      02 Jan 2019 16:55  Mg     2.0     01-02    TPro  7.3  /  Alb  2.5<L>  /  TBili  12.8<H>  /  DBili  6.5<H>  /  AST  169<H>  /  ALT  36  /  AlkPhos  228<H>  01-02    PT/INR - ( 02 Jan 2019 16:55 )   PT: 33.4 sec;   INR: 2.81 ratio         PTT - ( 02 Jan 2019 16:55 )  PTT:44.3 sec      Venous Blood Gas:  01-02 @ 16:55  7.33/47/<20/24/19  VBG Lactate: 3.3      MICROBIOLOGY:     RADIOLOGY:  < from: Xray Chest 1 View- PORTABLE-Urgent (01.02.19 @ 17:09) >  INTERPRETATION:  clear lungs    < end of copied text >      EKG:  NSR 60s MT wnl no STT changes

## 2019-01-02 NOTE — H&P ADULT - PROBLEM SELECTOR PROBLEM 6
Type 2 diabetes mellitus Esophageal varices Congestive heart failure (CHF) ULICES (acute kidney injury)

## 2019-01-02 NOTE — DISCUSSION/SUMMARY
[FreeTextEntry1] : Mr. Rivera has lost 13 pounds since his visit here on 19 December although he has cut back on his diuretics. He looks quite ill and is orthostatic and has difficulty sitting up. His blood pressure is down to 80/50, but his heart rate is not increased. He has only trace peripheral edema.\par \par The cause of his current deterioration isn't clear, but it is not due to excessive diuretic and he looks like he should be hospitalized. I discussed the situation with him and his brothers and told them I thought he should go to the emergency room. He agrees and was sent.\par \par

## 2019-01-02 NOTE — ED ADULT NURSE NOTE - OBJECTIVE STATEMENT
53 y.o male presents to the ed c.o hypotension. pt went to his PCP today, and was told to come into the ER. history of htn, dm, hld, cirrhosis, chf. past five days weakness, lightheaded with no syncope. pt is lethargic appearing, jaundiced in appearance, dried blood to his lip, pt unsure of why the blood is there.   patient denies chest pain/sob/abdominal / n/v/d, cough. denies recent travel. abdomen is soft nt/nd, lung sounds clear b/l, no swelling noted to feet.   Patient undressed and placed into gown, call bell in hand and side rails up for safety. warm blanket provided, vital signs stable, pt in no acute distress.   Segundo Glass - cardiologist  assisting primary rn cat dipuma

## 2019-01-02 NOTE — H&P ADULT - PROBLEM SELECTOR PLAN 4
Currently patient breathing comfortably on room air with no appreciable LE edema.  Continue to monitor. PLT count 77k this admission, compared to 75k on previous admission (comparable).  Likely in setting of end stage liver disease.   No active bleeding. Patient with K+ 5.9 on admission (repeat value)  Insulin/D50, albuterol nebulized, and calcium gluconate given.  EKG showed no changes- normal sinus rhythm.   Did not give furosemide given pt volume depleted.   - follow up repeat potassium in AM. Patient with K+ 5.9 on admission (repeat value)  Insulin/D50, albuterol nebulized, and calcium gluconate, bicarb given.  EKG showed no changes- normal sinus rhythm.   Did not give furosemide given pt volume depleted.   - follow up repeat potassium in AM.

## 2019-01-02 NOTE — ED PROVIDER NOTE - OBJECTIVE STATEMENT
53M PMHx of End stage Alcoholic liver cirrhosis a/w esophageal varices, CHF, CAD, HTN, HLD, DM pw weakness and lightheadedness today. Reports feeling of being unwell described as fatigue. He visited his pmd which sent him to the ed for evaluation. Arrived in the ED hypotensive to sbp 80s/50s, but mentating well, alert and oriented to name, place, and time. Reports mild generalized abdominal pain. Denies any chest pain, fevers, nv, diarrhea, headaches, neck pain, sick contacts. He is compliant with home medications. Of note, previously on [facility] liver transplant list but was taken off 2/2 CHF w/ low EF.

## 2019-01-02 NOTE — ED ADULT NURSE REASSESSMENT NOTE - NS ED NURSE REASSESS COMMENT FT1
Albumin being given per order. Pt. remains on cardiac monitor. Breathing unlabored on RA. Comfort and safety measures in place. Family at bedside. Bed in lowest position, side rails up.

## 2019-01-02 NOTE — H&P ADULT - NSHPREVIEWOFSYSTEMS_GEN_ALL_CORE
General: +Shivers and feeling very cold. No fevers. +Weight loss in past month   Skin/Breast: Jaundiced  ENMT: No sore throat or nasal congestion 	  Respiratory and Thorax: +Dry cough, no sputum production. No wheezing. +Dyspnea on exertion   Cardiovascular: No chest pain, palpitations, or lower extremity swelling   Gastrointestinal: No abdominal distension reported, no nausea, vomiting, diarrhea, or constipation. No melena or hematochezia. 	  Genitourinary: No urinary complaints   Musculoskeletal: bilateral hip pain from arthritis   Neurological: no focal weakness  Hematology/Lymphatics: no abnormal bleeding

## 2019-01-02 NOTE — H&P ADULT - HISTORY OF PRESENT ILLNESS
Patient is a 53 year old male with end stage liver disease 2/2 chronic alcoholic cirrhosis, with esophageal varices, CAD, HfrEF (EF 35% Patient is a 53 year old male with end stage liver disease 2/2 chronic alcoholic cirrhosis, with esophageal varices, CAD, HfrEF (EF 35%), and DMII, presenting with 1 week of generalized weakness and poor PO intake. Patient's brother is at bedside. Patient denies fevers but reports feeling very cold and has multiple blankets on. Mild intermittent cough with no sputum for the past 2-3 days. No dyspnea at rest, but does have moderate dyspnea on exertion. No chest pain. Patient has intermittent abdominal pain in the LLQ and occasionally RUQ which he describes as a burning sensation. Patient was recently admitted to [facility] in 11/2018 with abdominal distension and lower extremity edema, was initially placed on liver transplant list however was removed from list due to HF with severely reduced EF. Prior to that, he had an admission in our health system 9-10/2018 due to severe sepsis from E coli bacteremia from presumed SBP, treated with ceftriaxone. Patient is a 53 year old male with end stage liver disease 2/2 chronic alcoholic cirrhosis, with esophageal varices, CAD, HfrEF (EF 35%), and DMII, presenting with 1 week of generalized weakness and poor PO intake. Patient's brother is at bedside. Patient denies fevers but reports feeling very cold and has multiple blankets on. Mild intermittent cough with no sputum for the past 2-3 days. No dyspnea at rest, but does have moderate dyspnea on exertion. No chest pain. Patient has intermittent abdominal pain in the LLQ and occasionally RUQ which he describes as a burning sensation. Patient denies hemoptysis/hematemesis or bloody stools. Denies nausea/vomiting in general. Denies abdominal distension (states it has gotten better). He notes at least 20 pound weight loss in the past month (mainly due to resolution of abdominal ascites compared to last month). Patient was recently admitted to our hospital system followed by [facility] in 10- 11/2018 with abdominal distension and lower extremity edema, was initially placed on liver transplant list however was removed from list due to HF with severely reduced EF.  Prior to the [facility] transfer, patient was treated for severe sepsis from E coli bacteremia from presumed SBP, treated with ceftriaxone. Patient also recently had episode of LE edema 3 weeks ago- was started metolazone in addition to home lasix and spironolactone. However, the metolazone was discontinued 1 week ago. Patient currently takes lasix 20 mg daily and spironolactone 25 mg daily.  Brother at bedside reports taking daily BP and HR on patient, which average in mid 80s-low 90s systolic, and HR 60s.     In the ED, pt afebrile, T 97.4, HR 62, BP as low as 73/78, satting 100% on room air. Labs notable for WBC 11.8, PLT 77, INR 2.81, K+ 5.9, Cr 1.33, Total Bilirubin 12.8. Lactate on VBG 4.7.   Patient given 250 cc 5% albumin with improvement in bp to SBP 95/58. Also given 1g IV ceftriaxone for empiric SBP treatment.

## 2019-01-02 NOTE — HISTORY OF PRESENT ILLNESS
[FreeTextEntry1] : 53-year-old male with chronic fluid retention due to cirrhosis aggravated by left ventricular dysfunction returns for follow..  Since his visit here 2 weeks ago he has not taken any metolazone, but has continued to lose weight and feels very weak and sick at present. He has diarrhea, but it is not dark and he is eating a little. Not short of breath.

## 2019-01-02 NOTE — H&P ADULT - PROBLEM SELECTOR PLAN 1
MELD 34 on admission, indicating >50% mortality in next 3 months. Was recently on liver transplant candidate list at [facility], however had to be removed due to severe HFrEF.  - patient on furosemide 20 mg and spironolactone 25 mg daily. Will hold diuretics for now as pt intravascularly depleted.  - continue lactulose and rifaximin.   - no concern for hepatic encephalopathy at this time.   - palliative care consult in AM as patient no longer transplant candidate. MELD 34 on admission, indicating >50% mortality in next 3 months. Was recently on liver transplant candidate list at [facility], however had to be removed due to severe HFrEF, no longer plans for liver transplant  - patient on furosemide 20 mg and spironolactone 25 mg daily. Will hold diuretics for now as pt intravascularly depleted and hypotensive on arrival in setting of aggressive diuretic regimen as outpatient (lasix/aldactone + addition of metolazone 2 wks ago).   - continue lactulose and rifaximin.   - lower concern for hepatic encephalopathy at this time.   --evidence of severe synthetic dysfunction, portal hypertensive, varices, and existing heart failure and now ULICES suggestive of high risk of multi-organ dysfunction in coming days to weeks. Prognosis is poor.   -hepatology eval in am  - palliative care consult in AM as patient no longer transplant candidate.  -c/w albumin as noted below  -c/w abd sono to assess for ascites as below

## 2019-01-02 NOTE — ED PROVIDER NOTE - PROGRESS NOTE DETAILS
adithya teran fellow: micu consulted, evaluated patient, requests additional zosyn for coverage and declines admission to icu. will admit to hospitalist.

## 2019-01-02 NOTE — CONSULT NOTE ADULT - ATTENDING COMMENTS
54 yo with end stage liver disease 2/2 alcoholic cirrhosis (no longer on transplant list 2/2 new cardiomyopathy), CHF (EF 35%), s/p recent hospitalization with Ecoli bacteremia admitted with weakness and hypotension. S/p albumin resuscitation with improved BPs.   -- patient's baseline BPs run in 80s systolic, patient is also on home Midodrine  -- agree with albumin rather than IVF given live disease, c/w home Midodrine  -- would pan-culture, consider Paracentesis, would broaden Abx to Zosyn  -- hold diuretics for now  -- would check urine lytes and monitor UO in the setting of ULICES, trend Cr, consider HRS if Cr does not improve with volume resuscitation and holding diuretics 54 yo with end stage liver disease 2/2 alcoholic cirrhosis (no longer on transplant list 2/2 new cardiomyopathy), CHF (EF 35%), s/p recent hospitalization with Ecoli bacteremia admitted with weakness and hypotension. S/p albumin resuscitation with improved BPs.   -- patient's baseline BPs run in 80s systolic, patient is also on home Midodrine  -- agree with albumin rather than IVF given live disease, c/w home Midodrine  -- would pan-culture, consider Paracentesis, would broaden Abx to Zosyn  -- hold diuretics for now  -- would check urine lytes and monitor UO in the setting of ULICES, trend Cr, consider HRS if Cr does not improve with volume resuscitation and holding diuretics  -- treat hyperkalemia and monitor K  -- hyponatremia 2/2 liver disease 54 yo with end stage liver disease 2/2 alcoholic cirrhosis (no longer on transplant list 2/2 new cardiomyopathy), CHF (EF 35%), s/p recent hospitalization with Ecoli bacteremia admitted with weakness and hypotension. S/p albumin resuscitation with improved BPs.   -- patient's baseline BPs run in 80s systolic, patient is also on home Midodrine  -- agree with albumin rather than IVF given live disease, c/w home Midodrine  -- would pan-culture, consider Paracentesis, would broaden Abx to Zosyn  -- hold diuretics for now  -- would check urine lytes and monitor UO in the setting of ULICES, trend Cr, consider HRS if Cr does not improve with volume resuscitation and holding diuretics  -- treat hyperkalemia and monitor K  -- hyponatremia 2/2 liver disease  -- consider Palliative consult given patient is no longer a liver transplant candidate  Not MICU candidate at this time, please re-consult as needed.

## 2019-01-02 NOTE — H&P ADULT - PROBLEM SELECTOR PLAN 5
No active bleeding at this time.  Patient was previously on propranolol for prophylaxis- however states he is no longer taking it. Likely 2/2 hypotension. Clarify medication with patient's PCP or pharmacy in AM. Currently patient breathing comfortably on room air with no appreciable LE edema.  Continue to monitor. PLT count 77k this admission, compared to 75k on previous admission (comparable).  Likely in setting of end stage liver disease.   No active bleeding. PLT count 77k this admission, compared to 75k on previous admission (comparable).  This, in addition to elevated INR and hyponatremia, are likely in setting of end stage liver disease.   No active bleeding.

## 2019-01-02 NOTE — H&P ADULT - PMH
Alcoholic cirrhosis of liver with ascites    Congestive heart failure (CHF)    Esophageal varices    Hypertension    Type 2 diabetes mellitus

## 2019-01-02 NOTE — H&P ADULT - NSHPLABSRESULTS_GEN_ALL_CORE
123<L>  |  90<L>  |  30<H>  ----------------------------<  134<H>  5.9<H>   |  19<L>  |  1.33<H>    Ca    8.7      2019 20:43  Mg     2.0         TPro  7.3  /  Alb  2.5<L>  /  TBili  12.8<H>  /  DBili  6.5<H>  /  AST  169<H>  /  ALT  36  /  AlkPhos  228<H>      CBC Full  -  ( 2019 16:55 )  WBC Count : 11.8 K/uL  Hemoglobin : 10.4 g/dL  Hematocrit : 30.7 %  Platelet Count - Automated : 77 K/uL  Mean Cell Volume : 102.0 fl  Mean Cell Hemoglobin : 34.8 pg  Mean Cell Hemoglobin Concentration : 34.0 gm/dL  Auto Neutrophil # : 8.3 K/uL  Auto Lymphocyte # : 1.6 K/uL  Auto Monocyte # : 1.1 K/uL  Auto Eosinophil # : 0.8 K/uL  Auto Basophil # : 0.1 K/uL  Auto Neutrophil % : 70.4 %  Auto Lymphocyte % : 13.3 %  Auto Monocyte % : 9.2 %  Auto Eosinophil % : 6.7 %  Auto Basophil % : 0.5 %    Urinalysis Basic - ( 2019 20:43 )    Color: Yellow / Appearance: Slightly Turbid / S.012 / pH: x  Gluc: x / Ketone: Negative  / Bili: Small / Urobili: 2 mg/dL   Blood: x / Protein: Negative / Nitrite: Negative   Leuk Esterase: Negative / RBC: 0 /hpf / WBC 0 /HPF   Sq Epi: x / Non Sq Epi: 1 / Bacteria: Negative    RVP: negative    CXR: clear lungs (personally reviewed by me).     123<L>  |  90<L>  |  30<H>  ----------------------------<  134<H>  5.9<H>   |  19<L>  |  1.33<H>    Ca    8.7      2019 20:43  Mg     2.0         TPro  7.3  /  Alb  2.5<L>  /  TBili  12.8<H>  /  DBili  6.5<H>  /  AST  169<H>  /  ALT  36  /  AlkPhos  228<H>      CBC Full  -  ( 2019 16:55 )  WBC Count : 11.8 K/uL  Hemoglobin : 10.4 g/dL  Hematocrit : 30.7 %  Platelet Count - Automated : 77 K/uL  Mean Cell Volume : 102.0 fl  Mean Cell Hemoglobin : 34.8 pg  Mean Cell Hemoglobin Concentration : 34.0 gm/dL  Auto Neutrophil # : 8.3 K/uL  Auto Lymphocyte # : 1.6 K/uL  Auto Monocyte # : 1.1 K/uL  Auto Eosinophil # : 0.8 K/uL  Auto Basophil # : 0.1 K/uL  Auto Neutrophil % : 70.4 %  Auto Lymphocyte % : 13.3 %  Auto Monocyte % : 9.2 %  Auto Eosinophil % : 6.7 %  Auto Basophil % : 0.5 %    Urinalysis Basic - ( 2019 20:43 )    Color: Yellow / Appearance: Slightly Turbid / S.012 / pH: x  Gluc: x / Ketone: Negative  / Bili: Small / Urobili: 2 mg/dL   Blood: x / Protein: Negative / Nitrite: Negative   Leuk Esterase: Negative / RBC: 0 /hpf / WBC 0 /HPF   Sq Epi: x / Non Sq Epi: 1 / Bacteria: Negative    RVP: negative    CXR: clear lungs (personally reviewed by me).  Imaging, labs, ekg personally reviewed

## 2019-01-02 NOTE — ED ADULT NURSE REASSESSMENT NOTE - NS ED NURSE REASSESS COMMENT FT1
Pt remains hypotensive at this time. MD Darby Goldberg aware at this time - paged @ [phone]. Pt AAOx4, NAD, resp nonlabored, resting comfortably in bed with family at bedside. Pt denies headache, dizziness, chest pain, palpitations, SOB, abd pain, n/v/d, urinary symptoms, fevers, & chills at this time.  Safety maintained.

## 2019-01-02 NOTE — PHYSICAL EXAM
[General Appearance - Well Developed] : well developed [Normal Appearance] : normal appearance [Well Groomed] : well groomed [General Appearance - Well Nourished] : well nourished [No Deformities] : no deformities [General Appearance - In No Acute Distress] : no acute distress [Normal Conjunctiva] : the conjunctiva exhibited no abnormalities [Eyelids - No Xanthelasma] : the eyelids demonstrated no xanthelasmas [Normal Oral Mucosa] : normal oral mucosa [No Oral Pallor] : no oral pallor [No Oral Cyanosis] : no oral cyanosis [Normal Jugular Venous A Waves Present] : normal jugular venous A waves present [Normal Jugular Venous V Waves Present] : normal jugular venous V waves present [No Jugular Venous Diaz A Waves] : no jugular venous diaz A waves [Respiration, Rhythm And Depth] : normal respiratory rhythm and effort [Exaggerated Use Of Accessory Muscles For Inspiration] : no accessory muscle use [Auscultation Breath Sounds / Voice Sounds] : lungs were clear to auscultation bilaterally [Heart Rate And Rhythm] : heart rate and rhythm were normal [Heart Sounds] : normal S1 and S2 [Murmurs] : no murmurs present [Abdomen Soft] : soft [Abdomen Tenderness] : non-tender [Abdomen Mass (___ Cm)] : no abdominal mass palpated [Abnormal Walk] : normal gait [Gait - Sufficient For Exercise Testing] : the gait was sufficient for exercise testing [Nail Clubbing] : no clubbing of the fingernails [Cyanosis, Localized] : no localized cyanosis [Petechial Hemorrhages (___cm)] : no petechial hemorrhages [Skin Color & Pigmentation] : normal skin color and pigmentation [] : no rash [No Venous Stasis] : no venous stasis [Skin Lesions] : no skin lesions [No Skin Ulcers] : no skin ulcer [No Xanthoma] : no  xanthoma was observed [Oriented To Time, Place, And Person] : oriented to person, place, and time [Affect] : the affect was normal [Mood] : the mood was normal [No Anxiety] : not feeling anxious [FreeTextEntry1] : no ascites

## 2019-01-02 NOTE — H&P ADULT - NSHPPHYSICALEXAM_GEN_ALL_CORE
Vital Signs Last 24 Hrs  T(C): 36.5 (02 Jan 2019 19:25), Max: 36.5 (02 Jan 2019 16:50)  T(F): 97.7 (02 Jan 2019 19:25), Max: 97.7 (02 Jan 2019 16:50)  HR: 60 (02 Jan 2019 22:34) (59 - 83)  BP: 92/60 (02 Jan 2019 22:34) (73/78 - 101/51)  BP(mean): 69 (02 Jan 2019 19:25) (63 - 69)  RR: 16 (02 Jan 2019 22:34) (15 - 18)  SpO2: 100% (02 Jan 2019 22:34) (98% - 100%)    Constitutional: Frail appearing, jaundiced, in mild distress due to feeling very cold  ENMT: dry mucous membranes, no pharyngeal erythema or exudates  Respiratory: lungs CTAB; no wheezing, rales or rhonchi  Cardiovascular: Normal S1 and S2; no murmurs. Trace LE edema bilaterally.   Gastrointestinal: Abdomen soft, TTP in RUQ and LLQ, no distension, no fluid wave, normal bowel sounds   Extremities: No clubbing, cyanosis or edema.   Vascular: 2+ peripheral pulses  Neurological: No asterixis   Skin: Jaundiced; no rashes noted  Psychiatric: A&O x 4.

## 2019-01-02 NOTE — H&P ADULT - NSHPSOCIALHISTORY_GEN_ALL_CORE
Patient lives with his parents.   No tobacco use. Last alcohol use 3.5 years ago. Former heavy alcohol user. No drug use.

## 2019-01-02 NOTE — ED PROVIDER NOTE - ATTENDING CONTRIBUTION TO CARE
Patient with ESLD secondary to alcoholic cirrhosis, follows with hepatology at [facility], recent admission there for sepsis and reportedly told developed heart failure on that admission, brought by family - patient reporting feeling lightheaded/dizzy/weak over past 5 days.  Having difficulty getting out of bed.  Saw PMD/cardiologist Segundo Chowdhury in office and noted to be significantly hypotensive - sent to Emergency Department.  Patient denying chest pains or shortness of breath, appears more jaundiced per family.  No longer using EtOH per family.  On lasix for fluid overload/peripheral edema    A 14 point review of systems is negative except as in HPI or otherwise documented.    Exam:  General: Patient chronically ill appearing, hypotensive otherwise vital signs within normal limits   HENT: airway patent with moist mucous membranes, small abrasion to lower lip with minimal oozing  Eyes: scleral icterus  Cardiac: RRR S1/S2 with strong peripheral pulses, no significant peripheral edema  Respiratory: lungs clear without respiratory distress  GI: abdomen soft, mild diffuse tenderness, moderately distended  Neuro: no gross neurologic deficits  Skin: color consistent with ESLD  Psych: normal mood    Patient presenting hypotensive/lightheaded, seems likely secondary to worsening ESLD, discussed with cardiologist does not know results of prior echo.  Does not seem fluid overloaded clinically, afebrile.  Plan for labs/LFT/bili, CXR, will start albumin IV for intravascular repletion as opposed to standard intravenous fluids, likely admission for further workup and treatment.

## 2019-01-02 NOTE — CONSULT NOTE ADULT - ASSESSMENT
53 M with ESLD 2/2 chronic alcoholism c/b esophageal varices (last screen 2017) and severe HFrEF (EF ~35%) who presents with one week of generalized weakness and rigors, concerning for possible underlying infection    - initially with hypotension SBP to 70-80s, now improved with albumin   - beside POCUS with markedly down LV, minimal ascites and no overt lung conssolidation  - c/w albumin resuscitation in setting of significant heart failure  - prior ctx growing pan-sensitive ecoli  - c/w Vanc and Zosyn and azithro given recent hospitalization  - f/u blood ctx, urine ctx  - RVP  - urine legionella  - given patient no longer candidate for liver transplant and heart failure, consider palliative   - no indication for MICU admission at this time. 53 M with ESLD 2/2 chronic alcoholism c/b esophageal varices (last screen 2017) and severe HFrEF (EF ~35%) who presents with one week of generalized weakness and rigors, concerning for possible underlying infection  - initially with hypotension SBP to 70-80s, now improved with albumin   - beside POCUS with markedly down LV, minimal ascites and no overt lung consolidation    recommend:  - c/w albumin resuscitation in setting of significant heart failure  - prior ctx growing pan-sensitive ecoli  - c/w Vanc and Zosyn and Azithro given recent hospitalization  - f/u blood ctx, urine ctx  - consider RVP and urine legionella  - consider LP   - given patient no longer candidate for liver transplant and heart failure, consider palliative   - no indication for MICU admission at this time. 53 M with ESLD 2/2 chronic alcoholism c/b esophageal varices (last screen 2017) and severe HFrEF (EF ~35%) who presents with one week of generalized weakness and rigors, concerning for possible underlying infection  - initially with hypotension SBP to 70-80s, now improved with albumin   - beside POCUS with markedly down LV, minimal ascites and no overt lung consolidation    recommend:  - c/w albumin resuscitation in setting of significant heart failure  - prior ctx growing pan-sensitive ecoli  - Zosyn given recent hospitalization  - f/u blood ctx, urine ctx  - consider RVP and urine legionella  - given patient no longer candidate for liver transplant and heart failure, consider palliative   - no indication for MICU admission at this time.

## 2019-01-02 NOTE — H&P ADULT - ASSESSMENT
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

## 2019-01-02 NOTE — ED PROVIDER NOTE - CARE PLAN
Principal Discharge DX:	SBP (spontaneous bacterial peritonitis)  Secondary Diagnosis:	Alcoholic cirrhosis of liver with ascites  Secondary Diagnosis:	Idiopathic hypotension

## 2019-01-02 NOTE — H&P ADULT - PROBLEM SELECTOR PLAN 7
Pt now with hypotension.  Antihypertensive medications contraindicated (and pt not taking anything for HTN at home)  Continue to monitor bp). - check A1c in AM.  - hold home glipizide.  - FS qac and qhs, with insulin SSI. No active bleeding at this time.  Patient was previously on propranolol for prophylaxis- however states he is no longer taking it. Likely 2/2 hypotension. Clarify medication with patient's PCP or pharmacy in AM. Currently patient breathing comfortably on room air with no appreciable LE edema.  Continue to monitor off diuretics as above, fluid status will be tenuous in the setting of advanced liver disease, systolic HF, and andrea.

## 2019-01-02 NOTE — H&P ADULT - PROBLEM SELECTOR PLAN 8
DVT ppx: SCDs  Diet: DASH/Consistent Carb    Goals of Care: FULL CODE. I addressed GOC with patient and his brother at bedside. Patient stated that he would like chest compressions and intubation if necessary.     Darby Goldberg MD PGY2  Medicine Night Admit Resident  Pager [phone] Pt now with hypotension.  Antihypertensive medications contraindicated (and pt not taking anything for HTN at home)  Continue to monitor bp). - check A1c in AM.  - hold home glipizide.  - FS qac and qhs, with insulin SSI. No active bleeding at this time. Pt has dried blood on teeth, from dry lips/scab picking, denies hematemesis, hgb at baseline.   Patient was previously on propranolol for prophylaxis- however states he is no longer taking it. Likely 2/2 hypotension. Clarify medication with patient's PCP or pharmacy in AM.  -c/w sbp coverage as noted above

## 2019-01-02 NOTE — ED ADULT NURSE NOTE - NSIMPLEMENTINTERV_GEN_ALL_ED
Implemented All Fall Risk Interventions:  [place] to call system. Call bell, personal items and telephone within reach. Instruct patient to call for assistance. Room bathroom lighting operational. Non-slip footwear when patient is off stretcher. Physically safe environment: no spills, clutter or unnecessary equipment. Stretcher in lowest position, wheels locked, appropriate side rails in place. Provide visual cue, wrist band, yellow gown, etc. Monitor gait and stability. Monitor for mental status changes and reorient to person, place, and time. Review medications for side effects contributing to fall risk. Reinforce activity limits and safety measures with patient and family.

## 2019-01-02 NOTE — ED ADULT NURSE REASSESSMENT NOTE - NS ED NURSE REASSESS COMMENT FT1
MD Lira aware of rectal temperature, IV antibiotics to be given, no additional interventions required at this time. Pt. remains A+Ox3, repositioned in position of comfort. Pt. c/o pain to right hip, no redness/bruising noted to right hip/buttock. MICU to be consulted. MD Lira aware of rectal temperature, IV antibiotics to be given, no additional interventions required at this time. Pt. remains A+Ox3, repositioned in position of comfort. Pt. c/o chills and  pain to right hip, no redness/bruising noted to right hip/buttock. MICU to be consulted.

## 2019-01-02 NOTE — H&P ADULT - PROBLEM SELECTOR PLAN 9
DVT ppx: SCDs  Diet: DASH/Consistent Carb    Goals of Care: FULL CODE. I addressed GOC with patient and his brother at bedside. Patient stated that he would like chest compressions and intubation if necessary.     Darby Goldberg MD PGY2  Medicine Night Admit Resident  Pager [phone] Pt now with hypotension.  Antihypertensive medications contraindicated (and pt not taking anything for HTN at home)  Continue to monitor bp). - check A1c in AM.  - hold home glipizide.  - FS qac and qhs, with insulin SSI.

## 2019-01-02 NOTE — H&P ADULT - PROBLEM SELECTOR PLAN 2
Patient with leukocytosis to WBC 11.8, hypotension to SBP 70's, with no confirmed infectious source but suspicion for SBP given prior treatments for SBP and history of esophageal varices. VBG lactate elevated to 4.7.    - s/p 250 cc 5% albumin for volume resuscitation (pt with low albumin) as patient intravascularly depleted and cannot give NS or LR due to risk of third spacing.  Continue giving albumin q6h.     - follow up blood and urine cultures  - treat with broad spectrum antibiotics: vanc, zosyn, and azithromycin. Patient with leukocytosis to WBC 11.8, hypotension to SBP 70's, with no confirmed infectious source but suspicion for SBP given prior treatments for SBP and history of esophageal varices. VBG lactate elevated to 4.7.    - s/p 250 cc 5% albumin for volume resuscitation (pt with low albumin) as patient intravascularly depleted and cannot give NS or LR due to risk of third spacing.  Continue giving albumin q6h.     - follow up blood and urine cultures  - treat with zosyn for now (pt with prior E coli bacteremia, cover broadly for Gram negative organisms) Patient with leukocytosis to WBC 11.8, hypotension to SBP 70's, with no confirmed infectious source but suspicion for SBP given prior treatments for SBP and history of esophageal varices and c/o of vague abd pain.  VBG lactate elevated to 4.7 (possibly from liver disease alone)  - s/p 250 cc 5% albumin for volume resuscitation (pt with low albumin) as patient intravascularly depleted and cannot give NS or LR due to risk of third spacing.  Continue giving albumin q6h.   - follow up blood and urine cultures  - treat with zosyn for now (pt with prior E coli bacteremia, cover broadly for Gram negative organisms). no respiratory sx at this time, holding vanco/azithro for now  -holding home antihypertensives.  would cautiously reintroduce diuretics if tolerated in coming days. clarify propranolol (family saying no longer taking)

## 2019-01-03 DIAGNOSIS — N17.9 ACUTE KIDNEY FAILURE, UNSPECIFIED: ICD-10-CM

## 2019-01-03 DIAGNOSIS — Z29.9 ENCOUNTER FOR PROPHYLACTIC MEASURES, UNSPECIFIED: ICD-10-CM

## 2019-01-03 DIAGNOSIS — E87.5 HYPERKALEMIA: ICD-10-CM

## 2019-01-03 DIAGNOSIS — K70.31 ALCOHOLIC CIRRHOSIS OF LIVER WITH ASCITES: ICD-10-CM

## 2019-01-03 DIAGNOSIS — E11.9 TYPE 2 DIABETES MELLITUS WITHOUT COMPLICATIONS: ICD-10-CM

## 2019-01-03 DIAGNOSIS — I10 ESSENTIAL (PRIMARY) HYPERTENSION: ICD-10-CM

## 2019-01-03 DIAGNOSIS — I85.00 ESOPHAGEAL VARICES WITHOUT BLEEDING: ICD-10-CM

## 2019-01-03 DIAGNOSIS — I50.9 HEART FAILURE, UNSPECIFIED: ICD-10-CM

## 2019-01-03 DIAGNOSIS — D69.6 THROMBOCYTOPENIA, UNSPECIFIED: ICD-10-CM

## 2019-01-03 LAB
-  COAGULASE NEGATIVE STAPHYLOCOCCUS: SIGNIFICANT CHANGE UP
ALBUMIN SERPL ELPH-MCNC: 2.5 G/DL — LOW (ref 3.3–5)
ALP SERPL-CCNC: 166 U/L — HIGH (ref 40–120)
ALT FLD-CCNC: 34 U/L — SIGNIFICANT CHANGE UP (ref 10–45)
ANION GAP SERPL CALC-SCNC: 11 MMOL/L — SIGNIFICANT CHANGE UP (ref 5–17)
APTT BLD: 51.7 SEC — HIGH (ref 27.5–36.3)
AST SERPL-CCNC: 150 U/L — HIGH (ref 10–40)
BILIRUB SERPL-MCNC: 13.2 MG/DL — HIGH (ref 0.2–1.2)
BUN SERPL-MCNC: 31 MG/DL — HIGH (ref 7–23)
CALCIUM SERPL-MCNC: 8.8 MG/DL — SIGNIFICANT CHANGE UP (ref 8.4–10.5)
CHLORIDE SERPL-SCNC: 92 MMOL/L — LOW (ref 96–108)
CO2 SERPL-SCNC: 21 MMOL/L — LOW (ref 22–31)
CORTIS AM PEAK SERPL-MCNC: 5 UG/DL — LOW (ref 6–18.4)
CREAT ?TM UR-MCNC: 17 MG/DL — SIGNIFICANT CHANGE UP
CREAT SERPL-MCNC: 1.33 MG/DL — HIGH (ref 0.5–1.3)
CULTURE RESULTS: NO GROWTH — SIGNIFICANT CHANGE UP
GLUCOSE BLDC GLUCOMTR-MCNC: 108 MG/DL — HIGH (ref 70–99)
GLUCOSE BLDC GLUCOMTR-MCNC: 110 MG/DL — HIGH (ref 70–99)
GLUCOSE BLDC GLUCOMTR-MCNC: 111 MG/DL — HIGH (ref 70–99)
GLUCOSE BLDC GLUCOMTR-MCNC: 141 MG/DL — HIGH (ref 70–99)
GLUCOSE BLDC GLUCOMTR-MCNC: 147 MG/DL — HIGH (ref 70–99)
GLUCOSE BLDC GLUCOMTR-MCNC: 40 MG/DL — CRITICAL LOW (ref 70–99)
GLUCOSE BLDC GLUCOMTR-MCNC: 40 MG/DL — CRITICAL LOW (ref 70–99)
GLUCOSE SERPL-MCNC: 38 MG/DL — CRITICAL LOW (ref 70–99)
GRAM STN FLD: SIGNIFICANT CHANGE UP
HBA1C BLD-MCNC: 4.8 % — SIGNIFICANT CHANGE UP (ref 4–5.6)
HCT VFR BLD CALC: 23.4 % — LOW (ref 39–50)
HGB BLD-MCNC: 8.1 G/DL — LOW (ref 13–17)
INR BLD: 3.26 RATIO — HIGH (ref 0.88–1.16)
LEGIONELLA AG UR QL: NEGATIVE — SIGNIFICANT CHANGE UP
MANUAL SMEAR VERIFICATION: SIGNIFICANT CHANGE UP
MCHC RBC-ENTMCNC: 33.5 PG — SIGNIFICANT CHANGE UP (ref 27–34)
MCHC RBC-ENTMCNC: 34.6 GM/DL — SIGNIFICANT CHANGE UP (ref 32–36)
MCV RBC AUTO: 96.7 FL — SIGNIFICANT CHANGE UP (ref 80–100)
METHOD TYPE: SIGNIFICANT CHANGE UP
PLAT MORPH BLD: SIGNIFICANT CHANGE UP
PLATELET # BLD AUTO: 56 K/UL — LOW (ref 150–400)
POTASSIUM SERPL-MCNC: 4.9 MMOL/L — SIGNIFICANT CHANGE UP (ref 3.5–5.3)
POTASSIUM SERPL-SCNC: 4.9 MMOL/L — SIGNIFICANT CHANGE UP (ref 3.5–5.3)
POTASSIUM UR-SCNC: 41 MMOL/L — SIGNIFICANT CHANGE UP
PROT SERPL-MCNC: 6.2 G/DL — SIGNIFICANT CHANGE UP (ref 6–8.3)
PROTHROM AB SERPL-ACNC: 38.6 SEC — HIGH (ref 10–13.1)
RBC # BLD: 2.42 M/UL — LOW (ref 4.2–5.8)
RBC # FLD: 16 % — HIGH (ref 10.3–14.5)
RBC BLD AUTO: SIGNIFICANT CHANGE UP
SODIUM SERPL-SCNC: 124 MMOL/L — LOW (ref 135–145)
SODIUM UR-SCNC: 54 MMOL/L — SIGNIFICANT CHANGE UP
SPECIMEN SOURCE: SIGNIFICANT CHANGE UP
UUN UR-MCNC: 222 MG/DL — SIGNIFICANT CHANGE UP
WBC # BLD: 9.83 K/UL — SIGNIFICANT CHANGE UP (ref 3.8–10.5)
WBC # FLD AUTO: 9.83 K/UL — SIGNIFICANT CHANGE UP (ref 3.8–10.5)

## 2019-01-03 PROCEDURE — 99222 1ST HOSP IP/OBS MODERATE 55: CPT | Mod: AI

## 2019-01-03 PROCEDURE — 73523 X-RAY EXAM HIPS BI 5/> VIEWS: CPT | Mod: 26

## 2019-01-03 PROCEDURE — 76705 ECHO EXAM OF ABDOMEN: CPT | Mod: 26

## 2019-01-03 PROCEDURE — 99233 SBSQ HOSP IP/OBS HIGH 50: CPT | Mod: GC

## 2019-01-03 RX ORDER — VANCOMYCIN HCL 1 G
1000 VIAL (EA) INTRAVENOUS ONCE
Qty: 0 | Refills: 0 | Status: COMPLETED | OUTPATIENT
Start: 2019-01-03 | End: 2019-01-03

## 2019-01-03 RX ORDER — TETRAHYDROZOLINE/POLYETHYL GLY 0.05 %-1 %
1 DROPS OPHTHALMIC (EYE)
Qty: 0 | Refills: 0 | COMMUNITY

## 2019-01-03 RX ORDER — LACTULOSE 10 G/15ML
30 SOLUTION ORAL THREE TIMES A DAY
Qty: 0 | Refills: 0 | Status: DISCONTINUED | OUTPATIENT
Start: 2019-01-03 | End: 2019-01-15

## 2019-01-03 RX ORDER — DEXTROSE 50 % IN WATER 50 %
25 SYRINGE (ML) INTRAVENOUS ONCE
Qty: 0 | Refills: 0 | Status: COMPLETED | OUTPATIENT
Start: 2019-01-03 | End: 2019-01-03

## 2019-01-03 RX ORDER — VANCOMYCIN HCL 1 G
VIAL (EA) INTRAVENOUS
Qty: 0 | Refills: 0 | Status: DISCONTINUED | OUTPATIENT
Start: 2019-01-03 | End: 2019-01-05

## 2019-01-03 RX ORDER — VANCOMYCIN HCL 1 G
1000 VIAL (EA) INTRAVENOUS EVERY 12 HOURS
Qty: 0 | Refills: 0 | Status: DISCONTINUED | OUTPATIENT
Start: 2019-01-04 | End: 2019-01-05

## 2019-01-03 RX ORDER — CHLORHEXIDINE GLUCONATE 213 G/1000ML
15 SOLUTION TOPICAL
Qty: 0 | Refills: 0 | Status: DISCONTINUED | OUTPATIENT
Start: 2019-01-03 | End: 2019-01-17

## 2019-01-03 RX ADMIN — Medication 125 MILLILITER(S): at 17:09

## 2019-01-03 RX ADMIN — Medication 100 MILLIGRAM(S): at 13:02

## 2019-01-03 RX ADMIN — Medication 125 MILLILITER(S): at 00:36

## 2019-01-03 RX ADMIN — Medication 1 TABLET(S): at 12:13

## 2019-01-03 RX ADMIN — MIDODRINE HYDROCHLORIDE 5 MILLIGRAM(S): 2.5 TABLET ORAL at 06:57

## 2019-01-03 RX ADMIN — CHLORHEXIDINE GLUCONATE 15 MILLILITER(S): 213 SOLUTION TOPICAL at 22:38

## 2019-01-03 RX ADMIN — PIPERACILLIN AND TAZOBACTAM 25 GRAM(S): 4; .5 INJECTION, POWDER, LYOPHILIZED, FOR SOLUTION INTRAVENOUS at 12:13

## 2019-01-03 RX ADMIN — PIPERACILLIN AND TAZOBACTAM 25 GRAM(S): 4; .5 INJECTION, POWDER, LYOPHILIZED, FOR SOLUTION INTRAVENOUS at 19:50

## 2019-01-03 RX ADMIN — Medication 25 MILLILITER(S): at 07:34

## 2019-01-03 RX ADMIN — MIDODRINE HYDROCHLORIDE 5 MILLIGRAM(S): 2.5 TABLET ORAL at 18:40

## 2019-01-03 RX ADMIN — Medication 250 MILLIGRAM(S): at 22:26

## 2019-01-03 RX ADMIN — LACTULOSE 30 GRAM(S): 10 SOLUTION ORAL at 22:37

## 2019-01-03 RX ADMIN — Medication 125 MILLILITER(S): at 08:35

## 2019-01-03 RX ADMIN — MIDODRINE HYDROCHLORIDE 5 MILLIGRAM(S): 2.5 TABLET ORAL at 22:37

## 2019-01-03 RX ADMIN — Medication 1 MILLIGRAM(S): at 13:02

## 2019-01-03 NOTE — PROGRESS NOTE ADULT - PROBLEM SELECTOR PLAN 4
Patient with K+ 5.9 on admission (repeat value)  Insulin/D50, albuterol nebulized, and calcium gluconate, bicarb given.  EKG showed no changes- normal sinus rhythm.   Did not give furosemide given pt volume depleted.   - follow up repeat potassium in AM. PLT count 77k this admission, compared to 75k on previous admission (comparable). This, in addition to elevated INR and hyponatremia, are likely in setting of end stage liver disease.     - Platelet Trend: 56 K/uL<--, 77 K/uL<--, 75 K/uL  - No active bleeding. PLT count 77k this admission, compared to 75k on previous admission (comparable). This, in addition to elevated INR and hyponatremia, are likely in setting of end stage liver disease.   - Hemoglobin Trend: 8.1 g/dL<--, 10.4 g/dL<--, 10.3 g/dL  - Platelet Trend: 56 K/uL<--, 77 K/uL<--, 75 K/uL  - No active bleeding.

## 2019-01-03 NOTE — PROGRESS NOTE ADULT - PROBLEM SELECTOR PLAN 8
No active bleeding at this time. Pt has dried blood on teeth, from dry lips/scab picking, denies hematemesis, hgb at baseline.   Patient was previously on propranolol for prophylaxis- however states he is no longer taking it. Likely 2/2 hypotension. Clarify medication with patient's PCP or pharmacy in AM.  -c/w sbp coverage as noted above - check A1c in AM.  - hold home glipizide.  - FS qac and qhs, with insulin SSI. Last HbA1c 6.1% in 2016. Holding home Glipizide.     - f/u repeat A1c.   - FS qac and qhs, with insulin SSI.

## 2019-01-03 NOTE — ED ADULT NURSE REASSESSMENT NOTE - NS ED NURSE REASSESS COMMENT FT1
03:00. Pt removed his IV from LAC at this time. Pt still has functional large bore IV on the Rwrist.

## 2019-01-03 NOTE — PROGRESS NOTE ADULT - PROBLEM SELECTOR PLAN 1
MELD 34 on admission, indicating >50% mortality in next 3 months. Was recently on liver transplant candidate list at [facility], however had to be removed due to severe HFrEF, no longer plans for liver transplant  - patient on furosemide 20 mg and spironolactone 25 mg daily. Will hold diuretics for now as pt intravascularly depleted and hypotensive on arrival in setting of aggressive diuretic regimen as outpatient (lasix/aldactone + addition of metolazone 2 wks ago).   - continue lactulose and rifaximin.   - lower concern for hepatic encephalopathy at this time.   --evidence of severe synthetic dysfunction, portal hypertensive, varices, and existing heart failure and now ULICES suggestive of high risk of multi-organ dysfunction in coming days to weeks. Prognosis is poor.   -hepatology eval in am  - palliative care consult in AM as patient no longer transplant candidate.  -c/w albumin as noted below  -c/w abd sono to assess for ascites as below MELD 34 on admission, indicating >50% mortality in next 3 months. Was recently on liver transplant candidate list at [facility], however had to be removed due to severe HFrEF, no longer plans for liver transplant    - Patient on Furosemide 20 mg and spironolactone 25 mg daily. Will hold diuretics for now as pt intravascularly depleted and hypotensive on arrival in setting of aggressive diuretic regimen as outpatient (lasix/aldactone + addition of metolazone 2 wks ago).   - Will continue with Lactulose and Rifaximin PO.   - Lower concern for hepatic encephalopathy at this time.   - Evidence of severe synthetic dysfunction, portal hypertensive, varices, and existing heart failure and now ULICES suggestive of high risk of multi-organ dysfunction in coming days to weeks. Prognosis is poor.   - Will consider palliative care consult given poor prognosis.   - c/w albumin as noted below  - f/u U/S abdomen to evaluate for presence of ascites. MELD 34 on admission, indicating >50% mortality in next 3 months. Was recently on liver transplant candidate list at [facility], however had to be removed due to severe HFrEF, no longer plans for liver transplant    - Patient on Furosemide 20 mg and spironolactone 25 mg daily. Will hold diuretics for now as pt intravascularly depleted and hypotensive on arrival in setting of aggressive diuretic regimen as outpatient (lasix/aldactone + addition of metolazone 2 wks ago).   - Will continue with Lactulose and Rifaximin PO.   - Lower concern for hepatic encephalopathy at this time.   - Evidence of severe synthetic dysfunction, portal hypertensive, varices, and existing heart failure and now ULICES suggestive of high risk of multi-organ dysfunction in coming days to weeks. Prognosis is poor. Will consider palliative care consult given poor prognosis.   - c/w albumin as noted below  - f/u U/S abdomen to evaluate for presence of ascites.

## 2019-01-03 NOTE — PROGRESS NOTE ADULT - SUBJECTIVE AND OBJECTIVE BOX
Dr. Noman Harden  Internal Medicine PGY-1   Pager# [phone]    Patient is a 53y old  Male who presents with a chief complaint of hypotension, decompensated cirrhosis (02 Jan 2019 23:26)      SUBJECTIVE / OVERNIGHT EVENTS:    MEDICATIONS  (STANDING):  albumin human  5% IVPB 250 milliLiter(s) IV Intermittent every 6 hours  dextrose 5%. 1000 milliLiter(s) (50 mL/Hr) IV Continuous <Continuous>  dextrose 50% Injectable 12.5 Gram(s) IV Push once  dextrose 50% Injectable 25 Gram(s) IV Push once  dextrose 50% Injectable 25 Gram(s) IV Push once  folic acid 1 milliGRAM(s) Oral daily  insulin lispro (HumaLOG) corrective regimen sliding scale   SubCutaneous three times a day before meals  insulin lispro (HumaLOG) corrective regimen sliding scale   SubCutaneous at bedtime  midodrine 5 milliGRAM(s) Oral three times a day  multivitamin 1 Tablet(s) Oral daily  piperacillin/tazobactam IVPB. 3.375 Gram(s) IV Intermittent every 8 hours  rifaximin 550 milliGRAM(s) Oral two times a day  thiamine 100 milliGRAM(s) Oral daily    MEDICATIONS  (PRN):  dextrose 40% Gel 15 Gram(s) Oral once PRN Blood Glucose LESS THAN 70 milliGRAM(s)/deciliter  glucagon  Injectable 1 milliGRAM(s) IntraMuscular once PRN Glucose LESS THAN 70 milligrams/deciliter  lactulose Syrup 20 Gram(s) Oral three times a day PRN Titrate to 2-3 BMs daily      Vital Signs Last 24 Hrs  T(C): 36.3 (03 Jan 2019 07:25), Max: 36.6 (03 Jan 2019 01:40)  T(F): 97.4 (03 Jan 2019 07:25), Max: 97.8 (03 Jan 2019 01:40)  HR: 70 (03 Jan 2019 08:19) (59 - 83)  BP: 92/50 (03 Jan 2019 08:19) (73/78 - 101/51)  BP(mean): 69 (02 Jan 2019 19:25) (63 - 69)  RR: 16 (03 Jan 2019 08:19) (15 - 18)  SpO2: 98% (03 Jan 2019 08:19) (95% - 100%)  CAPILLARY BLOOD GLUCOSE      POCT Blood Glucose.: 108 mg/dL (03 Jan 2019 08:28)  POCT Blood Glucose.: 111 mg/dL (03 Jan 2019 08:08)  POCT Blood Glucose.: 40 mg/dL (03 Jan 2019 07:31)  POCT Blood Glucose.: 40 mg/dL (03 Jan 2019 07:29)  POCT Blood Glucose.: 115 mg/dL (02 Jan 2019 22:16)    I&O's Summary      PHYSICAL EXAM:  GENERAL: NAD, well-developed  HEAD:  Atraumatic, Normocephalic  EYES: EOMI, PERRLA, conjunctiva and sclera clear  NECK: Supple, No JVD  CHEST/LUNG: Clear to auscultation bilaterally; No wheeze  HEART: Regular rate and rhythm; No murmurs, rubs, or gallops  ABDOMEN: Soft, Nontender, Nondistended; Bowel sounds present  EXTREMITIES:  2+ Peripheral Pulses, No clubbing, cyanosis, or edema  PSYCH: AAOx3  NEUROLOGY: non-focal  SKIN: No rashes or lesions Dr. Noman Harden  Internal Medicine PGY-1   Pager# [phone]    Patient is a 53y old  Male who presents with a chief complaint of hypotension, decompensated cirrhosis (2019 23:26)      SUBJECTIVE / OVERNIGHT EVENTS: Patient complaining of bilateral hip pain. Denies any chest pain, nausea, vomiting, hematemesis, abdominal pain, leg pain or calf swelling and SOB.     MEDICATIONS  (STANDING):  albumin human  5% IVPB 250 milliLiter(s) IV Intermittent every 6 hours  dextrose 5%. 1000 milliLiter(s) (50 mL/Hr) IV Continuous <Continuous>  dextrose 50% Injectable 12.5 Gram(s) IV Push once  dextrose 50% Injectable 25 Gram(s) IV Push once  dextrose 50% Injectable 25 Gram(s) IV Push once  folic acid 1 milliGRAM(s) Oral daily  insulin lispro (HumaLOG) corrective regimen sliding scale   SubCutaneous three times a day before meals  insulin lispro (HumaLOG) corrective regimen sliding scale   SubCutaneous at bedtime  midodrine 5 milliGRAM(s) Oral three times a day  multivitamin 1 Tablet(s) Oral daily  piperacillin/tazobactam IVPB. 3.375 Gram(s) IV Intermittent every 8 hours  rifaximin 550 milliGRAM(s) Oral two times a day  thiamine 100 milliGRAM(s) Oral daily    MEDICATIONS  (PRN):  dextrose 40% Gel 15 Gram(s) Oral once PRN Blood Glucose LESS THAN 70 milliGRAM(s)/deciliter  glucagon  Injectable 1 milliGRAM(s) IntraMuscular once PRN Glucose LESS THAN 70 milligrams/deciliter  lactulose Syrup 20 Gram(s) Oral three times a day PRN Titrate to 2-3 BMs daily      Vital Signs Last 24 Hrs  T(C): 36.3 (2019 07:25), Max: 36.6 (2019 01:40)  T(F): 97.4 (2019 07:25), Max: 97.8 (2019 01:40)  HR: 70 (2019 08:19) (59 - 83)  BP: 92/50 (2019 08:19) (73/78 - 101/51)  BP(mean): 69 (2019 19:25) (63 - 69)  RR: 16 (2019 08:19) (15 - 18)  SpO2: 98% (2019 08:19) (95% - 100%)  CAPILLARY BLOOD GLUCOSE      POCT Blood Glucose.: 108 mg/dL (2019 08:28)  POCT Blood Glucose.: 111 mg/dL (2019 08:08)  POCT Blood Glucose.: 40 mg/dL (2019 07:31)  POCT Blood Glucose.: 40 mg/dL (2019 07:29)  POCT Blood Glucose.: 115 mg/dL (2019 22:16)    I&O's Summary      PHYSICAL EXAM:  GENERAL: NAD, well-developed  HEAD:  Atraumatic, Normocephalic  EYES: EOMI, PERRLA, conjunctiva and sclera clear  NECK: Supple, No JVD  CHEST/LUNG: Clear to auscultation bilaterally; No wheeze  HEART: Regular rate and rhythm; No murmurs, rubs, or gallops  ABDOMEN: Soft, Nontender, Nondistended; Bowel sounds present  EXTREMITIES:  2+ Peripheral Pulses, No clubbing, cyanosis, or edema  PSYCH: AAOx3  NEUROLOGY: non-focal  SKIN: No rashes or lesions      LABS:                        8.1    9.83  )-----------( 56       ( 2019 10:04 )             23.4     01-03    124<L>  |  92<L>  |  31<H>  ----------------------------<  38<LL>  4.9   |  21<L>  |  1.33<H>    Ca    8.8      2019 07:39  Mg     2.0     -    TPro  6.2  /  Alb  2.5<L>  /  TBili  13.2<H>  /  DBili  x   /  AST  150<H>  /  ALT  34  /  AlkPhos  166<H>  -03    PT/INR - ( 2019 10:04 )   PT: 38.6 sec;   INR: 3.26 ratio         PTT - ( 2019 10:04 )  PTT:51.7 sec      Urinalysis Basic - ( 2019 20:43 )    Color: Yellow / Appearance: Slightly Turbid / S.012 / pH: x  Gluc: x / Ketone: Negative  / Bili: Small / Urobili: 2 mg/dL   Blood: x / Protein: Negative / Nitrite: Negative   Leuk Esterase: Negative / RBC: 0 /hpf / WBC 0 /HPF   Sq Epi: x / Non Sq Epi: 1 / Bacteria: Negative    AST Trend: 150 U/L<--, 169 U/L  ALT Trend: 34 U/L<--, 36 U/L  ALP Trend: 166 U/L<--, 228 U/L  Albumin Trend: 2.5 g/dL<--, 2.5 g/dL  Total Bilirubin Trend: 13.2 mg/dL<--, 12.8 mg/dL  INR Trend: 3.26 ratio<--, 2.81 ratio Dr. Noman Harden  Internal Medicine PGY-1   Pager# [phone]    Patient is a 53y old  Male who presents with a chief complaint of hypotension, decompensated cirrhosis (2019 23:26)      SUBJECTIVE / OVERNIGHT EVENTS: Patient complaining of bilateral hip pain. Denies any chest pain, nausea, vomiting, hematemesis, abdominal pain, leg pain or calf swelling and SOB.     MEDICATIONS  (STANDING):  albumin human  5% IVPB 250 milliLiter(s) IV Intermittent every 6 hours  dextrose 5%. 1000 milliLiter(s) (50 mL/Hr) IV Continuous <Continuous>  dextrose 50% Injectable 12.5 Gram(s) IV Push once  dextrose 50% Injectable 25 Gram(s) IV Push once  dextrose 50% Injectable 25 Gram(s) IV Push once  folic acid 1 milliGRAM(s) Oral daily  insulin lispro (HumaLOG) corrective regimen sliding scale   SubCutaneous three times a day before meals  insulin lispro (HumaLOG) corrective regimen sliding scale   SubCutaneous at bedtime  midodrine 5 milliGRAM(s) Oral three times a day  multivitamin 1 Tablet(s) Oral daily  piperacillin/tazobactam IVPB. 3.375 Gram(s) IV Intermittent every 8 hours  rifaximin 550 milliGRAM(s) Oral two times a day  thiamine 100 milliGRAM(s) Oral daily    MEDICATIONS  (PRN):  dextrose 40% Gel 15 Gram(s) Oral once PRN Blood Glucose LESS THAN 70 milliGRAM(s)/deciliter  glucagon  Injectable 1 milliGRAM(s) IntraMuscular once PRN Glucose LESS THAN 70 milligrams/deciliter  lactulose Syrup 20 Gram(s) Oral three times a day PRN Titrate to 2-3 BMs daily      Vital Signs Last 24 Hrs  T(C): 36.3 (2019 07:25), Max: 36.6 (2019 01:40)  T(F): 97.4 (2019 07:25), Max: 97.8 (2019 01:40)  HR: 70 (2019 08:19) (59 - 83)  BP: 92/50 (2019 08:19) (73/78 - 101/51)  BP(mean): 69 (2019 19:25) (63 - 69)  RR: 16 (2019 08:19) (15 - 18)  SpO2: 98% (2019 08:19) (95% - 100%)  CAPILLARY BLOOD GLUCOSE      POCT Blood Glucose.: 108 mg/dL (2019 08:28)  POCT Blood Glucose.: 111 mg/dL (2019 08:08)  POCT Blood Glucose.: 40 mg/dL (2019 07:31)  POCT Blood Glucose.: 40 mg/dL (2019 07:29)  POCT Blood Glucose.: 115 mg/dL (2019 22:16)    I&O's Summary        Constitutional: Frail appearing, jaundiced, in mild distress due to feeling very cold  	ENMT: dry mucous membranes, no pharyngeal erythema or exudates  	Respiratory: lungs CTAB; no wheezing, rales or rhonchi  	Cardiovascular: Normal S1 and S2; no murmurs. Trace LE edema bilaterally.   	Gastrointestinal: Abdomen soft,  mild TTP in RUQ and LLQ, no distension, no fluid wave, normal bowel sounds   	Extremities: No clubbing, cyanosis or edema.   	Vascular: 2+ peripheral pulses  	Neurological: No asterixis. No focal deficits appreciated.  	Skin: Jaundiced; no rashes noted. 2 cm x 3 cm ecchymosis non tender no palpable hematoma.     Psychiatric: A&O x 4.      LABS:                        8.1    9.83  )-----------( 56       ( 2019 10:04 )             23.4     01-03    124<L>  |  92<L>  |  31<H>  ----------------------------<  38<LL>  4.9   |  21<L>  |  1.33<H>    Ca    8.8      2019 07:39  Mg     2.0     01-02    TPro  6.2  /  Alb  2.5<L>  /  TBili  13.2<H>  /  DBili  x   /  AST  150<H>  /  ALT  34  /  AlkPhos  166<H>  -03    PT/INR - ( 2019 10:04 )   PT: 38.6 sec;   INR: 3.26 ratio         PTT - ( 2019 10:04 )  PTT:51.7 sec      Urinalysis Basic - ( 2019 20:43 )    Color: Yellow / Appearance: Slightly Turbid / S.012 / pH: x  Gluc: x / Ketone: Negative  / Bili: Small / Urobili: 2 mg/dL   Blood: x / Protein: Negative / Nitrite: Negative   Leuk Esterase: Negative / RBC: 0 /hpf / WBC 0 /HPF   Sq Epi: x / Non Sq Epi: 1 / Bacteria: Negative    AST Trend: 150 U/L<--, 169 U/L  ALT Trend: 34 U/L<--, 36 U/L  ALP Trend: 166 U/L<--, 228 U/L  Albumin Trend: 2.5 g/dL<--, 2.5 g/dL  Total Bilirubin Trend: 13.2 mg/dL<--, 12.8 mg/dL  INR Trend: 3.26 ratio<--, 2.81 ratio    Lactate Trend: 4.7 mmoL/L<--, 3.3 mmoL/L

## 2019-01-03 NOTE — CONSULT NOTE ADULT - ASSESSMENT
53 year old male with cirrhosis and cardiomyopathy on recent echo admitted with 1 week deterioration in status for unclear reasons.  Does not appear to be due to worsening of his cardiac status which appears unchanged from recent office visits.     Is also unclear why he developed systolic dysfunction between [address] and recent hospitalization.  He was septic shortly before the echo was done and is possible that the LV dysfunction was residua of the sepsis.  He was never started on usual cardiotonic regimen presumably because of hypotension.  Is not congested at present and fine to withhold diuretics for present.  Should have echo repeated.    Will follow with you    JNOG Chowdhury  [phone]

## 2019-01-03 NOTE — ED ADULT NURSE REASSESSMENT NOTE - NS ED NURSE REASSESS COMMENT FT1
06:55. Pt's blood pressure low at this time @ 88/55. Night medical resident MD Darby Gutiérrez paged @ #[phone]. Awaiting response. Pt a&ox4. NAD. Capillary refill<2seconds, Skin, warm & dry to touch. Bilateral radial pulses +2 & bilateral pedal pulses present +2. Pt continuing to refuse IV access at this time. 06:55. Pt's blood pressure low at this time @ 88/55. Night medical resident MD Darby Goldberg paged @ #[phone]. Awaiting response. Pt a&ox4. NAD. Capillary refill<2seconds, Skin, warm & dry to touch. Bilateral radial pulses +2 & bilateral pedal pulses present +2. Pt continuing to refuse IV access at this time.

## 2019-01-03 NOTE — CONSULT NOTE ADULT - ASSESSMENT
Pt is a 52 yo male presenting with 1 week of fatigue, weakness, and chills with no fever in the setting of cirrhosis (MELDNa score 34), type 2 DM, anemia, and presumed CHF found to be hypotensive and hyperbilirubinemic on admission concerning for infectious etiology and acute-on-chronic hepatic injury. No recent EtOH use, hematemesis, black or tarry stools.     Given presentation on admission, increased hyperbilirubinemia and alk phos, and recent history of SBP, additional recommendations from hepatology team as follows:    -Lactulose 30mg TID  -Rifaximin 550mg BID  -Total immunoglobulin panel  -Repeat Hep A, B, C panel  -HSV, CMV, and EBV PCR  -Direct bilirubin  -MRCP  -MRI abdomen with iron quantification  -Agree with cardiology recommendations for repeat ECHO during admission  -Agree with full infectious workup/ follow culture reports over next few days    Will continue to follow patient, and will reassess tomorrow. Pt is a 52 yo male presenting with 1 week of fatigue, weakness, and chills with no fever in the setting of cirrhosis (MELDNa score 34), type 2 DM, anemia, and presumed CHF found to be hypotensive and hyperbilirubinemic on admission concerning for infectious etiology and acute-on-chronic hepatic injury. No recent EtOH use, hematemesis, black or tarry stools.     Given presentation on admission, increased hyperbilirubinemia and alk phos, and recent history of SBP, additional recommendations from hepatology team as follows:    -Lactulose 30mg TID  -Rifaximin 550mg BID  -Total immunoglobulin panel  -Repeat Hep A, B, C panel  -HSV, CMV, and EBV PCR  -Direct bilirubin  -MRCP  -MRI abdomen with iron quantification  -Agree with cardiology recommendations for repeat ECHO during admission  -Agree with full infectious workup/ follow culture reports over next few days  - please obtain diagnostic paracentesis to r/o SBP    Will continue to follow patient, and will reassess tomorrow.

## 2019-01-03 NOTE — ED ADULT NURSE REASSESSMENT NOTE - NS ED NURSE REASSESS COMMENT FT1
patient willing to accept intervention from RNs. Asymptomatic after 15 mins of albumin. Albumin infusing via pump. Will continue to monitor. he is admitted pending bed

## 2019-01-03 NOTE — PROGRESS NOTE ADULT - PROBLEM SELECTOR PLAN 5
PLT count 77k this admission, compared to 75k on previous admission (comparable).  This, in addition to elevated INR and hyponatremia, are likely in setting of end stage liver disease.   No active bleeding. ULICES probably in the setting of   -nonoliguric  -ddx includes hepatorenal at this time vs. prerenal/dehydration from diuretics continue to monitor. check urine lytes/sodium/urea  -trend creatinine with fluid challenge  -consider renal eval  -likely not long term HD candidate given poor prognosis and poor liver transplant candidate, family aware. Non oliguric ULICES probably in the setting of hypotension pre renal to ATN.     - Avoid nephrotoxins. Dose medications renally. Trend BMP (BUN/Cr) qd.   - Likely not long term HD candidate given poor prognosis and poor liver transplant candidate, family aware.

## 2019-01-03 NOTE — ED ADULT NURSE REASSESSMENT NOTE - NS ED NURSE REASSESS COMMENT FT1
01:40. Pt sleeping comfortably at this time. Family at bedside. Respirations spontaneous, non-labored. NAD. Safety & comfort measures maintained. Will continue to reassess.

## 2019-01-03 NOTE — PROGRESS NOTE ADULT - PROBLEM SELECTOR PLAN 7
Currently patient breathing comfortably on room air with no appreciable LE edema.  Continue to monitor off diuretics as above, fluid status will be tenuous in the setting of advanced liver disease, systolic HF, and andrea. No active bleeding at this time. Pt has dried blood on teeth, from dry lips/scab picking, denies hematemesis, hgb at baseline.   Patient was previously on propranolol for prophylaxis- however states he is no longer taking it. Likely 2/2 hypotension. Clarify medication with patient's PCP or pharmacy in AM.  -c/w sbp coverage as noted above No active bleeding at this time. Pt has dried blood on teeth, from dry lips/scab picking, denies hematemesis, hgb at baseline.  Patient was previously on propranolol for prophylaxis- however states he is no longer taking it. Likely 2/2 hypotension. Clarify medication with patient's PCP or pharmacy in AM.    -c/w sbp coverage as noted above.

## 2019-01-03 NOTE — ED ADULT NURSE REASSESSMENT NOTE - NS ED NURSE REASSESS COMMENT FT1
patient returned from ultrasound. States he is feeling less dizzy. albumin still infusing. Patient a&ox3. BP improving. Will continue to monitor.

## 2019-01-03 NOTE — CONSULT NOTE ADULT - SUBJECTIVE AND OBJECTIVE BOX
Patient seen and evaluated @   Chief Complaint:     HPI:  Patient is a 53 year old male with end stage liver disease 2/2 chronic alcoholic cirrhosis, with esophageal varices,, HfrEF (EF 35%), and DMII, presenting with 1 week of generalized weakness and poor PO intake.. Patient denies fevers but reports feeling very cold and has multiple blankets on. Mild intermittent cough with no sputum for the past 2-3 days. No dyspnea at rest, but does have moderate dyspnea on exertion. No chest pain. Patient has intermittent abdominal pain in the LLQ and occasionally RUQ which he describes as a burning sensation. Patient denies hemoptysis/hematemesis or bloody stools. Denies nausea/vomiting in general. Denies abdominal distension (states it has gotten better). He notes at least 20 pound weight loss in the past month (mainly due to resolution of abdominal ascites compared to last month).     He has no prior history of heart disease.  He had an echocardiogram and cardiac MRI at the time he was diagnosed with cirrhosis in 12/15 and both were unremarkable.  He had normal systolic function and no evidence of hemochromatosis.  He has chronic edema and chronic low BP's     Patient was recently admitted to our hospital system followed by [facility] in 10- 11/2018 with abdominal distension and lower extremity edema, was initially placed on liver transplant list however was removed from list due to HF with severely reduced EF.  His echo now showed a dilated LV (7.1 cm) with a LVEF 30%.  An angiogram showed normal coronary arteries.   Prior to the [facility] transfer, patient was treated for severe sepsis from E coli bacteremia from presumed SBP, treated with ceftriaxone. Patient seen in office after DC from [facility] with 20 lb weight gain, ascites, and edema and was started metolazone in addition to home lasix and spironolactone. However, the metolazone was discontinued 1 week ago after he returned to his baseline weight. Patient currently takes lasix 20 mg daily and spironolactone 25 mg daily.  Returned to office with additional 15 lb weight loss off metolazone, with orthostasis, and marked weakness, anorexia, and sent to ER.     In the ED, pt afebrile, T 97.4, HR 62, BP as low as 73/78, satting 100% on room air. Labs notable for WBC 11.8, PLT 77, INR 2.81, K+ 5.9, Cr 1.33, Total Bilirubin 12.8. Lactate on VBG 4.7.   Patient given 250 cc 5% albumin with improvement in bp to SBP 95/58. Also given 1g IV ceftriaxone for empiric SBP treatment. (02 Jan 2019 23:26)    PMH:   Hypertension  Type 2 diabetes mellitus  Congestive heart failure (CHF)  Esophageal varices  Alcoholic cirrhosis of liver with ascites  No pertinent past medical history    PSH:   Abdominal hernia  H/O cataract    Medications:   albumin human  5% IVPB 250 milliLiter(s) IV Intermittent every 6 hours  dextrose 40% Gel 15 Gram(s) Oral once PRN  dextrose 5%. 1000 milliLiter(s) IV Continuous <Continuous>  dextrose 50% Injectable 12.5 Gram(s) IV Push once  dextrose 50% Injectable 25 Gram(s) IV Push once  dextrose 50% Injectable 25 Gram(s) IV Push once  folic acid 1 milliGRAM(s) Oral daily  glucagon  Injectable 1 milliGRAM(s) IntraMuscular once PRN  insulin lispro (HumaLOG) corrective regimen sliding scale   SubCutaneous three times a day before meals  insulin lispro (HumaLOG) corrective regimen sliding scale   SubCutaneous at bedtime  lactulose Syrup 20 Gram(s) Oral three times a day PRN  midodrine 5 milliGRAM(s) Oral three times a day  multivitamin 1 Tablet(s) Oral daily  piperacillin/tazobactam IVPB. 3.375 Gram(s) IV Intermittent every 8 hours  rifaximin 550 milliGRAM(s) Oral two times a day  thiamine 100 milliGRAM(s) Oral daily    Allergies:  No Known Allergies    FAMILY HISTORY:  Family history of hypercholesterolemia (Father)  Family history of diabetes mellitus (Mother)    Social History:  Smoking:  Alcohol:  Drugs:    Review of Systems:  Constitutional: [ ] Fever [x ] Chills [ x] Fatigue [ ] Weight change   HEENT: [ ] Blurred vision [ ] Eye Pain [ ] Headache [ ] Runny nose [ ] Sore Throat   Respiratory: [ ] Cough [ ] Wheezing [ ] Shortness of breath  Cardiovascular: [ ] Chest Pain [ ] Palpitations [ ] SHERIDAN [ ] PND [ ] Orthopnea  Gastrointestinal: [x ] Abdominal Pain [ ] Diarrhea [ ] Constipation [ ] Hemorrhoids [ ] Nausea [ ] Vomiting  Genitourinary: [ ] Nocturia [ ] Dysuria [ ] Incontinence  Extremities: [ x] Swelling [ ] Joint Pain  Neurologic: [ ] Focal deficit [ ] Paresthesias [ ] Syncope  Lymphatic: [ ] Swelling [ ] Lymphadenopathy   Skin: [ ] Rash [ ] Ecchymoses [ ] Wounds [ ] Lesions  Psychiatry: [x ] Depression [ ] Suicidal/Homicidal Ideation [ ] Anxiety [ ] Sleep Disturbances  [ x] 10 point review of systems is otherwise negative except as mentioned above            [ ]Unable to obtain    Physical Exam:  T(C): 36.3 (01-03-19 @ 07:25), Max: 36.6 (01-03-19 @ 01:40)  HR: 70 (01-03-19 @ 08:19) (59 - 83)  BP: 92/50 (01-03-19 @ 08:19) (73/78 - 101/51)  RR: 16 (01-03-19 @ 08:19) (15 - 18)  SpO2: 98% (01-03-19 @ 08:19) (95% - 100%)  Wt(kg): --    Daily Height in cm: 185.42 (02 Jan 2019 16:18)    Daily     Appearance: chronically and acutely ill  Eyes: [ ] PERRL [ ] EOMI  HENT: [x ] Normal oral muscosa [ ]NC/AT  Cardiovascular: [x ] S1 [x ] S2 [x ] RRR [ ] No m/r/g [ ]No edema [ ] JVP  Procedural Access Site: [ ] No hematoma [ ] Non-tender to palpation [ ] 2+ pulse [ ] No bruit [ ] No Ecchymosis  Respiratory: [x ] Clear to auscultation bilaterally  Gastrointestinal: [x ] Soft [ ] Non-tender [ ] Non-distended [ ] BS+  Musculoskeletal: [x ] No clubbing [ ] No joint deformity   Neurologic: [x ] Non-focal  Lymphatic: [x ] No lymphadenopathy  Psychiatry: [ ] AAOx3 [ ] Mood & affect appropriate  Skin: jaundiced  Cardiovascular Diagnostic Testing:  ECG:    Echo:    Stress Testing:    Cath:    Interpretation of Telemetry:    Imaging:    Labs:                        10.4   11.8  )-----------( 77       ( 02 Jan 2019 16:55 )             30.7     01-03    124<L>  |  92<L>  |  31<H>  ----------------------------<  38<LL>  4.9   |  21<L>  |  x     Ca    8.8      03 Jan 2019 07:39  Mg     2.0     01-02    TPro  6.2  /  Alb  2.5<L>  /  TBili  13.2<H>  /  DBili  x   /  AST  150<H>  /  ALT  34  /  AlkPhos  166<H>  01-03    PT/INR - ( 02 Jan 2019 16:55 )   PT: 33.4 sec;   INR: 2.81 ratio         PTT - ( 02 Jan 2019 16:55 )  PTT:44.3 sec      Serum Pro-Brain Natriuretic Peptide: 1037 pg/mL (01-02 @ 16:55)

## 2019-01-03 NOTE — PROGRESS NOTE ADULT - PROBLEM SELECTOR PLAN 2
Patient with leukocytosis to WBC 11.8, hypotension to SBP 70's, with no confirmed infectious source but suspicion for SBP given prior treatments for SBP and history of esophageal varices and c/o of vague abd pain.  VBG lactate elevated to 4.7 (possibly from liver disease alone)  - s/p 250 cc 5% albumin for volume resuscitation (pt with low albumin) as patient intravascularly depleted and cannot give NS or LR due to risk of third spacing.  Continue giving albumin q6h.   - follow up blood and urine cultures  - treat with zosyn for now (pt with prior E coli bacteremia, cover broadly for Gram negative organisms). no respiratory sx at this time, holding vanco/azithro for now  -holding home antihypertensives.  would cautiously reintroduce diuretics if tolerated in coming days. clarify propranolol (family saying no longer taking) Patient with leukocytosis to WBC 11.8, hypotension to SBP 70's, with no confirmed infectious source but suspicion for SBP given prior treatments for SBP and history of esophageal varices and c/o of vague abd pain.  VBG lactate elevated to 4.7 (possibly from liver disease alone)    - s/p 250 cc 5% albumin for volume resuscitation (pt with low albumin) as patient intravascularly depleted and cannot give NS or LR due to risk of third spacing.  Continue giving albumin q6h.   - follow up blood and urine cultures  - treat with zosyn for now (pt with prior E coli bacteremia, cover broadly for Gram negative organisms). no respiratory sx at this time, holding vanco/azithro for now  -holding home antihypertensives.  would cautiously reintroduce diuretics if tolerated in coming days. clarify propranolol (family saying no longer taking)  - Day 1 IV abx 1/3. Patient with leukocytosis to WBC 11.8, hypotension to SBP 70's, with no confirmed infectious source but suspicion for SBP given prior treatments for SBP and history of esophageal varices and c/o of vague abd pain.  VBG lactate elevated to 4.7 (possibly from liver disease alone)    - s/p 250 cc 5% albumin for volume resuscitation (pt with low albumin) as patient intravascularly depleted and cannot give NS or LR due to risk of third spacing.  Continue giving albumin q6h.   - Initially started on Ceftriaxone and Vancomycin. Now on Zosyn to cover from Gram negative organisms. Day 1 IV abx 1/3.   - Holding home diuretics and antihypertensives.  - f/u Bcx and Ucxs.

## 2019-01-03 NOTE — PROGRESS NOTE ADULT - PROBLEM SELECTOR PLAN 3
Patient with no florid ascites on physical exam; abdomen soft and nondistended.  However, patient previously treated for SBP and patient with esophageal varices.   Patient complains of diffuse abdominal pain particularly in RUQ and LLQ.  May benefit from at least diagnostic paracentesis. Obtain abdominal US to assess ascites fluid. Patient with no florid ascites on physical exam; abdomen soft and nondistended.  However, patient previously treated for SBP and patient with esophageal varices.   Patient complains of diffuse abdominal pain particularly in RUQ and LLQ.  May benefit from at least diagnostic paracentesis. Obtain abdominal US to assess ascites fluid.    - f/u Abdominal U/S to evaluate for the presence of ascites.

## 2019-01-03 NOTE — CONSULT NOTE ADULT - ATTENDING COMMENTS
Patient was seen and examined with Hepatology team at rounds. Agree with above.  A 52 yo man  with history of DM, anemia and presumed CHF, presented  fatigue, weakness and chills, hypotension was seen for cirrhosis (MELDNa score 34) and hyperbilirubinemia. He denies recent alcohol consumption.   Patient was seen at [facility] for cirrhosis and was delisted for LT reportedly due to significant cardiac disease with low EF.   Hb 7.8, PLT 38 K, INR 3.48,  , ALT 30, , TB 13.6, Elevated IgG, A and M, CMV PCR negative  Blood cultures grew Gm posiitve cocci, and Gm negative rods (on antibiotics per IR)  Patient has decompensated cirrhosis, likely alcoholic cirrhosis with severe jaundice and GPC bacteremia and lethargy.  Will recommend  -get records from [facility] LT Dr. Stokes's team   -continue lactulose and rifaximin  -MRCP and abdominal MRI  -repeat echocardiogram  -diagnostic paracentesis to r/o SBP  -continue antibiotics and avoid zosyn and vancomycin if ULICES worsens

## 2019-01-03 NOTE — PROGRESS NOTE ADULT - ASSESSMENT
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 53M end stage liver disease 2/2 alcoholic cirrhosis, DMII, severe HFrEF (35%), CAD, HTN, presenting with 1 week of generalized weakness and hypotension, likely 2/2 decompensated cirrhosis in the setting of sepsis (less likely).

## 2019-01-03 NOTE — ED ADULT NURSE REASSESSMENT NOTE - NS ED NURSE REASSESS COMMENT FT1
Received report from ER RN [place]. Night RN states patient was refusing IV stick. Patient had no IV at time day RN assessed him. Patient extremely agitated, does not want to speak to staff, and does not want any interventions performed. States "I just want to go home". assessed patients finger stick, found to be 40. Notified patient of importance of IV for treatment. Patient consented to IV. Dr Rosette Allred on team 3 was paged for patients FS. She gave verbal order for 1 amp IV dextrose. Repeated order to confirm. Patients IV flushed without difficulty and 1 amp dextrose given. Patient educated that serial finger sticks will need to be performed to ensure that his blood glucose is going up. Patient states " you are not poking me again". He is a&ox3. Dr Enrique called to notify her that he is refusing finger sticks. Will continue to monitor patient. Bedrails up x2.

## 2019-01-03 NOTE — CONSULT NOTE ADULT - SUBJECTIVE AND OBJECTIVE BOX
Chief Complaint:  Patient is a 53y old  Male who presents with a chief complaint of hypotension, decompensated cirrhosis (2019 08:30)      HPI:    Allergies:  No Known Allergies      Home Medications:    Hospital Medications:  albumin human  5% IVPB 250 milliLiter(s) IV Intermittent every 6 hours  dextrose 40% Gel 15 Gram(s) Oral once PRN  dextrose 5%. 1000 milliLiter(s) IV Continuous <Continuous>  dextrose 50% Injectable 12.5 Gram(s) IV Push once  dextrose 50% Injectable 25 Gram(s) IV Push once  dextrose 50% Injectable 25 Gram(s) IV Push once  folic acid 1 milliGRAM(s) Oral daily  glucagon  Injectable 1 milliGRAM(s) IntraMuscular once PRN  insulin lispro (HumaLOG) corrective regimen sliding scale   SubCutaneous three times a day before meals  insulin lispro (HumaLOG) corrective regimen sliding scale   SubCutaneous at bedtime  lactulose Syrup 20 Gram(s) Oral three times a day PRN  midodrine 5 milliGRAM(s) Oral three times a day  multivitamin 1 Tablet(s) Oral daily  piperacillin/tazobactam IVPB. 3.375 Gram(s) IV Intermittent every 8 hours  rifaximin 550 milliGRAM(s) Oral two times a day  thiamine 100 milliGRAM(s) Oral daily      PMHX/PSHX:  Hypertension  Type 2 diabetes mellitus  Congestive heart failure (CHF)  Esophageal varices  Alcoholic cirrhosis of liver with ascites  No pertinent past medical history  Abdominal hernia  H/O cataract      Family history:  Family history of hypercholesterolemia (Father)  Family history of diabetes mellitus (Mother)  No pertinent family history in first degree relatives      Social History:     ROS:     General:  No wt loss, fevers, chills, night sweats, fatigue,   Eyes:  Good vision, no reported pain  ENT:  No sore throat, pain, runny nose, dysphagia  CV:  No pain, palpitations, hypo/hypertension  Resp:  No dyspnea, cough, tachypnea, wheezing  GI:  See HPI  :  No pain, bleeding, incontinence, nocturia  Muscle:  No pain, weakness  Neuro:  No weakness, tingling, memory problems  Psych:  No fatigue, insomnia, mood problems, depression  Endocrine:  No polyuria, polydipsia, cold/heat intolerance  Heme:  No petechiae, ecchymosis, easy bruisability  Skin:  No rash, edema      PHYSICAL EXAM:     GENERAL:  Appears stated age, well-groomed, well-nourished, no distress  HEENT:  NC/AT,  conjunctivae clear and pink,  no JVD  CHEST:  Full & symmetric excursion, no increased effort, breath sounds clear  HEART:  Regular rhythm, S1, S2, no murmur/rub/S3/S4, no abdominal bruit, no edema  ABDOMEN:  Soft, non-tender, non-distended, normoactive bowel sounds,  no masses ,  EXTREMITIES:  no cyanosis,clubbing or edema  SKIN:  No rash/erythema/ecchymoses/petechiae/wounds/abscess/warm/dry  NEURO:  Alert, oriented    Vital Signs:  Vital Signs Last 24 Hrs  T(C): 36.6 (2019 10:40), Max: 36.9 (2019 08:33)  T(F): 97.9 (2019 10:40), Max: 98.4 (2019 08:33)  HR: 72 (2019 10:40) (59 - 83)  BP: 95/48 (2019 10:40) (73/78 - 101/51)  BP(mean): 69 (2019 19:25) (63 - 69)  RR: 23 (2019 10:40) (14 - 23)  SpO2: 98% (2019 10:40) (95% - 100%)  Daily Height in cm: 185.42 (2019 16:18)    Daily     LABS:                        8.1    9.83  )-----------( 56       ( 2019 10:04 )             23.4     01-03    124<L>  |  92<L>  |  31<H>  ----------------------------<  38<LL>  4.9   |  21<L>  |  1.33<H>    Ca    8.8      2019 07:39  Mg     2.0     01-    TPro  6.2  /  Alb  2.5<L>  /  TBili  13.2<H>  /  DBili  x   /  AST  150<H>  /  ALT  34  /  AlkPhos  166<H>  01-03    LIVER FUNCTIONS - ( 2019 07:39 )  Alb: 2.5 g/dL / Pro: 6.2 g/dL / ALK PHOS: 166 U/L / ALT: 34 U/L / AST: 150 U/L / GGT: x           PT/INR - ( 2019 10:04 )   PT: 38.6 sec;   INR: 3.26 ratio         PTT - ( 2019 10:04 )  PTT:51.7 sec  Urinalysis Basic - ( 2019 20:43 )    Color: Yellow / Appearance: Slightly Turbid / S.012 / pH: x  Gluc: x / Ketone: Negative  / Bili: Small / Urobili: 2 mg/dL   Blood: x / Protein: Negative / Nitrite: Negative   Leuk Esterase: Negative / RBC: 0 /hpf / WBC 0 /HPF   Sq Epi: x / Non Sq Epi: 1 / Bacteria: Negative      Amylase Serum--      Lipase serum12       Ammonia--      Imaging: Chief Complaint:  Patient is a 53y old  Male who presents with a chief complaint of hypotension, decompensated cirrhosis (2019 08:30)      HPI:  Pt is a 52 yo male with cirrhosis and type 2 DM presenting to ED for 1 week of decreased exercise tolerance due to weakness. Per pt's older brother, pt was able to "walk 30-40 laps" around his home until he recently began feeling more lightheaded with ambulation, lethargic and spending more time in bed. Pt's brother states that he has also been more irritable but has not appeared to be confused or disoriented during this time. Pt has also been placed on metolazone for 2 weeks in addition to spironolactone regimen for increased diuresis and discontinued metolazone ~1 week ago due to excess weight loss (baseline weight 240s down to 218#). Of note, Pt's brother reports propranolol discontinued during his last admission at [facility] 2018 due to persistently low BPs.    Pt was scheduled to see his cardiologist for an outpatient ECHO yesterday, and due to his lethargy and apparent malaise was told to come to ED for urgent evaluation. Since yesterday, pt reported feeling very cold, with generalized abdominal pain, worse in RUQ and LLQ with some improvement today. In ED, pt found to have SBPs in mid 70s with elevated total bilirubin. No fevers, no hematemesis, no bloody or tarry stools.    Allergies:  No Known Allergies      Home Medications:    Hospital Medications:  albumin human  5% IVPB 250 milliLiter(s) IV Intermittent every 6 hours  dextrose 40% Gel 15 Gram(s) Oral once PRN  dextrose 5%. 1000 milliLiter(s) IV Continuous <Continuous>  dextrose 50% Injectable 12.5 Gram(s) IV Push once  dextrose 50% Injectable 25 Gram(s) IV Push once  dextrose 50% Injectable 25 Gram(s) IV Push once  folic acid 1 milliGRAM(s) Oral daily  glucagon  Injectable 1 milliGRAM(s) IntraMuscular once PRN  insulin lispro (HumaLOG) corrective regimen sliding scale   SubCutaneous three times a day before meals  insulin lispro (HumaLOG) corrective regimen sliding scale   SubCutaneous at bedtime  lactulose Syrup 20 Gram(s) Oral three times a day PRN  midodrine 5 milliGRAM(s) Oral three times a day  multivitamin 1 Tablet(s) Oral daily  piperacillin/tazobactam IVPB. 3.375 Gram(s) IV Intermittent every 8 hours  rifaximin 550 milliGRAM(s) Oral two times a day  thiamine 100 milliGRAM(s) Oral daily      PMHX/PSHX:  Hypertension  Type 2 diabetes mellitus  Congestive heart failure (CHF)  Esophageal varices  Alcoholic cirrhosis of liver with ascites  Abdominal hernia  H/O cataract    Family history:  Family history of hypercholesterolemia (Father)  Family history of diabetes mellitus (Mother)  No pertinent family history in first degree relatives      Social History:   Former tobacco smoker and EtOH use. Pt and brother adamantly report no smoking or EtOH use since 2015  Lives with 2 older brothers and mother at home.    ROS:     General:  ~20-25# weight loss over last 2-3 weeks, +fatigue. +chills. No fevers,  no night sweats.   ENT: No sore throat, pain, runny nose, dysphagia  CV: No pain, palpitations.  Resp: No dyspnea, cough, tachypnea, wheezing  GI:  See HPI  : No pain, bleeding, incontinence, nocturia  Muscle: +generalized extremity weakness. No pain.   Neuro:  No weakness, tingling, memory problems  Psych:  +fatigue, +irritability. No insomnia.  Endocrine:  No polyuria, polydipsia.  Heme:  No petechiae, ecchymosis, easy bruisability  Skin:  No rash, edema.      PHYSICAL EXAM:     GENERAL:  Jaundiced, ill-appearing man in mild distress.  HEENT:  NC/AT,  conjunctivae icteric, no JVD  CHEST:  Full & symmetric excursion, breath sounds diminished.  HEART:  Regular rhythm, S1, S2, no murmur/rub/S3/S4, no abdominal bruit, no edema  ABDOMEN:  Soft, mild tenderness to palpation of LLQ, non-distended, normoactive bowel sounds, no masses. Liver edge palpable 3cm below costal margin.  EXTREMITIES:  no cyanosis, clubbing or edema.  SKIN:  +Jaundiced. No rash/erythema/ecchymoses/petechiae/abscess/warm/dry  NEURO: Alert and oriented to person, place, and date.    Vital Signs:  Vital Signs Last 24 Hrs  T(C): 36.6 (2019 10:40), Max: 36.9 (2019 08:33)  T(F): 97.9 (2019 10:40), Max: 98.4 (2019 08:33)  HR: 72 (2019 10:40) (59 - 83)  BP: 95/48 (2019 10:40) (73/78 - 101/51)  BP(mean): 69 (2019 19:25) (63 - 69)  RR: 23 (2019 10:40) (14 - 23)  SpO2: 98% (2019 10:40) (95% - 100%)  Daily Height in cm: 185.42 (2019 16:18)    Daily     LABS:                        8.1    9.83  )-----------( 56       ( 2019 10:04 )             23.4     01-03    124<L>  |  92<L>  |  31<H>  ----------------------------<  38<LL>  4.9   |  21<L>  |  1.33<H>    Ca    8.8      2019 07:39  Mg     2.0     01-02    TPro  6.2  /  Alb  2.5<L>  /  TBili  13.2<H>  /  DBili  x   /  AST  150<H>  /  ALT  34  /  AlkPhos  166<H>  01-03    LIVER FUNCTIONS - ( 2019 07:39 )  Alb: 2.5 g/dL / Pro: 6.2 g/dL / ALK PHOS: 166 U/L / ALT: 34 U/L / AST: 150 U/L / GGT: x           PT/INR - ( 2019 10:04 )   PT: 38.6 sec;   INR: 3.26 ratio         PTT - ( 2019 10:04 )  PTT:51.7 sec  Urinalysis Basic - ( 2019 20:43 )    Color: Yellow / Appearance: Slightly Turbid / S.012 / pH: x  Gluc: x / Ketone: Negative  / Bili: Small / Urobili: 2 mg/dL   Blood: x / Protein: Negative / Nitrite: Negative   Leuk Esterase: Negative / RBC: 0 /hpf / WBC 0 /HPF   Sq Epi: x / Non Sq Epi: 1 / Bacteria: Negative    Amylase Serum--      Lipase serum12       Ammonia--      Imaging: Chief Complaint:  Patient is a 53y old  Male who presents with a chief complaint of hypotension, decompensated cirrhosis (2019 08:30)      HPI:  Pt is a 52 yo male with cirrhosis and type 2 DM presenting to ED for 1 week of decreased exercise tolerance due to weakness. Per pt's older brother, pt was able to "walk 30-40 laps" around his home until he recently began feeling more lightheaded with ambulation, lethargic and spending more time in bed. Pt's brother states that he has also been more irritable but has not appeared to be confused or disoriented during this time. Pt has also been placed on metolazone for 2 weeks in addition to spironolactone regimen for increased diuresis and discontinued metolazone ~1 week ago due to excess weight loss (baseline weight 240s down to 218#). Of note, Pt's brother reports propranolol discontinued during his last admission at [facility] 2018 due to persistently low BPs.    Pt was scheduled to see his cardiologist for an outpatient ECHO yesterday, and due to his lethargy and apparent malaise was told to come to ED for urgent evaluation. Since yesterday, pt reported feeling very cold, with generalized abdominal pain, worse in RUQ and LLQ with some improvement today. In ED, pt found to have SBPs in mid 70s with elevated total bilirubin. No fevers, no hematemesis, no bloody or tarry stools.    Hepatologist: Dr. Norma Canseco ([phone])  Pharmacy: [facility] ([phone])    Allergies:  No Known Allergies      Home Medications:    Hospital Medications:  albumin human  5% IVPB 250 milliLiter(s) IV Intermittent every 6 hours  dextrose 40% Gel 15 Gram(s) Oral once PRN  dextrose 5%. 1000 milliLiter(s) IV Continuous <Continuous>  dextrose 50% Injectable 12.5 Gram(s) IV Push once  dextrose 50% Injectable 25 Gram(s) IV Push once  dextrose 50% Injectable 25 Gram(s) IV Push once  folic acid 1 milliGRAM(s) Oral daily  glucagon  Injectable 1 milliGRAM(s) IntraMuscular once PRN  insulin lispro (HumaLOG) corrective regimen sliding scale   SubCutaneous three times a day before meals  insulin lispro (HumaLOG) corrective regimen sliding scale   SubCutaneous at bedtime  lactulose Syrup 20 Gram(s) Oral three times a day PRN  midodrine 5 milliGRAM(s) Oral three times a day  multivitamin 1 Tablet(s) Oral daily  piperacillin/tazobactam IVPB. 3.375 Gram(s) IV Intermittent every 8 hours  rifaximin 550 milliGRAM(s) Oral two times a day  thiamine 100 milliGRAM(s) Oral daily      PMHX/PSHX:  Hypertension  Type 2 diabetes mellitus  Congestive heart failure (CHF)  Esophageal varices  Alcoholic cirrhosis of liver with ascites  Abdominal hernia  H/O cataract    Family history:  Family history of hypercholesterolemia (Father)  Family history of diabetes mellitus (Mother)  No pertinent family history in first degree relatives      Social History:   Former tobacco smoker and EtOH use. Pt and brother adamantly report no smoking or EtOH use since 2015  Lives with 2 older brothers and mother at home.    ROS:     General:  ~20-25# weight loss over last 2-3 weeks, +fatigue. +chills. No fevers,  no night sweats.   ENT: No sore throat, pain, runny nose, dysphagia  CV: No pain, palpitations.  Resp: No dyspnea, cough, tachypnea, wheezing  GI:  See HPI  : No pain, bleeding, incontinence, nocturia  Muscle: +generalized extremity weakness. No pain.   Neuro:  No weakness, tingling, memory problems  Psych:  +fatigue, +irritability. No insomnia.  Endocrine:  No polyuria, polydipsia.  Heme:  No petechiae, ecchymosis, easy bruisability  Skin:  No rash, edema.      PHYSICAL EXAM:     GENERAL:  Jaundiced, ill-appearing man in mild distress.  HEENT:  NC/AT,  conjunctivae icteric, no JVD  CHEST:  Full & symmetric excursion, breath sounds diminished.  HEART:  Regular rhythm, S1, S2, no murmur/rub/S3/S4, no abdominal bruit, no edema  ABDOMEN:  Soft, mild tenderness to palpation of LLQ, non-distended, normoactive bowel sounds, no masses. Liver edge palpable 3cm below costal margin.  EXTREMITIES:  no cyanosis, clubbing or edema.  SKIN:  +Jaundiced. No rash/erythema/ecchymoses/petechiae/abscess/warm/dry  NEURO: Alert and oriented to person, place, and date.    Vital Signs:  Vital Signs Last 24 Hrs  T(C): 36.6 (2019 10:40), Max: 36.9 (2019 08:33)  T(F): 97.9 (2019 10:40), Max: 98.4 (2019 08:33)  HR: 72 (2019 10:40) (59 - 83)  BP: 95/48 (2019 10:40) (73/78 - 101/51)  BP(mean): 69 (2019 19:25) (63 - 69)  RR: 23 (2019 10:40) (14 - 23)  SpO2: 98% (2019 10:40) (95% - 100%)  Daily Height in cm: 185.42 (2019 16:18)    Daily     LABS:                        8.1    9.83  )-----------( 56       ( 2019 10:04 )             23.4     01-03    124<L>  |  92<L>  |  31<H>  ----------------------------<  38<LL>  4.9   |  21<L>  |  1.33<H>    Ca    8.8      2019 07:39  Mg     2.0     01-02    TPro  6.2  /  Alb  2.5<L>  /  TBili  13.2<H>  /  DBili  x   /  AST  150<H>  /  ALT  34  /  AlkPhos  166<H>  01-03    LIVER FUNCTIONS - ( 2019 07:39 )  Alb: 2.5 g/dL / Pro: 6.2 g/dL / ALK PHOS: 166 U/L / ALT: 34 U/L / AST: 150 U/L / GGT: x           PT/INR - ( 2019 10:04 )   PT: 38.6 sec;   INR: 3.26 ratio         PTT - ( 2019 10:04 )  PTT:51.7 sec  Urinalysis Basic - ( 2019 20:43 )    Color: Yellow / Appearance: Slightly Turbid / S.012 / pH: x  Gluc: x / Ketone: Negative  / Bili: Small / Urobili: 2 mg/dL   Blood: x / Protein: Negative / Nitrite: Negative   Leuk Esterase: Negative / RBC: 0 /hpf / WBC 0 /HPF   Sq Epi: x / Non Sq Epi: 1 / Bacteria: Negative    Amylase Serum--      Lipase serum12       Ammonia--      Imaging: Chief Complaint:  Patient is a 53y old  Male who presents with a chief complaint of hypotension, decompensated cirrhosis (2019 08:30)      HPI:  Pt is a 54 yo male with cirrhosis, likely alcoholic cirrhosis and type 2 DM presenting to ED for 1 week of decreased exercise tolerance due to weakness. Per pt's older brother, pt was able to "walk 30-40 laps" around his home until he recently began feeling more lightheaded with ambulation, lethargic and spending more time in bed. Pt's brother states that he has also been more irritable but has not appeared to be confused or disoriented during this time. Pt has also been placed on metolazone for 2 weeks in addition to spironolactone regimen for increased diuresis and discontinued metolazone ~1 week ago due to excess weight loss (baseline weight 240s down to 218#). Of note, Pt's brother reports propranolol discontinued during his last admission at [facility] 2018 due to persistently low BPs.    Pt was scheduled to see his cardiologist for an outpatient ECHO yesterday, and due to his lethargy and apparent malaise was told to come to ED for urgent evaluation. Since yesterday, pt reported feeling very cold, with generalized abdominal pain, worse in RUQ and LLQ with some improvement today. In ED, pt found to have SBPs in mid 70s with elevated total bilirubin. No fevers, no hematemesis, no bloody or tarry stools.    Hepatologist: Dr. Norma Canseco ([phone])  Pharmacy: [facility] ([phone])    Allergies:  No Known Allergies      Home Medications:    Hospital Medications:  albumin human  5% IVPB 250 milliLiter(s) IV Intermittent every 6 hours  dextrose 40% Gel 15 Gram(s) Oral once PRN  dextrose 5%. 1000 milliLiter(s) IV Continuous <Continuous>  dextrose 50% Injectable 12.5 Gram(s) IV Push once  dextrose 50% Injectable 25 Gram(s) IV Push once  dextrose 50% Injectable 25 Gram(s) IV Push once  folic acid 1 milliGRAM(s) Oral daily  glucagon  Injectable 1 milliGRAM(s) IntraMuscular once PRN  insulin lispro (HumaLOG) corrective regimen sliding scale   SubCutaneous three times a day before meals  insulin lispro (HumaLOG) corrective regimen sliding scale   SubCutaneous at bedtime  lactulose Syrup 20 Gram(s) Oral three times a day PRN  midodrine 5 milliGRAM(s) Oral three times a day  multivitamin 1 Tablet(s) Oral daily  piperacillin/tazobactam IVPB. 3.375 Gram(s) IV Intermittent every 8 hours  rifaximin 550 milliGRAM(s) Oral two times a day  thiamine 100 milliGRAM(s) Oral daily      PMHX/PSHX:  Hypertension  Type 2 diabetes mellitus  Congestive heart failure (CHF)  Esophageal varices  Alcoholic cirrhosis of liver with ascites  Abdominal hernia  H/O cataract    Family history:  Family history of hypercholesterolemia (Father)  Family history of diabetes mellitus (Mother)  No pertinent family history in first degree relatives      Social History:   Former tobacco smoker and EtOH use. Pt and brother adamantly report no smoking or EtOH use since 2015  Lives with 2 older brothers and mother at home.    ROS:     General:  ~20-25# weight loss over last 2-3 weeks, +fatigue. +chills. No fevers,  no night sweats.   ENT: No sore throat, pain, runny nose, dysphagia  CV: No pain, palpitations.  Resp: No dyspnea, cough, tachypnea, wheezing  GI:  See HPI  : No pain, bleeding, incontinence, nocturia  Muscle: +generalized extremity weakness. No pain.   Neuro:  No weakness, tingling, memory problems  Psych:  +fatigue, +irritability. No insomnia.  Endocrine:  No polyuria, polydipsia.  Heme:  No petechiae, ecchymosis, easy bruisability  Skin:  No rash, edema.      PHYSICAL EXAM:     GENERAL:  Jaundiced, ill-appearing man in mild distress.  HEENT:  NC/AT,  conjunctivae icteric, no JVD  CHEST:  Full & symmetric excursion, breath sounds diminished.  HEART:  Regular rhythm, S1, S2, no murmur/rub/S3/S4, no abdominal bruit, no edema  ABDOMEN:  Soft, mild tenderness to palpation of LLQ, non-distended, normoactive bowel sounds, no masses. Liver edge palpable 3cm below costal margin.  EXTREMITIES:  no cyanosis, clubbing or edema.  SKIN:  +Jaundiced. No rash/erythema/ecchymoses/petechiae/abscess/warm/dry  NEURO: Alert and oriented to person, place, and date.    Vital Signs:  Vital Signs Last 24 Hrs  T(C): 36.6 (2019 10:40), Max: 36.9 (2019 08:33)  T(F): 97.9 (2019 10:40), Max: 98.4 (2019 08:33)  HR: 72 (2019 10:40) (59 - 83)  BP: 95/48 (2019 10:40) (73/78 - 101/51)  BP(mean): 69 (2019 19:25) (63 - 69)  RR: 23 (2019 10:40) (14 - 23)  SpO2: 98% (2019 10:40) (95% - 100%)  Daily Height in cm: 185.42 (2019 16:18)    Daily     LABS:                        8.1    9.83  )-----------( 56       ( 2019 10:04 )             23.4     01-03    124<L>  |  92<L>  |  31<H>  ----------------------------<  38<LL>  4.9   |  21<L>  |  1.33<H>    Ca    8.8      2019 07:39  Mg     2.0     01-    TPro  6.2  /  Alb  2.5<L>  /  TBili  13.2<H>  /  DBili  x   /  AST  150<H>  /  ALT  34  /  AlkPhos  166<H>  01-03    LIVER FUNCTIONS - ( 2019 07:39 )  Alb: 2.5 g/dL / Pro: 6.2 g/dL / ALK PHOS: 166 U/L / ALT: 34 U/L / AST: 150 U/L / GGT: x           PT/INR - ( 2019 10:04 )   PT: 38.6 sec;   INR: 3.26 ratio         PTT - ( 2019 10:04 )  PTT:51.7 sec  Urinalysis Basic - ( 2019 20:43 )    Color: Yellow / Appearance: Slightly Turbid / S.012 / pH: x  Gluc: x / Ketone: Negative  / Bili: Small / Urobili: 2 mg/dL   Blood: x / Protein: Negative / Nitrite: Negative   Leuk Esterase: Negative / RBC: 0 /hpf / WBC 0 /HPF   Sq Epi: x / Non Sq Epi: 1 / Bacteria: Negative    Amylase Serum--      Lipase serum12       Ammonia--      Imaging:

## 2019-01-03 NOTE — PROGRESS NOTE ADULT - PROBLEM SELECTOR PLAN 6
-uptrending creatinine  -nonoliguric  -ddx includes hepatorenal at this time vs. prerenal/dehydration from diuretics continue to monitor. check urine lytes/sodium/urea  -trend creatinine with fluid challenge  -consider renal eval  -likely not long term HD candidate given poor prognosis and poor liver transplant candidate, family aware. Currently patient breathing comfortably on room air with no appreciable LE edema.  Continue to monitor off diuretics as above, fluid status will be tenuous in the setting of advanced liver disease, systolic HF, and andrea. Last TTE showed reduced Ef but was in setting of sepsis and bacteremia. Currently appears euvolemic. Fluid status will be tenuous in the setting of advanced liver disease, sepsis, HFrEF, and ULICES.    - Will monitor vitals q4hr and hemodynamics.   - Will attempt to maintain euvolemia by minimizing I's.   - Repeat TTE f/u.

## 2019-01-03 NOTE — PROGRESS NOTE ADULT - PROBLEM SELECTOR PLAN 9
- check A1c in AM.  - hold home glipizide.  - FS qac and qhs, with insulin SSI. DVT ppx: SCDs  Diet: DASH/Consistent Carb    Goals of Care: FULL CODE. I addressed GOC with patient and his brother at bedside. Patient stated that he would like chest compressions and intubation if necessary.     Darby Goldberg MD PGY2  Medicine Night Admit Resident  Pager [phone] DVT ppx: SCDs  Diet: DASH/Consistent Carb    Goals of Care: FULL CODE. addressed GOC with patient and his brother at bedside. Patient stated that he would like chest compressions and intubation if necessary.

## 2019-01-03 NOTE — ED ADULT NURSE REASSESSMENT NOTE - NS ED NURSE REASSESS COMMENT FT1
06:20. Pt's IV no longer working at this time. IV removed. Pt refusing IV to be placed at this time. MD Gutiérrez Paged @ [phone]. 06:20. Pt's IV no longer working at this time. IV removed. Pt refusing IV to be placed at this time. MD Goldberg Paged @ [phone].

## 2019-01-04 ENCOUNTER — TRANSCRIPTION ENCOUNTER (OUTPATIENT)
Age: 54
End: 2019-01-04

## 2019-01-04 DIAGNOSIS — A41.50 GRAM-NEGATIVE SEPSIS, UNSPECIFIED: ICD-10-CM

## 2019-01-04 LAB
ALBUMIN SERPL ELPH-MCNC: 2.8 G/DL — LOW (ref 3.3–5)
ALP SERPL-CCNC: 140 U/L — HIGH (ref 40–120)
ALT FLD-CCNC: 30 U/L — SIGNIFICANT CHANGE UP (ref 10–45)
ANION GAP SERPL CALC-SCNC: 13 MMOL/L — SIGNIFICANT CHANGE UP (ref 5–17)
APTT BLD: 56.2 SEC — HIGH (ref 27.5–36.3)
AST SERPL-CCNC: 125 U/L — HIGH (ref 10–40)
BASOPHILS # BLD AUTO: 0 K/UL — SIGNIFICANT CHANGE UP (ref 0–0.2)
BASOPHILS NFR BLD AUTO: 0.7 % — SIGNIFICANT CHANGE UP (ref 0–2)
BILIRUB DIRECT SERPL-MCNC: 5.9 MG/DL — HIGH (ref 0–0.2)
BILIRUB SERPL-MCNC: 13.6 MG/DL — HIGH (ref 0.2–1.2)
BUN SERPL-MCNC: 24 MG/DL — HIGH (ref 7–23)
CALCIUM SERPL-MCNC: 8.4 MG/DL — SIGNIFICANT CHANGE UP (ref 8.4–10.5)
CHLORIDE SERPL-SCNC: 94 MMOL/L — LOW (ref 96–108)
CMV DNA CSF QL NAA+PROBE: SIGNIFICANT CHANGE UP
CMV DNA SPEC NAA+PROBE-LOG#: SIGNIFICANT CHANGE UP LOGIU/ML
CO2 SERPL-SCNC: 20 MMOL/L — LOW (ref 22–31)
CREAT SERPL-MCNC: 1.1 MG/DL — SIGNIFICANT CHANGE UP (ref 0.5–1.3)
EOSINOPHIL # BLD AUTO: 0.5 K/UL — SIGNIFICANT CHANGE UP (ref 0–0.5)
EOSINOPHIL NFR BLD AUTO: 7.4 % — HIGH (ref 0–6)
GLUCOSE BLDC GLUCOMTR-MCNC: 163 MG/DL — HIGH (ref 70–99)
GLUCOSE BLDC GLUCOMTR-MCNC: 229 MG/DL — HIGH (ref 70–99)
GLUCOSE BLDC GLUCOMTR-MCNC: 236 MG/DL — HIGH (ref 70–99)
GLUCOSE BLDC GLUCOMTR-MCNC: 252 MG/DL — HIGH (ref 70–99)
GLUCOSE SERPL-MCNC: 146 MG/DL — HIGH (ref 70–99)
GRAM STN FLD: SIGNIFICANT CHANGE UP
HAV IGM SER-ACNC: SIGNIFICANT CHANGE UP
HBV CORE AB SER-ACNC: SIGNIFICANT CHANGE UP
HBV SURFACE AB SER-ACNC: SIGNIFICANT CHANGE UP
HBV SURFACE AG SER-ACNC: SIGNIFICANT CHANGE UP
HCT VFR BLD CALC: 22.7 % — LOW (ref 39–50)
HCV AB S/CO SERPL IA: 0.13 S/CO — SIGNIFICANT CHANGE UP
HCV AB SERPL-IMP: SIGNIFICANT CHANGE UP
HGB BLD-MCNC: 7.8 G/DL — LOW (ref 13–17)
IGA FLD-MCNC: 766 MG/DL — HIGH (ref 84–499)
IGG FLD-MCNC: 1915 MG/DL — HIGH (ref 610–1660)
IGM SERPL-MCNC: 296 MG/DL — HIGH (ref 35–242)
INR BLD: 3.48 RATIO — HIGH (ref 0.88–1.16)
KAPPA LC SER QL IFE: 8.11 MG/DL — HIGH (ref 0.33–1.94)
KAPPA/LAMBDA FREE LIGHT CHAIN RATIO, SERUM: 1.73 RATIO — HIGH (ref 0.26–1.65)
LAMBDA LC SER QL IFE: 4.7 MG/DL — HIGH (ref 0.57–2.63)
LYMPHOCYTES # BLD AUTO: 0.7 K/UL — LOW (ref 1–3.3)
LYMPHOCYTES # BLD AUTO: 10 % — LOW (ref 13–44)
MAGNESIUM SERPL-MCNC: 2 MG/DL — SIGNIFICANT CHANGE UP (ref 1.6–2.6)
MCHC RBC-ENTMCNC: 34.3 GM/DL — SIGNIFICANT CHANGE UP (ref 32–36)
MCHC RBC-ENTMCNC: 35.2 PG — HIGH (ref 27–34)
MCV RBC AUTO: 103 FL — HIGH (ref 80–100)
MONOCYTES # BLD AUTO: 0.7 K/UL — SIGNIFICANT CHANGE UP (ref 0–0.9)
MONOCYTES NFR BLD AUTO: 9.2 % — SIGNIFICANT CHANGE UP (ref 2–14)
NEUTROPHILS # BLD AUTO: 5.2 K/UL — SIGNIFICANT CHANGE UP (ref 1.8–7.4)
NEUTROPHILS NFR BLD AUTO: 72.7 % — SIGNIFICANT CHANGE UP (ref 43–77)
PHOSPHATE SERPL-MCNC: 2.9 MG/DL — SIGNIFICANT CHANGE UP (ref 2.5–4.5)
PLATELET # BLD AUTO: 38 K/UL — LOW (ref 150–400)
POTASSIUM SERPL-MCNC: 4.6 MMOL/L — SIGNIFICANT CHANGE UP (ref 3.5–5.3)
POTASSIUM SERPL-SCNC: 4.6 MMOL/L — SIGNIFICANT CHANGE UP (ref 3.5–5.3)
PROT SERPL-MCNC: 6 G/DL — SIGNIFICANT CHANGE UP (ref 6–8.3)
PROTHROM AB SERPL-ACNC: 41.2 SEC — HIGH (ref 10–12.9)
RBC # BLD: 2.21 M/UL — LOW (ref 4.2–5.8)
RBC # FLD: 15.4 % — HIGH (ref 10.3–14.5)
SODIUM SERPL-SCNC: 127 MMOL/L — LOW (ref 135–145)
WBC # BLD: 7.2 K/UL — SIGNIFICANT CHANGE UP (ref 3.8–10.5)
WBC # FLD AUTO: 7.2 K/UL — SIGNIFICANT CHANGE UP (ref 3.8–10.5)

## 2019-01-04 PROCEDURE — 99232 SBSQ HOSP IP/OBS MODERATE 35: CPT

## 2019-01-04 PROCEDURE — 99223 1ST HOSP IP/OBS HIGH 75: CPT | Mod: GC

## 2019-01-04 PROCEDURE — 99233 SBSQ HOSP IP/OBS HIGH 50: CPT | Mod: GC

## 2019-01-04 PROCEDURE — 99222 1ST HOSP IP/OBS MODERATE 55: CPT

## 2019-01-04 RX ORDER — SPIRONOLACTONE 25 MG/1
1 TABLET, FILM COATED ORAL
Qty: 0 | Refills: 0 | COMMUNITY

## 2019-01-04 RX ORDER — TRAMADOL HYDROCHLORIDE 50 MG/1
1 TABLET ORAL
Qty: 0 | Refills: 0 | COMMUNITY
Start: 2019-01-04

## 2019-01-04 RX ORDER — ZINC SULFATE TAB 220 MG (50 MG ZINC EQUIVALENT) 220 (50 ZN) MG
1 TAB ORAL
Qty: 0 | Refills: 0 | COMMUNITY

## 2019-01-04 RX ORDER — VANCOMYCIN HCL 1 G
1 VIAL (EA) INTRAVENOUS
Qty: 0 | Refills: 0 | COMMUNITY
Start: 2019-01-04

## 2019-01-04 RX ORDER — PIPERACILLIN AND TAZOBACTAM 4; .5 G/20ML; G/20ML
3.38 INJECTION, POWDER, LYOPHILIZED, FOR SOLUTION INTRAVENOUS
Qty: 0 | Refills: 0 | COMMUNITY
Start: 2019-01-04

## 2019-01-04 RX ORDER — FUROSEMIDE 40 MG
1 TABLET ORAL
Qty: 0 | Refills: 0 | COMMUNITY

## 2019-01-04 RX ORDER — ALBUMIN HUMAN 25 %
250 VIAL (ML) INTRAVENOUS
Qty: 0 | Refills: 0 | COMMUNITY
Start: 2019-01-04

## 2019-01-04 RX ORDER — CHLORHEXIDINE GLUCONATE 213 G/1000ML
15 SOLUTION TOPICAL
Qty: 0 | Refills: 0 | COMMUNITY
Start: 2019-01-04

## 2019-01-04 RX ORDER — TRAMADOL HYDROCHLORIDE 50 MG/1
50 TABLET ORAL EVERY 8 HOURS
Qty: 0 | Refills: 0 | Status: DISCONTINUED | OUTPATIENT
Start: 2019-01-04 | End: 2019-01-04

## 2019-01-04 RX ORDER — SODIUM CHLORIDE 9 MG/ML
1000 INJECTION, SOLUTION INTRAVENOUS
Qty: 0 | Refills: 0 | COMMUNITY
Start: 2019-01-04

## 2019-01-04 RX ORDER — LIDOCAINE 4 G/100G
1 CREAM TOPICAL DAILY
Qty: 0 | Refills: 0 | Status: DISCONTINUED | OUTPATIENT
Start: 2019-01-04 | End: 2019-01-17

## 2019-01-04 RX ADMIN — MIDODRINE HYDROCHLORIDE 5 MILLIGRAM(S): 2.5 TABLET ORAL at 13:57

## 2019-01-04 RX ADMIN — MIDODRINE HYDROCHLORIDE 5 MILLIGRAM(S): 2.5 TABLET ORAL at 06:03

## 2019-01-04 RX ADMIN — Medication 125 MILLILITER(S): at 06:52

## 2019-01-04 RX ADMIN — Medication 100 MILLIGRAM(S): at 13:57

## 2019-01-04 RX ADMIN — Medication 1 TABLET(S): at 13:56

## 2019-01-04 RX ADMIN — Medication 2: at 18:17

## 2019-01-04 RX ADMIN — LIDOCAINE 1 PATCH: 4 CREAM TOPICAL at 17:06

## 2019-01-04 RX ADMIN — Medication 250 MILLIGRAM(S): at 23:15

## 2019-01-04 RX ADMIN — MIDODRINE HYDROCHLORIDE 5 MILLIGRAM(S): 2.5 TABLET ORAL at 22:46

## 2019-01-04 RX ADMIN — LIDOCAINE 1 PATCH: 4 CREAM TOPICAL at 12:12

## 2019-01-04 RX ADMIN — PIPERACILLIN AND TAZOBACTAM 25 GRAM(S): 4; .5 INJECTION, POWDER, LYOPHILIZED, FOR SOLUTION INTRAVENOUS at 02:54

## 2019-01-04 RX ADMIN — Medication 3: at 13:52

## 2019-01-04 RX ADMIN — Medication 1: at 09:13

## 2019-01-04 RX ADMIN — Medication 125 MILLILITER(S): at 00:32

## 2019-01-04 RX ADMIN — LACTULOSE 30 GRAM(S): 10 SOLUTION ORAL at 22:52

## 2019-01-04 RX ADMIN — LACTULOSE 30 GRAM(S): 10 SOLUTION ORAL at 13:57

## 2019-01-04 RX ADMIN — Medication 125 MILLILITER(S): at 12:10

## 2019-01-04 RX ADMIN — LACTULOSE 30 GRAM(S): 10 SOLUTION ORAL at 06:03

## 2019-01-04 RX ADMIN — PIPERACILLIN AND TAZOBACTAM 25 GRAM(S): 4; .5 INJECTION, POWDER, LYOPHILIZED, FOR SOLUTION INTRAVENOUS at 17:23

## 2019-01-04 RX ADMIN — Medication 250 MILLIGRAM(S): at 09:07

## 2019-01-04 RX ADMIN — Medication 1 MILLIGRAM(S): at 13:56

## 2019-01-04 RX ADMIN — Medication 125 MILLILITER(S): at 17:22

## 2019-01-04 RX ADMIN — PIPERACILLIN AND TAZOBACTAM 25 GRAM(S): 4; .5 INJECTION, POWDER, LYOPHILIZED, FOR SOLUTION INTRAVENOUS at 11:26

## 2019-01-04 NOTE — PROGRESS NOTE ADULT - PROBLEM SELECTOR PLAN 6
Last TTE showed reduced Ef but was in setting of sepsis and bacteremia. Currently appears euvolemic. Fluid status will be tenuous in the setting of advanced liver disease, sepsis, HFrEF, and ULICES.    - Will monitor vitals q4hr and hemodynamics.   - Will attempt to maintain euvolemia by minimizing I's.   - Repeat TTE f/u.

## 2019-01-04 NOTE — DISCHARGE NOTE ADULT - MEDICATION SUMMARY - MEDICATIONS TO STOP TAKING
I will STOP taking the medications listed below when I get home from the hospital:    furosemide 20 mg oral tablet  -- 1 tab(s) by mouth once a day    spironolactone 25 mg oral tablet  -- 1 tab(s) by mouth once a day    glipiZIDE 5 mg oral tablet  -- 1 tab(s) by mouth once a day    Bactrim  mg-160 mg oral tablet  -- 1 tab(s) by mouth once a day    midodrine 10 mg oral tablet  -- 1 tab(s) by mouth 3 times a day I will STOP taking the medications listed below when I get home from the hospital:    furosemide 20 mg oral tablet  -- 1 tab(s) by mouth once a day    spironolactone 25 mg oral tablet  -- 1 tab(s) by mouth once a day    glipiZIDE 5 mg oral tablet  -- 1 tab(s) by mouth once a day    Bactrim  mg-160 mg oral tablet  -- 1 tab(s) by mouth once a day    midodrine 10 mg oral tablet  -- 1 tab(s) by mouth 3 times a day    Nephro-[place] oral tablet  -- 1 tab(s) by mouth once a day    zinc sulfate 220 mg oral tablet  -- 1 tab(s) by mouth once a day

## 2019-01-04 NOTE — DISCHARGE NOTE ADULT - PATIENT PORTAL LINK FT
You can access the [facility], offered by [facility], by registering with the following website: [email or web address]

## 2019-01-04 NOTE — PROGRESS NOTE ADULT - PROBLEM SELECTOR PLAN 8
Last HbA1c 6.1% in 2016. Holding home Glipizide.     - Repeat A1c <5 patient is not diabetic.   - FS qac and qhs, with insulin SSI.

## 2019-01-04 NOTE — DISCHARGE NOTE ADULT - PLAN OF CARE
Treatment of ascites and condition. Consult Hepatology. Patient continued to receive IV albumin 5% q6hr. Hepatology was consulted and recommended MR abdomen with Iron study, and MRCP, and continuing with Rifaximin, Lactulose, The following tests were ordered CMV, EBV, Hep A Ig M, Hep B core Ab, surface Ab, surface Ag, Hep C Ab, Immunoglobulins. Ultrasound of the abdomen done shows Small to moderate ascites largest accumulation in RUQ. Treatment of infection. BCxs showed gram positive cocci and gram negative rods. Patient was expanded and added Vancomycin IV to Zosyn on 1/4. Repeat BCxs were drawn. Treatment of POSSIBLE infection in ascites. Monitor for active signs of bleeding and trend platelet count. PLT count 77k this admission, compared to 75k on previous admission (comparable). This, in addition to elevated INR and hyponatremia, are likely in setting of end stage liver disease. No signs of active bleeding on discharge.   Platelet Trend: 38 K/uL<--, 56 K/uL<--, 77 K/uL<--, 75 K/uL Follow BUN/Cr and renal function. Non oliguric ULICES probably in the setting of hypotension pre renal to ATN.   - Avoid nephrotoxins. Dose medications renally. Trend BMP (BUN/Cr) qd. Further evaluation of HFrEF with TTE Cardiology was consulted who recommended repeat TTE given hx of HFrEF and reduced EF which was pending. Hx of banding. No concerns for active bleeding at this time. (Patient denies any hematemesis). Patient continued to receive IV albumin 5% q6hr. Hepatology was consulted and recommended MR abdomen with Iron study, and MRCP, and continuing with Rifaximin, Lactulose, The following tests were ordered CMV, EBV, Hep A Ig M, Hep B core Ab, surface Ab, surface Ag, Hep C Ab, Immunoglobulins. Ultrasound of the abdomen done shows Small to moderate ascites largest accumulation in RUQ. Liver failure associated with hepatic dysfunction caused the patient to have multiple bleeds. He has required multiple units of blood, FFP, Kcentra, and vitamin K with little effect on the patients INR. He has hematomas in his right buttock, abdominal wall near paracentesis site, and bilateral upper extremity hematomas.

## 2019-01-04 NOTE — PROGRESS NOTE ADULT - PROBLEM SELECTOR PLAN 3
Patient with no florid ascites on physical exam; abdomen soft and nondistended.  However, patient previously treated for SBP and patient with esophageal varices.   Patient complains of diffuse abdominal pain particularly in RUQ and LLQ.  May benefit from at least diagnostic paracentesis. Obtain abdominal US to assess ascites fluid.    - f/u Abdominal U/S to evaluate for the presence of ascites.

## 2019-01-04 NOTE — PROGRESS NOTE ADULT - PROBLEM SELECTOR PLAN 4
PLT count 77k this admission, compared to 75k on previous admission (comparable). This, in addition to elevated INR and hyponatremia, are likely in setting of end stage liver disease.   - Hemoglobin Trend: 8.1 g/dL<--, 10.4 g/dL<--, 10.3 g/dL  - Platelet Trend: 56 K/uL<--, 77 K/uL<--, 75 K/uL  - No active bleeding.

## 2019-01-04 NOTE — PROGRESS NOTE ADULT - PROBLEM SELECTOR PLAN 2
Patient with leukocytosis to WBC 11.8, hypotension to SBP 70's, with no confirmed infectious source but suspicion for SBP given prior treatments for SBP and history of esophageal varices and c/o of vague abd pain.  VBG lactate elevated to 4.7 (possibly from liver disease alone)    - s/p 250 cc 5% albumin for volume resuscitation (pt with low albumin) as patient intravascularly depleted and cannot give NS or LR due to risk of third spacing.  Continue giving albumin q6h.   - Initially started on Ceftriaxone and Vancomycin. Now on Zosyn to cover from Gram negative organisms. Day 2 IV abx 1/4. Started on Vancomycin overnight (1/3 to 1/4) to broaden coverage.  - Holding home diuretics and antihypertensives.  - BCxs 1/2 showing gram negative rods.

## 2019-01-04 NOTE — DISCHARGE NOTE ADULT - SECONDARY DIAGNOSIS.
Sepsis due to Gram negative bacteria SBP (spontaneous bacterial peritonitis) Thrombocytopenia ULICES (acute kidney injury) Systolic congestive heart failure, unspecified HF chronicity Secondary esophageal varices without bleeding

## 2019-01-04 NOTE — PROGRESS NOTE ADULT - SUBJECTIVE AND OBJECTIVE BOX
lethargic, no complaints    PAST MEDICAL & SURGICAL HISTORY:  Hypertension  Type 2 diabetes mellitus  Congestive heart failure (CHF)  Esophageal varices  Alcoholic cirrhosis of liver with ascites  Abdominal hernia: S/P repair x2  H/O cataract    Medications:  albumin human  5% IVPB 250 milliLiter(s) IV Intermittent every 6 hours  artificial tears (preservative free) Ophthalmic Solution 1 Drop(s) Both EYES daily PRN  chlorhexidine 0.12% Liquid 15 milliLiter(s) Oral Mucosa two times a day  dextrose 40% Gel 15 Gram(s) Oral once PRN  dextrose 5%. 1000 milliLiter(s) IV Continuous <Continuous>  dextrose 50% Injectable 12.5 Gram(s) IV Push once  dextrose 50% Injectable 25 Gram(s) IV Push once  dextrose 50% Injectable 25 Gram(s) IV Push once  folic acid 1 milliGRAM(s) Oral daily  glucagon  Injectable 1 milliGRAM(s) IntraMuscular once PRN  insulin lispro (HumaLOG) corrective regimen sliding scale   SubCutaneous three times a day before meals  insulin lispro (HumaLOG) corrective regimen sliding scale   SubCutaneous at bedtime  lactulose Syrup 30 Gram(s) Oral three times a day  midodrine 5 milliGRAM(s) Oral three times a day  multivitamin 1 Tablet(s) Oral daily  piperacillin/tazobactam IVPB. 3.375 Gram(s) IV Intermittent every 8 hours  rifaximin 550 milliGRAM(s) Oral two times a day  thiamine 100 milliGRAM(s) Oral daily  vancomycin  IVPB 1000 milliGRAM(s) IV Intermittent every 12 hours  vancomycin  IVPB          Vitals:  T(C): 36.7 (19 @ 04:30), Max: 36.7 (19 @ 19:18)  HR: 71 (19 @ 04:30) (66 - 72)  BP: 91/51 (19 @ 04:30) (88/52 - 103/83)  BP(mean): --  RR: 18 (19 @ 04:30) (14 - 23)  SpO2: 95% (19 @ 04:30) (95% - 100%)  Wt(kg): --  Daily     Daily Weight in k.1 (2019 04:30)  I&O's Summary    2019 07:01  -  2019 07:00  --------------------------------------------------------  IN: 720 mL / OUT: 200 mL / NET: 520 mL        Physical Exam:  Appearance: [x] Normal [ ] NAD  Eyes: [x ] PERRL [ ] EOMI  HENT: [x ] Normal oral muscosa [ ]NC/AT  Cardiovascular: [x ] S1 [x ] 1+ edema  Procedural Access Site: [ ] No hematoma [ ] Non-tender to palpation [ ] 2+ pulse [ ] No bruit [ ] No Ecchymosis  Respiratory: [x ] Clear to auscultation bilaterally  Gastrointestinal: [ ] Soft [ ] Non-tender [ ] Non-distended [ ] BS+  Musculoskeletal: [ ] No clubbing [ ] No joint deformity   Neurologic: [ x] Non-focal  Lymphatic: [ ] No lymphadenopathy  Psychiatry: [ x] AAOx3 [ ] Mood & affect appropriate  Skin: [ ] No rashes [ ] No ecchymoses [ ] No cyanosis        127<L>  |  94<L>  |  24<H>  ----------------------------<  146<H>  4.6   |  20<L>  |  1.10    Ca    8.4      2019 06:32  Phos  2.9       Mg     2.0         TPro  6.0  /  Alb  2.8<L>  /  TBili  13.6<H>  /  DBili  5.9<H>  /  AST  125<H>  /  ALT  30  /  AlkPhos  140<H>      PT/INR - ( 2019 06:32 )   PT: 41.2 sec;   INR: 3.48 ratio         PTT - ( 2019 06:32 )  PTT:56.2 sec      Serum Pro-Brain Natriuretic Peptide: 1037 pg/mL ( @ 16:55)      Hemoglobin A1C, Whole Blood: 4.8 % ( @ 10:04)      ECG:    Echo:    Stress Testing:     Cath:    Imaging:    Interpretation of Telemetry:

## 2019-01-04 NOTE — DISCHARGE NOTE ADULT - PROVIDER TOKENS
FREE:[LAST:[Kike],FIRST:[Norma],PHONE:[(   )    -],FAX:[(   )    -],ADDRESS:[[facility]]]

## 2019-01-04 NOTE — PROGRESS NOTE ADULT - PROBLEM SELECTOR PLAN 7
No active bleeding at this time. Pt has dried blood on teeth, from dry lips/scab picking, denies hematemesis, hgb at baseline.  Patient was previously on propranolol for prophylaxis- however states he is no longer taking it. Likely 2/2 hypotension. Clarify medication with patient's PCP or pharmacy in AM.    -c/w sbp coverage as noted above.

## 2019-01-04 NOTE — CONSULT NOTE ADULT - ASSESSMENT
53 year old male with end stage liver disease 2/2 chronic alcoholic cirrhosis, with esophageal varices, CAD, HfrEF (EF 35%), and DMII, presenting with 1 week of generalized weakness and poor PO intake.  No fevers, no leukocytosis  Vague symptoms of fatigue, but no focal complaints  UA/UCX neg  BCX with GPC-CL (CoNS by PCR), and GNR  Despite lack of symptoms, would need to treat GNR as true pathogen; suspect CoNS contam  Source--bile? SBP?  Overall, GNR bacteremia, liver cirrhosis, elevated bili, ascites  - Zosyn 3.375g q 8  - Continue Vanco 1g q 12 for now (trough before 4th dose)  - Would check CT A/P (with contrast if can tolerate)  - Consider paracentesis to R/O SBP  - Follow up BCX/pending repeat BCXs    Tereso Collins MD  Pager [phone]  After 5pm and on weekends call [phone]

## 2019-01-04 NOTE — PROGRESS NOTE ADULT - ASSESSMENT
No change in status and stable from cardiac status.  Per above notes has end stage liver disease and prognosis is poor.  OK to continue to hold diuretics.  Echo pending.    JONG Chowdhury  [phone]

## 2019-01-04 NOTE — DISCHARGE NOTE ADULT - MEDICATION SUMMARY - MEDICATIONS TO TAKE
I will START or STAY ON the medications listed below when I get home from the hospital:    traMADol 50 mg oral tablet  -- 1 tab(s) by mouth every 8 hours, As needed, Severe Pain (7 - 10)  -- Indication: For Preventive measure    chlorhexidine 0.12% mucous membrane liquid  -- 15 milliliter(s) mucous membrane 2 times a day  -- Indication: For Preventive measure    vancomycin  -- 1 gram(s) intravenous once a day infuse over 60 minutes.   -- Indication: For Sepsis due to Gram negative bacteria    Dextrose 5% in Water intravenous solution  -- 1000 milliliter(s) intravenous   -- Indication: For Hypoglycemia protocol in patients receiving Insulin    lactulose 10 g/15 mL oral syrup  -- 30 milliliter(s) by mouth 2 times a day, As Needed  -- Indication: For Alcoholic cirrhosis of liver with ascites    rifAXIMin 550 mg oral tablet  -- 1 tab(s) by mouth 2 times a day  -- Indication: For Alcoholic cirrhosis of liver with ascites    midodrine 10 mg oral tablet  -- 1 tab(s) by mouth 3 times a day  -- Indication: For Alcoholic cirrhosis of liver with ascites    ocular lubricant ophthalmic solution  -- 1 drop(s) to each affected eye once a day, As needed, Dry Eyes  -- Indication: For Preventive measure    Visine 0.05% ophthalmic solution  -- 1 drop(s) in each eye , As Needed  -- Indication: For Preventive measure    piperacillin-tazobactam 2 g-0.25 g intravenous injection  -- 3.375 gram(s) intravenous in dextrose 5% 100 mL IV intermittent q8hr  -- Indication: For Sepsis due to Gram negative bacteria    Nephro-[place] oral tablet  -- 1 tab(s) by mouth once a day  -- Indication: For Preventive measure    folic acid 1 mg oral tablet  -- 1 tab(s) by mouth once a day  -- Indication: For Preventive measure    thiamine 100 mg oral tablet  -- 1 tab(s) by mouth once a day  -- Indication: For Preventive measure    albumin human 5% intravenous solution  -- 250 milliliter(s) intravenous every 6 hours  -- Indication: For Alcoholic cirrhosis of liver with ascites I will START or STAY ON the medications listed below when I get home from the hospital:    spironolactone 25 mg oral tablet  -- 2 tab(s) by mouth 2 times a day  -- Indication: For Congestive heart failure (CHF)    traMADol 50 mg oral tablet  -- 1 tab(s) by mouth every 8 hours, As needed, Severe Pain (7 - 10)  -- Indication: For Preventive measure    furosemide 20 mg oral tablet  -- 1 tab(s) by mouth once a day  -- Indication: For Congestive heart failure (CHF)    vancomycin  -- 1 gram(s) intravenous once a day infuse over 60 minutes.   -- Indication: For Sepsis due to Gram negative bacteria    lactulose 10 g/15 mL oral syrup  -- 60 milliliter(s) by mouth 3 times a day  -- Indication: For liver failure    rifAXIMin 550 mg oral tablet  -- 1 tab(s) by mouth 2 times a day  -- Indication: For Alcoholic cirrhosis of liver with ascites    midodrine 5 mg oral tablet  -- 3 tab(s) by mouth 3 times a day  -- Indication: For Hyperkalemia    Visine 0.05% ophthalmic solution  -- 1 drop(s) in each eye , As Needed  -- Indication: For Preventive measure    ocular lubricant ophthalmic solution  -- 1 drop(s) to each affected eye once a day, As needed, Dry Eyes  -- Indication: For Preventive measure    folic acid 1 mg oral tablet  -- 1 tab(s) by mouth once a day  -- Indication: For Preventive measure    thiamine 100 mg oral tablet  -- 1 tab(s) by mouth once a day  -- Indication: For Preventive measure

## 2019-01-04 NOTE — PROGRESS NOTE ADULT - SUBJECTIVE AND OBJECTIVE BOX
Dr. Noman Harden  Internal Medicine PGY-1   Pager# [phone]    Patient is a 53y old  Male who presents with a chief complaint of hypotension, decompensated cirrhosis (03 Jan 2019 14:33)      SUBJECTIVE / OVERNIGHT EVENTS:    MEDICATIONS  (STANDING):  albumin human  5% IVPB 250 milliLiter(s) IV Intermittent every 6 hours  chlorhexidine 0.12% Liquid 15 milliLiter(s) Oral Mucosa two times a day  dextrose 5%. 1000 milliLiter(s) (50 mL/Hr) IV Continuous <Continuous>  dextrose 50% Injectable 12.5 Gram(s) IV Push once  dextrose 50% Injectable 25 Gram(s) IV Push once  dextrose 50% Injectable 25 Gram(s) IV Push once  folic acid 1 milliGRAM(s) Oral daily  insulin lispro (HumaLOG) corrective regimen sliding scale   SubCutaneous three times a day before meals  insulin lispro (HumaLOG) corrective regimen sliding scale   SubCutaneous at bedtime  lactulose Syrup 30 Gram(s) Oral three times a day  midodrine 5 milliGRAM(s) Oral three times a day  multivitamin 1 Tablet(s) Oral daily  piperacillin/tazobactam IVPB. 3.375 Gram(s) IV Intermittent every 8 hours  rifaximin 550 milliGRAM(s) Oral two times a day  thiamine 100 milliGRAM(s) Oral daily  vancomycin  IVPB 1000 milliGRAM(s) IV Intermittent every 12 hours  vancomycin  IVPB        MEDICATIONS  (PRN):  artificial tears (preservative free) Ophthalmic Solution 1 Drop(s) Both EYES daily PRN Dry Eyes  dextrose 40% Gel 15 Gram(s) Oral once PRN Blood Glucose LESS THAN 70 milliGRAM(s)/deciliter  glucagon  Injectable 1 milliGRAM(s) IntraMuscular once PRN Glucose LESS THAN 70 milligrams/deciliter      Vital Signs Last 24 Hrs  T(C): 36.7 (04 Jan 2019 04:30), Max: 36.9 (03 Jan 2019 08:33)  T(F): 98 (04 Jan 2019 04:30), Max: 98.4 (03 Jan 2019 08:33)  HR: 71 (04 Jan 2019 04:30) (64 - 76)  BP: 91/51 (04 Jan 2019 04:30) (88/52 - 103/83)  BP(mean): --  RR: 18 (04 Jan 2019 04:30) (14 - 23)  SpO2: 95% (04 Jan 2019 04:30) (95% - 100%)  CAPILLARY BLOOD GLUCOSE      POCT Blood Glucose.: 147 mg/dL (03 Jan 2019 22:35)  POCT Blood Glucose.: 141 mg/dL (03 Jan 2019 18:43)  POCT Blood Glucose.: 110 mg/dL (03 Jan 2019 14:47)  POCT Blood Glucose.: 108 mg/dL (03 Jan 2019 08:28)  POCT Blood Glucose.: 111 mg/dL (03 Jan 2019 08:08)  POCT Blood Glucose.: 40 mg/dL (03 Jan 2019 07:31)  POCT Blood Glucose.: 40 mg/dL (03 Jan 2019 07:29)    I&O's Summary    03 Jan 2019 07:01  -  04 Jan 2019 06:57  --------------------------------------------------------  IN: 720 mL / OUT: 200 mL / NET: 520 mL        PHYSICAL EXAM:    Constitutional: Frail appearing, jaundiced, in mild distress due to feeling very cold  	ENMT: dry mucous membranes, no pharyngeal erythema or exudates  	Respiratory: lungs CTAB; no wheezing, rales or rhonchi  	Cardiovascular: Normal S1 and S2; no murmurs. Trace LE edema bilaterally.   	Gastrointestinal: Abdomen soft,  mild TTP in RUQ and LLQ, no distension, no fluid wave, normal bowel sounds   	Extremities: No clubbing, cyanosis or edema.   	Vascular: 2+ peripheral pulses  	Neurological: No asterixis. No focal deficits appreciated.  	Skin: Jaundiced; no rashes noted. 2 cm x 3 cm ecchymosis non tender no palpable hematoma.     Psychiatric: A&O x 4.      Culture - Urine (01.02.19 @ 22:32)    Specimen Source: .Urine Clean Catch (Midstream)    Culture Results:   No growth    Culture - Blood (01.02.19 @ 19:32)    -  Coagulase negative Staphylococcus: Detec    Gram Stain:   Growth in anaerobic bottle: Gram Positive Cocci in Clusters  Growth in aerobic bottle: Gram Positive Cocci in Clusters and Gram  Negative Rods    Specimen Source: .Blood Blood    Organism: Blood Culture PCR    Culture Results:   Growth in anaerobic bottle: Gram Positive Cocci in Clusters  Growth in aerobic bottle: Gram Positive Cocci in Clusters and Gram  Negative Rods  "Due to technical problems, Proteus sp. will Not be reported as part of  the BCID panel until further notice"    Culture - Blood (01.02.19 @ 19:32)    Specimen Source: .Blood Blood    Culture Results:   No growth to date.    Hemoglobin A1C, Whole Blood in AM (01.03.19 @ 10:04)    Hemoglobin A1C, Whole Blood: 4.8: Dr. Noman Harden  Internal Medicine PGY-1   Pager# [phone]    Patient is a 53y old  Male who presents with a chief complaint of hypotension, decompensated cirrhosis (2019 14:33)      SUBJECTIVE / OVERNIGHT EVENTS: No acute events overnight. Patient denies any fevers, chills nausea, vomiting, abdominal pain, chest pain. Still reporting pain in hips. Tolerating PO intake,     MEDICATIONS  (STANDING):  albumin human  5% IVPB 250 milliLiter(s) IV Intermittent every 6 hours  chlorhexidine 0.12% Liquid 15 milliLiter(s) Oral Mucosa two times a day  dextrose 5%. 1000 milliLiter(s) (50 mL/Hr) IV Continuous <Continuous>  dextrose 50% Injectable 12.5 Gram(s) IV Push once  dextrose 50% Injectable 25 Gram(s) IV Push once  dextrose 50% Injectable 25 Gram(s) IV Push once  folic acid 1 milliGRAM(s) Oral daily  insulin lispro (HumaLOG) corrective regimen sliding scale   SubCutaneous three times a day before meals  insulin lispro (HumaLOG) corrective regimen sliding scale   SubCutaneous at bedtime  lactulose Syrup 30 Gram(s) Oral three times a day  midodrine 5 milliGRAM(s) Oral three times a day  multivitamin 1 Tablet(s) Oral daily  piperacillin/tazobactam IVPB. 3.375 Gram(s) IV Intermittent every 8 hours  rifaximin 550 milliGRAM(s) Oral two times a day  thiamine 100 milliGRAM(s) Oral daily  vancomycin  IVPB 1000 milliGRAM(s) IV Intermittent every 12 hours  vancomycin  IVPB        MEDICATIONS  (PRN):  artificial tears (preservative free) Ophthalmic Solution 1 Drop(s) Both EYES daily PRN Dry Eyes  dextrose 40% Gel 15 Gram(s) Oral once PRN Blood Glucose LESS THAN 70 milliGRAM(s)/deciliter  glucagon  Injectable 1 milliGRAM(s) IntraMuscular once PRN Glucose LESS THAN 70 milligrams/deciliter      Vital Signs Last 24 Hrs  T(C): 36.7 (2019 04:30), Max: 36.9 (2019 08:33)  T(F): 98 (2019 04:30), Max: 98.4 (2019 08:33)  HR: 71 (2019 04:30) (64 - 76)  BP: 91/51 (2019 04:30) (88/52 - 103/83)  BP(mean): --  RR: 18 (2019 04:30) (14 - 23)  SpO2: 95% (2019 04:30) (95% - 100%)  CAPILLARY BLOOD GLUCOSE      POCT Blood Glucose.: 147 mg/dL (2019 22:35)  POCT Blood Glucose.: 141 mg/dL (2019 18:43)  POCT Blood Glucose.: 110 mg/dL (2019 14:47)  POCT Blood Glucose.: 108 mg/dL (2019 08:28)  POCT Blood Glucose.: 111 mg/dL (2019 08:08)  POCT Blood Glucose.: 40 mg/dL (2019 07:31)  POCT Blood Glucose.: 40 mg/dL (2019 07:29)    I&O's Summary    2019 07:01  -  2019 06:57  --------------------------------------------------------  IN: 720 mL / OUT: 200 mL / NET: 520 mL        PHYSICAL EXAM:    Constitutional: Frail appearing, jaundiced, in mild distress due to feeling very cold  	ENMT: dry mucous membranes, no pharyngeal erythema or exudates  	Respiratory: lungs CTAB; no wheezing, rales or rhonchi  	Cardiovascular: Normal S1 and S2; no murmurs. Trace LE edema bilaterally.   	Gastrointestinal: Abdomen soft,  mild TTP in RUQ and LLQ, no distension, no fluid wave, normal bowel sounds   	Extremities: No clubbing, cyanosis or edema.   	Vascular: 2+ peripheral pulses  	Neurological: No asterixis. No focal deficits appreciated.  	Skin: Jaundiced; no rashes noted. 2 cm x 3 cm ecchymosis non tender no palpable hematoma.     Psychiatric: A&O x 4.      Culture - Urine (19 @ 22:32)    Specimen Source: .Urine Clean Catch (Midstream)    Culture Results:   No growth    Culture - Blood (19 @ 19:32)    -  Coagulase negative Staphylococcus: Detec    Gram Stain:   Growth in anaerobic bottle: Gram Positive Cocci in Clusters  Growth in aerobic bottle: Gram Positive Cocci in Clusters and Gram  Negative Rods    Specimen Source: .Blood Blood    Organism: Blood Culture PCR    Culture Results:   Growth in anaerobic bottle: Gram Positive Cocci in Clusters  Growth in aerobic bottle: Gram Positive Cocci in Clusters and Gram  Negative Rods  "Due to technical problems, Proteus sp. will Not be reported as part of  the BCID panel until further notice"    Culture - Blood (19 @ 19:32)    Specimen Source: .Blood Blood    Culture Results:   No growth to date.    Hemoglobin A1C, Whole Blood in AM (19 @ 10:04)    Hemoglobin A1C, Whole Blood: 4.8:       LABS:                        7.8    7.2   )-----------( 38       ( 2019 06:32 )             22.7     -    127<L>  |  94<L>  |  24<H>  ----------------------------<  146<H>  4.6   |  20<L>  |  1.10    Ca    8.4      2019 06:32  Phos  2.9       Mg     2.0         TPro  6.0  /  Alb  2.8<L>  /  TBili  13.6<H>  /  DBili  5.9<H>  /  AST  125<H>  /  ALT  30  /  AlkPhos  140<H>      PT/INR - ( 2019 06:32 )   PT: 41.2 sec;   INR: 3.48 ratio         PTT - ( 2019 06:32 )  PTT:56.2 sec      Urinalysis Basic - ( 2019 20:43 )    Color: Yellow / Appearance: Slightly Turbid / S.012 / pH: x  Gluc: x / Ketone: Negative  / Bili: Small / Urobili: 2 mg/dL   Blood: x / Protein: Negative / Nitrite: Negative   Leuk Esterase: Negative / RBC: 0 /hpf / WBC 0 /HPF   Sq Epi: x / Non Sq Epi: 1 / Bacteria: Negative    AST Trend: 125 U/L<--, 150 U/L<--, 169 U/L  ALT Trend: 30 U/L<--, 34 U/L<--, 36 U/L<  ALP Trend: 140 U/L<--, 166 U/L<--, 228 U/L  Albumin Trend: 2.8 g/dL<--, 2.5 g/dL<--, 2.5 g/dL  Total Bilirubin Trend: 13.6 mg/dL<--, 13.2 mg/dL<--, 12.8 mg/dL  INR Trend: 3.48 ratio<--, 3.26 ratio<--, 2.81 ratio    Bilirubin Direct, Serum (19 @ 06:32)    Bilirubin Direct, Serum: 5.9 mg/dL    Bilirubin - Total and Direct (19 @ 16:55)    Bilirubin Direct, Serum: 6.5 mg/dL    Bilirubin Total, Serum: 12.8 mg/dL    Indirect Reacting Bilirubin: 6.3 mg/dL      < from: US Abdomen Limited (19 @ 09:35) >  IMPRESSION:     Small to moderate ascites. Largest accumulation in the right upper   quadrant.

## 2019-01-04 NOTE — PROGRESS NOTE ADULT - PROBLEM SELECTOR PLAN 5
Non oliguric ULICES probably in the setting of hypotension pre renal to ATN.     - Avoid nephrotoxins. Dose medications renally. Trend BMP (BUN/Cr) qd.   - Likely not long term HD candidate given poor prognosis and poor liver transplant candidate, family aware.

## 2019-01-04 NOTE — PROGRESS NOTE ADULT - PROBLEM SELECTOR PLAN 9
DVT ppx: SCDs  Diet: DASH/Consistent Carb    Goals of Care: FULL CODE. addressed GOC with patient and his brother at bedside. Patient stated that he would like chest compressions and intubation if necessary.

## 2019-01-04 NOTE — PROVIDER CONTACT NOTE (CRITICAL VALUE NOTIFICATION) - ASSESSMENT
Pt is afebrile. Pt completed stat dose of IV ABT - Vanco Pt is afebrile. Pt completed stat dose of IV ABT - Vanco and Zosyn.

## 2019-01-04 NOTE — CHART NOTE - NSCHARTNOTEFT_GEN_A_CORE
Patients family refusing MR abdomen and MRCP test after speaking to Dr. Stokes (Hepatology at [facility]), in regards to the utility of this test.

## 2019-01-04 NOTE — DISCHARGE NOTE ADULT - CARE PLAN
Principal Discharge DX:	Alcoholic cirrhosis of liver with ascites  Goal:	Treatment of ascites and condition. Consult Hepatology.  Assessment and plan of treatment:	Patient continued to receive IV albumin 5% q6hr. Hepatology was consulted and recommended MR abdomen with Iron study, and MRCP, and continuing with Rifaximin, Lactulose, The following tests were ordered CMV, EBV, Hep A Ig M, Hep B core Ab, surface Ab, surface Ag, Hep C Ab, Immunoglobulins. Ultrasound of the abdomen done shows Small to moderate ascites largest accumulation in RUQ.  Secondary Diagnosis:	Sepsis due to Gram negative bacteria  Goal:	Treatment of infection.  Assessment and plan of treatment:	BCxs showed gram positive cocci and gram negative rods. Patient was expanded and added Vancomycin IV to Zosyn on 1/4. Repeat BCxs were drawn.  Secondary Diagnosis:	SBP (spontaneous bacterial peritonitis)  Goal:	Treatment of POSSIBLE infection in ascites.  Assessment and plan of treatment:	BCxs showed gram positive cocci and gram negative rods. Patient was expanded and added Vancomycin IV to Zosyn on 1/4. Repeat BCxs were drawn.  Secondary Diagnosis:	Thrombocytopenia  Goal:	Monitor for active signs of bleeding and trend platelet count.  Assessment and plan of treatment:	PLT count 77k this admission, compared to 75k on previous admission (comparable). This, in addition to elevated INR and hyponatremia, are likely in setting of end stage liver disease. No signs of active bleeding on discharge.   Platelet Trend: 38 K/uL<--, 56 K/uL<--, 77 K/uL<--, 75 K/uL  Secondary Diagnosis:	ULICES (acute kidney injury)  Goal:	Follow BUN/Cr and renal function.  Assessment and plan of treatment:	Non oliguric ULICES probably in the setting of hypotension pre renal to ATN.   - Avoid nephrotoxins. Dose medications renally. Trend BMP (BUN/Cr) qd.  Secondary Diagnosis:	Systolic congestive heart failure, unspecified HF chronicity  Goal:	Further evaluation of HFrEF with TTE  Assessment and plan of treatment:	Cardiology was consulted who recommended repeat TTE given hx of HFrEF and reduced EF which was pending.  Secondary Diagnosis:	Secondary esophageal varices without bleeding  Goal:	Hx of banding.  Assessment and plan of treatment:	No concerns for active bleeding at this time. (Patient denies any hematemesis). Principal Discharge DX:	Alcoholic cirrhosis of liver with ascites  Goal:	Treatment of ascites and condition. Consult Hepatology.  Assessment and plan of treatment:	Patient continued to receive IV albumin 5% q6hr. Hepatology was consulted and recommended MR abdomen with Iron study, and MRCP, and continuing with Rifaximin, Lactulose, The following tests were ordered CMV, EBV, Hep A Ig M, Hep B core Ab, surface Ab, surface Ag, Hep C Ab, Immunoglobulins. Ultrasound of the abdomen done shows Small to moderate ascites largest accumulation in RUQ. Liver failure associated with hepatic dysfunction caused the patient to have multiple bleeds. He has required multiple units of blood, FFP, Kcentra, and vitamin K with little effect on the patients INR. He has hematomas in his right buttock, abdominal wall near paracentesis site, and bilateral upper extremity hematomas.  Secondary Diagnosis:	Sepsis due to Gram negative bacteria  Goal:	Treatment of infection.  Assessment and plan of treatment:	BCxs showed gram positive cocci and gram negative rods. Patient was expanded and added Vancomycin IV to Zosyn on 1/4. Repeat BCxs were drawn.  Secondary Diagnosis:	SBP (spontaneous bacterial peritonitis)  Goal:	Treatment of POSSIBLE infection in ascites.  Assessment and plan of treatment:	BCxs showed gram positive cocci and gram negative rods. Patient was expanded and added Vancomycin IV to Zosyn on 1/4. Repeat BCxs were drawn.  Secondary Diagnosis:	Thrombocytopenia  Goal:	Monitor for active signs of bleeding and trend platelet count.  Assessment and plan of treatment:	PLT count 77k this admission, compared to 75k on previous admission (comparable). This, in addition to elevated INR and hyponatremia, are likely in setting of end stage liver disease. No signs of active bleeding on discharge.   Platelet Trend: 38 K/uL<--, 56 K/uL<--, 77 K/uL<--, 75 K/uL  Secondary Diagnosis:	ULICES (acute kidney injury)  Goal:	Follow BUN/Cr and renal function.  Assessment and plan of treatment:	Non oliguric ULICES probably in the setting of hypotension pre renal to ATN.   - Avoid nephrotoxins. Dose medications renally. Trend BMP (BUN/Cr) qd.  Secondary Diagnosis:	Systolic congestive heart failure, unspecified HF chronicity  Goal:	Further evaluation of HFrEF with TTE  Assessment and plan of treatment:	Cardiology was consulted who recommended repeat TTE given hx of HFrEF and reduced EF which was pending.  Secondary Diagnosis:	Secondary esophageal varices without bleeding  Goal:	Hx of banding.  Assessment and plan of treatment:	No concerns for active bleeding at this time. (Patient denies any hematemesis).

## 2019-01-04 NOTE — CONSULT NOTE ADULT - SUBJECTIVE AND OBJECTIVE BOX
"HPI: Patient is a 53 year old male with end stage liver disease 2/2 chronic alcoholic cirrhosis, with esophageal varices, CAD, HfrEF (EF 35%), and DMII, presenting with 1 week of generalized weakness and poor PO intake. Patient's brother is at bedside. Patient denies fevers but reports feeling very cold and has multiple blankets on. Mild intermittent cough with no sputum for the past 2-3 days. No dyspnea at rest, but does have moderate dyspnea on exertion. No chest pain. Patient has intermittent abdominal pain in the LLQ and occasionally RUQ which he describes as a burning sensation. Patient denies hemoptysis/hematemesis or bloody stools. Denies nausea/vomiting in general. Denies abdominal distension (states it has gotten better). He notes at least 20 pound weight loss in the past month (mainly due to resolution of abdominal ascites compared to last month). Patient was recently admitted to our hospital system followed by [facility] in 10- 2018 with abdominal distension and lower extremity edema, was initially placed on liver transplant list however was removed from list due to HF with severely reduced EF.  Prior to the [facility] transfer, patient was treated for severe sepsis from E coli bacteremia from presumed SBP, treated with ceftriaxone. Patient also recently had episode of LE edema 3 weeks ago- was started metolazone in addition to home lasix and spironolactone. However, the metolazone was discontinued 1 week ago. Patient currently takes lasix 20 mg daily and spironolactone 25 mg daily.  Brother at bedside reports taking daily BP and HR on patient, which average in mid 80s-low 90s systolic, and HR 60s.     In the ED, pt afebrile, T 97.4, HR 62, BP as low as 73/78, satting 100% on room air. Labs notable for WBC 11.8, PLT 77, INR 2.81, K+ 5.9, Cr 1.33, Total Bilirubin 12.8. Lactate on VBG 4.7.   Patient given 250 cc 5% albumin with improvement in bp to SBP 95/58. Also given 1g IV ceftriaxone for empiric SBP treatment. (2019 23:26)"    Above reviewed. Saw/spoke to patient. Patient with history alcoholic cirrhosis, varices, ascites on long term PPX (Cipro, then more recently Bactrim), presenting with weakness/poor PO. In 2018, E coli bacteremia, likely 2/2 SBP. Here, found to have GPC and GNR in blood culture. ID called for further eval. No fevers, no chills. Feels significantly fatigued, worse on ambulation. No abd pain, no RUQ abd pain specifically. No wounds, no rashes. Has had yellowing of skin. had evaluation for Liver transplant at OSH, but concern for heart failure as well. Now on abx for bacteremia.    PAST MEDICAL & SURGICAL HISTORY:  Hypertension  Type 2 diabetes mellitus  Congestive heart failure (CHF)  Esophageal varices  Alcoholic cirrhosis of liver with ascites  Abdominal hernia: S/P repair x2  H/O cataract    Allergies    No Known Allergies    ANTIMICROBIALS:  piperacillin/tazobactam IVPB. 3.375 every 8 hours  rifaximin 550 two times a day  vancomycin  IVPB 1000 every 12 hours  vancomycin  IVPB      OTHER MEDS:  albumin human  5% IVPB 250 milliLiter(s) IV Intermittent every 6 hours  artificial tears (preservative free) Ophthalmic Solution 1 Drop(s) Both EYES daily PRN  chlorhexidine 0.12% Liquid 15 milliLiter(s) Oral Mucosa two times a day  dextrose 40% Gel 15 Gram(s) Oral once PRN  dextrose 5%. 1000 milliLiter(s) IV Continuous <Continuous>  dextrose 50% Injectable 12.5 Gram(s) IV Push once  dextrose 50% Injectable 25 Gram(s) IV Push once  dextrose 50% Injectable 25 Gram(s) IV Push once  folic acid 1 milliGRAM(s) Oral daily  glucagon  Injectable 1 milliGRAM(s) IntraMuscular once PRN  insulin lispro (HumaLOG) corrective regimen sliding scale   SubCutaneous three times a day before meals  insulin lispro (HumaLOG) corrective regimen sliding scale   SubCutaneous at bedtime  lactulose Syrup 30 Gram(s) Oral three times a day  lidocaine   Patch 1 Patch Transdermal daily  midodrine 5 milliGRAM(s) Oral three times a day  multivitamin 1 Tablet(s) Oral daily  thiamine 100 milliGRAM(s) Oral daily  traMADol 50 milliGRAM(s) Oral every 8 hours PRN    SOCIAL HISTORY: No tobacco, history of alcohol abuse, no other toxic habits    FAMILY HISTORY:  Family history of hypercholesterolemia (Father)  Family history of diabetes mellitus (Mother)    Drug Dosing Weight  Height (cm): 185.42 (2019 16:18)  Weight (kg): 98.9 (2019 16:18)  BMI (kg/m2): 28.8 (2019 16:18)  BSA (m2): 2.23 (2019 16:18)    PE:    Vital Signs Last 24 Hrs  T(C): 36.5 (2019 12:10), Max: 36.7 (2019 19:18)  T(F): 97.7 (2019 12:10), Max: 98.1 (2019 19:18)  HR: 60 (2019 12:10) (60 - 71)  BP: 97/57 (2019 12:10) (90/56 - 103/83)  RR: 18 (2019 12:10) (16 - 18)  SpO2: 97% (2019 12:10) (95% - 100%)    Gen: AOx3, NAD, non-toxic, fatigued, jaundice  CV: S1+S2 normal, nontachycardic  Resp: Clear bilat, no resp distress, no crackles/wheezes  Abd: Soft, nontender, +BS, mild distension, no pain in all 4 quadrants  Ext: No LE edema, no wounds  : No Duarte, no suprapubic tenderness  IV/Skin: No thrombophlebitis, no rash  Msk: No low back pain, no arthralgias, no joint swelling  Neuro: No sensory deficits, no motor deficits    LABS:                        7.8    7.2   )-----------( 38       ( 2019 06:32 )             22.7     01-04    127<L>  |  94<L>  |  24<H>  ----------------------------<  146<H>  4.6   |  20<L>  |  1.10    Ca    8.4      2019 06:32  Phos  2.9     -04  Mg     2.0     -04    TPro  6.0  /  Alb  2.8<L>  /  TBili  13.6<H>  /  DBili  5.9<H>  /  AST  125<H>  /  ALT  30  /  AlkPhos  140<H>  -04    Urinalysis Basic - ( 2019 20:43 )    Color: Yellow / Appearance: Slightly Turbid / S.012 / pH: x  Gluc: x / Ketone: Negative  / Bili: Small / Urobili: 2 mg/dL   Blood: x / Protein: Negative / Nitrite: Negative   Leuk Esterase: Negative / RBC: 0 /hpf / WBC 0 /HPF   Sq Epi: x / Non Sq Epi: 1 / Bacteria: Negative    MICROBIOLOGY:    .Urine Clean Catch (Midstream)  19   No growth     .Blood Blood  19   No growth to date.  --  Blood Culture PCR CoNS; (CX with GPC-CL, GNR)    Rapid RVP Result: NotDetec ( @ 16:55)    RADIOLOGY:    1/3 USG:    FINDINGS:    Evaluation of the 4 quadrants of the abdomen demonstrates small to   moderate ascites. Largest accumulation within the right upper quadrant.     IMPRESSION:     Small to moderate ascites. Largest accumulation in the right upper   quadrant.    1/3 XR Pelvis:    IMPRESSION:     No fractures or dislocations. Advanced left and moderate right hip   osteoarthritis.

## 2019-01-04 NOTE — PROVIDER CONTACT NOTE (CRITICAL VALUE NOTIFICATION) - BACKGROUND
Pt admitted with b/l LE weakness. Hx of end stage liver disease Pt admitted with b/l LE weakness. Hx of end stage liver disease, diabetes, HTN, CHF.

## 2019-01-04 NOTE — DISCHARGE NOTE ADULT - HOSPITAL COURSE
ADMITTING H&P    Patient is a 53 year old male with end stage liver disease 2/2 chronic alcoholic cirrhosis, with esophageal varices, CAD, HfrEF (EF 35%), and DMII, presenting with 1 week of generalized weakness and poor PO intake. Patient's brother is at bedside. Patient denies fevers but reports feeling very cold and has multiple blankets on. Mild intermittent cough with no sputum for the past 2-3 days. No dyspnea at rest, but does have moderate dyspnea on exertion. No chest pain. Patient has intermittent abdominal pain in the LLQ and occasionally RUQ which he describes as a burning sensation. Patient denies hemoptysis/hematemesis or bloody stools. Denies nausea/vomiting in general. Denies abdominal distension (states it has gotten better). He notes at least 20 pound weight loss in the past month (mainly due to resolution of abdominal ascites compared to last month). Patient was recently admitted to our hospital system followed by [facility] in 10- 11/2018 with abdominal distension and lower extremity edema, was initially placed on liver transplant list however was removed from list due to HF with severely reduced EF.  Prior to the [facility] transfer, patient was treated for severe sepsis from E coli bacteremia from presumed SBP, treated with ceftriaxone. Patient also recently had episode of LE edema 3 weeks ago- was started metolazone in addition to home lasix and spironolactone. However, the metolazone was discontinued 1 week ago. Patient currently takes lasix 20 mg daily and spironolactone 25 mg daily.  Brother at bedside reports taking daily BP and HR on patient, which average in mid 80s-low 90s systolic, and HR 60s.     In the ED, pt afebrile, T 97.4, HR 62, BP as low as 73/78, satting 100% on room air. Labs notable for WBC 11.8, PLT 77, INR 2.81, K+ 5.9, Cr 1.33, Total Bilirubin 12.8. Lactate on VBG 4.7.   Patient given 250 cc 5% albumin with improvement in bp to SBP 95/58. Also given 1g IV ceftriaxone for empiric SBP treatment.       HOSPITAL COURSE    Patient continued to receive IV albumin 5% q6hr. BCxs showed gram positive cocci and gram negative rods. Patient was expanded and added Vancomycin IV to Zosyn on 1/4. Repeat BCxs were drawn. Hepatology was consulted and recommended MR abdomen with Iron study, and MRCP. The following tests were ordered CMV, EBV, Hep A Ig M, Hep B core Ab, surface Ab, surface Ag, Hep C Ab, Immunoglobulins. Cardiology was consulted who recommended repeat TTE given hx of HFrEF and reduced EF which was pending.     Side note Hips and Pelvis Xrays were done which showed OA changes.     Patient is clinically stable for transfer SBPs improved to baseline 90s, afebrile, SPO2 well on RA. ADMITTING H&P    Patient is a 53 year old male with end stage liver disease 2/2 chronic alcoholic cirrhosis, with esophageal varices, CAD, HfrEF (EF 35%), and DMII, presenting with 1 week of generalized weakness and poor PO intake. Patient's brother is at bedside. Patient denies fevers but reports feeling very cold and has multiple blankets on. Mild intermittent cough with no sputum for the past 2-3 days. No dyspnea at rest, but does have moderate dyspnea on exertion. No chest pain. Patient has intermittent abdominal pain in the LLQ and occasionally RUQ which he describes as a burning sensation. Patient denies hemoptysis/hematemesis or bloody stools. Denies nausea/vomiting in general. Denies abdominal distension (states it has gotten better). He notes at least 20 pound weight loss in the past month (mainly due to resolution of abdominal ascites compared to last month). Patient was recently admitted to our hospital system followed by [facility] in 10- 11/2018 with abdominal distension and lower extremity edema, was initially placed on liver transplant list however was removed from list due to HF with severely reduced EF.  Prior to the [facility] transfer, patient was treated for severe sepsis from E coli bacteremia from presumed SBP, treated with ceftriaxone. Patient also recently had episode of LE edema 3 weeks ago- was started metolazone in addition to home lasix and spironolactone. However, the metolazone was discontinued 1 week ago. Patient currently takes lasix 20 mg daily and spironolactone 25 mg daily.  Brother at bedside reports taking daily BP and HR on patient, which average in mid 80s-low 90s systolic, and HR 60s.     In the ED, pt afebrile, T 97.4, HR 62, BP as low as 73/78, satting 100% on room air. Labs notable for WBC 11.8, PLT 77, INR 2.81, K+ 5.9, Cr 1.33, Total Bilirubin 12.8. Lactate on VBG 4.7.   Patient given 250 cc 5% albumin with improvement in bp to SBP 95/58. Also given 1g IV ceftriaxone for empiric SBP treatment.       HOSPITAL COURSE    Patient continued to receive IV albumin 5% q6hr. BCxs showed gram positive cocci and gram negative rods. Patient was expanded and added Vancomycin IV to Zosyn on 1/4. Repeat BCxs were drawn.     - Hepatology was consulted and recommended MR abdomen with Iron study, and MRCP, and continuing with Rifaximin, Lactulose, The following tests were ordered CMV, EBV, Hep A Ig M, Hep B core Ab, surface Ab, surface Ag, Hep C Ab, Immunoglobulins. Ultrasound of the abdomen done shows Small to moderate ascites largest accumulation in RUQ.    - Cardiology was consulted who recommended repeat TTE given hx of HFrEF and reduced EF which was pending.     - Side note Hips and Pelvis Xrays were done which showed OA changes.     - Patient is clinically stable for transfer SBPs improved to baseline 90s, afebrile, SPO2 well on RA. ADMITTING H&P    Patient is a 53 year old male with end stage liver disease 2/2 chronic alcoholic cirrhosis, with esophageal varices, CAD, HfrEF (EF 35%), and DMII, presenting with 1 week of generalized weakness and poor PO intake. Patient's brother is at bedside. Patient denies fevers but reports feeling very cold and has multiple blankets on. Mild intermittent cough with no sputum for the past 2-3 days. No dyspnea at rest, but does have moderate dyspnea on exertion. No chest pain. Patient has intermittent abdominal pain in the LLQ and occasionally RUQ which he describes as a burning sensation. Patient denies hemoptysis/hematemesis or bloody stools. Denies nausea/vomiting in general. Denies abdominal distension (states it has gotten better). He notes at least 20 pound weight loss in the past month (mainly due to resolution of abdominal ascites compared to last month). Patient was recently admitted to our hospital system followed by [facility] in 10- 11/2018 with abdominal distension and lower extremity edema, was initially placed on liver transplant list however was removed from list due to HF with severely reduced EF.  Prior to the [facility] transfer, patient was treated for severe sepsis from E coli bacteremia from presumed SBP, treated with ceftriaxone. Patient also recently had episode of LE edema 3 weeks ago- was started metolazone in addition to home lasix and spironolactone. However, the metolazone was discontinued 1 week ago. Patient currently takes lasix 20 mg daily and spironolactone 25 mg daily.  Brother at bedside reports taking daily BP and HR on patient, which average in mid 80s-low 90s systolic, and HR 60s.     In the ED, pt afebrile, T 97.4, HR 62, BP as low as 73/78, satting 100% on room air. Labs notable for WBC 11.8, PLT 77, INR 2.81, K+ 5.9, Cr 1.33, Total Bilirubin 12.8. Lactate on VBG 4.7.   Patient given 250 cc 5% albumin with improvement in bp to SBP 95/58. Also given 1g IV ceftriaxone for empiric SBP treatment.       HOSPITAL COURSE    Patient continued to receive IV albumin 5% q6hr. BCxs showed gram positive cocci and gram negative rods. Patient was expanded and added Vancomycin IV to Zosyn on 1/4. Repeat BCxs were drawn. Blood cultures were found to be incorrect (gram negative rods was a false positive result) which and patient was stopped on all IV antibiotics on 1/6. Patient remained afebrile.     Hepatology was consulted and recommended MR abdomen with Iron study, and MRCP which showed findings consistent with liver cirrhosis and portal HTN with mild Iron deposition into the liver. Patient received IV albumin until 1/9 and afterwards was discontinued. Patient was restarted on diuretics (Spironolactone and Furosemide) on 1/11/19. Patient underwent a diagnostic paracentesis on 1/10 (was optimized with 2U FFP prior) which was negative for SBP. Patient initially was going to be transferred to [facility] with Dr. Stokes (patients hepatologist in [facility]) aware, however patient deferred to be treated in [facility] finish work up. After negative diagnostic tap, patient was to be optimized for endoscopy to evaluate for presence of esophogeal varices to explain patients worsening anemia despite 2U PRBC. Stool occult blood was initially negative. No active melena or hematemesis was noted.    Hgb, Plts, and INR continued to worsen without any obvious source of bleed initially. However on 1/11 patient was complaining of worsening pain in right buttock area, U/S done revealed 13 cm hematoma. Patient received 2U of FFP and 1U Platelets, and CT angio abdomen/pelvis on 1/13 revealed active bleed from a intramuscular hematoma centered in the right gluteus mikki muscle. Patient received KCentra on 1/13 to stabilize coagulopathy.     Cardiology was consulted who recommended repeat TTE given hx of HFrEF. Repeat TTE showed Ef of 43% (improvement from 35% in October). With eccentric LV hypertrophy (dilated LV ventricle with normal wall thickness). Moderate global LV dysfunction. Findings were discussed with Hepatologist from [facility] (Dr. Stokes). ADMITTING H&P    Patient is a 53 year old male with end stage liver disease 2/2 chronic alcoholic cirrhosis, with esophageal varices, CAD, HfrEF (EF 35%), and DMII, presenting with 1 week of generalized weakness and poor PO intake. Patient's brother is at bedside. Patient denies fevers but reports feeling very cold and has multiple blankets on. Mild intermittent cough with no sputum for the past 2-3 days. No dyspnea at rest, but does have moderate dyspnea on exertion. No chest pain. Patient has intermittent abdominal pain in the LLQ and occasionally RUQ which he describes as a burning sensation. Patient denies hemoptysis/hematemesis or bloody stools. Denies nausea/vomiting in general. Denies abdominal distension (states it has gotten better). He notes at least 20 pound weight loss in the past month (mainly due to resolution of abdominal ascites compared to last month). Patient was recently admitted to our hospital system followed by [facility] in 10- 11/2018 with abdominal distension and lower extremity edema, was initially placed on liver transplant list however was removed from list due to HF with severely reduced EF.  Prior to the Health system transfer, patient was treated for severe sepsis from E coli bacteremia from presumed SBP, treated with ceftriaxone. Patient also recently had episode of LE edema 3 weeks ago- was started metolazone in addition to home lasix and spironolactone. However, the metolazone was discontinued 1 week ago. Patient currently takes lasix 20 mg daily and spironolactone 25 mg daily.  Brother at bedside reports taking daily BP and HR on patient, which average in mid 80s-low 90s systolic, and HR 60s.     In the ED, pt afebrile, T 97.4, HR 62, BP as low as 73/78, satting 100% on room air. Labs notable for WBC 11.8, PLT 77, INR 2.81, K+ 5.9, Cr 1.33, Total Bilirubin 12.8. Lactate on VBG 4.7.   Patient given 250 cc 5% albumin with improvement in bp to SBP 95/58. Also given 1g IV ceftriaxone for empiric SBP treatment.       HOSPITAL COURSE    Patient continued to receive IV albumin 5% q6hr. BCxs showed gram positive cocci and gram negative rods. Patient was expanded and added Vancomycin IV to Zosyn on 1/4. Repeat BCxs were drawn. Blood cultures were found to be incorrect (gram negative rods was a false positive result) which and patient was stopped on all IV antibiotics on 1/6. Patient remained afebrile.     Hepatology was consulted and recommended MR abdomen with Iron study, and MRCP which showed findings consistent with liver cirrhosis and portal HTN with mild Iron deposition into the liver. Patient received IV albumin until 1/9 and afterwards was discontinued. Patient was restarted on diuretics (Spironolactone and Furosemide) on 1/11/19. Patient underwent a diagnostic paracentesis on 1/10 (was optimized with 2U FFP prior) which was negative for SBP. Patient initially was going to be transferred to Health system with Dr. Stokes (patients hepatologist in [facility]) aware, however patient deferred to be treated in [facility] finish work up. After negative diagnostic tap, patient was to be optimized for endoscopy to evaluate for presence of esophogeal varices to explain patients worsening anemia despite 2U PRBC. Stool occult blood was initially negative. No active melena or hematemesis was noted.    Hgb, Plts, and INR continued to worsen without any obvious source of bleed initially. However on 1/11 patient was complaining of worsening pain in right buttock area, U/S done revealed 13 cm hematoma. Patient received multiple units of FFP, plasma, cryo, PRBCs, Kcentra (2500 BID) and vitamine k, and CT angio abdomen/pelvis on 1/13 revealed active bleed from a intramuscular hematoma centered in the right gluteus mikki muscle. On date of transfer the patient had an ultrasound and CT scan of the right buttock hematoma that was expanding and on exam he also has an abdominal wall hematoma as well has bilateral upper extremity hematomas. There was concern for DIC with a low haptoglobin and high LDH. Peripheral smear showed no evidence of schistocytes     Cardiology was consulted who recommended repeat TTE given hx of HFrEF. Repeat TTE showed Ef of 43% (improvement from 35% in October). With eccentric LV hypertrophy (dilated LV ventricle with normal wall thickness). Moderate global LV dysfunction. Findings were discussed with Hepatologist from Health system (Dr. Stokes).

## 2019-01-04 NOTE — PROGRESS NOTE ADULT - ASSESSMENT
53M end stage liver disease 2/2 alcoholic cirrhosis, DMII, severe HFrEF (35%), CAD, HTN, presenting with 1 week of generalized weakness and hypotension, likely 2/2 decompensated cirrhosis in the setting of sepsis. Now found to have gram negative rods and gram positive bacteremia explaining sepsis.

## 2019-01-04 NOTE — PROGRESS NOTE ADULT - PROBLEM SELECTOR PLAN 1
MELD 34 on admission, indicating >50% mortality in next 3 months. Was recently on liver transplant candidate list at [facility], however had to be removed due to severe HFrEF, no longer plans for liver transplant    - Patient on Furosemide 20 mg and spironolactone 25 mg daily. Will hold diuretics for now as pt intravascularly depleted and hypotensive on arrival in setting of aggressive diuretic regimen as outpatient (lasix/aldactone + addition of metolazone 2 wks ago).   - Will continue with Lactulose and Rifaximin PO.   - Lower concern for hepatic encephalopathy at this time.   - Evidence of severe synthetic dysfunction, portal hypertensive, varices, and existing heart failure and now ULICES suggestive of high risk of multi-organ dysfunction in coming days to weeks. Prognosis is poor. Will consider palliative care consult given poor prognosis.   - c/w albumin as noted below  - f/u U/S abdomen to evaluate for presence of ascites. MELD 34 on admission, indicating >50% mortality in next 3 months. Was recently on liver transplant candidate list at [facility], however had to be removed due to severe HFrEF, no longer plans for liver transplant    - Patient on Furosemide 20 mg and spironolactone 25 mg daily. Will hold diuretics for now as pt intravascularly depleted and hypotensive on arrival in setting of aggressive diuretic regimen as outpatient (lasix/aldactone + addition of metolazone 2 wks ago).   - Will continue with Lactulose and Rifaximin PO.   - Lower concern for hepatic encephalopathy at this time.   - Evidence of severe synthetic dysfunction, portal hypertensive, varices, and existing heart failure and now ULICES. Poor prognosis.  - U/S Abdomen showed small to moderate ascites in the RUQ on   - MRCP and MR Abdomen with Iron quantification f/u.

## 2019-01-04 NOTE — DISCHARGE NOTE ADULT - NS TRANSFER PATIENT BELONGINGS
Intake Progress Note    Organ:  Liver    Initial Call Made by: records from [facility]    Referring Physician:Dr Dodie Beck    PCP- Dr Gume Armstrong    Assigned Coordinator:  Lamar Ho/ Wenceslao Sharpe    Reported Diagnosis:  HUDSON- Nonalcoholic steatohepatitis    On Dialysis:  No    Dialysis Schedule:  Days/shift  or N/A    Reported Medical History: HUDSON- bad liver- varices banding will have another procedure next week  Was inpt [facility] blood infection-     Records:  I will request.     Clinic RN Appt (Only Adult Kidney, Liver and Pancreas Referrals):  N    Preferred Evaluation Start Date:  ASAP     Online Forms    Best time patient can be reached: Anytime    Type of packet sent:  Liver packet    Misc. Notes: May speak with emergency contacts listed in OTTR.  Wife Carleen    Verbal Consent: Y     Email Consent: Y or N    If no PCP or referring information the time of call informed pt to call back with information: Y or N    Informed patient to call if doesn't receive packet and or phone call from coordinator within given time- any time    Insurance- Medicare- [id number]    BCBS-[place]- ID- [id number]     Clothing

## 2019-01-05 DIAGNOSIS — D64.9 ANEMIA, UNSPECIFIED: ICD-10-CM

## 2019-01-05 LAB
ALBUMIN SERPL ELPH-MCNC: 2.7 G/DL — LOW (ref 3.3–5)
ALP SERPL-CCNC: 111 U/L — SIGNIFICANT CHANGE UP (ref 40–120)
ALT FLD-CCNC: 22 U/L — SIGNIFICANT CHANGE UP (ref 10–45)
ANION GAP SERPL CALC-SCNC: 9 MMOL/L — SIGNIFICANT CHANGE UP (ref 5–17)
AST SERPL-CCNC: 96 U/L — HIGH (ref 10–40)
BILIRUB SERPL-MCNC: 12.1 MG/DL — HIGH (ref 0.2–1.2)
BLD GP AB SCN SERPL QL: NEGATIVE — SIGNIFICANT CHANGE UP
BUN SERPL-MCNC: 19 MG/DL — SIGNIFICANT CHANGE UP (ref 7–23)
CALCIUM SERPL-MCNC: 8.3 MG/DL — LOW (ref 8.4–10.5)
CHLORIDE SERPL-SCNC: 100 MMOL/L — SIGNIFICANT CHANGE UP (ref 96–108)
CO2 SERPL-SCNC: 21 MMOL/L — LOW (ref 22–31)
CREAT SERPL-MCNC: 0.97 MG/DL — SIGNIFICANT CHANGE UP (ref 0.5–1.3)
CULTURE RESULTS: SIGNIFICANT CHANGE UP
GLUCOSE BLDC GLUCOMTR-MCNC: 163 MG/DL — HIGH (ref 70–99)
GLUCOSE BLDC GLUCOMTR-MCNC: 207 MG/DL — HIGH (ref 70–99)
GLUCOSE BLDC GLUCOMTR-MCNC: 210 MG/DL — HIGH (ref 70–99)
GLUCOSE BLDC GLUCOMTR-MCNC: 343 MG/DL — HIGH (ref 70–99)
GLUCOSE SERPL-MCNC: 144 MG/DL — HIGH (ref 70–99)
HCT VFR BLD CALC: 19.9 % — CRITICAL LOW (ref 39–50)
HCT VFR BLD CALC: 21.4 % — LOW (ref 39–50)
HCT VFR BLD CALC: 24.7 % — LOW (ref 39–50)
HGB BLD-MCNC: 7 G/DL — CRITICAL LOW (ref 13–17)
HGB BLD-MCNC: 7.4 G/DL — LOW (ref 13–17)
HGB BLD-MCNC: 8.7 G/DL — LOW (ref 13–17)
INR BLD: 3.6 RATIO — HIGH (ref 0.88–1.16)
MAGNESIUM SERPL-MCNC: 2 MG/DL — SIGNIFICANT CHANGE UP (ref 1.6–2.6)
MCHC RBC-ENTMCNC: 34.8 GM/DL — SIGNIFICANT CHANGE UP (ref 32–36)
MCHC RBC-ENTMCNC: 35 GM/DL — SIGNIFICANT CHANGE UP (ref 32–36)
MCHC RBC-ENTMCNC: 35.1 GM/DL — SIGNIFICANT CHANGE UP (ref 32–36)
MCHC RBC-ENTMCNC: 35.7 PG — HIGH (ref 27–34)
MCHC RBC-ENTMCNC: 35.9 PG — HIGH (ref 27–34)
MCHC RBC-ENTMCNC: 36 PG — HIGH (ref 27–34)
MCV RBC AUTO: 102 FL — HIGH (ref 80–100)
MCV RBC AUTO: 103 FL — HIGH (ref 80–100)
MCV RBC AUTO: 103 FL — HIGH (ref 80–100)
OB PNL STL IA: NEGATIVE — SIGNIFICANT CHANGE UP
ORGANISM # SPEC MICROSCOPIC CNT: SIGNIFICANT CHANGE UP
PHOSPHATE SERPL-MCNC: 2.3 MG/DL — LOW (ref 2.5–4.5)
PLATELET # BLD AUTO: 35 K/UL — LOW (ref 150–400)
PLATELET # BLD AUTO: 43 K/UL — LOW (ref 150–400)
PLATELET # BLD AUTO: 49 K/UL — LOW (ref 150–400)
POTASSIUM SERPL-MCNC: 4.5 MMOL/L — SIGNIFICANT CHANGE UP (ref 3.5–5.3)
POTASSIUM SERPL-SCNC: 4.5 MMOL/L — SIGNIFICANT CHANGE UP (ref 3.5–5.3)
PROT SERPL-MCNC: 5.4 G/DL — LOW (ref 6–8.3)
PROTHROM AB SERPL-ACNC: 43.1 SEC — HIGH (ref 10–12.9)
RBC # BLD: 1.94 M/UL — LOW (ref 4.2–5.8)
RBC # BLD: 2.07 M/UL — LOW (ref 4.2–5.8)
RBC # BLD: 2.43 M/UL — LOW (ref 4.2–5.8)
RBC # FLD: 15.2 % — HIGH (ref 10.3–14.5)
RBC # FLD: 15.2 % — HIGH (ref 10.3–14.5)
RBC # FLD: 15.4 % — HIGH (ref 10.3–14.5)
RH IG SCN BLD-IMP: POSITIVE — SIGNIFICANT CHANGE UP
SODIUM SERPL-SCNC: 130 MMOL/L — LOW (ref 135–145)
SPECIMEN SOURCE: SIGNIFICANT CHANGE UP
VANCOMYCIN TROUGH SERPL-MCNC: 19.1 UG/ML — SIGNIFICANT CHANGE UP (ref 10–20)
WBC # BLD: 5 K/UL — SIGNIFICANT CHANGE UP (ref 3.8–10.5)
WBC # BLD: 6.2 K/UL — SIGNIFICANT CHANGE UP (ref 3.8–10.5)
WBC # BLD: 6.9 K/UL — SIGNIFICANT CHANGE UP (ref 3.8–10.5)
WBC # FLD AUTO: 5 K/UL — SIGNIFICANT CHANGE UP (ref 3.8–10.5)
WBC # FLD AUTO: 6.2 K/UL — SIGNIFICANT CHANGE UP (ref 3.8–10.5)
WBC # FLD AUTO: 6.9 K/UL — SIGNIFICANT CHANGE UP (ref 3.8–10.5)

## 2019-01-05 PROCEDURE — 99233 SBSQ HOSP IP/OBS HIGH 50: CPT | Mod: GC

## 2019-01-05 RX ORDER — VANCOMYCIN HCL 1 G
750 VIAL (EA) INTRAVENOUS EVERY 12 HOURS
Qty: 0 | Refills: 0 | Status: DISCONTINUED | OUTPATIENT
Start: 2019-01-05 | End: 2019-01-06

## 2019-01-05 RX ADMIN — LIDOCAINE 1 PATCH: 4 CREAM TOPICAL at 13:17

## 2019-01-05 RX ADMIN — Medication 1 MILLIGRAM(S): at 09:04

## 2019-01-05 RX ADMIN — LIDOCAINE 1 PATCH: 4 CREAM TOPICAL at 18:26

## 2019-01-05 RX ADMIN — CHLORHEXIDINE GLUCONATE 15 MILLILITER(S): 213 SOLUTION TOPICAL at 17:25

## 2019-01-05 RX ADMIN — LACTULOSE 30 GRAM(S): 10 SOLUTION ORAL at 13:11

## 2019-01-05 RX ADMIN — PIPERACILLIN AND TAZOBACTAM 25 GRAM(S): 4; .5 INJECTION, POWDER, LYOPHILIZED, FOR SOLUTION INTRAVENOUS at 17:24

## 2019-01-05 RX ADMIN — Medication 4: at 13:11

## 2019-01-05 RX ADMIN — LACTULOSE 30 GRAM(S): 10 SOLUTION ORAL at 21:31

## 2019-01-05 RX ADMIN — LACTULOSE 30 GRAM(S): 10 SOLUTION ORAL at 05:18

## 2019-01-05 RX ADMIN — PIPERACILLIN AND TAZOBACTAM 25 GRAM(S): 4; .5 INJECTION, POWDER, LYOPHILIZED, FOR SOLUTION INTRAVENOUS at 10:48

## 2019-01-05 RX ADMIN — Medication 125 MILLILITER(S): at 00:42

## 2019-01-05 RX ADMIN — Medication 125 MILLILITER(S): at 17:23

## 2019-01-05 RX ADMIN — MIDODRINE HYDROCHLORIDE 5 MILLIGRAM(S): 2.5 TABLET ORAL at 13:11

## 2019-01-05 RX ADMIN — MIDODRINE HYDROCHLORIDE 5 MILLIGRAM(S): 2.5 TABLET ORAL at 05:19

## 2019-01-05 RX ADMIN — CHLORHEXIDINE GLUCONATE 15 MILLILITER(S): 213 SOLUTION TOPICAL at 05:19

## 2019-01-05 RX ADMIN — Medication 100 MILLIGRAM(S): at 09:04

## 2019-01-05 RX ADMIN — Medication 62.5 MILLIMOLE(S): at 08:39

## 2019-01-05 RX ADMIN — Medication 1 DROP(S): at 18:25

## 2019-01-05 RX ADMIN — Medication 1: at 09:03

## 2019-01-05 RX ADMIN — Medication 2: at 18:14

## 2019-01-05 RX ADMIN — Medication 250 MILLIGRAM(S): at 21:31

## 2019-01-05 RX ADMIN — Medication 125 MILLILITER(S): at 06:20

## 2019-01-05 RX ADMIN — Medication 250 MILLIGRAM(S): at 09:39

## 2019-01-05 RX ADMIN — Medication 1 TABLET(S): at 09:04

## 2019-01-05 RX ADMIN — PIPERACILLIN AND TAZOBACTAM 25 GRAM(S): 4; .5 INJECTION, POWDER, LYOPHILIZED, FOR SOLUTION INTRAVENOUS at 03:02

## 2019-01-05 RX ADMIN — MIDODRINE HYDROCHLORIDE 5 MILLIGRAM(S): 2.5 TABLET ORAL at 21:31

## 2019-01-05 NOTE — PROGRESS NOTE ADULT - PROBLEM SELECTOR PLAN 2
Patient with leukocytosis to WBC 11.8, hypotension to SBP 70's, with no confirmed infectious source but suspicion for SBP given prior treatments for SBP and history of esophageal varices and c/o of vague abd pain.  VBG lactate elevated to 4.7 (possibly from liver disease alone)    - s/p 250 cc 5% albumin for volume resuscitation (pt with low albumin) as patient intravascularly depleted and cannot give NS or LR due to risk of third spacing.  Continue giving albumin q6h.   - Initially started on Ceftriaxone and Vancomycin. Now on Zosyn to cover from Gram negative organisms. Day 3 IV abx 1/5. Started on Vancomycin 1/3 to 1/4 to broaden coverage.  - Repeat blood cultures negative from 1/4. can likely d/c vanc after negative for 48 hours  - Holding home diuretics and antihypertensives. MELD 34 on admission, indicating >50% mortality in next 3 months. Was recently on liver transplant candidate list at [facility], however had to be removed due to severe HFrEF, no longer plans for liver transplant    - Patient on Furosemide 20 mg and spironolactone 25 mg daily. Will hold diuretics for now as pt intravascularly depleted and hypotensive on arrival in setting of aggressive diuretic regimen as outpatient (lasix/aldactone + addition of metolazone 2 wks ago).   - Will continue with Lactulose and Rifaximin PO.   - Lower concern for hepatic encephalopathy at this time.   - Evidence of severe synthetic dysfunction, portal hypertensive, varices, and existing heart failure and now ULICES. Poor prognosis.  - U/S Abdomen showed small to moderate ascites in the RUQ. Per hepatology, consider diagnostic paracentesis to rule out SBP  - MRCP and MR Abdomen with Iron quantification patient and patient's brother are agreeable.

## 2019-01-05 NOTE — PROGRESS NOTE ADULT - PROBLEM SELECTOR PLAN 1
MELD 34 on admission, indicating >50% mortality in next 3 months. Was recently on liver transplant candidate list at [facility], however had to be removed due to severe HFrEF, no longer plans for liver transplant    - Patient on Furosemide 20 mg and spironolactone 25 mg daily. Will hold diuretics for now as pt intravascularly depleted and hypotensive on arrival in setting of aggressive diuretic regimen as outpatient (lasix/aldactone + addition of metolazone 2 wks ago).   - Will continue with Lactulose and Rifaximin PO.   - Lower concern for hepatic encephalopathy at this time.   - Evidence of severe synthetic dysfunction, portal hypertensive, varices, and existing heart failure and now ULICES. Poor prognosis.  - U/S Abdomen showed small to moderate ascites in the RUQ. Per hepatology, consider diagnostic paracentesis to rule out SBP  - MRCP and MR Abdomen with Iron quantification now cancelled as patient's family wishes to decline. hemoglobin dropped from 10 to 7.4, unclear source of bleeding, patient denies GIB  will transfuse 1 unit PRBC, check post transfusion CBC  check stool guaiac ?occult variceal bleed  GI eval hemoglobin dropped from 10 to 7.4, unclear source of bleeding, patient denies GIB  will transfuse 1 unit PRBC, check post transfusion CBC  check stool guaiac ?occult variceal bleed  GI eval  Supratherapeutic INR likely due to end stage liver disease, if bleeding found will need FFP, vit K.

## 2019-01-05 NOTE — PROGRESS NOTE ADULT - PROBLEM SELECTOR PLAN 9
DVT ppx: SCDs  Diet: DASH/Consistent Carb    Goals of Care: FULL CODE. addressed GOC with patient and his brother at bedside. Patient stated that he would like chest compressions and intubation if necessary. Last HbA1c 6.1% in 2016. Holding home Glipizide.     - Repeat A1c <5 patient is not diabetic.   - FS qac and qhs, with insulin SSI.

## 2019-01-05 NOTE — PROGRESS NOTE ADULT - PROBLEM SELECTOR PLAN 5
Non oliguric ULICES probably in the setting of hypotension pre renal to ATN.     - Avoid nephrotoxins. Dose medications renally. Trend BMP (BUN/Cr) qd.   - Likely not long term HD candidate given poor prognosis and poor liver transplant candidate, family aware. PLT count 77k this admission, compared to 75k on previous admission (comparable). This, in addition to elevated INR and hyponatremia, are likely in setting of end stage liver disease.   - Hemoglobin Trend: 8.1 g/dL<--, 10.4 g/dL<--, 10.3 g/dL  - Will transfuse 1 unit of blood today.   - Platelet Trend: 43 to 56 K/uL<--, 77 K/uL<--, 75 K/uL  - No active bleeding.

## 2019-01-05 NOTE — PROGRESS NOTE ADULT - ASSESSMENT
53M end stage liver disease 2/2 alcoholic cirrhosis, DMII, severe HFrEF (35%), CAD, HTN, presenting with 1 week of generalized weakness and hypotension, likely 2/2 decompensated cirrhosis in the setting of sepsis. Now found to have gram negative rods and gram positive bacteremia explaining sepsis. 53M end stage liver disease 2/2 alcoholic cirrhosis, DMII, severe HFrEF (35%), CAD, HTN, presenting with 1 week of generalized weakness and hypotension, likely 2/2 decompensated cirrhosis in the setting of sepsis. Now found to have gram negative rods and gram positive bacteremia explaining sepsis. 1/5 with drop in hemoglobin, HD stable, no s/s of bleeding, will transfuse 1 unit, check stool guaiac and observe closely, GI follow up

## 2019-01-05 NOTE — PROGRESS NOTE ADULT - PROBLEM SELECTOR PLAN 6
Last TTE showed reduced Ef but was in setting of sepsis and bacteremia. Currently appears euvolemic. Fluid status will be tenuous in the setting of advanced liver disease, sepsis, HFrEF, and ULICES.    - Will monitor vitals q4hr and hemodynamics.   - Will attempt to maintain euvolemia by minimizing I's.   - Repeat TTE f/u. Non oliguric ULICES probably in the setting of hypotension pre renal to ATN.     - Avoid nephrotoxins. Dose medications renally. Trend BMP (BUN/Cr) qd.   - Likely not long term HD candidate given poor prognosis and poor liver transplant candidate, family aware.

## 2019-01-05 NOTE — PROGRESS NOTE ADULT - SUBJECTIVE AND OBJECTIVE BOX
Patient is a 53y old  Male who presents with a chief complaint of hypotension, decompensated cirrhosis (03 Jan 2019 14:33)      SUBJECTIVE / OVERNIGHT EVENTS: No acute events overnight. Patient denies any abdominal pain, fevers or chills. Patient states that the transfer to [facility] was cancelled last night because he wants to stay in the hospital for treatment of his bacteremia. Patient's family also noted by chart note to have refused MRI of the abdomen after talking to their hepatologist on the phone, so those scans have also been cancelled. Patient has no complaints at this time.      MEDICATIONS  (STANDING):  albumin human  5% IVPB 250 milliLiter(s) IV Intermittent every 6 hours  chlorhexidine 0.12% Liquid 15 milliLiter(s) Oral Mucosa two times a day  dextrose 5%. 1000 milliLiter(s) (50 mL/Hr) IV Continuous <Continuous>  dextrose 50% Injectable 12.5 Gram(s) IV Push once  dextrose 50% Injectable 25 Gram(s) IV Push once  dextrose 50% Injectable 25 Gram(s) IV Push once  folic acid 1 milliGRAM(s) Oral daily  insulin lispro (HumaLOG) corrective regimen sliding scale   SubCutaneous three times a day before meals  insulin lispro (HumaLOG) corrective regimen sliding scale   SubCutaneous at bedtime  lactulose Syrup 30 Gram(s) Oral three times a day  midodrine 5 milliGRAM(s) Oral three times a day  multivitamin 1 Tablet(s) Oral daily  piperacillin/tazobactam IVPB. 3.375 Gram(s) IV Intermittent every 8 hours  rifaximin 550 milliGRAM(s) Oral two times a day  thiamine 100 milliGRAM(s) Oral daily  vancomycin  IVPB 1000 milliGRAM(s) IV Intermittent every 12 hours  vancomycin  IVPB        MEDICATIONS  (PRN):  artificial tears (preservative free) Ophthalmic Solution 1 Drop(s) Both EYES daily PRN Dry Eyes  dextrose 40% Gel 15 Gram(s) Oral once PRN Blood Glucose LESS THAN 70 milliGRAM(s)/deciliter  glucagon  Injectable 1 milliGRAM(s) IntraMuscular once PRN Glucose LESS THAN 70 milligrams/deciliter      Vital Signs Last 24 Hrs  T(C): 36.5 (05 Jan 2019 08:43), Max: 36.7 (04 Jan 2019 20:49)  T(F): 97.7 (05 Jan 2019 08:43), Max: 98 (04 Jan 2019 20:49)  HR: 60 (05 Jan 2019 08:43) (60 - 73)  BP: 94/55 (05 Jan 2019 08:43) (91/52 - 100/58)  BP(mean): --  ABP: --  ABP(mean): --  RR: 18 (05 Jan 2019 08:43) (18 - 18)  SpO2: 95% (05 Jan 2019 08:43) (94% - 97%)        POCT Blood Glucose.: 147 mg/dL (03 Jan 2019 22:35)  POCT Blood Glucose.: 141 mg/dL (03 Jan 2019 18:43)  POCT Blood Glucose.: 110 mg/dL (03 Jan 2019 14:47)  POCT Blood Glucose.: 108 mg/dL (03 Jan 2019 08:28)  POCT Blood Glucose.: 111 mg/dL (03 Jan 2019 08:08)  POCT Blood Glucose.: 40 mg/dL (03 Jan 2019 07:31)  POCT Blood Glucose.: 40 mg/dL (03 Jan 2019 07:29)    I&O's Summary    03 Jan 2019 07:01  -  04 Jan 2019 06:57  --------------------------------------------------------  IN: 720 mL / OUT: 200 mL / NET: 520 mL        PHYSICAL EXAM:    Constitutional: Frail appearing, jaundiced, in no acute distress, comfortable in bed  	ENMT: dry mucous membranes, no pharyngeal erythema or exudates  	Respiratory: lungs CTAB; no wheezing, rales or rhonchi  	Cardiovascular: Normal S1 and S2; no murmurs. Trace LE edema bilaterally.   	Gastrointestinal: Abdomen soft, no tenderness to palpation, no distension, no fluid wave, normal bowel sounds   	Extremities: No clubbing, cyanosis or edema.   	Vascular: 2+ peripheral pulses  	Neurological: No asterixis. No focal deficits appreciated.  	Skin: Jaundiced; no rashes noted. 2 cm x 3 cm ecchymosis non tender no palpable hematoma.     Psychiatric: A&O x 4.      Culture - Urine (01.02.19 @ 22:32)    Specimen Source: .Urine Clean Catch (Midstream)    Culture Results:   No growth    Culture - Blood (01.02.19 @ 19:32)    -  Coagulase negative Staphylococcus: Detec    Gram Stain:   Growth in anaerobic bottle: Gram Positive Cocci in Clusters  Growth in aerobic bottle: Gram Positive Cocci in Clusters and Gram  Negative Rods    Specimen Source: .Blood Blood    Organism: Blood Culture PCR    Culture Results:   Growth in anaerobic bottle: Gram Positive Cocci in Clusters  Growth in aerobic bottle: Gram Positive Cocci in Clusters and Gram  Negative Rods  "Due to technical problems, Proteus sp. will Not be reported as part of  the BCID panel until further notice"    Culture - Blood (01.02.19 @ 19:32)    Specimen Source: .Blood Blood    Culture Results:   No growth to date.    Hemoglobin A1C, Whole Blood in AM (01.03.19 @ 10:04)    Hemoglobin A1C, Whole Blood: 4.8:       LABS:                          7.4    6.2   )-----------( 43       ( 05 Jan 2019 08:47 )             21.4     01-05    130<L>  |  100  |  19  ----------------------------<  144<H>  4.5   |  21<L>  |  0.97    Ca    8.3<L>      05 Jan 2019 06:53  Phos  2.3     01-05  Mg     2.0     01-05    TPro  5.4<L>  /  Alb  2.7<L>  /  TBili  12.1<H>  /  DBili  x   /  AST  96<H>  /  ALT  22  /  AlkPhos  111  01-05    CAPILLARY BLOOD GLUCOSE      POCT Blood Glucose.: 163 mg/dL (05 Jan 2019 08:45)    PT/INR - ( 05 Jan 2019 06:54 )   PT: 43.1 sec;   INR: 3.60 ratio         PTT - ( 04 Jan 2019 06:32 )  PTT:56.2 sec      < from: US Abdomen Limited (01.03.19 @ 09:35) >  IMPRESSION:     Small to moderate ascites. Largest accumulation in the right upper   quadrant. Patient is a 53y old  Male who presents with a chief complaint of hypotension, decompensated cirrhosis (03 Jan 2019 14:33)      SUBJECTIVE / OVERNIGHT EVENTS: No acute events overnight. Patient denies any abdominal pain, fevers or chills. Patient states that the transfer to [facility] was cancelled last night because he wants to stay in the hospital for treatment of his bacteremia. Patient's family also noted by chart note to have refused MRI of the abdomen after talking to their hepatologist on the phone, so those scans have also been cancelled. Patient has no complaints at this time.  Patient denies any bleeding, dark stools.    MEDICATIONS  (STANDING):  albumin human  5% IVPB 250 milliLiter(s) IV Intermittent every 6 hours  chlorhexidine 0.12% Liquid 15 milliLiter(s) Oral Mucosa two times a day  dextrose 5%. 1000 milliLiter(s) (50 mL/Hr) IV Continuous <Continuous>  dextrose 50% Injectable 12.5 Gram(s) IV Push once  dextrose 50% Injectable 25 Gram(s) IV Push once  dextrose 50% Injectable 25 Gram(s) IV Push once  folic acid 1 milliGRAM(s) Oral daily  insulin lispro (HumaLOG) corrective regimen sliding scale   SubCutaneous three times a day before meals  insulin lispro (HumaLOG) corrective regimen sliding scale   SubCutaneous at bedtime  lactulose Syrup 30 Gram(s) Oral three times a day  midodrine 5 milliGRAM(s) Oral three times a day  multivitamin 1 Tablet(s) Oral daily  piperacillin/tazobactam IVPB. 3.375 Gram(s) IV Intermittent every 8 hours  rifaximin 550 milliGRAM(s) Oral two times a day  thiamine 100 milliGRAM(s) Oral daily  vancomycin  IVPB 1000 milliGRAM(s) IV Intermittent every 12 hours  vancomycin  IVPB        MEDICATIONS  (PRN):  artificial tears (preservative free) Ophthalmic Solution 1 Drop(s) Both EYES daily PRN Dry Eyes  dextrose 40% Gel 15 Gram(s) Oral once PRN Blood Glucose LESS THAN 70 milliGRAM(s)/deciliter  glucagon  Injectable 1 milliGRAM(s) IntraMuscular once PRN Glucose LESS THAN 70 milligrams/deciliter      Vital Signs Last 24 Hrs  T(C): 36.5 (05 Jan 2019 08:43), Max: 36.7 (04 Jan 2019 20:49)  T(F): 97.7 (05 Jan 2019 08:43), Max: 98 (04 Jan 2019 20:49)  HR: 60 (05 Jan 2019 08:43) (60 - 73)  BP: 94/55 (05 Jan 2019 08:43) (91/52 - 100/58)  BP(mean): --  ABP: --  ABP(mean): --  RR: 18 (05 Jan 2019 08:43) (18 - 18)  SpO2: 95% (05 Jan 2019 08:43) (94% - 97%)        POCT Blood Glucose.: 147 mg/dL (03 Jan 2019 22:35)  POCT Blood Glucose.: 141 mg/dL (03 Jan 2019 18:43)  POCT Blood Glucose.: 110 mg/dL (03 Jan 2019 14:47)  POCT Blood Glucose.: 108 mg/dL (03 Jan 2019 08:28)  POCT Blood Glucose.: 111 mg/dL (03 Jan 2019 08:08)  POCT Blood Glucose.: 40 mg/dL (03 Jan 2019 07:31)  POCT Blood Glucose.: 40 mg/dL (03 Jan 2019 07:29)    I&O's Summary    03 Jan 2019 07:01  -  04 Jan 2019 06:57  --------------------------------------------------------  IN: 720 mL / OUT: 200 mL / NET: 520 mL        PHYSICAL EXAM:    Constitutional: Frail appearing, jaundiced, in no acute distress, comfortable in bed  	ENMT: dry mucous membranes, no pharyngeal erythema or exudates  	Respiratory: lungs CTAB; no wheezing, rales or rhonchi  	Cardiovascular: Normal S1 and S2; no murmurs. Trace LE edema bilaterally.   	Gastrointestinal: Abdomen soft, no tenderness to palpation, no distension, no fluid wave, normal bowel sounds   	Extremities: No clubbing, cyanosis or edema.   	Vascular: 2+ peripheral pulses  	Neurological: No asterixis. No focal deficits appreciated.  	Skin: Jaundiced; no rashes noted. 2 cm x 3 cm ecchymosis non tender no palpable hematoma.     Psychiatric: A&O x 4.      Culture - Urine (01.02.19 @ 22:32)    Specimen Source: .Urine Clean Catch (Midstream)    Culture Results:   No growth    Culture - Blood (01.02.19 @ 19:32)    -  Coagulase negative Staphylococcus: Detec    Gram Stain:   Growth in anaerobic bottle: Gram Positive Cocci in Clusters  Growth in aerobic bottle: Gram Positive Cocci in Clusters and Gram  Negative Rods    Specimen Source: .Blood Blood    Organism: Blood Culture PCR    Culture Results:   Growth in anaerobic bottle: Gram Positive Cocci in Clusters  Growth in aerobic bottle: Gram Positive Cocci in Clusters and Gram  Negative Rods  "Due to technical problems, Proteus sp. will Not be reported as part of  the BCID panel until further notice"    Culture - Blood (01.02.19 @ 19:32)    Specimen Source: .Blood Blood    Culture Results:   No growth to date.    Hemoglobin A1C, Whole Blood in AM (01.03.19 @ 10:04)    Hemoglobin A1C, Whole Blood: 4.8:       LABS:                          7.4    6.2   )-----------( 43       ( 05 Jan 2019 08:47 )             21.4     01-05    130<L>  |  100  |  19  ----------------------------<  144<H>  4.5   |  21<L>  |  0.97    Ca    8.3<L>      05 Jan 2019 06:53  Phos  2.3     01-05  Mg     2.0     01-05    TPro  5.4<L>  /  Alb  2.7<L>  /  TBili  12.1<H>  /  DBili  x   /  AST  96<H>  /  ALT  22  /  AlkPhos  111  01-05    CAPILLARY BLOOD GLUCOSE      POCT Blood Glucose.: 163 mg/dL (05 Jan 2019 08:45)    PT/INR - ( 05 Jan 2019 06:54 )   PT: 43.1 sec;   INR: 3.60 ratio         PTT - ( 04 Jan 2019 06:32 )  PTT:56.2 sec      < from: US Abdomen Limited (01.03.19 @ 09:35) >  IMPRESSION:     Small to moderate ascites. Largest accumulation in the right upper   quadrant.

## 2019-01-05 NOTE — PROVIDER CONTACT NOTE (CRITICAL VALUE NOTIFICATION) - ASSESSMENT
Patient A&Ox4, VSS. Patient denies chest pain, lightheadedness, or shortness of breath. No s/s of bleeding noted.

## 2019-01-05 NOTE — PROGRESS NOTE ADULT - PROBLEM SELECTOR PLAN 7
No active bleeding at this time. Pt has dried blood on teeth, from dry lips/scab picking, denies hematemesis, hgb at baseline.  Patient was previously on propranolol for prophylaxis- however states he is no longer taking it. Likely 2/2 hypotension. Clarify medication with patient's PCP or pharmacy in AM.    -c/w sbp coverage as noted above. Last TTE showed reduced Ef but was in setting of sepsis and bacteremia. Currently appears euvolemic. Fluid status will be tenuous in the setting of advanced liver disease, sepsis, HFrEF, and ULICES.    - Will monitor vitals q4hr and hemodynamics.   - Will attempt to maintain euvolemia by minimizing I's.   - Repeat TTE f/u.

## 2019-01-05 NOTE — PROGRESS NOTE ADULT - PROBLEM SELECTOR PLAN 3
Patient with no florid ascites on physical exam; abdomen soft and nondistended.  However, patient previously treated for SBP and patient with esophageal varices.   Patient complains of diffuse abdominal pain particularly in RUQ and LLQ.  May benefit from at least diagnostic paracentesis. Obtain abdominal US to assess ascites fluid.    - abdominal ultrasound showing small to moderate ascites. Can consider paracentesis if exam changes. Patient with leukocytosis to WBC 11.8, hypotension to SBP 70's, with no confirmed infectious source but suspicion for SBP given prior treatments for SBP and history of esophageal varices and c/o of vague abd pain.  VBG lactate elevated to 4.7 (possibly from liver disease alone)    - s/p 250 cc 5% albumin for volume resuscitation (pt with low albumin) as patient intravascularly depleted and cannot give NS or LR due to risk of third spacing.  Continue giving albumin q6h.   - Initially started on Ceftriaxone and Vancomycin. Now on Zosyn to cover from Gram negative organisms. Day 3 IV abx 1/5. Started on Vancomycin 1/3 to 1/4 to broaden coverage.  - Repeat blood cultures negative from 1/4. can likely d/c vanc after negative for 48 hours  - Holding home diuretics and antihypertensives.

## 2019-01-05 NOTE — PROGRESS NOTE ADULT - PROBLEM SELECTOR PLAN 8
Last HbA1c 6.1% in 2016. Holding home Glipizide.     - Repeat A1c <5 patient is not diabetic.   - FS qac and qhs, with insulin SSI. No active bleeding at this time. Pt has dried blood on teeth, from dry lips/scab picking, denies hematemesis, hgb at baseline.  Patient was previously on propranolol for prophylaxis- however states he is no longer taking it. Likely 2/2 hypotension. Clarify medication with patient's PCP or pharmacy in AM.    -c/w sbp coverage as noted above.

## 2019-01-05 NOTE — PROGRESS NOTE ADULT - SUBJECTIVE AND OBJECTIVE BOX
Chief Complaint:  Patient is a 53y old  Male who presents with a chief complaint of hypotension, decompensated cirrhosis (2019 09:21)    Interval Events:   No acute overnight events; given 1U pRBC for anemia. No complaints of abdominal pain, nausea, vomiting, hematemesis, melena or hematochezia    Allergies:  No Known Allergies    Hospital Medications:  albumin human  5% IVPB 250 milliLiter(s) IV Intermittent every 6 hours  artificial tears (preservative free) Ophthalmic Solution 1 Drop(s) Both EYES daily PRN  chlorhexidine 0.12% Liquid 15 milliLiter(s) Oral Mucosa two times a day  dextrose 40% Gel 15 Gram(s) Oral once PRN  dextrose 5%. 1000 milliLiter(s) IV Continuous <Continuous>  dextrose 50% Injectable 12.5 Gram(s) IV Push once  dextrose 50% Injectable 25 Gram(s) IV Push once  dextrose 50% Injectable 25 Gram(s) IV Push once  folic acid 1 milliGRAM(s) Oral daily  glucagon  Injectable 1 milliGRAM(s) IntraMuscular once PRN  insulin lispro (HumaLOG) corrective regimen sliding scale   SubCutaneous three times a day before meals  insulin lispro (HumaLOG) corrective regimen sliding scale   SubCutaneous at bedtime  lactulose Syrup 30 Gram(s) Oral three times a day  lidocaine   Patch 1 Patch Transdermal daily  midodrine 5 milliGRAM(s) Oral three times a day  multivitamin 1 Tablet(s) Oral daily  piperacillin/tazobactam IVPB. 3.375 Gram(s) IV Intermittent every 8 hours  rifaximin 550 milliGRAM(s) Oral two times a day  thiamine 100 milliGRAM(s) Oral daily  traMADol 50 milliGRAM(s) Oral every 8 hours PRN  vancomycin  IVPB 750 milliGRAM(s) IV Intermittent every 12 hours    PMHX/PSHX:  Hypertension  Type 2 diabetes mellitus  Congestive heart failure (CHF)  Esophageal varices  Alcoholic cirrhosis of liver with ascites  No pertinent past medical history  Abdominal hernia  H/O cataract    ROS:   General:  No fevers, chills  ENT:  No sore throat or dysphagia  CV:  No pain or palpitations  Resp:  No dyspnea, cough or wheezing  GI:  No pain, No nausea, No vomiting, No diarrhea, No weight loss, No rectal bleeding, No tarry stools,  Skin:  No rash or edema    PHYSICAL EXAM:   Vital Signs:  Vital Signs Last 24 Hrs  T(C): 35.9 (2019 16:43), Max: 36.7 (2019 20:49)  T(F): 96.6 (2019 16:43), Max: 98.1 (2019 12:57)  HR: 67 (2019 16:43) (60 - 73)  BP: 109/66 (2019 16:43) (91/52 - 109/66)  BP(mean): --  RR: 18 (2019 16:43) (18 - 18)  SpO2: 95% (2019 12:57) (94% - 95%)  Daily     Daily Weight in k.8 (2019 06:41)    GENERAL:  NAD, Appears stated age  HEENT:  NC/AT,  conjunctivae clear and pink, sclera -anicteric  CHEST:  Normal Effort, Breath sounds clear  HEART:  RRR, S1 + S2, no murmurs  ABDOMEN:  Soft, non-tender, mildly distended, normoactive bowel sounds  EXTEREMITIES:  no cyanosis or edema  SKIN:  Warm & Dry.  NEURO:  Alert, oriented    LABS:                        8.7    6.9   )-----------( 49       ( 2019 18:36 )             24.7     Mean Cell Volume: 102.0 fl (19 @ 18:36)        130<L>  |  100  |  19  ----------------------------<  144<H>  4.5   |  21<L>  |  0.97    Ca    8.3<L>      2019 06:53  Phos  2.3       Mg     2.0         TPro  5.4<L>  /  Alb  2.7<L>  /  TBili  12.1<H>  /  DBili  x   /  AST  96<H>  /  ALT  22  /  AlkPhos  111  -05    LIVER FUNCTIONS - ( 2019 06:53 )  Alb: 2.7 g/dL / Pro: 5.4 g/dL / ALK PHOS: 111 U/L / ALT: 22 U/L / AST: 96 U/L / GGT: x           PT/INR - ( 2019 06:54 )   PT: 43.1 sec;   INR: 3.60 ratio      PTT - ( 2019 06:32 )  PTT:56.2 sec    Hemoglobin: 8.7 g/dL (19 @ 18:36)  Hemoglobin: 7.4 g/dL (19 @ 08:47)  Hemoglobin: 7.0 g/dL (19 @ 06:53)  Hemoglobin: 7.8 g/dL (19 @ 06:32)  Hemoglobin: 8.1 g/dL (19 @ 10:04)    Imaging: Chief Complaint:  Patient is a 53y old  Male who presents with a chief complaint of hypotension, decompensated cirrhosis (2019 09:21)    Interval Events:   No acute overnight events; given 1U pRBC for anemia. No complaints of abdominal pain, nausea, vomiting, hematemesis, melena or hematochezia    Allergies:  No Known Allergies    Hospital Medications:  albumin human  5% IVPB 250 milliLiter(s) IV Intermittent every 6 hours  artificial tears (preservative free) Ophthalmic Solution 1 Drop(s) Both EYES daily PRN  chlorhexidine 0.12% Liquid 15 milliLiter(s) Oral Mucosa two times a day  dextrose 40% Gel 15 Gram(s) Oral once PRN  dextrose 5%. 1000 milliLiter(s) IV Continuous <Continuous>  dextrose 50% Injectable 12.5 Gram(s) IV Push once  dextrose 50% Injectable 25 Gram(s) IV Push once  dextrose 50% Injectable 25 Gram(s) IV Push once  folic acid 1 milliGRAM(s) Oral daily  glucagon  Injectable 1 milliGRAM(s) IntraMuscular once PRN  insulin lispro (HumaLOG) corrective regimen sliding scale   SubCutaneous three times a day before meals  insulin lispro (HumaLOG) corrective regimen sliding scale   SubCutaneous at bedtime  lactulose Syrup 30 Gram(s) Oral three times a day  lidocaine   Patch 1 Patch Transdermal daily  midodrine 5 milliGRAM(s) Oral three times a day  multivitamin 1 Tablet(s) Oral daily  piperacillin/tazobactam IVPB. 3.375 Gram(s) IV Intermittent every 8 hours  rifaximin 550 milliGRAM(s) Oral two times a day  thiamine 100 milliGRAM(s) Oral daily  traMADol 50 milliGRAM(s) Oral every 8 hours PRN  vancomycin  IVPB 750 milliGRAM(s) IV Intermittent every 12 hours    PMHX/PSHX:  Hypertension  Type 2 diabetes mellitus  Congestive heart failure (CHF)  Esophageal varices  Alcoholic cirrhosis of liver with ascites  No pertinent past medical history  Abdominal hernia  H/O cataract    ROS:   General:  No fevers, chills  ENT:  No sore throat or dysphagia  CV:  No pain or palpitations  Resp:  No dyspnea, cough or wheezing  GI:  No pain, No nausea, No vomiting, No diarrhea, No weight loss, No rectal bleeding, No tarry stools,  Skin:  No rash or edema    PHYSICAL EXAM:   Vital Signs:  Vital Signs Last 24 Hrs  T(C): 35.9 (2019 16:43), Max: 36.7 (2019 20:49)  T(F): 96.6 (2019 16:43), Max: 98.1 (2019 12:57)  HR: 67 (2019 16:43) (60 - 73)  BP: 109/66 (2019 16:43) (91/52 - 109/66)  BP(mean): --  RR: 18 (2019 16:43) (18 - 18)  SpO2: 95% (2019 12:57) (94% - 95%)  Daily     Daily Weight in k.8 (2019 06:41)    GENERAL:  NAD, Appears stated age  HEENT:  NC/AT,  conjunctivae clear and pink, sclera -anicteric  CHEST:  Normal Effort, Breath sounds clear  HEART:  RRR, S1 + S2, no murmurs  ABDOMEN:  Soft, non-tender, mildly distended, normoactive bowel sounds  EXTEREMITIES:  no cyanosis or edema  SKIN:  Warm & Dry.  NEURO:  Alert, oriented x 3; no asterixis    LABS:                        8.7    6.9   )-----------( 49       ( 2019 18:36 )             24.7     Mean Cell Volume: 102.0 fl (19 @ 18:36)        130<L>  |  100  |  19  ----------------------------<  144<H>  4.5   |  21<L>  |  0.97    Ca    8.3<L>      2019 06:53  Phos  2.3       Mg     2.0         TPro  5.4<L>  /  Alb  2.7<L>  /  TBili  12.1<H>  /  DBili  x   /  AST  96<H>  /  ALT  22  /  AlkPhos  111      LIVER FUNCTIONS - ( 2019 06:53 )  Alb: 2.7 g/dL / Pro: 5.4 g/dL / ALK PHOS: 111 U/L / ALT: 22 U/L / AST: 96 U/L / GGT: x           PT/INR - ( 2019 06:54 )   PT: 43.1 sec;   INR: 3.60 ratio      PTT - ( 2019 06:32 )  PTT:56.2 sec    Hemoglobin: 8.7 g/dL (19 @ 18:36)  Hemoglobin: 7.4 g/dL (19 @ 08:47)  Hemoglobin: 7.0 g/dL (19 @ 06:53)  Hemoglobin: 7.8 g/dL (19 @ 06:32)  Hemoglobin: 8.1 g/dL (19 @ 10:04)    Imaging: Chief Complaint:  Patient is a 53y old  Male who presents with a chief complaint of hypotension, decompensated cirrhosis (2019 09:21)    Interval Events:   No acute overnight events; given 1U pRBC for anemia. No complaints of abdominal pain, nausea, vomiting, hematemesis, melena or hematochezia    Allergies:  No Known Allergies    Hospital Medications:  albumin human  5% IVPB 250 milliLiter(s) IV Intermittent every 6 hours  artificial tears (preservative free) Ophthalmic Solution 1 Drop(s) Both EYES daily PRN  chlorhexidine 0.12% Liquid 15 milliLiter(s) Oral Mucosa two times a day  dextrose 40% Gel 15 Gram(s) Oral once PRN  dextrose 5%. 1000 milliLiter(s) IV Continuous <Continuous>  dextrose 50% Injectable 12.5 Gram(s) IV Push once  dextrose 50% Injectable 25 Gram(s) IV Push once  dextrose 50% Injectable 25 Gram(s) IV Push once  folic acid 1 milliGRAM(s) Oral daily  glucagon  Injectable 1 milliGRAM(s) IntraMuscular once PRN  insulin lispro (HumaLOG) corrective regimen sliding scale   SubCutaneous three times a day before meals  insulin lispro (HumaLOG) corrective regimen sliding scale   SubCutaneous at bedtime  lactulose Syrup 30 Gram(s) Oral three times a day  lidocaine   Patch 1 Patch Transdermal daily  midodrine 5 milliGRAM(s) Oral three times a day  multivitamin 1 Tablet(s) Oral daily  piperacillin/tazobactam IVPB. 3.375 Gram(s) IV Intermittent every 8 hours  rifaximin 550 milliGRAM(s) Oral two times a day  thiamine 100 milliGRAM(s) Oral daily  traMADol 50 milliGRAM(s) Oral every 8 hours PRN  vancomycin  IVPB 750 milliGRAM(s) IV Intermittent every 12 hours    PMHX/PSHX:  Hypertension  Type 2 diabetes mellitus  Congestive heart failure (CHF)  Esophageal varices  Alcoholic cirrhosis of liver with ascites  No pertinent past medical history  Abdominal hernia  H/O cataract    ROS:   General:  No fevers, chills  ENT:  No sore throat or dysphagia  CV:  No pain or palpitations  Resp:  No dyspnea, cough or wheezing  GI:  No pain, No nausea, No vomiting, No diarrhea, No weight loss, No rectal bleeding, No tarry stools,  Skin:  No rash or edema    PHYSICAL EXAM:   Vital Signs:  Vital Signs Last 24 Hrs  T(C): 35.9 (2019 16:43), Max: 36.7 (2019 20:49)  T(F): 96.6 (2019 16:43), Max: 98.1 (2019 12:57)  HR: 67 (2019 16:43) (60 - 73)  BP: 109/66 (2019 16:43) (91/52 - 109/66)  BP(mean): --  RR: 18 (2019 16:43) (18 - 18)  SpO2: 95% (2019 12:57) (94% - 95%)  Daily     Daily Weight in k.8 (2019 06:41)    GENERAL:  NAD, Appears stated age  HEENT:  NC/AT,  conjunctivae clear and pink, Jaundiced, icteric sclera  CHEST:  Normal Effort, Breath sounds clear  HEART:  RRR, S1 + S2, no murmurs  ABDOMEN:  Soft, non-tender, mildly distended, normoactive bowel sounds  EXTEREMITIES:  no cyanosis or edema  SKIN:  Warm & Dry. Jaundiced  NEURO:  Alert, oriented x 3; no asterixis    LABS:                        8.7    6.9   )-----------( 49       ( 2019 18:36 )             24.7     Mean Cell Volume: 102.0 fl (19 @ 18:36)        130<L>  |  100  |  19  ----------------------------<  144<H>  4.5   |  21<L>  |  0.97    Ca    8.3<L>      2019 06:53  Phos  2.3       Mg     2.0         TPro  5.4<L>  /  Alb  2.7<L>  /  TBili  12.1<H>  /  DBili  x   /  AST  96<H>  /  ALT  22  /  AlkPhos  111      LIVER FUNCTIONS - ( 2019 06:53 )  Alb: 2.7 g/dL / Pro: 5.4 g/dL / ALK PHOS: 111 U/L / ALT: 22 U/L / AST: 96 U/L / GGT: x           PT/INR - ( 2019 06:54 )   PT: 43.1 sec;   INR: 3.60 ratio      PTT - ( 2019 06:32 )  PTT:56.2 sec    Hemoglobin: 8.7 g/dL (19 @ 18:36)  Hemoglobin: 7.4 g/dL (19 @ 08:47)  Hemoglobin: 7.0 g/dL (19 @ 06:53)  Hemoglobin: 7.8 g/dL (19 @ 06:32)  Hemoglobin: 8.1 g/dL (19 @ 10:04)    Imaging:

## 2019-01-05 NOTE — CHART NOTE - NSCHARTNOTEFT_GEN_A_CORE
Spoke with patient's brother, Soham Rivera over the phone at 11AM on 1/5. Obtained blood consent to transfuse pt 1 unit of blood today.   OF NOTE, Soham states that he misunderstood the doctors yesterday and thought patient was ordered for an MRI of the Heart, not MRI of the abdomen. As a result of this misunderstanding, Soham now agrees to have the MRI of the abdomen performed.   Will order 1 Unit blood, and abdominal imaging at this time.     Laura Pena MD PGY-3  Pager [phone]

## 2019-01-05 NOTE — PROGRESS NOTE ADULT - ASSESSMENT
Impression:  # Decompensated Cirrhosis: suspected EtOH related - MELD-Na: 33 (today)  HE: On Lactulose + Rifaximin at home  Ascites: small to moderate ascites on US 1/3/19  HCC: No focal masses on US 9/2018  Varices: Small on EGD 8/2017  # Macrocytic Anemia: Low suspicion for active GI bleed as would expect overt GI bleeding given with drop by 2U in one day.    Recommendations:  - Obtain records from [facility]  - Continue lactulose and rifaximin  - Follow up MRI with iron quantification given hx of reported iron overload  - MRCP given elevated bili and previously high Alk Phos  - Check B12/Folate given macrocytic anemia  - Repeat TTE  - Diagnostic paracentesis to r/o SBP  - Trend CBC. Will re-evaluate endoscopic intervention if clinical status changes  - Supportive care per primary team    Lilly Ruffin MD  Gastroenterology Fellow  [phone]  Please page on call fellow on weekends and after 5pm on weekdays Impression:  # Decompensated Cirrhosis: suspected EtOH related - MELD-Na: 33 (today)  HE: On Lactulose + Rifaximin at home  Ascites: small to moderate ascites on US 1/3/19  HCC: No focal masses on US 9/2018  Varices: Small on EGD 8/2017  # Macrocytic Anemia: Low suspicion for active GI bleed as would expect overt GI bleeding given with drop by 2U in one day.    Recommendations:  - Obtain records from [facility]  - Continue lactulose and rifaximin, titrate to 2-3 BM per day  - Follow up MRI with iron quantification given hx of reported iron overload  - MRCP given elevated bili and previously high Alk Phos  - Check B12/Folate given macrocytic anemia  - Repeat TTE  - Repeat diagnostic paracentesis to r/o SBP  - Trend CBC.  - Check TSH, folate, B12 for macrocytic anemia workup  - Will consider endoscopic evaluation for blood loss if anemia workup is negative  - Supportive care per primary team    Lilly Ruffin MD  Gastroenterology Fellow  [phone]  Please page on call fellow on weekends and after 5pm on weekdays

## 2019-01-05 NOTE — PROGRESS NOTE ADULT - PROBLEM SELECTOR PLAN 4
PLT count 77k this admission, compared to 75k on previous admission (comparable). This, in addition to elevated INR and hyponatremia, are likely in setting of end stage liver disease.   - Hemoglobin Trend: 8.1 g/dL<--, 10.4 g/dL<--, 10.3 g/dL  - Platelet Trend: 56 K/uL<--, 77 K/uL<--, 75 K/uL  - No active bleeding. PLT count 77k this admission, compared to 75k on previous admission (comparable). This, in addition to elevated INR and hyponatremia, are likely in setting of end stage liver disease.   - Hemoglobin Trend: 8.1 g/dL<--, 10.4 g/dL<--, 10.3 g/dL  - Will transfuse 1 unit of blood today.   - Platelet Trend: 56 K/uL<--, 77 K/uL<--, 75 K/uL  - No active bleeding. Patient with no florid ascites on physical exam; abdomen soft and nondistended.  However, patient previously treated for SBP and patient with esophageal varices.   Patient complains of diffuse abdominal pain particularly in RUQ and LLQ.    - abdominal ultrasound showing small to moderate ascites.

## 2019-01-06 DIAGNOSIS — A41.9 SEPSIS, UNSPECIFIED ORGANISM: ICD-10-CM

## 2019-01-06 LAB
-  COAGULASE NEGATIVE STAPHYLOCOCCUS: SIGNIFICANT CHANGE UP
ALBUMIN SERPL ELPH-MCNC: 3 G/DL — LOW (ref 3.3–5)
ALP SERPL-CCNC: 103 U/L — SIGNIFICANT CHANGE UP (ref 40–120)
ALT FLD-CCNC: 23 U/L — SIGNIFICANT CHANGE UP (ref 10–45)
ANION GAP SERPL CALC-SCNC: 12 MMOL/L — SIGNIFICANT CHANGE UP (ref 5–17)
AST SERPL-CCNC: 92 U/L — HIGH (ref 10–40)
BILIRUB SERPL-MCNC: 13 MG/DL — HIGH (ref 0.2–1.2)
BUN SERPL-MCNC: 16 MG/DL — SIGNIFICANT CHANGE UP (ref 7–23)
CALCIUM SERPL-MCNC: 8.2 MG/DL — LOW (ref 8.4–10.5)
CHLORIDE SERPL-SCNC: 101 MMOL/L — SIGNIFICANT CHANGE UP (ref 96–108)
CO2 SERPL-SCNC: 19 MMOL/L — LOW (ref 22–31)
CREAT SERPL-MCNC: 0.81 MG/DL — SIGNIFICANT CHANGE UP (ref 0.5–1.3)
GLUCOSE BLDC GLUCOMTR-MCNC: 137 MG/DL — HIGH (ref 70–99)
GLUCOSE BLDC GLUCOMTR-MCNC: 169 MG/DL — HIGH (ref 70–99)
GLUCOSE BLDC GLUCOMTR-MCNC: 212 MG/DL — HIGH (ref 70–99)
GLUCOSE BLDC GLUCOMTR-MCNC: 218 MG/DL — HIGH (ref 70–99)
GLUCOSE SERPL-MCNC: 116 MG/DL — HIGH (ref 70–99)
HCT VFR BLD CALC: 21.9 % — LOW (ref 39–50)
HGB BLD-MCNC: 7.7 G/DL — LOW (ref 13–17)
INR BLD: 3.61 RATIO — HIGH (ref 0.88–1.16)
MAGNESIUM SERPL-MCNC: 2.1 MG/DL — SIGNIFICANT CHANGE UP (ref 1.6–2.6)
MCHC RBC-ENTMCNC: 35.2 GM/DL — SIGNIFICANT CHANGE UP (ref 32–36)
MCHC RBC-ENTMCNC: 35.9 PG — HIGH (ref 27–34)
MCV RBC AUTO: 102 FL — HIGH (ref 80–100)
METHOD TYPE: SIGNIFICANT CHANGE UP
ORGANISM # SPEC MICROSCOPIC CNT: SIGNIFICANT CHANGE UP
PHOSPHATE SERPL-MCNC: 2.2 MG/DL — LOW (ref 2.5–4.5)
PLATELET # BLD AUTO: 34 K/UL — LOW (ref 150–400)
POTASSIUM SERPL-MCNC: 4.5 MMOL/L — SIGNIFICANT CHANGE UP (ref 3.5–5.3)
POTASSIUM SERPL-SCNC: 4.5 MMOL/L — SIGNIFICANT CHANGE UP (ref 3.5–5.3)
PROT SERPL-MCNC: 5.6 G/DL — LOW (ref 6–8.3)
PROTHROM AB SERPL-ACNC: 42.8 SEC — HIGH (ref 10–12.9)
RBC # BLD: 2.15 M/UL — LOW (ref 4.2–5.8)
RBC # FLD: 16 % — HIGH (ref 10.3–14.5)
SODIUM SERPL-SCNC: 132 MMOL/L — LOW (ref 135–145)
WBC # BLD: 5.5 K/UL — SIGNIFICANT CHANGE UP (ref 3.8–10.5)
WBC # FLD AUTO: 5.5 K/UL — SIGNIFICANT CHANGE UP (ref 3.8–10.5)

## 2019-01-06 PROCEDURE — 74183 MRI ABD W/O CNTR FLWD CNTR: CPT | Mod: 26

## 2019-01-06 PROCEDURE — 99233 SBSQ HOSP IP/OBS HIGH 50: CPT

## 2019-01-06 PROCEDURE — 99233 SBSQ HOSP IP/OBS HIGH 50: CPT | Mod: GC

## 2019-01-06 RX ADMIN — Medication 125 MILLILITER(S): at 12:50

## 2019-01-06 RX ADMIN — Medication 250 MILLIGRAM(S): at 09:21

## 2019-01-06 RX ADMIN — Medication 1 TABLET(S): at 09:21

## 2019-01-06 RX ADMIN — MIDODRINE HYDROCHLORIDE 5 MILLIGRAM(S): 2.5 TABLET ORAL at 21:30

## 2019-01-06 RX ADMIN — LACTULOSE 30 GRAM(S): 10 SOLUTION ORAL at 21:30

## 2019-01-06 RX ADMIN — PIPERACILLIN AND TAZOBACTAM 25 GRAM(S): 4; .5 INJECTION, POWDER, LYOPHILIZED, FOR SOLUTION INTRAVENOUS at 01:59

## 2019-01-06 RX ADMIN — Medication 85 MILLIMOLE(S): at 13:49

## 2019-01-06 RX ADMIN — LACTULOSE 30 GRAM(S): 10 SOLUTION ORAL at 13:07

## 2019-01-06 RX ADMIN — Medication 125 MILLILITER(S): at 06:18

## 2019-01-06 RX ADMIN — Medication 100 MILLIGRAM(S): at 09:21

## 2019-01-06 RX ADMIN — Medication 125 MILLILITER(S): at 20:51

## 2019-01-06 RX ADMIN — Medication 2: at 20:08

## 2019-01-06 RX ADMIN — CHLORHEXIDINE GLUCONATE 15 MILLILITER(S): 213 SOLUTION TOPICAL at 05:45

## 2019-01-06 RX ADMIN — Medication 2: at 13:06

## 2019-01-06 RX ADMIN — LACTULOSE 30 GRAM(S): 10 SOLUTION ORAL at 05:44

## 2019-01-06 RX ADMIN — LIDOCAINE 1 PATCH: 4 CREAM TOPICAL at 01:17

## 2019-01-06 RX ADMIN — Medication 125 MILLILITER(S): at 00:09

## 2019-01-06 RX ADMIN — MIDODRINE HYDROCHLORIDE 5 MILLIGRAM(S): 2.5 TABLET ORAL at 05:44

## 2019-01-06 RX ADMIN — Medication 1 MILLIGRAM(S): at 09:21

## 2019-01-06 RX ADMIN — MIDODRINE HYDROCHLORIDE 5 MILLIGRAM(S): 2.5 TABLET ORAL at 13:07

## 2019-01-06 RX ADMIN — PIPERACILLIN AND TAZOBACTAM 25 GRAM(S): 4; .5 INJECTION, POWDER, LYOPHILIZED, FOR SOLUTION INTRAVENOUS at 09:24

## 2019-01-06 RX ADMIN — CHLORHEXIDINE GLUCONATE 15 MILLILITER(S): 213 SOLUTION TOPICAL at 20:07

## 2019-01-06 NOTE — PROGRESS NOTE ADULT - PROBLEM SELECTOR PLAN 2
MELD 34 on admission, indicating >50% mortality in next 3 months. Was recently on liver transplant candidate list at [facility], however had to be removed due to severe HFrEF, no longer plans for liver transplant    - Patient on Furosemide 20 mg and spironolactone 25 mg daily. Will hold diuretics for now as pt intravascularly depleted and hypotensive on arrival in setting of aggressive diuretic regimen as outpatient (lasix/aldactone + addition of metolazone 2 wks ago).   - Will continue with Lactulose and Rifaximin PO.   - Lower concern for hepatic encephalopathy at this time.   - Evidence of severe synthetic dysfunction, portal hypertensive, varices, and existing heart failure and now ULICES. Poor prognosis.  - U/S Abdomen showed small to moderate ascites in the RUQ. Per hepatology, consider diagnostic paracentesis to rule out SBP  - MRCP and MR Abdomen with Iron quantification patient and patient's brother are agreeable. MELD 34 on admission, indicating >50% mortality in next 3 months. Was recently on liver transplant candidate list at [facility], however had to be removed due to severe HFrEF, no longer plans for liver transplant    - Patient on Furosemide 20 mg and spironolactone 25 mg daily. Will hold diuretics for now as pt intravascularly depleted and hypotensive on arrival in setting of aggressive diuretic regimen as outpatient (lasix/aldactone + addition of metolazone 2 wks ago).   - Will continue with Lactulose and Rifaximin PO.   - Lower concern for hepatic encephalopathy at this time.   - Evidence of severe synthetic dysfunction, portal hypertensive, varices, and existing heart failure and now ULICES. Poor prognosis.  - U/S Abdomen showed small to moderate ascites in the RUQ. Per hepatology, consider diagnostic paracentesis to rule out SBP  - MRCP and MR Abdomen with Iron quantification patient and patient's brother are agreeable.  d/w hepatology to restart lasix/spironolactone

## 2019-01-06 NOTE — PROGRESS NOTE ADULT - SUBJECTIVE AND OBJECTIVE BOX
Dr. Noman Harden  Internal Medicine PGY-1   Pager# [phone]    Patient is a 53y old  Male who presents with a chief complaint of hypotension, decompensated cirrhosis (05 Jan 2019 19:36)      SUBJECTIVE / OVERNIGHT EVENTS: No acute events overnight. Hgb jumped from 7.4 to 8.7 s/p 1UPRBC.     MEDICATIONS  (STANDING):  albumin human  5% IVPB 250 milliLiter(s) IV Intermittent every 6 hours  chlorhexidine 0.12% Liquid 15 milliLiter(s) Oral Mucosa two times a day  dextrose 5%. 1000 milliLiter(s) (50 mL/Hr) IV Continuous <Continuous>  dextrose 50% Injectable 12.5 Gram(s) IV Push once  dextrose 50% Injectable 25 Gram(s) IV Push once  dextrose 50% Injectable 25 Gram(s) IV Push once  folic acid 1 milliGRAM(s) Oral daily  insulin lispro (HumaLOG) corrective regimen sliding scale   SubCutaneous three times a day before meals  insulin lispro (HumaLOG) corrective regimen sliding scale   SubCutaneous at bedtime  lactulose Syrup 30 Gram(s) Oral three times a day  lidocaine   Patch 1 Patch Transdermal daily  midodrine 5 milliGRAM(s) Oral three times a day  multivitamin 1 Tablet(s) Oral daily  piperacillin/tazobactam IVPB. 3.375 Gram(s) IV Intermittent every 8 hours  rifaximin 550 milliGRAM(s) Oral two times a day  thiamine 100 milliGRAM(s) Oral daily  vancomycin  IVPB 750 milliGRAM(s) IV Intermittent every 12 hours    MEDICATIONS  (PRN):  artificial tears (preservative free) Ophthalmic Solution 1 Drop(s) Both EYES daily PRN Dry Eyes  dextrose 40% Gel 15 Gram(s) Oral once PRN Blood Glucose LESS THAN 70 milliGRAM(s)/deciliter  glucagon  Injectable 1 milliGRAM(s) IntraMuscular once PRN Glucose LESS THAN 70 milligrams/deciliter  traMADol 50 milliGRAM(s) Oral every 8 hours PRN Severe Pain (7 - 10)      Vital Signs Last 24 Hrs  T(C): 36.7 (06 Jan 2019 04:08), Max: 36.8 (05 Jan 2019 23:49)  T(F): 98 (06 Jan 2019 04:08), Max: 98.3 (05 Jan 2019 23:49)  HR: 61 (06 Jan 2019 04:08) (60 - 73)  BP: 95/53 (06 Jan 2019 04:08) (93/50 - 109/66)  BP(mean): --  RR: 18 (06 Jan 2019 04:08) (18 - 18)  SpO2: 96% (06 Jan 2019 04:08) (95% - 98%)  CAPILLARY BLOOD GLUCOSE      POCT Blood Glucose.: 210 mg/dL (05 Jan 2019 21:54)  POCT Blood Glucose.: 207 mg/dL (05 Jan 2019 17:56)  POCT Blood Glucose.: 343 mg/dL (05 Jan 2019 12:47)  POCT Blood Glucose.: 163 mg/dL (05 Jan 2019 08:45)    I&O's Summary    04 Jan 2019 07:01  -  05 Jan 2019 07:00  --------------------------------------------------------  IN: 2520 mL / OUT: 0 mL / NET: 2520 mL    05 Jan 2019 07:01  -  06 Jan 2019 06:44  --------------------------------------------------------  IN: 1380 mL / OUT: 150 mL / NET: 1230 mL        PHYSICAL EXAM:    Constitutional: Frail appearing, jaundiced, in no acute distress, comfortable in bed  	ENMT: dry mucous membranes, no pharyngeal erythema or exudates  	Respiratory: lungs CTAB; no wheezing, rales or rhonchi  	Cardiovascular: Normal S1 and S2; no murmurs. Trace LE edema bilaterally.   	Gastrointestinal: Abdomen soft, no tenderness to palpation, no distension, no fluid wave, normal bowel sounds   	Extremities: No clubbing, cyanosis or edema.   	Vascular: 2+ peripheral pulses  	Neurological: No asterixis. No focal deficits appreciated.  	Skin: Jaundiced; no rashes noted. 2 cm x 3 cm ecchymosis non tender no palpable hematoma.     Psychiatric: A&O x 4. Dr. Noman Harden  Internal Medicine PGY-1   Pager# [phone]    Patient is a 53y old  Male who presents with a chief complaint of hypotension, decompensated cirrhosis (05 Jan 2019 19:36)      SUBJECTIVE / OVERNIGHT EVENTS: No acute events overnight. Hgb jumped from 7.4 to 8.7 s/p 1UPRBC. No complaints at bedside besides hip pain. Denies fevers, chills, nausea, vomiting, chest pain, abdominal pain, or leg swelling.     MEDICATIONS  (STANDING):  albumin human  5% IVPB 250 milliLiter(s) IV Intermittent every 6 hours  chlorhexidine 0.12% Liquid 15 milliLiter(s) Oral Mucosa two times a day  dextrose 5%. 1000 milliLiter(s) (50 mL/Hr) IV Continuous <Continuous>  dextrose 50% Injectable 12.5 Gram(s) IV Push once  dextrose 50% Injectable 25 Gram(s) IV Push once  dextrose 50% Injectable 25 Gram(s) IV Push once  folic acid 1 milliGRAM(s) Oral daily  insulin lispro (HumaLOG) corrective regimen sliding scale   SubCutaneous three times a day before meals  insulin lispro (HumaLOG) corrective regimen sliding scale   SubCutaneous at bedtime  lactulose Syrup 30 Gram(s) Oral three times a day  lidocaine   Patch 1 Patch Transdermal daily  midodrine 5 milliGRAM(s) Oral three times a day  multivitamin 1 Tablet(s) Oral daily  piperacillin/tazobactam IVPB. 3.375 Gram(s) IV Intermittent every 8 hours  rifaximin 550 milliGRAM(s) Oral two times a day  thiamine 100 milliGRAM(s) Oral daily  vancomycin  IVPB 750 milliGRAM(s) IV Intermittent every 12 hours    MEDICATIONS  (PRN):  artificial tears (preservative free) Ophthalmic Solution 1 Drop(s) Both EYES daily PRN Dry Eyes  dextrose 40% Gel 15 Gram(s) Oral once PRN Blood Glucose LESS THAN 70 milliGRAM(s)/deciliter  glucagon  Injectable 1 milliGRAM(s) IntraMuscular once PRN Glucose LESS THAN 70 milligrams/deciliter  traMADol 50 milliGRAM(s) Oral every 8 hours PRN Severe Pain (7 - 10)      Vital Signs Last 24 Hrs  T(C): 36.7 (06 Jan 2019 04:08), Max: 36.8 (05 Jan 2019 23:49)  T(F): 98 (06 Jan 2019 04:08), Max: 98.3 (05 Jan 2019 23:49)  HR: 61 (06 Jan 2019 04:08) (60 - 73)  BP: 95/53 (06 Jan 2019 04:08) (93/50 - 109/66)  BP(mean): --  RR: 18 (06 Jan 2019 04:08) (18 - 18)  SpO2: 96% (06 Jan 2019 04:08) (95% - 98%)  CAPILLARY BLOOD GLUCOSE      POCT Blood Glucose.: 210 mg/dL (05 Jan 2019 21:54)  POCT Blood Glucose.: 207 mg/dL (05 Jan 2019 17:56)  POCT Blood Glucose.: 343 mg/dL (05 Jan 2019 12:47)  POCT Blood Glucose.: 163 mg/dL (05 Jan 2019 08:45)    I&O's Summary    04 Jan 2019 07:01  -  05 Jan 2019 07:00  --------------------------------------------------------  IN: 2520 mL / OUT: 0 mL / NET: 2520 mL    05 Jan 2019 07:01  -  06 Jan 2019 06:44  --------------------------------------------------------  IN: 1380 mL / OUT: 150 mL / NET: 1230 mL        PHYSICAL EXAM:    Constitutional: Frail appearing, jaundiced, in no acute distress, comfortable in bed  	ENMT: dry mucous membranes, no pharyngeal erythema or exudates  	Respiratory: lungs CTAB; no wheezing, rales or rhonchi  	Cardiovascular: Normal S1 and S2; no murmurs. Trace LE edema bilaterally.   	Gastrointestinal: Abdomen soft, no tenderness to palpation, no distension, no fluid wave, normal bowel sounds   	Extremities: No clubbing, cyanosis or edema.   	Vascular: 2+ peripheral pulses  	Neurological: No asterixis. No focal deficits appreciated.  	Skin: Jaundiced; no rashes noted. 2 cm x 3 cm ecchymosis non tender no palpable hematoma.     Psychiatric: A&O x 4.      LABS:                        7.7    5.5   )-----------( 34       ( 06 Jan 2019 06:35 )             21.9     01-06    132<L>  |  101  |  16  ----------------------------<  116<H>  4.5   |  19<L>  |  0.81    Ca    8.2<L>      06 Jan 2019 06:33  Phos  2.2     01-06  Mg     2.1     01-06    TPro  5.6<L>  /  Alb  3.0<L>  /  TBili  13.0<H>  /  DBili  x   /  AST  92<H>  /  ALT  23  /  AlkPhos  103  01-06    PT/INR - ( 06 Jan 2019 06:35 )   PT: 42.8 sec;   INR: 3.61 ratio      AST Trend: 92 U/L<--, 96 U/L<--, 125 U/L<--, 150 U/L  ALT Trend: 23 U/L<--, 22 U/L<--, 30 U/L<--, 34 U/L  ALP Trend: 103 U/L<--, 111 U/L<--, 140 U/L<--, 166 U/L  Albumin Trend: 3.0 g/dL<--, 2.7 g/dL<--, 2.8 g/dL<--, 2.5 g/dL  Total Bilirubin Trend: 13.0 mg/dL<--, 12.1 mg/dL<--, 13.6 mg/dL<--, 13.2 mg/dL  INR Trend: 3.61 ratio<--, 3.60 ratio<--, 3.48 ratio<--, 3.26 ratio      Culture - Blood (01.04.19 @ 08:56)    Specimen Source: .Blood Blood    Culture Results:   No growth to date.    Culture - Blood (01.04.19 @ 08:56)    Specimen Source: .Blood Blood    Culture Results:   No growth to date.    Culture - Urine (01.02.19 @ 22:32)    Specimen Source: .Urine Clean Catch (Midstream)    Culture Results:   No growth    Culture - Blood (01.02.19 @ 19:32)    -  Coagulase negative Staphylococcus: Detec    Gram Stain:   Growth in anaerobic bottle: Gram Positive Cocci in Clusters  Growth in aerobic bottle: Gram Positive Cocci in Clusters and Gram  Negative Rods    Specimen Source: .Blood Blood    Organism: Blood Culture PCR    Culture Results:   **Please Note**: This is a Corrected Report**  Growth in aerobic and anaerobic bottles: Coag Negative Staphylococcus  Single set isolate, possible contaminant. Contact  Microbiology if susceptibility testing clinically  indicated.  Previously reported as:  Growth in anaerobic bottle: Coag Negative Staphylococcus  Single set isolate, possible contaminant. Contact  Microbiology if susceptibility testing clinically  indicated. "Susceptibilities not performed"  Growth in aerobic bottle: Gram Positive Cocci in Clusters and Gram  Negative Rods  "Due to technical problems, Proteus sp. will Not be reported as part of  the BCID panel until further notice"  ***Blood Panel PCR results on this specimen are available  approximately 3 hours after the Gram stain result.***  Gram stain, PCR, and/or culture results may not always  correspond due to difference in methodologies.  ************************************************************  This PCR assay was performed using [employer].  The following targets are tested for: Enterococcus,  vancomycin resistant enterococci, Listeria monocytogenes,  coagulase negative staphylococci, S. aureus,  methicillin resistant S. aureus, Streptococcus agalactiae  (Group B), S. pneumoniae, S. pyogenes (GroupA),  Acinetobacter baumannii, Enterobacter cloacae, E. coli,  Klebsiella oxytoca, K. pneumoniae, Proteus sp.,  Serratia marcescens, Haemophilus influenzae,  Neisseria meningitidis, Pseudomonas aeruginosa, Candida  albicans, C. glabrata, C krusei, C parapsilosis,  C. tropicalis and the KPC resistance gene.    Organism Identification: Blood Culture PCR    Method Type: PCR

## 2019-01-06 NOTE — PROGRESS NOTE ADULT - PROBLEM SELECTOR PLAN 5
PLT count 77k this admission, compared to 75k on previous admission (comparable). This, in addition to elevated INR and hyponatremia, are likely in setting of end stage liver disease.   - Hemoglobin Trend: 8.1 g/dL<--, 10.4 g/dL<--, 10.3 g/dL  - Will transfuse 1 unit of blood today.   - Platelet Trend: 43 to 56 K/uL<--, 77 K/uL<--, 75 K/uL  - No active bleeding. PLT count 77k this admission, compared to 75k on previous admission (comparable). This, in addition to elevated INR and hyponatremia, are likely in setting of end stage liver disease.   - Hemoglobin Trend: 8.1 g/dL<--, 10.4 g/dL<--, 10.3 g/dL  - s/p 1 unit PRBC 1/5  - No active bleeding.

## 2019-01-06 NOTE — PROGRESS NOTE ADULT - ASSESSMENT
53 year old male with end stage liver disease 2/2 chronic alcoholic cirrhosis, with esophageal varices, CAD, HfrEF (EF 35%), and DMII, presenting with 1 week of generalized weakness and poor PO intake.  No fevers, no leukocytosis  Vague symptoms of fatigue, but no focal complaints  UA/UCX neg  BCX with GPC-CL (CoNS by PCR)--note corrected report, microbio reporting NO GNR present  False positive GNR is consistent with clinical picture--patient has no signs or symptoms of sepsis  GPC-CL likely contam  Overall, GNR bacteremia, liver cirrhosis, elevated bili, ascites  - DC Vanco/Zosyn, monitor off abx  - Would hold on para/CT unless otherwise indicated  - Follow up pending BCX  - Monitor for any signs infection  - Called/confirmed micro result with microbiology tech    Tereso Collins MD  Pager [phone]  After 5pm and on weekends call [phone] 53 year old male with end stage liver disease 2/2 chronic alcoholic cirrhosis, with esophageal varices, CAD, HfrEF (EF 35%), and DMII, presenting with 1 week of generalized weakness and poor PO intake.  No fevers, no leukocytosis  Vague symptoms of fatigue, but no focal complaints  UA/UCX neg  BCX with GPC-CL (CoNS by PCR)--note corrected report, microbio reporting NO GNR present  False positive GNR is consistent with clinical picture--patient has no signs or symptoms of sepsis  GPC-CL likely contam  Overall, GNR bacteremia, liver cirrhosis, elevated bili, ascites  - DC Vanco/Zosyn, monitor off abx  - Would hold on para/CT unless otherwise indicated  - Follow up pending BCX  - Monitor for any signs infection  - Called/confirmed micro result with microbiology tech  - Discussed case with medicine team    Tereso Collins MD  Pager [phone]  After 5pm and on weekends call [phone]

## 2019-01-06 NOTE — PROGRESS NOTE ADULT - ASSESSMENT
53M end stage liver disease 2/2 alcoholic cirrhosis, DMII, severe HFrEF (35%), CAD, HTN, presenting with 1 week of generalized weakness and hypotension, likely 2/2 decompensated cirrhosis in the setting of sepsis. Now found to have gram negative rods and gram positive bacteremia explaining sepsis. 1/5 with drop in hemoglobin, HD stable, no s/s of bleeding, will transfuse 1 unit, check stool guaiac and observe closely, GI follow up 53M end stage liver disease 2/2 alcoholic cirrhosis, DMII, severe HFrEF (35%), CAD, HTN, presenting with 1 week of generalized weakness and hypotension, likely 2/2 decompensated cirrhosis in the setting of sepsis. Now found to have gram negative rods and gram positive bacteremia explaining sepsis. 1/5 with drop in hemoglobin, HD stable, no s/s of bleeding, will transfuse 1 unit, check stool guaiac and observe closely, GI follow up. Initialy bcxs for gram negative rods negative, and G+ most likely contaminant. As per ID will hold off abx for now. Going for MR of the abdomen today to evaluate hepatobilliary anatomy.

## 2019-01-06 NOTE — PROGRESS NOTE ADULT - PROBLEM SELECTOR PLAN 1
hemoglobin dropped from 10 to 7.4, unclear source of bleeding, patient denies GIB  will transfuse 1 unit PRBC, check post transfusion CBC  check stool guaiac ?occult variceal bleed  GI eval  Supratherapeutic INR likely due to end stage liver disease, if bleeding found will need FFP, vit K. hemoglobin dropped from 10 to 7.4, unclear source of bleeding, patient denies GIB  will transfuse 1 unit PRBC, check post transfusion CBC  stool guaiac negative   GI eval appreciated, no scope for now   Supratherapeutic INR likely due to end stage liver disease, if bleeding found will need FFP, vit K.

## 2019-01-06 NOTE — PROVIDER CONTACT NOTE (CRITICAL VALUE NOTIFICATION) - BACKGROUND
Patient admitted Spontaneous bacterial peritonitis. Found to be Hypotensive and Hyperbilirubinemic on admission and acute-on-chronic hepatic injury.

## 2019-01-06 NOTE — PROGRESS NOTE ADULT - SUBJECTIVE AND OBJECTIVE BOX
CC: F/U for ? Bacteremia    Saw/spoke to patient. No fevers, no chills. Overall well. Having diarrhea, but on lactulose. Otherwise well.    Allergies  No Known Allergies    ANTIMICROBIALS:  rifaximin 550 two times a day    PE:    Vital Signs Last 24 Hrs  T(C): 36.7 (06 Jan 2019 04:08), Max: 36.8 (05 Jan 2019 23:49)  T(F): 98 (06 Jan 2019 04:08), Max: 98.3 (05 Jan 2019 23:49)  HR: 61 (06 Jan 2019 04:08) (61 - 73)  BP: 95/53 (06 Jan 2019 04:08) (93/50 - 109/66)  RR: 18 (06 Jan 2019 04:08) (18 - 18)  SpO2: 96% (06 Jan 2019 04:08) (95% - 98%)    Gen: AOx3, NAD, non-toxic  CV: S1+S2 normal, nontachycardic  Resp: Clear bilat, no resp distress, no crackles/wheezes  Abd: Soft, nontender, +BS  Ext: No LE edema, no wounds    LABS:                        7.7    5.5   )-----------( 34       ( 06 Jan 2019 06:35 )             21.9     01-06    132<L>  |  101  |  16  ----------------------------<  116<H>  4.5   |  19<L>  |  0.81    Ca    8.2<L>      06 Jan 2019 06:33  Phos  2.2     01-06  Mg     2.1     01-06    TPro  5.6<L>  /  Alb  3.0<L>  /  TBili  13.0<H>  /  DBili  x   /  AST  92<H>  /  ALT  23  /  AlkPhos  103  01-06    MICROBIOLOGY:    .Blood Blood  01-04-19   No growth to date.     .Urine Clean Catch (Midstream)  01-02-19   No growth      .Blood Blood  01-02-19   No growth to date.  --  Blood Culture PCR GPC-CL--CoNS (***corrected report, no GNR))    CMVPCR Log: NotDetec LogIU/mL (01-04 @ 07:48)  Rapid RVP Result: NotDetec (01-02 @ 16:55)    RADIOLOGY:    1/3 XR:    IMPRESSION:     No fractures or dislocations. Advanced left and moderate right hip   osteoarthritis.

## 2019-01-06 NOTE — PROGRESS NOTE ADULT - PROBLEM SELECTOR PLAN 3
Patient with leukocytosis to WBC 11.8, hypotension to SBP 70's, with no confirmed infectious source but suspicion for SBP given prior treatments for SBP and history of esophageal varices and c/o of vague abd pain.  VBG lactate elevated to 4.7 (possibly from liver disease alone)    - s/p 250 cc 5% albumin for volume resuscitation (pt with low albumin) as patient intravascularly depleted and cannot give NS or LR due to risk of third spacing.  Continue giving albumin q6h.   - Initially started on Ceftriaxone and Vancomycin. Now on Zosyn to cover from Gram negative organisms. Day 4 IV abx 1/6.   - Started on Vancomycin 1/3 to 1/4 to broaden coverage. Repeat blood cultures negative from 1/4. can likely d/c vanc after negative for 48 hours  - Holding home diuretics and antihypertensives. Patient with no florid ascites on physical exam; abdomen soft and nondistended.  However, patient previously treated for SBP and patient with esophageal varices.   Patient complains of diffuse abdominal pain particularly in RUQ and LLQ.    - abdominal ultrasound showing small to moderate ascites. Patient with no florid ascites on physical exam; abdomen soft and nondistended.  However, patient previously treated for SBP and patient with esophageal varices.   Patient complains of diffuse abdominal pain particularly in RUQ and LLQ.    - abdominal ultrasound showing small to moderate ascites.  Hold off antibiotics as appears blood culture contaminant

## 2019-01-07 LAB
ALBUMIN SERPL ELPH-MCNC: 3 G/DL — LOW (ref 3.3–5)
ALP SERPL-CCNC: 107 U/L — SIGNIFICANT CHANGE UP (ref 40–120)
ALT FLD-CCNC: 20 U/L — SIGNIFICANT CHANGE UP (ref 10–45)
ANION GAP SERPL CALC-SCNC: 11 MMOL/L — SIGNIFICANT CHANGE UP (ref 5–17)
AST SERPL-CCNC: 77 U/L — HIGH (ref 10–40)
BILIRUB SERPL-MCNC: 12.2 MG/DL — HIGH (ref 0.2–1.2)
BUN SERPL-MCNC: 17 MG/DL — SIGNIFICANT CHANGE UP (ref 7–23)
CALCIUM SERPL-MCNC: 8.4 MG/DL — SIGNIFICANT CHANGE UP (ref 8.4–10.5)
CHLORIDE SERPL-SCNC: 104 MMOL/L — SIGNIFICANT CHANGE UP (ref 96–108)
CO2 SERPL-SCNC: 20 MMOL/L — LOW (ref 22–31)
CREAT SERPL-MCNC: 0.78 MG/DL — SIGNIFICANT CHANGE UP (ref 0.5–1.3)
CULTURE RESULTS: SIGNIFICANT CHANGE UP
EBV DNA SERPL NAA+PROBE-ACNC: SIGNIFICANT CHANGE UP IU/ML
EBVPCR LOG: SIGNIFICANT CHANGE UP LOGIU/ML
FOLATE SERPL-MCNC: >20 NG/ML — SIGNIFICANT CHANGE UP
GLUCOSE BLDC GLUCOMTR-MCNC: 141 MG/DL — HIGH (ref 70–99)
GLUCOSE BLDC GLUCOMTR-MCNC: 166 MG/DL — HIGH (ref 70–99)
GLUCOSE BLDC GLUCOMTR-MCNC: 169 MG/DL — HIGH (ref 70–99)
GLUCOSE BLDC GLUCOMTR-MCNC: 255 MG/DL — HIGH (ref 70–99)
GLUCOSE SERPL-MCNC: 134 MG/DL — HIGH (ref 70–99)
HCT VFR BLD CALC: 21.5 % — LOW (ref 39–50)
HGB BLD-MCNC: 7.4 G/DL — LOW (ref 13–17)
INR BLD: 3.79 RATIO — HIGH (ref 0.88–1.16)
MAGNESIUM SERPL-MCNC: 2.2 MG/DL — SIGNIFICANT CHANGE UP (ref 1.6–2.6)
MCHC RBC-ENTMCNC: 34.6 GM/DL — SIGNIFICANT CHANGE UP (ref 32–36)
MCHC RBC-ENTMCNC: 35.4 PG — HIGH (ref 27–34)
MCV RBC AUTO: 102 FL — HIGH (ref 80–100)
PHOSPHATE SERPL-MCNC: 3.2 MG/DL — SIGNIFICANT CHANGE UP (ref 2.5–4.5)
PLATELET # BLD AUTO: 31 K/UL — LOW (ref 150–400)
POTASSIUM SERPL-MCNC: 4.5 MMOL/L — SIGNIFICANT CHANGE UP (ref 3.5–5.3)
POTASSIUM SERPL-SCNC: 4.5 MMOL/L — SIGNIFICANT CHANGE UP (ref 3.5–5.3)
PROT SERPL-MCNC: 5.5 G/DL — LOW (ref 6–8.3)
PROTHROM AB SERPL-ACNC: 45.4 SEC — HIGH (ref 10–12.9)
RBC # BLD: 2.09 M/UL — LOW (ref 4.2–5.8)
RBC # FLD: 15.6 % — HIGH (ref 10.3–14.5)
SODIUM SERPL-SCNC: 135 MMOL/L — SIGNIFICANT CHANGE UP (ref 135–145)
SPECIMEN SOURCE: SIGNIFICANT CHANGE UP
TSH SERPL-MCNC: 1.81 UIU/ML — SIGNIFICANT CHANGE UP (ref 0.27–4.2)
VIT B12 SERPL-MCNC: >2000 PG/ML — HIGH (ref 232–1245)
WBC # BLD: 5.1 K/UL — SIGNIFICANT CHANGE UP (ref 3.8–10.5)
WBC # FLD AUTO: 5.1 K/UL — SIGNIFICANT CHANGE UP (ref 3.8–10.5)

## 2019-01-07 PROCEDURE — 99232 SBSQ HOSP IP/OBS MODERATE 35: CPT | Mod: GC

## 2019-01-07 PROCEDURE — 99232 SBSQ HOSP IP/OBS MODERATE 35: CPT

## 2019-01-07 PROCEDURE — 99233 SBSQ HOSP IP/OBS HIGH 50: CPT | Mod: GC

## 2019-01-07 RX ADMIN — Medication 125 MILLILITER(S): at 17:02

## 2019-01-07 RX ADMIN — Medication 1 MILLIGRAM(S): at 10:40

## 2019-01-07 RX ADMIN — LACTULOSE 30 GRAM(S): 10 SOLUTION ORAL at 05:56

## 2019-01-07 RX ADMIN — Medication 125 MILLILITER(S): at 03:20

## 2019-01-07 RX ADMIN — CHLORHEXIDINE GLUCONATE 15 MILLILITER(S): 213 SOLUTION TOPICAL at 05:57

## 2019-01-07 RX ADMIN — MIDODRINE HYDROCHLORIDE 5 MILLIGRAM(S): 2.5 TABLET ORAL at 13:16

## 2019-01-07 RX ADMIN — Medication 100 MILLIGRAM(S): at 10:39

## 2019-01-07 RX ADMIN — Medication 1 TABLET(S): at 10:40

## 2019-01-07 RX ADMIN — Medication 125 MILLILITER(S): at 10:38

## 2019-01-07 RX ADMIN — LACTULOSE 30 GRAM(S): 10 SOLUTION ORAL at 13:17

## 2019-01-07 RX ADMIN — MIDODRINE HYDROCHLORIDE 5 MILLIGRAM(S): 2.5 TABLET ORAL at 21:11

## 2019-01-07 RX ADMIN — LACTULOSE 30 GRAM(S): 10 SOLUTION ORAL at 21:11

## 2019-01-07 RX ADMIN — Medication 125 MILLILITER(S): at 21:50

## 2019-01-07 RX ADMIN — CHLORHEXIDINE GLUCONATE 15 MILLILITER(S): 213 SOLUTION TOPICAL at 18:08

## 2019-01-07 RX ADMIN — Medication 1: at 18:08

## 2019-01-07 RX ADMIN — MIDODRINE HYDROCHLORIDE 5 MILLIGRAM(S): 2.5 TABLET ORAL at 05:56

## 2019-01-07 RX ADMIN — Medication 3: at 13:21

## 2019-01-07 NOTE — PROGRESS NOTE ADULT - ASSESSMENT
53 year old male with end stage liver disease 2/2 chronic alcoholic cirrhosis, with esophageal varices, CAD, HfrEF (EF 35%), and DMII, presenting with 1 week of generalized weakness and poor PO intake.  No fevers, no leukocytosis  Vague symptoms of fatigue, but no focal complaints  UA/UCX neg  BCX with GPC-CL (CoNS by PCR)--note corrected report, microbio reporting NO GNR present  False positive GNR is consistent with clinical picture--patient has no signs or symptoms of sepsis  MR reassuring from billary perspective  GPC-CL likely contam  Overall, GNR bacteremia, liver cirrhosis, elevated bili, ascites  - Continue off abx  - Follow up pending BCX  - Monitor for any signs infection    Tereso Collins MD  Pager [phone]  After 5pm and on weekends call [phone]

## 2019-01-07 NOTE — PROGRESS NOTE ADULT - ASSESSMENT
Impression:  # Decompensated Cirrhosis: suspected EtOH related - MELD-Na: 33 (today)  HE: On Lactulose + Rifaximin at home  Ascites: small to moderate ascites on US 1/3/19  HCC: No focal masses on US 9/2018  Varices: Small on EGD 8/2017  # Macrocytic Anemia: Low suspicion for active GI bleed as would expect overt GI bleeding given with drop by 2U in one day.    Recommendations:  - Obtain records from [facility]  - Continue lactulose and rifaximin, titrate to 2-3 BM per day  - Follow up MRI with iron quantification given hx of reported iron overload  - MRCP given elevated bili and previously high Alk Phos  - Check B12/Folate given macrocytic anemia  - Repeat TTE  - Repeat diagnostic paracentesis to r/o SBP  - Trend CBC.  - Check TSH, folate, B12 for macrocytic anemia workup  - Will consider endoscopic evaluation for blood loss if anemia workup is negative  - Supportive care per primary team    Lilly Ruffin MD  Gastroenterology Fellow  [phone]  Please page on call fellow on weekends and after 5pm on weekdays 53M end stage liver disease 2/2 alcoholic cirrhosis, DMII, severe HFrEF (35%), CAD, HTN, presenting with progressively worsening generalized weakness. During admission, he was found to have gram negative rods and gram positive blood cultures but this was thought to be secondary to contaminant. Rest of infectious workup his pending. 2g drop in Hb on 1/5 but HD stable, no signs of bleeding.     Impression:  # Decompensated Cirrhosis: suspected EtOH related - MELD-Na: 33  Patient was listed for transplant at [facility] but given new diagnosis of HFrEF in 10/2018, he was placed on hold on the waitlist with plan to reassess cardiac function. He is awaiting JOHNSON during this admission. Patient refused transfer to [facility] at this time.  HE: On Lactulose + Rifaximin at home  Ascites: small to moderate ascites on US 1/3/19. Has a history of SBP last episode in 10/2018  HCC: No focal masses on US 9/2018  Varices: Small on EGD 8/2017, on spironolactone 25mg daily and lasix 20mg daily at home  Repeat Hep A, B, C serologies negative from 1/4/2019  #GNR and coag negative staph blood cultures  ID following and thought to be secondary to contaminant. Not currently on IV abx.  # Macrocytic Anemia: Low suspicion for active GI bleed as would expect overt GI bleeding given with drop by 2U in one day.    Recommendations:  - Obtain records from [facility]  - Continue lactulose and rifaximin, titrate to 2-3 BM per day  - Follow up MRI with iron quantification given hx of reported iron overload  - MRCP given elevated bili and previously high Alk Phos  - Check B12/Folate given macrocytic anemia  - Repeat TTE  - Repeat diagnostic paracentesis to r/o SBP  - Check TSH, folate, B12 for macrocytic anemia workup  - please check total immunoglobulin, HSV, CMV, and EBV PCR  - Trend MELD labs and CBC daily

## 2019-01-07 NOTE — PROGRESS NOTE ADULT - PROBLEM SELECTOR PLAN 6
Non oliguric ULICES probably in the setting of hypotension pre renal to ATN.     - Avoid nephrotoxins. Dose medications renally. Trend BMP (BUN/Cr) qd.   - Likely not long term HD candidate given poor prognosis and poor liver transplant candidate, family aware. Last TTE showed reduced Ef but was in setting of sepsis and bacteremia. Currently appears euvolemic. Fluid status will be tenuous in the setting of advanced liver disease, sepsis, HFrEF, and ULICES.    - Will monitor vitals q4hr and hemodynamics.   - Will attempt to maintain euvolemia by minimizing I's.   - Repeat TTE f/u.

## 2019-01-07 NOTE — PROGRESS NOTE ADULT - PROBLEM SELECTOR PLAN 3
Patient with no florid ascites on physical exam; abdomen soft and nondistended.  However, patient previously treated for SBP and patient with esophageal varices.   Patient complains of diffuse abdominal pain particularly in RUQ and LLQ.    - abdominal ultrasound showing small to moderate ascites.  Hold off antibiotics as appears blood culture contaminant Patient with no florid ascites on physical exam; abdomen soft and nondistended.  However, patient previously treated for SBP and patient with esophageal varices.   Patient complains of diffuse abdominal pain particularly in RUQ and LLQ.  - Plan for diagnostic paracentesis tomorrow by IR 1/8.   - abdominal ultrasound showing small to moderate ascites.  - Hold off antibiotics as appears blood culture contaminant

## 2019-01-07 NOTE — PROGRESS NOTE ADULT - PROBLEM SELECTOR PLAN 7
Last TTE showed reduced Ef but was in setting of sepsis and bacteremia. Currently appears euvolemic. Fluid status will be tenuous in the setting of advanced liver disease, sepsis, HFrEF, and ULICES.    - Will monitor vitals q4hr and hemodynamics.   - Will attempt to maintain euvolemia by minimizing I's.   - Repeat TTE f/u. No active bleeding at this time. Pt has dried blood on teeth, from dry lips/scab picking, denies hematemesis, hgb at baseline.  Patient was previously on propranolol for prophylaxis- however states he is no longer taking it. Likely 2/2 hypotension. Clarify medication with patient's PCP or pharmacy in AM.    -c/w sbp coverage as noted above.

## 2019-01-07 NOTE — PROGRESS NOTE ADULT - PROBLEM SELECTOR PLAN 9
Last HbA1c 6.1% in 2016. Holding home Glipizide.     - Repeat A1c <5 patient is not diabetic.   - FS qac and qhs, with insulin SSI. DVT ppx: SCDs  Diet: DASH/Consistent Carb    Goals of Care: FULL CODE. addressed GOC with patient and his brother at bedside. Patient stated that he would like chest compressions and intubation if necessary.

## 2019-01-07 NOTE — PROGRESS NOTE ADULT - SUBJECTIVE AND OBJECTIVE BOX
CC: F/U for ? bacteremia    Saw/spoke to patient. No fevers, no chills. No new complaints. Overall well. No abd pain.    Allergies  No Known Allergies    ANTIMICROBIALS:  rifaximin 550 two times a day    PE:    Vital Signs Last 24 Hrs  T(C): 36.8 (07 Jan 2019 08:04), Max: 36.8 (07 Jan 2019 08:04)  T(F): 98.2 (07 Jan 2019 08:04), Max: 98.2 (07 Jan 2019 08:04)  HR: 63 (07 Jan 2019 08:04) (63 - 74)  BP: 90/52 (07 Jan 2019 08:04) (90/52 - 94/56)  RR: 18 (07 Jan 2019 08:04) (18 - 18)  SpO2: 93% (07 Jan 2019 08:04) (93% - 99%)    Gen: AOx3, NAD, non-toxic, pleasant  CV: S1+S2 normal, nontachycardic  Resp: Clear bilat, no resp distress, no crackles/wheezes  Abd: Soft, nontender, +BS  Ext: No LE edema, no wounds    LABS:                        7.4    5.1   )-----------( 31       ( 07 Jan 2019 06:46 )             21.5     01-07    135  |  104  |  17  ----------------------------<  134<H>  4.5   |  20<L>  |  0.78    Ca    8.4      07 Jan 2019 06:46  Phos  3.2     01-07  Mg     2.2     01-07    TPro  5.5<L>  /  Alb  3.0<L>  /  TBili  12.2<H>  /  DBili  x   /  AST  77<H>  /  ALT  20  /  AlkPhos  107  01-07    MICROBIOLOGY:    .Blood Blood  01-04-19   No growth to date.     .Urine Clean Catch (Midstream)  01-02-19   No growth     .Blood Blood  01-02-19   No growth to date.  --  Blood Culture PCR CoNS  Blood Culture PCR    CMVPCR Log: NotDetec LogIU/mL (01-04 @ 07:48)  Rapid RVP Result: NotDetec (01-02 @ 16:55)    RADIOLOGY:    1/6 MR:    IMPRESSION:     Cirrhosis with evidence of portal hypertension. Small volume ascites.    No evidence of hepatocellular cancer.    Mild hepatic iron deposition.    Portal hypertension.    No evidence of cholecystitis or biliary ductal dilatation.

## 2019-01-07 NOTE — PROGRESS NOTE ADULT - PROBLEM SELECTOR PLAN 5
PLT count 77k this admission, compared to 75k on previous admission (comparable). This, in addition to elevated INR and hyponatremia, are likely in setting of end stage liver disease.   - Hemoglobin Trend: 8.1 g/dL<--, 10.4 g/dL<--, 10.3 g/dL  - s/p 1 unit PRBC 1/5  - No active bleeding. PLT count 77k this admission, compared to 75k on previous admission (comparable). This, in addition to elevated INR and hyponatremia, are likely in setting of end stage liver disease.     - Worsening platelets can be secondary to liver cirrhosis. No signs of active bleeding despite Hgb decreasing (tachycardia, hypotension, melena).   - s/p 1 unit PRBC 1/5  - No active bleeding.

## 2019-01-07 NOTE — PROGRESS NOTE ADULT - SUBJECTIVE AND OBJECTIVE BOX
alert, no complaints    PAST MEDICAL & SURGICAL HISTORY:  Hypertension  Type 2 diabetes mellitus  Congestive heart failure (CHF)  Esophageal varices  Alcoholic cirrhosis of liver with ascites  Abdominal hernia: S/P repair x2  H/O cataract    Medications:  albumin human  5% IVPB 250 milliLiter(s) IV Intermittent every 6 hours  artificial tears (preservative free) Ophthalmic Solution 1 Drop(s) Both EYES daily PRN  chlorhexidine 0.12% Liquid 15 milliLiter(s) Oral Mucosa two times a day  dextrose 40% Gel 15 Gram(s) Oral once PRN  dextrose 5%. 1000 milliLiter(s) IV Continuous <Continuous>  dextrose 50% Injectable 12.5 Gram(s) IV Push once  dextrose 50% Injectable 25 Gram(s) IV Push once  dextrose 50% Injectable 25 Gram(s) IV Push once  folic acid 1 milliGRAM(s) Oral daily  glucagon  Injectable 1 milliGRAM(s) IntraMuscular once PRN  insulin lispro (HumaLOG) corrective regimen sliding scale   SubCutaneous three times a day before meals  insulin lispro (HumaLOG) corrective regimen sliding scale   SubCutaneous at bedtime  lactulose Syrup 30 Gram(s) Oral three times a day  lidocaine   Patch 1 Patch Transdermal daily  midodrine 5 milliGRAM(s) Oral three times a day  multivitamin 1 Tablet(s) Oral daily  rifaximin 550 milliGRAM(s) Oral two times a day  thiamine 100 milliGRAM(s) Oral daily  traMADol 50 milliGRAM(s) Oral every 8 hours PRN      Vitals:  T(C): 36.8 (01-07-19 @ 08:04), Max: 36.8 (01-07-19 @ 08:04)  HR: 63 (01-07-19 @ 08:04) (63 - 74)  BP: 90/52 (01-07-19 @ 08:04) (90/52 - 98/57)  BP(mean): --  RR: 18 (01-07-19 @ 08:04) (18 - 18)  SpO2: 93% (01-07-19 @ 08:04) (93% - 99%)  Wt(kg): --  Daily     Daily   I&O's Summary    06 Jan 2019 07:01  -  07 Jan 2019 07:00  --------------------------------------------------------  IN: 1960 mL / OUT: 600 mL / NET: 1360 mL        Physical Exam:  Appearance: [x ] Normal [ ] NAD  Eyes: [ ] PERRL [ ] EOMI  HENT: [ x] Normal oral muscosa [ ]NC/AT  Cardiovascular: x[ ] S1 [x ] S2 [ ] RRR [ ] No m/r/g abdomen soft 1+ edema  Procedural Access Site: [ ] No hematoma [ ] Non-tender to palpation [ ] 2+ pulse [ ] No bruit [ ] No Ecchymosis  Respiratory: [ x] Clear to auscultation bilaterally  Gastrointestinal: [x ] Soft [ ] Non-tender [ ] Non-distended [ ] BS+  Musculoskeletal: [x ] No clubbing [ ] No joint deformity   Neurologic: [ ] Non-focal  Lymphatic: [x ] No lymphadenopathy  Psychiatry: [x ] AAOx3 [ ] Mood & affect appropriate  Skin: [ ] No rashes [ ] No ecchymoses [ ] No cyanosis    01-07    135  |  104  |  17  ----------------------------<  134<H>  4.5   |  20<L>  |  0.78    Ca    8.4      07 Jan 2019 06:46  Phos  3.2     01-07  Mg     2.2     01-07    TPro  5.5<L>  /  Alb  3.0<L>  /  TBili  12.2<H>  /  DBili  x   /  AST  77<H>  /  ALT  20  /  AlkPhos  107  01-07    PT/INR - ( 06 Jan 2019 06:35 )   PT: 42.8 sec;   INR: 3.61 ratio               Serum Pro-Brain Natriuretic Peptide: 1037 pg/mL (01-02 @ 16:55)          ECG:    Echo:    Stress Testing:     Cath:    Imaging:    Interpretation of Telemetry:

## 2019-01-07 NOTE — PROGRESS NOTE ADULT - PROBLEM SELECTOR PLAN 8
No active bleeding at this time. Pt has dried blood on teeth, from dry lips/scab picking, denies hematemesis, hgb at baseline.  Patient was previously on propranolol for prophylaxis- however states he is no longer taking it. Likely 2/2 hypotension. Clarify medication with patient's PCP or pharmacy in AM.    -c/w sbp coverage as noted above. Last HbA1c 6.1% in 2016. Holding home Glipizide.     - Repeat A1c <5 patient is not diabetic.   - FS qac and qhs, with insulin SSI.

## 2019-01-07 NOTE — PROGRESS NOTE ADULT - PROBLEM SELECTOR PLAN 4
Patient with leukocytosis to WBC 11.8, hypotension to SBP 70's, with no confirmed infectious source but suspicion for SBP given prior treatments for SBP and history of esophageal varices and c/o of vague abd pain.  VBG lactate elevated to 4.7 (possibly from liver disease alone)     - s/p 250 cc 5% albumin for volume resuscitation (pt with low albumin) as patient intravascularly depleted and cannot give NS or LR due to risk of third spacing.  Continue giving albumin q6h.   - Hold antibiotics per ID, low threshold to start if s/s of infection    - Holding home diuretics and antihypertensives.

## 2019-01-07 NOTE — PROGRESS NOTE ADULT - ASSESSMENT
Cardiac status remains stable off diuretics and not edematous at this point.  Echo still pending and reports he had MRI yesterday.  OK to continue to remain off diuretics.

## 2019-01-07 NOTE — PROGRESS NOTE ADULT - SUBJECTIVE AND OBJECTIVE BOX
Chief Complaint:  Patient is a 53y old  Male who presents with a chief complaint of hypotension, decompensated cirrhosis (07 Jan 2019 06:57)      Interval Events:     Allergies:  No Known Allergies      Home Medications:    Hospital Medications:  albumin human  5% IVPB 250 milliLiter(s) IV Intermittent every 6 hours  artificial tears (preservative free) Ophthalmic Solution 1 Drop(s) Both EYES daily PRN  chlorhexidine 0.12% Liquid 15 milliLiter(s) Oral Mucosa two times a day  dextrose 40% Gel 15 Gram(s) Oral once PRN  dextrose 5%. 1000 milliLiter(s) IV Continuous <Continuous>  dextrose 50% Injectable 12.5 Gram(s) IV Push once  dextrose 50% Injectable 25 Gram(s) IV Push once  dextrose 50% Injectable 25 Gram(s) IV Push once  folic acid 1 milliGRAM(s) Oral daily  glucagon  Injectable 1 milliGRAM(s) IntraMuscular once PRN  insulin lispro (HumaLOG) corrective regimen sliding scale   SubCutaneous three times a day before meals  insulin lispro (HumaLOG) corrective regimen sliding scale   SubCutaneous at bedtime  lactulose Syrup 30 Gram(s) Oral three times a day  lidocaine   Patch 1 Patch Transdermal daily  midodrine 5 milliGRAM(s) Oral three times a day  multivitamin 1 Tablet(s) Oral daily  rifaximin 550 milliGRAM(s) Oral two times a day  thiamine 100 milliGRAM(s) Oral daily  traMADol 50 milliGRAM(s) Oral every 8 hours PRN      PMHX/PSHX:  Hypertension  Type 2 diabetes mellitus  Congestive heart failure (CHF)  Esophageal varices  Alcoholic cirrhosis of liver with ascites  No pertinent past medical history  Abdominal hernia  H/O cataract      Family history:  Family history of hypercholesterolemia (Father)  Family history of diabetes mellitus (Mother)  No pertinent family history in first degree relatives      ROS:     General:  No wt loss, fevers, chills, night sweats, fatigue,   Eyes:  Good vision, no reported pain  ENT:  No sore throat, pain, runny nose, dysphagia  CV:  No pain, palpitations, hypo/hypertension  Resp:  No dyspnea, cough, tachypnea, wheezing  GI:  No pain, No nausea, No vomiting, No diarrhea, No constipation, No weight loss, No fever, No pruritis, No rectal bleeding, No tarry stools, No dysphagia,  :  No pain, bleeding, incontinence, nocturia  Muscle:  No pain, weakness  Neuro:  No weakness, tingling, memory problems  Psych:  No fatigue, insomnia, mood problems, depression  Endocrine:  No polyuria, polydipsia, cold/heat intolerance  Heme:  No petechiae, ecchymosis, easy bruisability  Skin:  No rash, tattoos, scars, edema      PHYSICAL EXAM:   Vital Signs:  Vital Signs Last 24 Hrs  T(C): 36.6 (07 Jan 2019 04:04), Max: 36.7 (06 Jan 2019 12:43)  T(F): 97.9 (07 Jan 2019 04:04), Max: 98.1 (06 Jan 2019 12:43)  HR: 66 (07 Jan 2019 04:04) (66 - 74)  BP: 92/52 (07 Jan 2019 03:24) (92/49 - 98/57)  BP(mean): --  RR: 18 (07 Jan 2019 04:04) (18 - 18)  SpO2: 99% (07 Jan 2019 04:04) (96% - 99%)  Daily     Daily     GENERAL:  Appears stated age, well-groomed, well-nourished, no distress  HEENT:  NC/AT,  conjunctivae clear and pink, no thyromegaly, nodules, adenopathy, no JVD, sclera -anicteric  CHEST:  Full & symmetric excursion, no increased effort, breath sounds clear  HEART:  Regular rhythm, S1, S2, no murmur/rub/S3/S4, no abdominal bruit, no edema  ABDOMEN:  Soft, non-tender, non-distended, normoactive bowel sounds,  no masses ,no hepato-splenomegaly, no signs of chronic liver disease  EXTEREMITIES:  no cyanosis,clubbing or edema  SKIN:  No rash/erythema/ecchymoses/petechiae/wounds/abscess/warm/dry  NEURO:  Alert, oriented, no asterixis, no tremor, no encephalopathy    LABS:                        7.4    5.1   )-----------( 31       ( 07 Jan 2019 06:46 )             21.5     01-07    135  |  104  |  17  ----------------------------<  134<H>  4.5   |  20<L>  |  0.78    Ca    8.4      07 Jan 2019 06:46  Phos  3.2     01-07  Mg     2.2     01-07    TPro  5.5<L>  /  Alb  3.0<L>  /  TBili  12.2<H>  /  DBili  x   /  AST  77<H>  /  ALT  20  /  AlkPhos  107  01-07    LIVER FUNCTIONS - ( 07 Jan 2019 06:46 )  Alb: 3.0 g/dL / Pro: 5.5 g/dL / ALK PHOS: 107 U/L / ALT: 20 U/L / AST: 77 U/L / GGT: x           PT/INR - ( 06 Jan 2019 06:35 )   PT: 42.8 sec;   INR: 3.61 ratio                 Imaging: Chief Complaint:  Patient is a 53y old  Male who presents with a chief complaint of hypotension, decompensated cirrhosis (07 Jan 2019 06:57)      Interval Events:   No event overnight    Allergies:  No Known Allergies      Home Medications:    Hospital Medications:  albumin human  5% IVPB 250 milliLiter(s) IV Intermittent every 6 hours  artificial tears (preservative free) Ophthalmic Solution 1 Drop(s) Both EYES daily PRN  chlorhexidine 0.12% Liquid 15 milliLiter(s) Oral Mucosa two times a day  dextrose 40% Gel 15 Gram(s) Oral once PRN  dextrose 5%. 1000 milliLiter(s) IV Continuous <Continuous>  dextrose 50% Injectable 12.5 Gram(s) IV Push once  dextrose 50% Injectable 25 Gram(s) IV Push once  dextrose 50% Injectable 25 Gram(s) IV Push once  folic acid 1 milliGRAM(s) Oral daily  glucagon  Injectable 1 milliGRAM(s) IntraMuscular once PRN  insulin lispro (HumaLOG) corrective regimen sliding scale   SubCutaneous three times a day before meals  insulin lispro (HumaLOG) corrective regimen sliding scale   SubCutaneous at bedtime  lactulose Syrup 30 Gram(s) Oral three times a day  lidocaine   Patch 1 Patch Transdermal daily  midodrine 5 milliGRAM(s) Oral three times a day  multivitamin 1 Tablet(s) Oral daily  rifaximin 550 milliGRAM(s) Oral two times a day  thiamine 100 milliGRAM(s) Oral daily  traMADol 50 milliGRAM(s) Oral every 8 hours PRN      PMHX/PSHX:  Hypertension  Type 2 diabetes mellitus  Congestive heart failure (CHF)  Esophageal varices  Alcoholic cirrhosis of liver with ascites  No pertinent past medical history  Abdominal hernia  H/O cataract      Family history:  Family history of hypercholesterolemia (Father)  Family history of diabetes mellitus (Mother)  No pertinent family history in first degree relatives      ROS:     General:  No wt loss, fevers, chills, night sweats, fatigue,   Eyes:  Good vision, no reported pain  ENT:  No sore throat, pain, runny nose, dysphagia  CV:  No pain, palpitations, hypo/hypertension  Resp:  No dyspnea, cough, tachypnea, wheezing  GI:  No pain, No nausea, No vomiting, No diarrhea, No constipation, No weight loss, No fever, No pruritis, No rectal bleeding, No tarry stools, No dysphagia,  :  No pain, bleeding, incontinence, nocturia  Muscle:  No pain, weakness  Neuro:  No weakness, tingling, memory problems  Psych:  No fatigue, insomnia, mood problems, depression  Endocrine:  No polyuria, polydipsia, cold/heat intolerance  Heme:  No petechiae, ecchymosis, easy bruisability  Skin:  No rash, tattoos, scars, edema      PHYSICAL EXAM:   Vital Signs:  Vital Signs Last 24 Hrs  T(C): 36.6 (07 Jan 2019 04:04), Max: 36.7 (06 Jan 2019 12:43)  T(F): 97.9 (07 Jan 2019 04:04), Max: 98.1 (06 Jan 2019 12:43)  HR: 66 (07 Jan 2019 04:04) (66 - 74)  BP: 92/52 (07 Jan 2019 03:24) (92/49 - 98/57)  BP(mean): --  RR: 18 (07 Jan 2019 04:04) (18 - 18)  SpO2: 99% (07 Jan 2019 04:04) (96% - 99%)  Daily     Daily     GENERAL:  Appears stated age, well-groomed, well-nourished, no distress  HEENT:  NC/AT,  conjunctivae clear and pink, no thyromegaly, nodules, adenopathy, no JVD, sclera -anicteric  CHEST:  Full & symmetric excursion, no increased effort, breath sounds clear  HEART:  Regular rhythm, S1, S2, no murmur/rub/S3/S4, no abdominal bruit, no edema  ABDOMEN:  Soft, non-tender, non-distended, normoactive bowel sounds,  no masses ,no hepato-splenomegaly, no signs of chronic liver disease  EXTEREMITIES:  no cyanosis,clubbing or edema  SKIN:  No rash/erythema/ecchymoses/petechiae/wounds/abscess/warm/dry  NEURO:  Alert, oriented, no asterixis, no tremor, no encephalopathy    LABS:                        7.4    5.1   )-----------( 31       ( 07 Jan 2019 06:46 )             21.5     01-07    135  |  104  |  17  ----------------------------<  134<H>  4.5   |  20<L>  |  0.78    Ca    8.4      07 Jan 2019 06:46  Phos  3.2     01-07  Mg     2.2     01-07    TPro  5.5<L>  /  Alb  3.0<L>  /  TBili  12.2<H>  /  DBili  x   /  AST  77<H>  /  ALT  20  /  AlkPhos  107  01-07    LIVER FUNCTIONS - ( 07 Jan 2019 06:46 )  Alb: 3.0 g/dL / Pro: 5.5 g/dL / ALK PHOS: 107 U/L / ALT: 20 U/L / AST: 77 U/L / GGT: x           PT/INR - ( 06 Jan 2019 06:35 )   PT: 42.8 sec;   INR: 3.61 ratio                 Imaging: Chief Complaint:  Patient is a 53y old  Male who presents with a chief complaint of hypotension, decompensated cirrhosis (07 Jan 2019 06:57)      Interval Events:   No new complaints this AM.    Allergies:  No Known Allergies      Home Medications:    Hospital Medications:  albumin human  5% IVPB 250 milliLiter(s) IV Intermittent every 6 hours  artificial tears (preservative free) Ophthalmic Solution 1 Drop(s) Both EYES daily PRN  chlorhexidine 0.12% Liquid 15 milliLiter(s) Oral Mucosa two times a day  dextrose 40% Gel 15 Gram(s) Oral once PRN  dextrose 5%. 1000 milliLiter(s) IV Continuous <Continuous>  dextrose 50% Injectable 12.5 Gram(s) IV Push once  dextrose 50% Injectable 25 Gram(s) IV Push once  dextrose 50% Injectable 25 Gram(s) IV Push once  folic acid 1 milliGRAM(s) Oral daily  glucagon  Injectable 1 milliGRAM(s) IntraMuscular once PRN  insulin lispro (HumaLOG) corrective regimen sliding scale   SubCutaneous three times a day before meals  insulin lispro (HumaLOG) corrective regimen sliding scale   SubCutaneous at bedtime  lactulose Syrup 30 Gram(s) Oral three times a day  lidocaine   Patch 1 Patch Transdermal daily  midodrine 5 milliGRAM(s) Oral three times a day  multivitamin 1 Tablet(s) Oral daily  rifaximin 550 milliGRAM(s) Oral two times a day  thiamine 100 milliGRAM(s) Oral daily  traMADol 50 milliGRAM(s) Oral every 8 hours PRN      PMHX/PSHX:  Hypertension  Type 2 diabetes mellitus  Congestive heart failure (CHF)  Esophageal varices  Alcoholic cirrhosis of liver with ascites  No pertinent past medical history  Abdominal hernia  H/O cataract      Family history:  Family history of hypercholesterolemia (Father)  Family history of diabetes mellitus (Mother)  No pertinent family history in first degree relatives      ROS:     General:  No wt loss, fevers, chills, night sweats, fatigue,   Eyes:  Good vision, no reported pain  ENT:  No sore throat, pain, runny nose, dysphagia  CV:  No pain, palpitations, hypo/hypertension  Resp:  No dyspnea, cough, tachypnea, wheezing  GI:  No pain, No nausea, No vomiting, No diarrhea, No constipation, No weight loss, No fever, No pruritis, No rectal bleeding, No tarry stools, No dysphagia,  :  No pain, bleeding, incontinence, nocturia  Muscle:  No pain, weakness  Neuro:  No weakness, tingling, memory problems  Psych:  No fatigue, insomnia, mood problems, depression  Endocrine:  No polyuria, polydipsia, cold/heat intolerance  Heme:  No petechiae, ecchymosis, easy bruisability  Skin:  No rash, tattoos, scars, edema      PHYSICAL EXAM:   Vital Signs:  Vital Signs Last 24 Hrs  T(C): 36.6 (07 Jan 2019 04:04), Max: 36.7 (06 Jan 2019 12:43)  T(F): 97.9 (07 Jan 2019 04:04), Max: 98.1 (06 Jan 2019 12:43)  HR: 66 (07 Jan 2019 04:04) (66 - 74)  BP: 92/52 (07 Jan 2019 03:24) (92/49 - 98/57)  BP(mean): --  RR: 18 (07 Jan 2019 04:04) (18 - 18)  SpO2: 99% (07 Jan 2019 04:04) (96% - 99%)  Daily     Daily     GENERAL:  Appears stated age, well-groomed, well-nourished, no distress  HEENT:  NC/AT,  conjunctivae clear and pink, no thyromegaly, nodules, adenopathy, no JVD, sclera -anicteric  CHEST:  Full & symmetric excursion, no increased effort, breath sounds clear  HEART:  Regular rhythm, S1, S2, no murmur/rub/S3/S4, no abdominal bruit, no edema  ABDOMEN:  Soft, non-tender, non-distended, normoactive bowel sounds,  no masses ,no hepato-splenomegaly, no signs of chronic liver disease  EXTEREMITIES:  no cyanosis,clubbing or edema  SKIN:  No rash/erythema/ecchymoses/petechiae/wounds/abscess/warm/dry  NEURO:  Alert, oriented, no asterixis, no tremor, no encephalopathy    LABS:                        7.4    5.1   )-----------( 31       ( 07 Jan 2019 06:46 )             21.5     01-07    135  |  104  |  17  ----------------------------<  134<H>  4.5   |  20<L>  |  0.78    Ca    8.4      07 Jan 2019 06:46  Phos  3.2     01-07  Mg     2.2     01-07    TPro  5.5<L>  /  Alb  3.0<L>  /  TBili  12.2<H>  /  DBili  x   /  AST  77<H>  /  ALT  20  /  AlkPhos  107  01-07    LIVER FUNCTIONS - ( 07 Jan 2019 06:46 )  Alb: 3.0 g/dL / Pro: 5.5 g/dL / ALK PHOS: 107 U/L / ALT: 20 U/L / AST: 77 U/L / GGT: x           PT/INR - ( 06 Jan 2019 06:35 )   PT: 42.8 sec;   INR: 3.61 ratio                 Imaging: Chief Complaint:  Patient is a 53y old  Male who presents with a chief complaint of hypotension, decompensated cirrhosis (07 Jan 2019 06:57)      Interval Events:   No new complaints this AM.  He reports 1 BM overnight.    Allergies:  No Known Allergies      Home Medications:    Hospital Medications:  albumin human  5% IVPB 250 milliLiter(s) IV Intermittent every 6 hours  artificial tears (preservative free) Ophthalmic Solution 1 Drop(s) Both EYES daily PRN  chlorhexidine 0.12% Liquid 15 milliLiter(s) Oral Mucosa two times a day  dextrose 40% Gel 15 Gram(s) Oral once PRN  dextrose 5%. 1000 milliLiter(s) IV Continuous <Continuous>  dextrose 50% Injectable 12.5 Gram(s) IV Push once  dextrose 50% Injectable 25 Gram(s) IV Push once  dextrose 50% Injectable 25 Gram(s) IV Push once  folic acid 1 milliGRAM(s) Oral daily  glucagon  Injectable 1 milliGRAM(s) IntraMuscular once PRN  insulin lispro (HumaLOG) corrective regimen sliding scale   SubCutaneous three times a day before meals  insulin lispro (HumaLOG) corrective regimen sliding scale   SubCutaneous at bedtime  lactulose Syrup 30 Gram(s) Oral three times a day  lidocaine   Patch 1 Patch Transdermal daily  midodrine 5 milliGRAM(s) Oral three times a day  multivitamin 1 Tablet(s) Oral daily  rifaximin 550 milliGRAM(s) Oral two times a day  thiamine 100 milliGRAM(s) Oral daily  traMADol 50 milliGRAM(s) Oral every 8 hours PRN      PMHX/PSHX:  Hypertension  Type 2 diabetes mellitus  Congestive heart failure (CHF)  Esophageal varices  Alcoholic cirrhosis of liver with ascites  No pertinent past medical history  Abdominal hernia  H/O cataract      Family history:  Family history of hypercholesterolemia (Father)  Family history of diabetes mellitus (Mother)  No pertinent family history in first degree relatives      ROS:     General:  No wt loss, fevers, chills, night sweats, fatigue,   Eyes:  Good vision, no reported pain  ENT:  No sore throat, pain, runny nose, dysphagia  CV:  No pain, palpitations, hypo/hypertension  Resp:  No dyspnea, cough, tachypnea, wheezing  GI:  No pain, No nausea, No vomiting, No diarrhea, No constipation, No weight loss, No fever, No pruritis, No rectal bleeding, No tarry stools, No dysphagia,  :  No pain, bleeding, incontinence, nocturia  Muscle:  No pain, weakness  Neuro:  No weakness, tingling, memory problems  Psych:  No fatigue, insomnia, mood problems, depression  Endocrine:  No polyuria, polydipsia, cold/heat intolerance  Heme:  No petechiae, ecchymosis, easy bruisability  Skin:  No rash, tattoos, scars, edema      PHYSICAL EXAM:   Vital Signs:  Vital Signs Last 24 Hrs  T(C): 36.6 (07 Jan 2019 04:04), Max: 36.7 (06 Jan 2019 12:43)  T(F): 97.9 (07 Jan 2019 04:04), Max: 98.1 (06 Jan 2019 12:43)  HR: 66 (07 Jan 2019 04:04) (66 - 74)  BP: 92/52 (07 Jan 2019 03:24) (92/49 - 98/57)  BP(mean): --  RR: 18 (07 Jan 2019 04:04) (18 - 18)  SpO2: 99% (07 Jan 2019 04:04) (96% - 99%)  Daily     Daily     GENERAL:  Appears stated age, well-groomed, well-nourished, no distress  HEENT:  NC/AT,  conjunctivae clear and pink, no thyromegaly, nodules, adenopathy, no JVD, sclera -anicteric  CHEST:  Full & symmetric excursion, no increased effort, breath sounds clear  HEART:  Regular rhythm, S1, S2, no murmur/rub/S3/S4, no abdominal bruit, no edema  ABDOMEN:  Soft, non-tender, non-distended, normoactive bowel sounds,  no masses ,no hepato-splenomegaly, no signs of chronic liver disease  EXTEREMITIES:  no cyanosis,clubbing or edema  SKIN:  No rash/erythema/ecchymoses/petechiae/wounds/abscess/warm/dry  NEURO:  Alert, oriented, no asterixis, no tremor, no encephalopathy    LABS:                        7.4    5.1   )-----------( 31       ( 07 Jan 2019 06:46 )             21.5     01-07    135  |  104  |  17  ----------------------------<  134<H>  4.5   |  20<L>  |  0.78    Ca    8.4      07 Jan 2019 06:46  Phos  3.2     01-07  Mg     2.2     01-07    TPro  5.5<L>  /  Alb  3.0<L>  /  TBili  12.2<H>  /  DBili  x   /  AST  77<H>  /  ALT  20  /  AlkPhos  107  01-07    LIVER FUNCTIONS - ( 07 Jan 2019 06:46 )  Alb: 3.0 g/dL / Pro: 5.5 g/dL / ALK PHOS: 107 U/L / ALT: 20 U/L / AST: 77 U/L / GGT: x           PT/INR - ( 06 Jan 2019 06:35 )   PT: 42.8 sec;   INR: 3.61 ratio                 Imaging:

## 2019-01-07 NOTE — PROGRESS NOTE ADULT - SUBJECTIVE AND OBJECTIVE BOX
Dr. Noman Harden  Internal Medicine PGY-1   Pager# [phone]    Patient is a 53y old  Male who presents with a chief complaint of hypotension, decompensated cirrhosis (06 Jan 2019 06:44)      SUBJECTIVE / OVERNIGHT EVENTS:    MEDICATIONS  (STANDING):  albumin human  5% IVPB 250 milliLiter(s) IV Intermittent every 6 hours  chlorhexidine 0.12% Liquid 15 milliLiter(s) Oral Mucosa two times a day  dextrose 5%. 1000 milliLiter(s) (50 mL/Hr) IV Continuous <Continuous>  dextrose 50% Injectable 12.5 Gram(s) IV Push once  dextrose 50% Injectable 25 Gram(s) IV Push once  dextrose 50% Injectable 25 Gram(s) IV Push once  folic acid 1 milliGRAM(s) Oral daily  insulin lispro (HumaLOG) corrective regimen sliding scale   SubCutaneous three times a day before meals  insulin lispro (HumaLOG) corrective regimen sliding scale   SubCutaneous at bedtime  lactulose Syrup 30 Gram(s) Oral three times a day  lidocaine   Patch 1 Patch Transdermal daily  midodrine 5 milliGRAM(s) Oral three times a day  multivitamin 1 Tablet(s) Oral daily  rifaximin 550 milliGRAM(s) Oral two times a day  thiamine 100 milliGRAM(s) Oral daily    MEDICATIONS  (PRN):  artificial tears (preservative free) Ophthalmic Solution 1 Drop(s) Both EYES daily PRN Dry Eyes  dextrose 40% Gel 15 Gram(s) Oral once PRN Blood Glucose LESS THAN 70 milliGRAM(s)/deciliter  glucagon  Injectable 1 milliGRAM(s) IntraMuscular once PRN Glucose LESS THAN 70 milligrams/deciliter  traMADol 50 milliGRAM(s) Oral every 8 hours PRN Severe Pain (7 - 10)      Vital Signs Last 24 Hrs  T(C): 36.6 (07 Jan 2019 04:04), Max: 36.7 (06 Jan 2019 12:43)  T(F): 97.9 (07 Jan 2019 04:04), Max: 98.1 (06 Jan 2019 12:43)  HR: 66 (07 Jan 2019 04:04) (66 - 74)  BP: 92/52 (07 Jan 2019 03:24) (92/49 - 98/57)  BP(mean): --  RR: 18 (07 Jan 2019 04:04) (18 - 18)  SpO2: 99% (07 Jan 2019 04:04) (96% - 99%)  CAPILLARY BLOOD GLUCOSE      POCT Blood Glucose.: 169 mg/dL (06 Jan 2019 21:54)  POCT Blood Glucose.: 218 mg/dL (06 Jan 2019 19:56)  POCT Blood Glucose.: 212 mg/dL (06 Jan 2019 13:06)  POCT Blood Glucose.: 137 mg/dL (06 Jan 2019 09:07)    I&O's Summary    05 Jan 2019 07:01  -  06 Jan 2019 07:00  --------------------------------------------------------  IN: 1750 mL / OUT: 150 mL / NET: 1600 mL    06 Jan 2019 07:01  -  07 Jan 2019 06:58  --------------------------------------------------------  IN: 1710 mL / OUT: 600 mL / NET: 1110 mL          PHYSICAL EXAM:    Constitutional: Frail appearing, jaundiced, in no acute distress, comfortable in bed  	ENMT: dry mucous membranes, no pharyngeal erythema or exudates  	Respiratory: lungs CTAB; no wheezing, rales or rhonchi  	Cardiovascular: Normal S1 and S2; no murmurs. Trace LE edema bilaterally.   	Gastrointestinal: Abdomen soft, no tenderness to palpation, no distension, no fluid wave, normal bowel sounds   	Extremities: No clubbing, cyanosis or edema.   	Vascular: 2+ peripheral pulses  	Neurological: No asterixis. No focal deficits appreciated.  	Skin: Jaundiced; no rashes noted. 2 cm x 3 cm ecchymosis non tender no palpable hematoma.     Psychiatric: A&O x 4. Dr. Noman Harden  Internal Medicine PGY-1   Pager# [phone]    Patient is a 53y old  Male who presents with a chief complaint of hypotension, decompensated cirrhosis (06 Jan 2019 06:44)      SUBJECTIVE / OVERNIGHT EVENTS: No acute events overnight. Patient denies any headaches, nausea, vomiting, numbness, tingling, chest pain, SOB, abdominal pain, diarrhea, fevers or chills.     MEDICATIONS  (STANDING):  albumin human  5% IVPB 250 milliLiter(s) IV Intermittent every 6 hours  chlorhexidine 0.12% Liquid 15 milliLiter(s) Oral Mucosa two times a day  dextrose 5%. 1000 milliLiter(s) (50 mL/Hr) IV Continuous <Continuous>  dextrose 50% Injectable 12.5 Gram(s) IV Push once  dextrose 50% Injectable 25 Gram(s) IV Push once  dextrose 50% Injectable 25 Gram(s) IV Push once  folic acid 1 milliGRAM(s) Oral daily  insulin lispro (HumaLOG) corrective regimen sliding scale   SubCutaneous three times a day before meals  insulin lispro (HumaLOG) corrective regimen sliding scale   SubCutaneous at bedtime  lactulose Syrup 30 Gram(s) Oral three times a day  lidocaine   Patch 1 Patch Transdermal daily  midodrine 5 milliGRAM(s) Oral three times a day  multivitamin 1 Tablet(s) Oral daily  rifaximin 550 milliGRAM(s) Oral two times a day  thiamine 100 milliGRAM(s) Oral daily    MEDICATIONS  (PRN):  artificial tears (preservative free) Ophthalmic Solution 1 Drop(s) Both EYES daily PRN Dry Eyes  dextrose 40% Gel 15 Gram(s) Oral once PRN Blood Glucose LESS THAN 70 milliGRAM(s)/deciliter  glucagon  Injectable 1 milliGRAM(s) IntraMuscular once PRN Glucose LESS THAN 70 milligrams/deciliter  traMADol 50 milliGRAM(s) Oral every 8 hours PRN Severe Pain (7 - 10)      Vital Signs Last 24 Hrs  T(C): 36.6 (07 Jan 2019 04:04), Max: 36.7 (06 Jan 2019 12:43)  T(F): 97.9 (07 Jan 2019 04:04), Max: 98.1 (06 Jan 2019 12:43)  HR: 66 (07 Jan 2019 04:04) (66 - 74)  BP: 92/52 (07 Jan 2019 03:24) (92/49 - 98/57)  BP(mean): --  RR: 18 (07 Jan 2019 04:04) (18 - 18)  SpO2: 99% (07 Jan 2019 04:04) (96% - 99%)  CAPILLARY BLOOD GLUCOSE      POCT Blood Glucose.: 169 mg/dL (06 Jan 2019 21:54)  POCT Blood Glucose.: 218 mg/dL (06 Jan 2019 19:56)  POCT Blood Glucose.: 212 mg/dL (06 Jan 2019 13:06)  POCT Blood Glucose.: 137 mg/dL (06 Jan 2019 09:07)    I&O's Summary    05 Jan 2019 07:01  -  06 Jan 2019 07:00  --------------------------------------------------------  IN: 1750 mL / OUT: 150 mL / NET: 1600 mL    06 Jan 2019 07:01  -  07 Jan 2019 06:58  --------------------------------------------------------  IN: 1710 mL / OUT: 600 mL / NET: 1110 mL          PHYSICAL EXAM:    Constitutional: Frail appearing, jaundiced, in no acute distress, comfortable in bed  	ENMT: dry mucous membranes, no pharyngeal erythema or exudates  	Respiratory: lungs CTAB; no wheezing, rales or rhonchi  	Cardiovascular: Normal S1 and S2; no murmurs. Trace LE edema bilaterally.   	Gastrointestinal: Abdomen soft, no tenderness to palpation, no distension, no fluid wave, normal bowel sounds   	Extremities: No clubbing, cyanosis or edema.   	Vascular: 2+ peripheral pulses  	Neurological: No asterixis. No focal deficits appreciated.  	Skin: Jaundiced; no rashes noted. 2 cm x 3 cm ecchymosis non tender no palpable hematoma.     Psychiatric: A&O x 4.      LABS:                        7.4    5.1   )-----------( 31       ( 07 Jan 2019 06:46 )             21.5     01-07    135  |  104  |  17  ----------------------------<  134<H>  4.5   |  20<L>  |  0.78    Ca    8.4      07 Jan 2019 06:46  Phos  3.2     01-07  Mg     2.2     01-07    TPro  5.5<L>  /  Alb  3.0<L>  /  TBili  12.2<H>  /  DBili  x   /  AST  77<H>  /  ALT  20  /  AlkPhos  107  01-07    PT/INR - ( 06 Jan 2019 06:35 )   PT: 42.8 sec;   INR: 3.61 ratio         AST Trend: 77 U/L<--, 92 U/L<--, 96 U/L<--, 125 U/L  ALT Trend: 20 U/L<--, 23 U/L<--, 22 U/L<--, 30 U/L  ALP Trend: 107 U/L<--, 103 U/L<--, 111 U/L<--, 140 U/L  Albumin Trend: 3.0 g/dL<--, 3.0 g/dL<--, 2.7 g/dL<--, 2.8 g/dL  Total Bilirubin Trend: 12.2 mg/dL<--, 13.0 mg/dL<--, 12.1 mg/dL<--, 13.6 mg/dL  INR Trend: 3.61 ratio<--, 3.60 ratio<--, 3.48 ratio<--, 3.26 ratio    WBC Trend: 5.1 K/uL<--, 5.5 K/uL<--, 6.9 K/uL<--, 6.2 K/uL  Hemoglobin Trend: 7.4 g/dL<--, 7.7 g/dL<--, 8.7 g/dL<--, 7.4 g/dL  Platelet Trend: 31 K/uL<--, 34 K/uL<--, 49 K/uL<--, 43 K/uL Dr. Noman Harden  Internal Medicine PGY-1   Pager# [phone]    Patient is a 53y old  Male who presents with a chief complaint of hypotension, decompensated cirrhosis (06 Jan 2019 06:44)      SUBJECTIVE / OVERNIGHT EVENTS: No acute events overnight. Patient denies any headaches, nausea, vomiting, numbness, tingling, chest pain, SOB, abdominal pain, diarrhea, fevers or chills.     MEDICATIONS  (STANDING):  albumin human  5% IVPB 250 milliLiter(s) IV Intermittent every 6 hours  chlorhexidine 0.12% Liquid 15 milliLiter(s) Oral Mucosa two times a day  dextrose 5%. 1000 milliLiter(s) (50 mL/Hr) IV Continuous <Continuous>  dextrose 50% Injectable 12.5 Gram(s) IV Push once  dextrose 50% Injectable 25 Gram(s) IV Push once  dextrose 50% Injectable 25 Gram(s) IV Push once  folic acid 1 milliGRAM(s) Oral daily  insulin lispro (HumaLOG) corrective regimen sliding scale   SubCutaneous three times a day before meals  insulin lispro (HumaLOG) corrective regimen sliding scale   SubCutaneous at bedtime  lactulose Syrup 30 Gram(s) Oral three times a day  lidocaine   Patch 1 Patch Transdermal daily  midodrine 5 milliGRAM(s) Oral three times a day  multivitamin 1 Tablet(s) Oral daily  rifaximin 550 milliGRAM(s) Oral two times a day  thiamine 100 milliGRAM(s) Oral daily    MEDICATIONS  (PRN):  artificial tears (preservative free) Ophthalmic Solution 1 Drop(s) Both EYES daily PRN Dry Eyes  dextrose 40% Gel 15 Gram(s) Oral once PRN Blood Glucose LESS THAN 70 milliGRAM(s)/deciliter  glucagon  Injectable 1 milliGRAM(s) IntraMuscular once PRN Glucose LESS THAN 70 milligrams/deciliter  traMADol 50 milliGRAM(s) Oral every 8 hours PRN Severe Pain (7 - 10)      Vital Signs Last 24 Hrs  T(C): 36.6 (07 Jan 2019 04:04), Max: 36.7 (06 Jan 2019 12:43)  T(F): 97.9 (07 Jan 2019 04:04), Max: 98.1 (06 Jan 2019 12:43)  HR: 66 (07 Jan 2019 04:04) (66 - 74)  BP: 92/52 (07 Jan 2019 03:24) (92/49 - 98/57)  BP(mean): --  RR: 18 (07 Jan 2019 04:04) (18 - 18)  SpO2: 99% (07 Jan 2019 04:04) (96% - 99%)  CAPILLARY BLOOD GLUCOSE      POCT Blood Glucose.: 169 mg/dL (06 Jan 2019 21:54)  POCT Blood Glucose.: 218 mg/dL (06 Jan 2019 19:56)  POCT Blood Glucose.: 212 mg/dL (06 Jan 2019 13:06)  POCT Blood Glucose.: 137 mg/dL (06 Jan 2019 09:07)    I&O's Summary    05 Jan 2019 07:01  -  06 Jan 2019 07:00  --------------------------------------------------------  IN: 1750 mL / OUT: 150 mL / NET: 1600 mL    06 Jan 2019 07:01  -  07 Jan 2019 06:58  --------------------------------------------------------  IN: 1710 mL / OUT: 600 mL / NET: 1110 mL          PHYSICAL EXAM:    Constitutional: Frail appearing, jaundiced, in no acute distress, comfortable in bed  	ENMT: dry mucous membranes, no pharyngeal erythema or exudates  	Respiratory: lungs CTAB; no wheezing, rales or rhonchi  	Cardiovascular: Normal S1 and S2; no murmurs. Trace LE edema bilaterally.   	Gastrointestinal: Abdomen soft, no tenderness to palpation, no distension, no fluid wave, normal bowel sounds   	Extremities: No clubbing, cyanosis or edema.   	Vascular: 2+ peripheral pulses  	Neurological: No asterixis. No focal deficits appreciated.  	Skin: Jaundiced; no rashes noted. 2 cm x 3 cm ecchymosis non tender no palpable hematoma.     Psychiatric: A&O x 4.      LABS:                        7.4    5.1   )-----------( 31       ( 07 Jan 2019 06:46 )             21.5     01-07    135  |  104  |  17  ----------------------------<  134<H>  4.5   |  20<L>  |  0.78    Ca    8.4      07 Jan 2019 06:46  Phos  3.2     01-07  Mg     2.2     01-07    TPro  5.5<L>  /  Alb  3.0<L>  /  TBili  12.2<H>  /  DBili  x   /  AST  77<H>  /  ALT  20  /  AlkPhos  107  01-07    PT/INR - ( 06 Jan 2019 06:35 )   PT: 42.8 sec;   INR: 3.61 ratio         AST Trend: 77 U/L<--, 92 U/L<--, 96 U/L<--, 125 U/L  ALT Trend: 20 U/L<--, 23 U/L<--, 22 U/L<--, 30 U/L  ALP Trend: 107 U/L<--, 103 U/L<--, 111 U/L<--, 140 U/L  Albumin Trend: 3.0 g/dL<--, 3.0 g/dL<--, 2.7 g/dL<--, 2.8 g/dL  Total Bilirubin Trend: 12.2 mg/dL<--, 13.0 mg/dL<--, 12.1 mg/dL<--, 13.6 mg/dL  INR Trend: 3.61 ratio<--, 3.60 ratio<--, 3.48 ratio<--, 3.26 ratio    WBC Trend: 5.1 K/uL<--, 5.5 K/uL<--, 6.9 K/uL<--, 6.2 K/uL  Hemoglobin Trend: 7.4 g/dL<--, 7.7 g/dL<--, 8.7 g/dL<--, 7.4 g/dL  Platelet Trend: 31 K/uL<--, 34 K/uL<--, 49 K/uL<--, 43 K/uL    < from: MR Abdomen w/wo IV Cont (01.06.19 @ 20:12) >  IMPRESSION:     Cirrhosis with evidence of portal hypertension. Small volume ascites.    No evidence of hepatocellular cancer.    Mild hepatic iron deposition.    Portal hypertension.    No evidence of cholecystitis or biliary ductal dilatation.

## 2019-01-07 NOTE — PROGRESS NOTE ADULT - ATTENDING COMMENTS
Liver MRI noted- Cirrhosis/ portal HTN/mild hepatic iron deposition.   Hepatology noted appreciated  await TTE  monitor LFTS

## 2019-01-07 NOTE — PROGRESS NOTE ADULT - PROBLEM SELECTOR PLAN 10
DVT ppx: SCDs  Diet: DASH/Consistent Carb    Goals of Care: FULL CODE. addressed GOC with patient and his brother at bedside. Patient stated that he would like chest compressions and intubation if necessary.
DVT ppx: SCDs  Diet: DASH/Consistent Carb    Goals of Care: FULL CODE. I addressed GOC with patient and his brother at bedside. Patient stated that he would like chest compressions and intubation if necessary.     Darby Goldberg MD PGY2  Medicine Night Admit Resident  Pager [phone]
DVT ppx: SCDs  Diet: DASH/Consistent Carb    Goals of Care: FULL CODE. addressed GOC with patient and his brother at bedside. Patient stated that he would like chest compressions and intubation if necessary.
DVT ppx: SCDs  Diet: DASH/Consistent Carb    Goals of Care: FULL CODE. addressed GOC with patient and his brother at bedside. Patient stated that he would like chest compressions and intubation if necessary.

## 2019-01-07 NOTE — PROGRESS NOTE ADULT - ASSESSMENT
53M end stage liver disease 2/2 alcoholic cirrhosis, DMII, severe HFrEF (35%), CAD, HTN, presenting with 1 week of generalized weakness and hypotension, likely 2/2 decompensated cirrhosis in the setting of sepsis. Now found to have gram negative rods and gram positive bacteremia explaining sepsis. 1/5 with drop in hemoglobin, HD stable, no s/s of bleeding, will transfuse 1 unit, check stool guaiac and observe closely, GI follow up. Initialy bcxs for gram negative rods negative, and G+ most likely contaminant. As per ID will hold off abx for now. Going for MR of the abdomen today to evaluate hepatobilliary anatomy. 53M end stage liver disease 2/2 alcoholic cirrhosis, DMII, severe HFrEF (35%), CAD, HTN, presenting with 1 week of generalized weakness and hypotension, likely 2/2 decompensated cirrhosis in the setting of sepsis. Now found to have gram negative rods and gram positive bacteremia explaining sepsis. 1/5 with drop in hemoglobin, HD stable, no s/s of bleeding, will transfuse 1 unit, check stool guaiac and observe closely, GI follow up. Back to baseline SBPs, mentating well and currently improved symptoms compared to admission. Will continue with hepatology workup and follow up MR abdomen and MRCP. 53M end stage liver disease 2/2 alcoholic cirrhosis, DMII, severe HFrEF (35%), CAD, HTN, presenting with 1 week of generalized weakness and hypotension, likely 2/2 decompensated cirrhosis in the setting of sepsis. Now found to have gram negative rods and gram positive bacteremia explaining sepsis. 1/5 with drop in hemoglobin, HD stable, no s/s of bleeding, will transfuse 1 unit, check stool guaiac and observe closely, GI follow up. Back to baseline SBPs, mentating well and currently improved symptoms compared to admission. MR abdomen showing mild Iron deposition, cirrhosis, and portal hypertension. Plan for diagnostic paracentesis tomorrow by IR 1/8.

## 2019-01-07 NOTE — PROGRESS NOTE ADULT - ATTENDING COMMENTS
Patient with alcoholic cirrhosis and recently delisted for liver transplant due to concerns of poor cardiac function.  Await cardiac echo.  Encourage ambulation and nutrition.

## 2019-01-07 NOTE — PROGRESS NOTE ADULT - PROBLEM SELECTOR PLAN 1
hemoglobin dropped from 10 to 7.4, unclear source of bleeding, patient denies GIB  will transfuse 1 unit PRBC, check post transfusion CBC  stool guaiac negative   GI eval appreciated, no scope for now   Supratherapeutic INR likely due to end stage liver disease, if bleeding found will need FFP, vit K. Hgb dropped from 10 to 7.4, unclear source of bleeding. Patient has component of macrocytic anemia.     - Stool guaiac negative   - GI eval appreciated, no scope for now   - Supratherapeutic INR likely due to end stage liver disease, if bleeding found will need FFP, vit K. Hgb dropped from 10 to 7.4, unclear source of bleeding. Patient has component of macrocytic anemia. Transfusion goal of Hgb<7.0     - Stool guaiac negative   - GI eval appreciated, no scope for now   - Supratherapeutic INR likely due to end stage liver disease, if bleeding found will need FFP, vit K.

## 2019-01-08 LAB
ALBUMIN SERPL ELPH-MCNC: 3.2 G/DL — LOW (ref 3.3–5)
ALP SERPL-CCNC: 100 U/L — SIGNIFICANT CHANGE UP (ref 40–120)
ALT FLD-CCNC: 17 U/L — SIGNIFICANT CHANGE UP (ref 10–45)
ANION GAP SERPL CALC-SCNC: 8 MMOL/L — SIGNIFICANT CHANGE UP (ref 5–17)
APTT BLD: 95.7 SEC — HIGH (ref 27.5–36.3)
AST SERPL-CCNC: 69 U/L — HIGH (ref 10–40)
BASOPHILS # BLD AUTO: 0 K/UL — SIGNIFICANT CHANGE UP (ref 0–0.2)
BASOPHILS NFR BLD AUTO: 0.6 % — SIGNIFICANT CHANGE UP (ref 0–2)
BILIRUB SERPL-MCNC: 12 MG/DL — HIGH (ref 0.2–1.2)
BLD GP AB SCN SERPL QL: NEGATIVE — SIGNIFICANT CHANGE UP
BUN SERPL-MCNC: 21 MG/DL — SIGNIFICANT CHANGE UP (ref 7–23)
CALCIUM SERPL-MCNC: 8.3 MG/DL — LOW (ref 8.4–10.5)
CHLORIDE SERPL-SCNC: 108 MMOL/L — SIGNIFICANT CHANGE UP (ref 96–108)
CO2 SERPL-SCNC: 20 MMOL/L — LOW (ref 22–31)
CREAT SERPL-MCNC: 0.8 MG/DL — SIGNIFICANT CHANGE UP (ref 0.5–1.3)
EOSINOPHIL # BLD AUTO: 0.4 K/UL — SIGNIFICANT CHANGE UP (ref 0–0.5)
EOSINOPHIL NFR BLD AUTO: 9 % — HIGH (ref 0–6)
GLUCOSE BLDC GLUCOMTR-MCNC: 126 MG/DL — HIGH (ref 70–99)
GLUCOSE BLDC GLUCOMTR-MCNC: 154 MG/DL — HIGH (ref 70–99)
GLUCOSE BLDC GLUCOMTR-MCNC: 171 MG/DL — HIGH (ref 70–99)
GLUCOSE BLDC GLUCOMTR-MCNC: 281 MG/DL — HIGH (ref 70–99)
GLUCOSE SERPL-MCNC: 114 MG/DL — HIGH (ref 70–99)
HCT VFR BLD CALC: 20 % — CRITICAL LOW (ref 39–50)
HCT VFR BLD CALC: 22.2 % — LOW (ref 39–50)
HGB BLD-MCNC: 6.9 G/DL — CRITICAL LOW (ref 13–17)
HGB BLD-MCNC: 7.8 G/DL — LOW (ref 13–17)
INR BLD: 4.07 RATIO — HIGH (ref 0.88–1.16)
LYMPHOCYTES # BLD AUTO: 0.7 K/UL — LOW (ref 1–3.3)
LYMPHOCYTES # BLD AUTO: 14.4 % — SIGNIFICANT CHANGE UP (ref 13–44)
MAGNESIUM SERPL-MCNC: 2.2 MG/DL — SIGNIFICANT CHANGE UP (ref 1.6–2.6)
MCHC RBC-ENTMCNC: 34.3 GM/DL — SIGNIFICANT CHANGE UP (ref 32–36)
MCHC RBC-ENTMCNC: 35 GM/DL — SIGNIFICANT CHANGE UP (ref 32–36)
MCHC RBC-ENTMCNC: 35.2 PG — HIGH (ref 27–34)
MCHC RBC-ENTMCNC: 35.5 PG — HIGH (ref 27–34)
MCV RBC AUTO: 101 FL — HIGH (ref 80–100)
MCV RBC AUTO: 103 FL — HIGH (ref 80–100)
MONOCYTES # BLD AUTO: 0.5 K/UL — SIGNIFICANT CHANGE UP (ref 0–0.9)
MONOCYTES NFR BLD AUTO: 9.2 % — SIGNIFICANT CHANGE UP (ref 2–14)
NEUTROPHILS # BLD AUTO: 3.3 K/UL — SIGNIFICANT CHANGE UP (ref 1.8–7.4)
NEUTROPHILS NFR BLD AUTO: 66.8 % — SIGNIFICANT CHANGE UP (ref 43–77)
PHOSPHATE SERPL-MCNC: 2.4 MG/DL — LOW (ref 2.5–4.5)
PLATELET # BLD AUTO: 33 K/UL — LOW (ref 150–400)
PLATELET # BLD AUTO: 35 K/UL — LOW (ref 150–400)
POTASSIUM SERPL-MCNC: 4.8 MMOL/L — SIGNIFICANT CHANGE UP (ref 3.5–5.3)
POTASSIUM SERPL-SCNC: 4.8 MMOL/L — SIGNIFICANT CHANGE UP (ref 3.5–5.3)
PROT SERPL-MCNC: 5.5 G/DL — LOW (ref 6–8.3)
PROTHROM AB SERPL-ACNC: 48.5 SEC — HIGH (ref 10–12.9)
RBC # BLD: 1.93 M/UL — LOW (ref 4.2–5.8)
RBC # BLD: 2.2 M/UL — LOW (ref 4.2–5.8)
RBC # FLD: 16.1 % — HIGH (ref 10.3–14.5)
RBC # FLD: 17.9 % — HIGH (ref 10.3–14.5)
RH IG SCN BLD-IMP: POSITIVE — SIGNIFICANT CHANGE UP
SODIUM SERPL-SCNC: 136 MMOL/L — SIGNIFICANT CHANGE UP (ref 135–145)
WBC # BLD: 4.8 K/UL — SIGNIFICANT CHANGE UP (ref 3.8–10.5)
WBC # BLD: 5.1 K/UL — SIGNIFICANT CHANGE UP (ref 3.8–10.5)
WBC # FLD AUTO: 4.8 K/UL — SIGNIFICANT CHANGE UP (ref 3.8–10.5)
WBC # FLD AUTO: 5.1 K/UL — SIGNIFICANT CHANGE UP (ref 3.8–10.5)

## 2019-01-08 PROCEDURE — 93306 TTE W/DOPPLER COMPLETE: CPT | Mod: 26

## 2019-01-08 PROCEDURE — 99233 SBSQ HOSP IP/OBS HIGH 50: CPT | Mod: GC

## 2019-01-08 PROCEDURE — 99232 SBSQ HOSP IP/OBS MODERATE 35: CPT

## 2019-01-08 PROCEDURE — 99232 SBSQ HOSP IP/OBS MODERATE 35: CPT | Mod: GC

## 2019-01-08 RX ORDER — SODIUM,POTASSIUM PHOSPHATES 278-250MG
1 POWDER IN PACKET (EA) ORAL ONCE
Qty: 0 | Refills: 0 | Status: COMPLETED | OUTPATIENT
Start: 2019-01-08 | End: 2019-01-08

## 2019-01-08 RX ADMIN — Medication 1 MILLIGRAM(S): at 09:49

## 2019-01-08 RX ADMIN — Medication 125 MILLILITER(S): at 03:44

## 2019-01-08 RX ADMIN — MIDODRINE HYDROCHLORIDE 5 MILLIGRAM(S): 2.5 TABLET ORAL at 23:08

## 2019-01-08 RX ADMIN — Medication 1 TABLET(S): at 13:07

## 2019-01-08 RX ADMIN — MIDODRINE HYDROCHLORIDE 5 MILLIGRAM(S): 2.5 TABLET ORAL at 05:27

## 2019-01-08 RX ADMIN — Medication 125 MILLILITER(S): at 13:03

## 2019-01-08 RX ADMIN — Medication 1: at 18:10

## 2019-01-08 RX ADMIN — Medication 125 MILLILITER(S): at 17:21

## 2019-01-08 RX ADMIN — Medication 3: at 13:06

## 2019-01-08 RX ADMIN — LACTULOSE 30 GRAM(S): 10 SOLUTION ORAL at 05:30

## 2019-01-08 RX ADMIN — Medication 125 MILLILITER(S): at 21:37

## 2019-01-08 RX ADMIN — CHLORHEXIDINE GLUCONATE 15 MILLILITER(S): 213 SOLUTION TOPICAL at 17:23

## 2019-01-08 RX ADMIN — LACTULOSE 30 GRAM(S): 10 SOLUTION ORAL at 13:06

## 2019-01-08 RX ADMIN — CHLORHEXIDINE GLUCONATE 15 MILLILITER(S): 213 SOLUTION TOPICAL at 05:28

## 2019-01-08 RX ADMIN — MIDODRINE HYDROCHLORIDE 5 MILLIGRAM(S): 2.5 TABLET ORAL at 13:05

## 2019-01-08 RX ADMIN — Medication 1 PACKET(S): at 09:48

## 2019-01-08 RX ADMIN — Medication 100 MILLIGRAM(S): at 09:49

## 2019-01-08 NOTE — DIETITIAN INITIAL EVALUATION ADULT. - PROBLEM SELECTOR PLAN 4
Patient with K+ 5.9 on admission (repeat value)  Insulin/D50, albuterol nebulized, and calcium gluconate, bicarb given.  EKG showed no changes- normal sinus rhythm.   Did not give furosemide given pt volume depleted.   - follow up repeat potassium in AM.

## 2019-01-08 NOTE — PROGRESS NOTE ADULT - ATTENDING COMMENTS
getting I unit PRBC for Hb of 6.9 - no clinical evidence of gross bleeding. will monitor CBC. Judicious PRBC  TTE noted - EF 43%  monitor LFTS and INR   Continue lactulose and rifaximin  Planned for diagnostic paracentesis  consider Haem eval

## 2019-01-08 NOTE — PROGRESS NOTE ADULT - SUBJECTIVE AND OBJECTIVE BOX
Dr. Nomna Harden  Internal Medicine PGY-1   Pager# [phone]    Patient is a 53y old  Male who presents with a chief complaint of hypotension, decompensated cirrhosis (07 Jan 2019 08:06)      SUBJECTIVE / OVERNIGHT EVENTS:    MEDICATIONS  (STANDING):  albumin human  5% IVPB 250 milliLiter(s) IV Intermittent every 6 hours  chlorhexidine 0.12% Liquid 15 milliLiter(s) Oral Mucosa two times a day  dextrose 5%. 1000 milliLiter(s) (50 mL/Hr) IV Continuous <Continuous>  dextrose 50% Injectable 12.5 Gram(s) IV Push once  dextrose 50% Injectable 25 Gram(s) IV Push once  dextrose 50% Injectable 25 Gram(s) IV Push once  folic acid 1 milliGRAM(s) Oral daily  insulin lispro (HumaLOG) corrective regimen sliding scale   SubCutaneous three times a day before meals  insulin lispro (HumaLOG) corrective regimen sliding scale   SubCutaneous at bedtime  lactulose Syrup 30 Gram(s) Oral three times a day  lidocaine   Patch 1 Patch Transdermal daily  midodrine 5 milliGRAM(s) Oral three times a day  multivitamin 1 Tablet(s) Oral daily  rifaximin 550 milliGRAM(s) Oral two times a day  thiamine 100 milliGRAM(s) Oral daily    MEDICATIONS  (PRN):  artificial tears (preservative free) Ophthalmic Solution 1 Drop(s) Both EYES daily PRN Dry Eyes  dextrose 40% Gel 15 Gram(s) Oral once PRN Blood Glucose LESS THAN 70 milliGRAM(s)/deciliter  glucagon  Injectable 1 milliGRAM(s) IntraMuscular once PRN Glucose LESS THAN 70 milligrams/deciliter  traMADol 50 milliGRAM(s) Oral every 8 hours PRN Severe Pain (7 - 10)      Vital Signs Last 24 Hrs  T(C): 36.9 (08 Jan 2019 04:34), Max: 36.9 (08 Jan 2019 04:34)  T(F): 98.5 (08 Jan 2019 04:34), Max: 98.5 (08 Jan 2019 04:34)  HR: 65 (08 Jan 2019 04:34) (63 - 66)  BP: 88/42 (08 Jan 2019 04:34) (88/42 - 92/55)  BP(mean): --  RR: 18 (08 Jan 2019 04:34) (18 - 18)  SpO2: 91% (08 Jan 2019 04:34) (91% - 95%)  CAPILLARY BLOOD GLUCOSE      POCT Blood Glucose.: 166 mg/dL (07 Jan 2019 21:30)  POCT Blood Glucose.: 169 mg/dL (07 Jan 2019 17:56)  POCT Blood Glucose.: 255 mg/dL (07 Jan 2019 12:49)  POCT Blood Glucose.: 141 mg/dL (07 Jan 2019 09:28)    I&O's Summary    07 Jan 2019 07:01  -  08 Jan 2019 07:00  --------------------------------------------------------  IN: 1280 mL / OUT: 0 mL / NET: 1280 mL        PHYSICAL EXAM:    Constitutional: Frail appearing, jaundiced, in no acute distress, comfortable in bed  	ENMT: dry mucous membranes, no pharyngeal erythema or exudates  	Respiratory: lungs CTAB; no wheezing, rales or rhonchi  	Cardiovascular: Normal S1 and S2; no murmurs. Trace LE edema bilaterally.   	Gastrointestinal: Abdomen soft, no tenderness to palpation, no distension, no fluid wave, normal bowel sounds   	Extremities: No clubbing, cyanosis or edema.   	Vascular: 2+ peripheral pulses  	Neurological: No asterixis. No focal deficits appreciated.  	Skin: Jaundiced; no rashes noted. 2 cm x 3 cm ecchymosis non tender no palpable hematoma.     Psychiatric: A&O x 4.      LABS:                        6.9    5.1   )-----------( 35       ( 08 Jan 2019 06:03 )             20.0     01-08    136  |  108  |  21  ----------------------------<  114<H>  4.8   |  20<L>  |  0.80    Ca    8.3<L>      08 Jan 2019 06:03  Phos  2.4     01-08  Mg     2.2     01-08    TPro  5.5<L>  /  Alb  3.2<L>  /  TBili  12.0<H>  /  DBili  x   /  AST  69<H>  /  ALT  17  /  AlkPhos  100  01-08    PT/INR - ( 08 Jan 2019 06:03 )   PT: 48.5 sec;   INR: 4.07 ratio         PTT - ( 08 Jan 2019 06:03 )  PTT:95.7 sec    AST Trend: 69 U/L<--, 77 U/L<--, 92 U/L<--, 96 U/L  ALT Trend: 17 U/L<--, 20 U/L<--, 23 U/L<--, 22 U/L  ALP Trend: 100 U/L<--, 107 U/L<--, 103 U/L<--, 111 U/L  Albumin Trend: 3.2 g/dL<--, 3.0 g/dL<--, 3.0 g/dL<--, 2.7 g/dL  Total Bilirubin Trend: 12.0 mg/dL<--, 12.2 mg/dL<--, 13.0 mg/dL<--, 12.1 mg/dL  INR Trend: 4.07 ratio<--, 3.79 ratio<--, 3.61 ratio<--, 3.60 ratio    WBC Trend: 5.1 K/uL<--, 5.1 K/uL<--, 5.5 K/uL<--, 6.9 K/uL  Hemoglobin Trend: 6.9 g/dL<--, 7.4 g/dL<--, 7.7 g/dL<--, 8.7 g/dL  Platelet Trend: 35 K/uL<--, 31 K/uL<--, 34 K/uL<--, 49 K/uL Dr. Noman Harden  Internal Medicine PGY-1   Pager# [phone]    Patient is a 53y old  Male who presents with a chief complaint of hypotension, decompensated cirrhosis (07 Jan 2019 08:06)      SUBJECTIVE / OVERNIGHT EVENTS: No acute events overnight. Patient denies any fevers, chills, nausea, vomiting, chest pain, abdominal pain. Hgb dropped below 7 initiated 1U PRBC ordered. Going for TTE.     MEDICATIONS  (STANDING):  albumin human  5% IVPB 250 milliLiter(s) IV Intermittent every 6 hours  chlorhexidine 0.12% Liquid 15 milliLiter(s) Oral Mucosa two times a day  dextrose 5%. 1000 milliLiter(s) (50 mL/Hr) IV Continuous <Continuous>  dextrose 50% Injectable 12.5 Gram(s) IV Push once  dextrose 50% Injectable 25 Gram(s) IV Push once  dextrose 50% Injectable 25 Gram(s) IV Push once  folic acid 1 milliGRAM(s) Oral daily  insulin lispro (HumaLOG) corrective regimen sliding scale   SubCutaneous three times a day before meals  insulin lispro (HumaLOG) corrective regimen sliding scale   SubCutaneous at bedtime  lactulose Syrup 30 Gram(s) Oral three times a day  lidocaine   Patch 1 Patch Transdermal daily  midodrine 5 milliGRAM(s) Oral three times a day  multivitamin 1 Tablet(s) Oral daily  rifaximin 550 milliGRAM(s) Oral two times a day  thiamine 100 milliGRAM(s) Oral daily    MEDICATIONS  (PRN):  artificial tears (preservative free) Ophthalmic Solution 1 Drop(s) Both EYES daily PRN Dry Eyes  dextrose 40% Gel 15 Gram(s) Oral once PRN Blood Glucose LESS THAN 70 milliGRAM(s)/deciliter  glucagon  Injectable 1 milliGRAM(s) IntraMuscular once PRN Glucose LESS THAN 70 milligrams/deciliter  traMADol 50 milliGRAM(s) Oral every 8 hours PRN Severe Pain (7 - 10)      Vital Signs Last 24 Hrs  T(C): 36.9 (08 Jan 2019 04:34), Max: 36.9 (08 Jan 2019 04:34)  T(F): 98.5 (08 Jan 2019 04:34), Max: 98.5 (08 Jan 2019 04:34)  HR: 65 (08 Jan 2019 04:34) (63 - 66)  BP: 88/42 (08 Jan 2019 04:34) (88/42 - 92/55)  BP(mean): --  RR: 18 (08 Jan 2019 04:34) (18 - 18)  SpO2: 91% (08 Jan 2019 04:34) (91% - 95%)  CAPILLARY BLOOD GLUCOSE      POCT Blood Glucose.: 166 mg/dL (07 Jan 2019 21:30)  POCT Blood Glucose.: 169 mg/dL (07 Jan 2019 17:56)  POCT Blood Glucose.: 255 mg/dL (07 Jan 2019 12:49)  POCT Blood Glucose.: 141 mg/dL (07 Jan 2019 09:28)    I&O's Summary    07 Jan 2019 07:01  -  08 Jan 2019 07:00  --------------------------------------------------------  IN: 1280 mL / OUT: 0 mL / NET: 1280 mL        PHYSICAL EXAM:    Constitutional: Frail appearing, jaundiced, in no acute distress, comfortable in bed  	ENMT: dry mucous membranes, no pharyngeal erythema or exudates  	Respiratory: lungs CTAB; no wheezing, rales or rhonchi  	Cardiovascular: Normal S1 and S2; no murmurs. Trace LE edema bilaterally.   	Gastrointestinal: Abdomen soft, no distension, no fluid wave, normal bowel sounds. Mild tenderness in Right and Left abdominal flanks.   	Extremities: No clubbing, cyanosis or edema.   	Vascular: 2+ peripheral pulses  	Neurological: No asterixis. No focal deficits appreciated.  	Skin: Jaundiced; no rashes noted. 2 cm x 3 cm ecchymosis non tender no palpable hematoma.     Psychiatric: A&O x 4.      LABS:                        6.9    5.1   )-----------( 35       ( 08 Jan 2019 06:03 )             20.0     01-08    136  |  108  |  21  ----------------------------<  114<H>  4.8   |  20<L>  |  0.80    Ca    8.3<L>      08 Jan 2019 06:03  Phos  2.4     01-08  Mg     2.2     01-08    TPro  5.5<L>  /  Alb  3.2<L>  /  TBili  12.0<H>  /  DBili  x   /  AST  69<H>  /  ALT  17  /  AlkPhos  100  01-08    PT/INR - ( 08 Jan 2019 06:03 )   PT: 48.5 sec;   INR: 4.07 ratio         PTT - ( 08 Jan 2019 06:03 )  PTT:95.7 sec    AST Trend: 69 U/L<--, 77 U/L<--, 92 U/L<--, 96 U/L  ALT Trend: 17 U/L<--, 20 U/L<--, 23 U/L<--, 22 U/L  ALP Trend: 100 U/L<--, 107 U/L<--, 103 U/L<--, 111 U/L  Albumin Trend: 3.2 g/dL<--, 3.0 g/dL<--, 3.0 g/dL<--, 2.7 g/dL  Total Bilirubin Trend: 12.0 mg/dL<--, 12.2 mg/dL<--, 13.0 mg/dL<--, 12.1 mg/dL  INR Trend: 4.07 ratio<--, 3.79 ratio<--, 3.61 ratio<--, 3.60 ratio    WBC Trend: 5.1 K/uL<--, 5.1 K/uL<--, 5.5 K/uL<--, 6.9 K/uL  Hemoglobin Trend: 6.9 g/dL<--, 7.4 g/dL<--, 7.7 g/dL<--, 8.7 g/dL  Platelet Trend: 35 K/uL<--, 31 K/uL<--, 34 K/uL<--, 49 K/uL Dr. Noman Harden  Internal Medicine PGY-1   Pager# [phone]    Patient is a 53y old  Male who presents with a chief complaint of hypotension, decompensated cirrhosis (07 Jan 2019 08:06)      SUBJECTIVE / OVERNIGHT EVENTS: No acute events overnight. Patient denies any fevers, chills, nausea, vomiting, chest pain, abdominal pain. Hgb dropped below 7 initiated 1U PRBC ordered. Going for TTE.     MEDICATIONS  (STANDING):  albumin human  5% IVPB 250 milliLiter(s) IV Intermittent every 6 hours  chlorhexidine 0.12% Liquid 15 milliLiter(s) Oral Mucosa two times a day  dextrose 5%. 1000 milliLiter(s) (50 mL/Hr) IV Continuous <Continuous>  dextrose 50% Injectable 12.5 Gram(s) IV Push once  dextrose 50% Injectable 25 Gram(s) IV Push once  dextrose 50% Injectable 25 Gram(s) IV Push once  folic acid 1 milliGRAM(s) Oral daily  insulin lispro (HumaLOG) corrective regimen sliding scale   SubCutaneous three times a day before meals  insulin lispro (HumaLOG) corrective regimen sliding scale   SubCutaneous at bedtime  lactulose Syrup 30 Gram(s) Oral three times a day  lidocaine   Patch 1 Patch Transdermal daily  midodrine 5 milliGRAM(s) Oral three times a day  multivitamin 1 Tablet(s) Oral daily  rifaximin 550 milliGRAM(s) Oral two times a day  thiamine 100 milliGRAM(s) Oral daily    MEDICATIONS  (PRN):  artificial tears (preservative free) Ophthalmic Solution 1 Drop(s) Both EYES daily PRN Dry Eyes  dextrose 40% Gel 15 Gram(s) Oral once PRN Blood Glucose LESS THAN 70 milliGRAM(s)/deciliter  glucagon  Injectable 1 milliGRAM(s) IntraMuscular once PRN Glucose LESS THAN 70 milligrams/deciliter  traMADol 50 milliGRAM(s) Oral every 8 hours PRN Severe Pain (7 - 10)      Vital Signs Last 24 Hrs  T(C): 36.9 (08 Jan 2019 04:34), Max: 36.9 (08 Jan 2019 04:34)  T(F): 98.5 (08 Jan 2019 04:34), Max: 98.5 (08 Jan 2019 04:34)  HR: 65 (08 Jan 2019 04:34) (63 - 66)  BP: 88/42 (08 Jan 2019 04:34) (88/42 - 92/55)  BP(mean): --  RR: 18 (08 Jan 2019 04:34) (18 - 18)  SpO2: 91% (08 Jan 2019 04:34) (91% - 95%)  CAPILLARY BLOOD GLUCOSE      POCT Blood Glucose.: 166 mg/dL (07 Jan 2019 21:30)  POCT Blood Glucose.: 169 mg/dL (07 Jan 2019 17:56)  POCT Blood Glucose.: 255 mg/dL (07 Jan 2019 12:49)  POCT Blood Glucose.: 141 mg/dL (07 Jan 2019 09:28)    I&O's Summary    07 Jan 2019 07:01  -  08 Jan 2019 07:00  --------------------------------------------------------  IN: 1280 mL / OUT: 0 mL / NET: 1280 mL        PHYSICAL EXAM:    Constitutional: Frail appearing, jaundiced, in no acute distress, comfortable in bed  	ENMT: dry mucous membranes, no pharyngeal erythema or exudates  	Respiratory: lungs CTAB; no wheezing, rales or rhonchi  	Cardiovascular: Normal S1 and S2; no murmurs. Trace LE edema bilaterally.   	Gastrointestinal: Abdomen soft, no distension, no fluid wave, normal bowel sounds. Mild tenderness in Right and Left abdominal flanks.   	Extremities: No clubbing, cyanosis. Borderline +1 pitting edema b/l up to just above the ankles.   	Vascular: 2+ peripheral pulses  	Neurological: No asterixis. No focal deficits appreciated.  	Skin: Jaundiced; no rashes noted. 2 cm x 3 cm ecchymosis non tender no palpable hematoma.     Psychiatric: A&O x 4.      LABS:                        6.9    5.1   )-----------( 35       ( 08 Jan 2019 06:03 )             20.0     01-08    136  |  108  |  21  ----------------------------<  114<H>  4.8   |  20<L>  |  0.80    Ca    8.3<L>      08 Jan 2019 06:03  Phos  2.4     01-08  Mg     2.2     01-08    TPro  5.5<L>  /  Alb  3.2<L>  /  TBili  12.0<H>  /  DBili  x   /  AST  69<H>  /  ALT  17  /  AlkPhos  100  01-08    PT/INR - ( 08 Jan 2019 06:03 )   PT: 48.5 sec;   INR: 4.07 ratio         PTT - ( 08 Jan 2019 06:03 )  PTT:95.7 sec    AST Trend: 69 U/L<--, 77 U/L<--, 92 U/L<--, 96 U/L  ALT Trend: 17 U/L<--, 20 U/L<--, 23 U/L<--, 22 U/L  ALP Trend: 100 U/L<--, 107 U/L<--, 103 U/L<--, 111 U/L  Albumin Trend: 3.2 g/dL<--, 3.0 g/dL<--, 3.0 g/dL<--, 2.7 g/dL  Total Bilirubin Trend: 12.0 mg/dL<--, 12.2 mg/dL<--, 13.0 mg/dL<--, 12.1 mg/dL  INR Trend: 4.07 ratio<--, 3.79 ratio<--, 3.61 ratio<--, 3.60 ratio    WBC Trend: 5.1 K/uL<--, 5.1 K/uL<--, 5.5 K/uL<--, 6.9 K/uL  Hemoglobin Trend: 6.9 g/dL<--, 7.4 g/dL<--, 7.7 g/dL<--, 8.7 g/dL  Platelet Trend: 35 K/uL<--, 31 K/uL<--, 34 K/uL<--, 49 K/uL Dr. Noman Harden  Internal Medicine PGY-1   Pager# [phone]    Patient is a 53y old  Male who presents with a chief complaint of hypotension, decompensated cirrhosis (07 Jan 2019 08:06)      SUBJECTIVE / OVERNIGHT EVENTS: No acute events overnight. Patient denies any fevers, chills, nausea, vomiting, chest pain, abdominal pain. Hgb dropped below 7 initiated 1U PRBC ordered. Going for TTE.     MEDICATIONS  (STANDING):  albumin human  5% IVPB 250 milliLiter(s) IV Intermittent every 6 hours  chlorhexidine 0.12% Liquid 15 milliLiter(s) Oral Mucosa two times a day  dextrose 5%. 1000 milliLiter(s) (50 mL/Hr) IV Continuous <Continuous>  dextrose 50% Injectable 12.5 Gram(s) IV Push once  dextrose 50% Injectable 25 Gram(s) IV Push once  dextrose 50% Injectable 25 Gram(s) IV Push once  folic acid 1 milliGRAM(s) Oral daily  insulin lispro (HumaLOG) corrective regimen sliding scale   SubCutaneous three times a day before meals  insulin lispro (HumaLOG) corrective regimen sliding scale   SubCutaneous at bedtime  lactulose Syrup 30 Gram(s) Oral three times a day  lidocaine   Patch 1 Patch Transdermal daily  midodrine 5 milliGRAM(s) Oral three times a day  multivitamin 1 Tablet(s) Oral daily  rifaximin 550 milliGRAM(s) Oral two times a day  thiamine 100 milliGRAM(s) Oral daily    MEDICATIONS  (PRN):  artificial tears (preservative free) Ophthalmic Solution 1 Drop(s) Both EYES daily PRN Dry Eyes  dextrose 40% Gel 15 Gram(s) Oral once PRN Blood Glucose LESS THAN 70 milliGRAM(s)/deciliter  glucagon  Injectable 1 milliGRAM(s) IntraMuscular once PRN Glucose LESS THAN 70 milligrams/deciliter  traMADol 50 milliGRAM(s) Oral every 8 hours PRN Severe Pain (7 - 10)      Vital Signs Last 24 Hrs  T(C): 36.9 (08 Jan 2019 04:34), Max: 36.9 (08 Jan 2019 04:34)  T(F): 98.5 (08 Jan 2019 04:34), Max: 98.5 (08 Jan 2019 04:34)  HR: 65 (08 Jan 2019 04:34) (63 - 66)  BP: 88/42 (08 Jan 2019 04:34) (88/42 - 92/55)  BP(mean): --  RR: 18 (08 Jan 2019 04:34) (18 - 18)  SpO2: 91% (08 Jan 2019 04:34) (91% - 95%)  CAPILLARY BLOOD GLUCOSE      POCT Blood Glucose.: 166 mg/dL (07 Jan 2019 21:30)  POCT Blood Glucose.: 169 mg/dL (07 Jan 2019 17:56)  POCT Blood Glucose.: 255 mg/dL (07 Jan 2019 12:49)  POCT Blood Glucose.: 141 mg/dL (07 Jan 2019 09:28)    I&O's Summary    07 Jan 2019 07:01  -  08 Jan 2019 07:00  --------------------------------------------------------  IN: 1280 mL / OUT: 0 mL / NET: 1280 mL        PHYSICAL EXAM:    Constitutional: Frail appearing, jaundiced, in no acute distress, comfortable in bed  	ENMT: dry mucous membranes, no pharyngeal erythema or exudates  	Respiratory: lungs CTAB; no wheezing, rales or rhonchi  	Cardiovascular: Normal S1 and S2; no murmurs. Trace LE edema bilaterally.   	Gastrointestinal: Abdomen soft, no distension, no fluid wave, normal bowel sounds. Mild tenderness in Right and Left abdominal flanks.   	Extremities: No clubbing, cyanosis. Borderline +1 pitting edema b/l up to just above the ankles.   	Vascular: 2+ peripheral pulses  	Neurological: No asterixis. No focal deficits appreciated.  	Skin: Jaundiced; no rashes noted. 2 cm x 3 cm ecchymosis non tender no palpable hematoma.     Psychiatric: A&O x 4.      LABS:                        6.9    5.1   )-----------( 35       ( 08 Jan 2019 06:03 )             20.0     01-08    136  |  108  |  21  ----------------------------<  114<H>  4.8   |  20<L>  |  0.80    Ca    8.3<L>      08 Jan 2019 06:03  Phos  2.4     01-08  Mg     2.2     01-08    TPro  5.5<L>  /  Alb  3.2<L>  /  TBili  12.0<H>  /  DBili  x   /  AST  69<H>  /  ALT  17  /  AlkPhos  100  01-08    PT/INR - ( 08 Jan 2019 06:03 )   PT: 48.5 sec;   INR: 4.07 ratio         PTT - ( 08 Jan 2019 06:03 )  PTT:95.7 sec    AST Trend: 69 U/L<--, 77 U/L<--, 92 U/L<--, 96 U/L  ALT Trend: 17 U/L<--, 20 U/L<--, 23 U/L<--, 22 U/L  ALP Trend: 100 U/L<--, 107 U/L<--, 103 U/L<--, 111 U/L  Albumin Trend: 3.2 g/dL<--, 3.0 g/dL<--, 3.0 g/dL<--, 2.7 g/dL  Total Bilirubin Trend: 12.0 mg/dL<--, 12.2 mg/dL<--, 13.0 mg/dL<--, 12.1 mg/dL  INR Trend: 4.07 ratio<--, 3.79 ratio<--, 3.61 ratio<--, 3.60 ratio    WBC Trend: 5.1 K/uL<--, 5.1 K/uL<--, 5.5 K/uL<--, 6.9 K/uL  Hemoglobin Trend: 6.9 g/dL<--, 7.4 g/dL<--, 7.7 g/dL<--, 8.7 g/dL  Platelet Trend: 35 K/uL<--, 31 K/uL<--, 34 K/uL<--, 49 K/uL    < from: TTE with Doppler (w/Cont) (01.08.19 @ 07:49) >  EF (Pop): 43 %  Doppler Peak Velocity (m/sec): AoV=1.9  ------------------------------------------------------------------------  Observations:  Mitral Valve: Mitral annular calcification. Mild mitral  regurgitation.  Aortic Valve/Aorta: Calcified aortic valve. Peak  transaortic valve gradient equals 16 mm Hg, mean  transaortic valve gradient equals 10 mm Hg, aortic valve  velocity time integral equals 46 cm, consistent with mild  aortic stenosis. Peak left ventricular outflow tract  gradient equals 5 mm Hg, mean gradient is equal to 3 mm Hg,  LVOT velocity time integral equals 27 cm.  Aortic Root: 3.4 cm.  LVOT diameter: 2 cm.  Left Atrium: Normal left atrium.  Left Ventricle: Endocardium not well visualized; moderate  global left ventricular dysfunction. Endocardial  visualization enhanced with intravenous injection of  Ultrasonic Enhancing Agent (Definity). Eccentric left  ventricular hypertrophy (dilated left ventricle with normal  relative wall thickness).  Right Heart: Normal right atrium. The right ventricle is  not well visualized; grossly normal right ventricular  systolic function. Normal tricuspid valve. Mild tricuspid  regurgitation. Normal pulmonic valve. Mild pulmonic  regurgitation.  Pericardium/Pleura: Normal pericardium with no pericardial  effusion.  Hemodynamic: Estimated right atrial pressure is 8 mm Hg.  Estimated right ventricular systolic pressure equals 31 mm  Hg, assuming right atrial pressure equals 8 mm Hg,  consistent with normal pulmonary pressures.  ------------------------------------------------------------------------  Conclusions:  1. Eccentric left ventricular hypertrophy (dilated left  ventricle with normal relative wall thickness).  2. Endocardium not well visualized; moderate global left  ventricular dysfunction. Endocardial visualization enhanced  with intravenous injection of Ultrasonic Enhancing Agent  (Definity).  3. The right ventricle is not well visualized; grossly  normal right ventricular systolic function.

## 2019-01-08 NOTE — PROGRESS NOTE ADULT - SUBJECTIVE AND OBJECTIVE BOX
Chief Complaint: Patient is a 53y old  Male who presents with a chief complaint of hypotension, decompensated cirrhosis (08 Jan 2019)    Interval Events:     Allergies:  No Known Allergies    Home Medications:    Hospital Medications:  albumin human  5% IVPB 250 milliLiter(s) IV Intermittent every 6 hours  artificial tears (preservative free) Ophthalmic Solution 1 Drop(s) Both EYES daily PRN  chlorhexidine 0.12% Liquid 15 milliLiter(s) Oral Mucosa two times a day  dextrose 40% Gel 15 Gram(s) Oral once PRN  dextrose 5%. 1000 milliLiter(s) IV Continuous <Continuous>  dextrose 50% Injectable 12.5 Gram(s) IV Push once  dextrose 50% Injectable 25 Gram(s) IV Push once  dextrose 50% Injectable 25 Gram(s) IV Push once  folic acid 1 milliGRAM(s) Oral daily  glucagon  Injectable 1 milliGRAM(s) IntraMuscular once PRN  insulin lispro (HumaLOG) corrective regimen sliding scale   SubCutaneous three times a day before meals  insulin lispro (HumaLOG) corrective regimen sliding scale   SubCutaneous at bedtime  lactulose Syrup 30 Gram(s) Oral three times a day  lidocaine   Patch 1 Patch Transdermal daily  midodrine 5 milliGRAM(s) Oral three times a day  multivitamin 1 Tablet(s) Oral daily  rifaximin 550 milliGRAM(s) Oral two times a day  thiamine 100 milliGRAM(s) Oral daily  traMADol 50 milliGRAM(s) Oral every 8 hours PRN      PMHX/PSHX:    Hypertension  Type 2 diabetes mellitus  Congestive heart failure (CHF) with reduced ejection fraction  Esophageal varices  Alcoholic cirrhosis of liver with ascites  Abdominal hernia  H/O cataract    Family history:   Family history of hypercholesterolemia (Father)  Family history of diabetes mellitus (Mother)  No pertinent family history in first degree relatives    ROS:   General: No wt loss, fevers, chills, night sweats, fatigue,   Eyes: Good vision, no reported pain  ENT: No sore throat, pain, runny nose, dysphagia  CV: No pain, palpitations, hypo/hypertension  Resp: No dyspnea, cough, tachypnea, wheezing  GI: No pain, No nausea, No vomiting, No diarrhea, No constipation, No weight loss, No fever, No pruritis, No rectal bleeding, No tarry stools, No dysphagia,  : No pain, bleeding, incontinence, nocturia  Muscle: No pain, weakness  Neuro: No weakness, tingling, memory problems  Psych: No fatigue, insomnia, mood problems, depression  Endocrine: No polyuria, polydipsia, cold/heat intolerance  Heme: No petechiae, ecchymosis, easy bruisability  Skin: No rash, tattoos, scars, edema    PHYSICAL EXAM:   Vital Signs:  Vital Signs Last 24 Hrs  T(C): 36.6 (07 Jan 2019 04:04), Max: 36.7 (06 Jan 2019 12:43)  T(F): 97.9 (07 Jan 2019 04:04), Max: 98.1 (06 Jan 2019 12:43)  HR: 66 (07 Jan 2019 04:04) (66 - 74)  BP: 92/52 (07 Jan 2019 03:24) (92/49 - 98/57)  BP(mean): --  RR: 18 (07 Jan 2019 04:04) (18 - 18)  SpO2: 99% (07 Jan 2019 04:04) (96% - 99%)  Daily       GENERAL:  Appears stated age, well-groomed, well-nourished, no distress  HEENT:  NC/AT,  conjunctivae clear and pink, no thyromegaly, nodules, adenopathy, no JVD, sclera -anicteric  CHEST:  Full & symmetric excursion, no increased effort, breath sounds clear  HEART:  Regular rhythm, S1, S2, no murmur/rub/S3/S4, no abdominal bruit, no edema  ABDOMEN:  Soft, non-tender, non-distended, normoactive bowel sounds,  no masses ,no hepato-splenomegaly, no signs of chronic liver disease  EXTEREMITIES:  no cyanosis,clubbing or edema  SKIN:  No rash/erythema/ecchymoses/petechiae/wounds/abscess/warm/dry  NEURO:  Alert, oriented, no asterixis, no tremor, no encephalopathy    LABS:                        7.4    5.1   )-----------( 31       ( 07 Jan 2019 06:46 )             21.5     01-07    135  |  104  |  17  ----------------------------<  134<H>  4.5   |  20<L>  |  0.78    Ca    8.4      07 Jan 2019 06:46  Phos  3.2     01-07  Mg     2.2     01-07    TPro  5.5<L>  /  Alb  3.0<L>  /  TBili  12.2<H>  /  DBili  x   /  AST  77<H>  /  ALT  20  /  AlkPhos  107  01-07    LIVER FUNCTIONS - ( 07 Jan 2019 06:46 )  Alb: 3.0 g/dL / Pro: 5.5 g/dL / ALK PHOS: 107 U/L / ALT: 20 U/L / AST: 77 U/L / GGT: x           PT/INR - ( 06 Jan 2019 06:35 )   PT: 42.8 sec;   INR: 3.61 ratio         Imaging: Chief Complaint: Patient is a 53y old  Male who presents with a chief complaint of hypotension, decompensated cirrhosis (08 Jan 2019)    Interval Events:     Allergies:  No Known Allergies    Home Medications:      Hospital Medications:  albumin human  5% IVPB 250 milliLiter(s) IV Intermittent every 6 hours  artificial tears (preservative free) Ophthalmic Solution 1 Drop(s) Both EYES daily PRN  chlorhexidine 0.12% Liquid 15 milliLiter(s) Oral Mucosa two times a day  dextrose 40% Gel 15 Gram(s) Oral once PRN  dextrose 5%. 1000 milliLiter(s) IV Continuous <Continuous>  dextrose 50% Injectable 12.5 Gram(s) IV Push once  dextrose 50% Injectable 25 Gram(s) IV Push once  dextrose 50% Injectable 25 Gram(s) IV Push once  folic acid 1 milliGRAM(s) Oral daily  glucagon  Injectable 1 milliGRAM(s) IntraMuscular once PRN  insulin lispro (HumaLOG) corrective regimen sliding scale   SubCutaneous three times a day before meals  insulin lispro (HumaLOG) corrective regimen sliding scale   SubCutaneous at bedtime  lactulose Syrup 30 Gram(s) Oral three times a day  lidocaine   Patch 1 Patch Transdermal daily  midodrine 5 milliGRAM(s) Oral three times a day  multivitamin 1 Tablet(s) Oral daily  rifaximin 550 milliGRAM(s) Oral two times a day  thiamine 100 milliGRAM(s) Oral daily  traMADol 50 milliGRAM(s) Oral every 8 hours PRN      PMHX/PSHX:    Hypertension  Type 2 diabetes mellitus  Congestive heart failure (CHF) with reduced ejection fraction  Esophageal varices  Alcoholic cirrhosis of liver with ascites  Abdominal hernia  H/O cataract    Family history:   Family history of hypercholesterolemia (Father)  Family history of diabetes mellitus (Mother)  No pertinent family history in first degree relatives    ROS:   General: No wt loss, fevers, chills, night sweats, fatigue,   Eyes: Good vision, no reported pain  ENT: No sore throat, pain, runny nose, dysphagia  CV: No pain, palpitations, hypo/hypertension  Resp: No dyspnea, cough, tachypnea, wheezing  GI: No pain, No nausea, No vomiting, No diarrhea, No constipation, No weight loss, No fever, No pruritis, No rectal bleeding, No tarry stools, No dysphagia,  : No pain, bleeding, incontinence, nocturia  Muscle: No pain, weakness  Neuro: No weakness, tingling, memory problems  Psych: No fatigue, insomnia, mood problems, depression  Endocrine: No polyuria, polydipsia, cold/heat intolerance  Heme: No petechiae, ecchymosis, easy bruisability  Skin: No rash, tattoos, scars, edema    PHYSICAL EXAM:   Vital Signs:  Vital Signs Last 24 Hrs  T(C): 36.6 (07 Jan 2019 04:04), Max: 36.7 (06 Jan 2019 12:43)  T(F): 97.9 (07 Jan 2019 04:04), Max: 98.1 (06 Jan 2019 12:43)  HR: 66 (07 Jan 2019 04:04) (66 - 74)  BP: 92/52 (07 Jan 2019 03:24) (92/49 - 98/57)  BP(mean): --  RR: 18 (07 Jan 2019 04:04) (18 - 18)  SpO2: 99% (07 Jan 2019 04:04) (96% - 99%)  Daily       GENERAL:  Appears stated age, well-groomed, well-nourished, no distress  HEENT:  NC/AT,  conjunctivae clear and pink, no thyromegaly, nodules, adenopathy, no JVD, sclera -anicteric  CHEST:  Full & symmetric excursion, no increased effort, breath sounds clear  HEART:  Regular rhythm, S1, S2, no murmur/rub/S3/S4, no abdominal bruit, no edema  ABDOMEN:  Soft, non-tender, non-distended, normoactive bowel sounds,  no masses ,no hepato-splenomegaly, no signs of chronic liver disease  EXTEREMITIES:  no cyanosis,clubbing or edema  SKIN:  No rash/erythema/ecchymoses/petechiae/wounds/abscess/warm/dry  NEURO:  Alert, oriented, no asterixis, no tremor, no encephalopathy    LABS:                        7.4    5.1   )-----------( 31       ( 07 Jan 2019 06:46 )             21.5     01-07    135  |  104  |  17  ----------------------------<  134<H>  4.5   |  20<L>  |  0.78    Ca    8.4      07 Jan 2019 06:46  Phos  3.2     01-07  Mg     2.2     01-07    TPro  5.5<L>  /  Alb  3.0<L>  /  TBili  12.2<H>  /  DBili  x   /  AST  77<H>  /  ALT  20  /  AlkPhos  107  01-07    LIVER FUNCTIONS - ( 07 Jan 2019 06:46 )  Alb: 3.0 g/dL / Pro: 5.5 g/dL / ALK PHOS: 107 U/L / ALT: 20 U/L / AST: 77 U/L / GGT: x           PT/INR - ( 06 Jan 2019 06:35 )   PT: 42.8 sec;   INR: 3.61 ratio         Imaging: Chief Complaint: Patient is a 53y old  Male who presents with a chief complaint of hypotension, decompensated cirrhosis (08 Jan 2019)    Interval Events:   No acute overnight events. Patient just returned from ECHO study (results pending) and sitting comfortably upright in bed. Patient reports feeling "fine" with no worsening or improvement from yesterday. Had 2-3 NB, non-tarry, BMs since yesterday of loose consistency.     This morning's CBC showed H/H drop below 7 (6.9/20) from yesterday's H/H of 7.4/21.5. 1U pRBC ordered and being prepped at bedside now; no obvious signs of bleeding noted other than mouth from superficial wound. BP also reported at 73/31 ~8a today. Patient denies any shortness of breath, chills, or lightheadedness.    Allergies:  No Known Allergies    Home Medications:    Hospital Medications:  albumin human  5% IVPB 250 milliLiter(s) IV Intermittent every 6 hours  artificial tears (preservative free) Ophthalmic Solution 1 Drop(s) Both EYES daily PRN  chlorhexidine 0.12% Liquid 15 milliLiter(s) Oral Mucosa two times a day  dextrose 40% Gel 15 Gram(s) Oral once PRN  dextrose 5%. 1000 milliLiter(s) IV Continuous <Continuous>  dextrose 50% Injectable 12.5 Gram(s) IV Push once  dextrose 50% Injectable 25 Gram(s) IV Push once  dextrose 50% Injectable 25 Gram(s) IV Push once  folic acid 1 milliGRAM(s) Oral daily  glucagon  Injectable 1 milliGRAM(s) IntraMuscular once PRN  insulin lispro (HumaLOG) corrective regimen sliding scale   SubCutaneous three times a day before meals  insulin lispro (HumaLOG) corrective regimen sliding scale   SubCutaneous at bedtime  lactulose Syrup 30 Gram(s) Oral three times a day  lidocaine   Patch 1 Patch Transdermal daily  midodrine 5 milliGRAM(s) Oral three times a day  multivitamin 1 Tablet(s) Oral daily  rifaximin 550 milliGRAM(s) Oral two times a day  thiamine 100 milliGRAM(s) Oral daily  traMADol 50 milliGRAM(s) Oral every 8 hours PRN      PMHX/PSHX:    Hypertension  Type 2 diabetes mellitus  Congestive heart failure (CHF) with reduced ejection fraction  Esophageal varices  Alcoholic cirrhosis of liver with ascites  Abdominal hernia  H/O cataract    Family history:   Family history of hypercholesterolemia (Father)  Family history of diabetes mellitus (Mother)  No pertinent family history in first degree relatives    ROS:   General: No wt loss, fevers, chills, night sweats, fatigue,   Eyes: Good vision, no reported pain  ENT: No sore throat, pain, runny nose, dysphagia  CV: No pain, palpitations, hypo/hypertension  Resp: No dyspnea, cough, tachypnea, wheezing  GI: No pain, No nausea, No vomiting, No diarrhea, No constipation, No weight loss, No fever, No pruritis, No rectal bleeding, No tarry stools, No dysphagia,  : No pain, bleeding, incontinence, nocturia  Muscle: No pain, weakness  Neuro: No weakness, tingling, memory problems  Psych: No fatigue, insomnia, mood problems, depression  Endocrine: No polyuria, polydipsia, cold/heat intolerance  Heme: No petechiae, ecchymosis, easy bruisability  Skin: No rash, tattoos, scars, edema    PHYSICAL EXAM:   Vital Signs:  Vital Signs Last 24 Hrs  T(C): 36.6 (07 Jan 2019 04:04), Max: 36.7 (06 Jan 2019 12:43)  T(F): 97.9 (07 Jan 2019 04:04), Max: 98.1 (06 Jan 2019 12:43)  HR: 66 (07 Jan 2019 04:04) (66 - 74)  BP: 92/52 (07 Jan 2019 03:24) (92/49 - 98/57)  BP(mean): --  RR: 18 (07 Jan 2019 04:04) (18 - 18)  SpO2: 99% (07 Jan 2019 04:04) (96% - 99%)    GENERAL:  Appears stated age, well-groomed, well-nourished, no apparent distress  HEENT:  NC/AT, conjunctivae pale pink, no thyromegaly, nodules, adenopathy, no JVD, sclera +icteric  CHEST:  Full & symmetric excursion, no increased effort, breath sounds clear bilaterally. +gynecomastia  HEART:  Regular rhythm, S1, S2, no murmur/rub/S3/S4, no abdominal bruit, no edema  ABDOMEN:  Soft, non-tender, non-distended, no masses appreciated  EXTEREMITIES:  no cyanosis, clubbing, or edema  SKIN:  No rash, abscesses, erythema or petechiae seen. Several ecchymoses around PIV sites on bilateral upper extremities. Skin dry and intact.  NEURO:  Alert & oriented x3, no encephalopathy, but some cognitive slowing relative to yesterday present with slowed speech.    LABS:             6.9    5.1   )-----------( 35       ( 08 Jan 2019 06:03)             20.0                         7.4    5.1   )-----------( 31       ( 07 Jan 2019 06:46)             21.5     01-08    136  |  108  |  21  ----------------------------<  114<H>  4.8   |  20<L>  |  0.80    Ca    8.3<L>      08 Jan 2019 06:03  Phos  2.4     01-08  Mg     2.2     01-08    TPro  5.5<L>  /  Alb  3.2<L>  /  TBili  12.0<H>  /  DBili  x   /  AST  69<H>  /  ALT  17  /  AlkPhos  100  01-08 01-07    135  |  104  |  17  ----------------------------<  134<H>  4.5   |  20<L>  |  0.78    Ca    8.4      07 Jan 2019 06:46  Phos  3.2     01-07  Mg     2.2     01-07    TPro  5.5<L>  /  Alb  3.0<L>  /  TBili  12.2<H>  /  DBili  x   /  AST  77<H>  /  ALT  20  /  AlkPhos  107  01-07    LIVER FUNCTIONS - ( 07 Jan 2019 06:46 )  Alb: 3.0 g/dL / Pro: 5.5 g/dL / ALK PHOS: 107 U/L / ALT: 20 U/L / AST: 77 U/L / GGT: x           PT/INR - ( 08 Jan 2019 06:03 )   PT: 48.5 sec;   INR: 4.07 ratio       PT/INR - ( 06 Jan 2019 06:35 )   PT: 42.8 sec;   INR: 3.61 ratio       Imaging:    EXAM: MR ABDOMEN WAW IC                        PROCEDURE DATE:  01/06/2019    INTERPRETATION:  CLINICAL INFORMATION: Ethanol cirrhosis and heart   failure with reduced ejection fraction. Jaundice, hypotension.  COMPARISON: CT abdomen and pelvis 9/28/2018, duplex sonography of the   abdomen dated 9/28/2018.  PROCEDURE:   MRI of the abdomen was performed with and without IV contrast.  10 cc of Gadavist administered, 0 cc discarded.     MRCP was performed.  Liver iron quantification with Rhennes protocol.    FINDINGS:    LOWER CHEST: Within normal limits.    LIVER: Cirrhosis. No evidence of hepatocellular cancer. Estimated hepatic iron concentration using a TR of 14 ms: 90 (+/- 30) umol/g (normal <36).    BILE DUCTS: Normal caliber. No evidence of choledocholithiasis.  GALLBLADDER: No evidence of gallstones. Mildly nonspecific wall edema.  SPLEEN: Splenomegaly.   PANCREAS: A 1.5 cm uncinate process cyst. No pancreatic ductal dilatation.  ADRENALS: Within normal limits.  KIDNEYS/URETERS: Within normal limits.    VISUALIZED PORTIONS:    BOWEL: Within normal limits.   PERITONEUM: Small volume ascites.  VESSELS: Hepatic and portal veins are patent. Evidence of portal hypertension as evidenced by a patent paraumbilical vein and additional   upper abdominal varices.  RETROPERITONEUM: No lymphadenopathy.    ABDOMINAL WALL: Within normal limits.  BONES: Within normal limits.    IMPRESSION:   Cirrhosis with evidence of portal hypertension. Small volume ascites.  No evidence of hepatocellular cancer.  Mild hepatic iron deposition.  Portal hypertension.  No evidence of cholecystitis or biliary ductal dilatation. Chief Complaint: Patient is a 53y old  Male who presents with a chief complaint of hypotension, decompensated cirrhosis (08 Jan 2019)    Interval Events:   No acute overnight events. Patient just returned from ECHO study (results pending) and sitting comfortably upright in bed. Patient reports feeling "fine" with no worsening or improvement from yesterday. Had 2-3 NB, non-tarry, BMs since yesterday of loose consistency.     This morning's CBC showed H/H drop below 7 (6.9/20) from yesterday's H/H of 7.4/21.5. 1U pRBC ordered and being prepped at bedside now; no obvious signs of bleeding noted other than mouth from superficial wound. BP also reported at 73/31 ~8a today. Patient denies any shortness of breath, chills, or lightheadedness.    Allergies:  No Known Allergies    Home Medications:    Hospital Medications:  albumin human  5% IVPB 250 milliLiter(s) IV Intermittent every 6 hours  artificial tears (preservative free) Ophthalmic Solution 1 Drop(s) Both EYES daily PRN  chlorhexidine 0.12% Liquid 15 milliLiter(s) Oral Mucosa two times a day  dextrose 40% Gel 15 Gram(s) Oral once PRN  dextrose 5%. 1000 milliLiter(s) IV Continuous <Continuous>  dextrose 50% Injectable 12.5 Gram(s) IV Push once  dextrose 50% Injectable 25 Gram(s) IV Push once  dextrose 50% Injectable 25 Gram(s) IV Push once  folic acid 1 milliGRAM(s) Oral daily  glucagon  Injectable 1 milliGRAM(s) IntraMuscular once PRN  insulin lispro (HumaLOG) corrective regimen sliding scale   SubCutaneous three times a day before meals  insulin lispro (HumaLOG) corrective regimen sliding scale   SubCutaneous at bedtime  lactulose Syrup 30 Gram(s) Oral three times a day  lidocaine   Patch 1 Patch Transdermal daily  midodrine 5 milliGRAM(s) Oral three times a day  multivitamin 1 Tablet(s) Oral daily  rifaximin 550 milliGRAM(s) Oral two times a day  thiamine 100 milliGRAM(s) Oral daily  traMADol 50 milliGRAM(s) Oral every 8 hours PRN      PMHX/PSHX:    Hypertension  Type 2 diabetes mellitus  Congestive heart failure (CHF) with reduced ejection fraction  Esophageal varices  Alcoholic cirrhosis of liver with ascites  Abdominal hernia  H/O cataract    Family history:   Family history of hypercholesterolemia (Father)  Family history of diabetes mellitus (Mother)  No pertinent family history in first degree relatives    ROS:   General: No wt loss, fevers, chills, night sweats, fatigue,   Eyes: Good vision, no reported pain  ENT: No sore throat, pain, runny nose, dysphagia  CV: No pain, palpitations, hypo/hypertension  Resp: No dyspnea, cough, tachypnea, wheezing  GI: No pain, No nausea, No vomiting, No diarrhea, No constipation, No weight loss, No fever, No pruritis, No rectal bleeding, No tarry stools, No dysphagia,  : No pain, bleeding, incontinence, nocturia  Muscle: No pain, weakness  Neuro: No weakness, tingling, memory problems  Psych: No fatigue, insomnia, mood problems, depression  Endocrine: No polyuria, polydipsia, cold/heat intolerance  Heme: No petechiae, ecchymosis, easy bruisability  Skin: No rash, tattoos, scars, edema    PHYSICAL EXAM:   Vital Signs:  Vital Signs Last 24 Hrs  T(C): 36.6 (07 Jan 2019 04:04), Max: 36.7 (06 Jan 2019 12:43)  T(F): 97.9 (07 Jan 2019 04:04), Max: 98.1 (06 Jan 2019 12:43)  HR: 66 (07 Jan 2019 04:04) (66 - 74)  BP: 92/52 (07 Jan 2019 03:24) (92/49 - 98/57)  BP(mean): --  RR: 18 (07 Jan 2019 04:04) (18 - 18)  SpO2: 99% (07 Jan 2019 04:04) (96% - 99%)    GENERAL:  Appears stated age, well-groomed, well-nourished, no apparent distress  HEENT:  NC/AT, conjunctivae pale pink, no thyromegaly, nodules, adenopathy, no JVD, sclera +icteric  CHEST:  Full & symmetric excursion, no increased effort, breath sounds clear bilaterally. +gynecomastia  HEART:  Regular rhythm, S1, S2, no murmur/rub/S3/S4, no abdominal bruit, no edema  ABDOMEN:  Soft, non-tender, non-distended, no masses appreciated  EXTEREMITIES:  no cyanosis, clubbing, or edema  SKIN:  No rash, abscesses, erythema or petechiae seen. Several ecchymoses around PIV sites on bilateral upper extremities. Skin dry and intact.  NEURO:  Alert & oriented x3, no encephalopathy, but some cognitive slowing relative to yesterday present with slowed speech.    LABS:             6.9    5.1   )-----------( 35       ( 08 Jan 2019 06:03)             20.0                         7.4    5.1   )-----------( 31       ( 07 Jan 2019 06:46)             21.5     01-08    136  |  108  |  21  ----------------------------<  114<H>  4.8   |  20<L>  |  0.80    Ca    8.3<L>      08 Jan 2019 06:03  Phos  2.4     01-08  Mg     2.2     01-08    TPro  5.5<L>  /  Alb  3.2<L>  /  TBili  12.0<H>  /  DBili  x   /  AST  69<H>  /  ALT  17  /  AlkPhos  100  01-08 01-07    135  |  104  |  17  ----------------------------<  134<H>  4.5   |  20<L>  |  0.78    Ca    8.4      07 Jan 2019 06:46  Phos  3.2     01-07  Mg     2.2     01-07    TPro  5.5<L>  /  Alb  3.0<L>  /  TBili  12.2<H>  /  DBili  x   /  AST  77<H>  /  ALT  20  /  AlkPhos  107  01-07    LIVER FUNCTIONS - ( 07 Jan 2019 06:46 )  Alb: 3.0 g/dL / Pro: 5.5 g/dL / ALK PHOS: 107 U/L / ALT: 20 U/L / AST: 77 U/L / GGT: x           PT/INR - ( 08 Jan 2019 06:03 )   PT: 48.5 sec;   INR: 4.07 ratio       PT/INR - ( 06 Jan 2019 06:35 )   PT: 42.8 sec;   INR: 3.61 ratio       Imaging:    EXAM: MR ABDOMEN WAW IC                        PROCEDURE DATE:  01/06/2019    INTERPRETATION:  CLINICAL INFORMATION: Ethanol cirrhosis and heart   failure with reduced ejection fraction. Jaundice, hypotension.  COMPARISON: CT abdomen and pelvis 9/28/2018, duplex sonography of the   abdomen dated 9/28/2018.  PROCEDURE:   MRI of the abdomen was performed with and without IV contrast.  10 cc of Gadavist administered, 0 cc discarded.     MRCP was performed.  Liver iron quantification with Rhennes protocol.    FINDINGS:    LOWER CHEST: Within normal limits.    LIVER: Cirrhosis. No evidence of hepatocellular cancer. Estimated hepatic iron concentration using a TR of 14 ms: 90 (+/- 30) umol/g (normal <36).    BILE DUCTS: Normal caliber. No evidence of choledocholithiasis.  GALLBLADDER: No evidence of gallstones. Mildly nonspecific wall edema.  SPLEEN: Splenomegaly.   PANCREAS: A 1.5 cm uncinate process cyst. No pancreatic ductal dilatation.  ADRENALS: Within normal limits.  KIDNEYS/URETERS: Within normal limits.    VISUALIZED PORTIONS:    BOWEL: Within normal limits.   PERITONEUM: Small volume ascites.  VESSELS: Hepatic and portal veins are patent. Evidence of portal hypertension as evidenced by a patent paraumbilical vein and additional   upper abdominal varices.  RETROPERITONEUM: No lymphadenopathy.    ABDOMINAL WALL: Within normal limits.  BONES: Within normal limits.    IMPRESSION:   Cirrhosis with evidence of portal hypertension. Small volume ascites.  No evidence of hepatocellular cancer.  Mild hepatic iron deposition.  Portal hypertension.  No evidence of cholecystitis or biliary ductal dilatation.      TTE  with 2-D, M-Mode, and complete spectral and color flow Doppler  Observations:  Mitral Valve: Mitral annular calcification. Mild mitral regurgitation.  Aortic Valve/Aorta: Calcified aortic valve. Peak transaortic valve gradient equals 16 mm Hg, mean transaortic valve gradient equals 10 mm Hg, aortic valve velocity time integral equals 46 cm, consistent with mild aortic stenosis. Peak left ventricular outflow tract gradient equals 5 mm Hg, mean gradient is equal to 3 mm Hg, LVOT velocity time integral equals 27 cm.  Aortic Root: 3.4 cm.  LVOT diameter: 2 cm.  Left Atrium: Normal left atrium.  Left Ventricle: Endocardium not well visualized; moderate global left ventricular dysfunction. Endocardial visualization enhanced with intravenous injection of Ultrasonic Enhancing Agent (Definity). Eccentric left ventricular hypertrophy (dilated left ventricle with normal relative wall thickness).  Right Heart: Normal right atrium. The right ventricle is not well visualized; grossly normal right ventricular systolic function. Normal tricuspid valve. Mild tricuspid regurgitation. Normal pulmonic valve. Mild pulmonic regurgitation.  Pericardium/Pleura: Normal pericardium with no pericardial effusion.  Hemodynamic: Estimated right atrial pressure is 8 mm Hg. Estimated right ventricular systolic pressure equals 31 mm Hg, assuming right atrial pressure equals 8 mm Hg, consistent with normal pulmonary pressures.    Conclusions:  1. Eccentric left ventricular hypertrophy (dilated left ventricle with normal relative wall thickness).  2. Endocardium not well visualized; moderate global left ventricular dysfunction. Endocardial visualization enhanced with intravenous injection of Ultrasonic Enhancing Agent (Definity).  3. The right ventricle is not well visualized; grossly normal right ventricular systolic function.

## 2019-01-08 NOTE — PROGRESS NOTE ADULT - SUBJECTIVE AND OBJECTIVE BOX
CC: F/U for Positive BCX    Saw/spoke to patient. No fevers, no chills. Overall well. No new complaints.    Allergies  No Known Allergies    ANTIMICROBIALS:  rifaximin 550 two times a day    PE:    Vital Signs Last 24 Hrs  T(C): 36.7 (08 Jan 2019 13:28), Max: 36.9 (08 Jan 2019 04:34)  T(F): 98.1 (08 Jan 2019 13:28), Max: 98.5 (08 Jan 2019 04:34)  HR: 66 (08 Jan 2019 13:28) (63 - 66)  BP: 81/43 (08 Jan 2019 13:28) (73/31 - 96/55)  RR: 18 (08 Jan 2019 13:28) (18 - 18)  SpO2: 100% (08 Jan 2019 13:28) (91% - 100%)    Gen: AOx3, NAD, non-toxic, pleasant  CV: S1+S2 normal, nontachycardic  Resp: Clear bilat, no resp distress, no crackles/wheezes  Abd: Soft, nontender, +BS  Ext: No LE edema, no wounds    LABS:                        7.8    4.8   )-----------( 33       ( 08 Jan 2019 15:54 )             22.2     01-08    136  |  108  |  21  ----------------------------<  114<H>  4.8   |  20<L>  |  0.80    Ca    8.3<L>      08 Jan 2019 06:03  Phos  2.4     01-08  Mg     2.2     01-08    TPro  5.5<L>  /  Alb  3.2<L>  /  TBili  12.0<H>  /  DBili  x   /  AST  69<H>  /  ALT  17  /  AlkPhos  100  01-08    MICROBIOLOGY:    .Blood Blood  01-04-19   No growth to date.     .Urine Clean Catch (Midstream)  01-02-19   No growth    .Blood Blood  01-02-19   No growth at 5 days.  --  Blood Culture PCR CoNS  Blood Culture PCR    CMVPCR Log: NotDetec LogIU/mL (01-04 @ 07:48)  Rapid RVP Result: NotDetec (01-02 @ 16:55)    RADIOLOGY:    1/6 MR:    IMPRESSION:     Cirrhosis with evidence of portal hypertension. Small volume ascites.    No evidence of hepatocellular cancer.    Mild hepatic iron deposition.    Portal hypertension.    No evidence of cholecystitis or biliary ductal dilatation.

## 2019-01-08 NOTE — DIETITIAN INITIAL EVALUATION ADULT. - OTHER INFO
seen for length of stay, admitted for hypotension, decompensated cirrhosis, consuming >75% of meals, denies nausea/vomit, denies difficulty chewing /swallow. last BM today. NKFA. he refused the need for diet education for both diabetes and dash diet. he dislikes Danactive and is not drinking it. Agrees to discontinue.

## 2019-01-08 NOTE — DIETITIAN INITIAL EVALUATION ADULT. - PERTINENT MEDS FT
albumin human  5% IVPB 250  artificial tears (preservative free) Ophthalmic Solution 1 PRN  chlorhexidine 0.12% Liquid 15  dextrose 40% Gel 15 PRN  dextrose 5%. 1000  dextrose 50% Injectable 12.5  dextrose 50% Injectable 25  dextrose 50% Injectable 25  folic acid 1  glucagon  Injectable 1 PRN  insulin lispro (HumaLOG) corrective regimen sliding scale   insulin lispro (HumaLOG) corrective regimen sliding scale   lactulose Syrup 30  lidocaine   Patch 1  midodrine 5  multivitamin 1  rifaximin 550  thiamine 100  traMADol 50 PRN

## 2019-01-08 NOTE — DIETITIAN INITIAL EVALUATION ADULT. - PROBLEM SELECTOR PLAN 8
No active bleeding at this time. Pt has dried blood on teeth, from dry lips/scab picking, denies hematemesis, hgb at baseline.   Patient was previously on propranolol for prophylaxis- however states he is no longer taking it. Likely 2/2 hypotension. Clarify medication with patient's PCP or pharmacy in AM.  -c/w sbp coverage as noted above

## 2019-01-08 NOTE — PROGRESS NOTE ADULT - ASSESSMENT
53M end stage liver disease 2/2 alcoholic cirrhosis, DMII, severe HFrEF (35%), CAD, HTN, presenting with 1 week of generalized weakness and hypotension, likely 2/2 decompensated cirrhosis in the setting of sepsis. Now found to have gram negative rods and gram positive bacteremia explaining sepsis. 1/5 with drop in hemoglobin, HD stable, no s/s of bleeding, will transfuse 1 unit, check stool guaiac and observe closely, GI follow up. Back to baseline SBPs, mentating well and currently improved symptoms compared to admission. MR abdomen showing mild Iron deposition, cirrhosis, and portal hypertension. Plan for diagnostic paracentesis tomorrow by IR 1/8. 53M end stage liver disease 2/2 alcoholic cirrhosis, DMII, severe HFrEF (35%), CAD, HTN, presenting with 1 week of generalized weakness and hypotension, likely 2/2 decompensated cirrhosis in the setting of sepsis. Now found to have gram negative rods and gram positive bacteremia explaining sepsis. 1/5 with drop in hemoglobin, HD stable, no s/s of bleeding, will transfuse 1 unit, check stool guaiac and observe closely, GI follow up. Back to baseline SBPs, mentating well and currently improved symptoms compared to admission. MR abdomen showing mild Iron deposition, cirrhosis, and portal hypertension. Plan for diagnostic paracentesis today by IR on 1/8. 53M end stage liver disease 2/2 alcoholic cirrhosis, DMII, severe HFrEF (35%), CAD, HTN, presenting with 1 week of generalized weakness and hypotension, likely 2/2 decompensated cirrhosis in the setting of sepsis. Now found to have gram negative rods and gram positive bacteremia explaining sepsis. 1/5 with drop in hemoglobin, HD stable, no s/s of bleeding, will transfuse 1 unit, check stool guaiac and observe closely, GI follow up. Back to baseline SBPs, mentating well and currently improved symptoms compared to admission. MR abdomen showing mild Iron deposition, cirrhosis, and portal hypertension. Now s/p 1U PRBC for hemoglobin drop. CBC abnormalities most likely 2/2 due to cirrhosis (thrombcytopenia and anemia) without evidence of obvious bleed (low Hgb/Hct, tachycardia, hypotension from baseline, negative occult blood stool). Plan for diagnostic paracentesis today by IR on 1/8. TTE showing eccentric LV hypertrophy, with moderate LV dysfunction but improved Ef of 43% from 35%.

## 2019-01-08 NOTE — PROGRESS NOTE ADULT - ATTENDING COMMENTS
Patient with impaired ejection fraction on cardiac echo.  will transmit to [facility].  liver function stable and cardiac function may prevent transplant candidacy.  This to be determined by [facility].

## 2019-01-08 NOTE — DIETITIAN INITIAL EVALUATION ADULT. - NUTRITION INTERVENTION
Vitamin/Meals and Snack/Medical Food Supplements Vitamin/Meals and Snack/Collaboration and Referral of Nutrition Care/Medical Food Supplements

## 2019-01-08 NOTE — DIETITIAN INITIAL EVALUATION ADULT. - PROBLEM SELECTOR PLAN 3
Patient with no florid ascites on physical exam; abdomen soft and nondistended.  However, patient previously treated for SBP and patient with esophageal varices.   Patient complains of diffuse abdominal pain particularly in RUQ and LLQ.  May benefit from at least diagnostic paracentesis. Obtain abdominal US to assess ascites fluid.

## 2019-01-08 NOTE — DIETITIAN INITIAL EVALUATION ADULT. - PROBLEM SELECTOR PLAN 10
DVT ppx: SCDs  Diet: DASH/Consistent Carb    Goals of Care: FULL CODE. I addressed GOC with patient and his brother at bedside. Patient stated that he would like chest compressions and intubation if necessary.     Darby Goldberg MD PGY2  Medicine Night Admit Resident  Pager [phone]

## 2019-01-08 NOTE — PROGRESS NOTE ADULT - ASSESSMENT
53 year old male with end stage liver disease 2/2 chronic alcoholic cirrhosis, with esophageal varices, CAD, HfrEF (EF 35%), and DMII, presenting with 1 week of generalized weakness and poor PO intake.  No fevers, no leukocytosis  Vague symptoms of fatigue, but no focal complaints  UA/UCX neg  BCX with GPC-CL (CoNS by PCR)--note corrected report, microbio reporting NO GNR present  False positive GNR is consistent with clinical picture--patient has no signs or symptoms of sepsis  MR reassuring from billary perspective  GPC-CL likely contam  Doing well off abx  Overall, GNR bacteremia, liver cirrhosis, elevated bili, ascites  - Continue off abx  - Follow up pending BCX  - Monitor for any signs infection  - Will sign off. Please call with further questions or change in status.    Tereso Collins MD  Pager [phone]  After 5pm and on weekends call [phone]

## 2019-01-08 NOTE — DIETITIAN INITIAL EVALUATION ADULT. - PROBLEM SELECTOR PLAN 2
Patient with leukocytosis to WBC 11.8, hypotension to SBP 70's, with no confirmed infectious source but suspicion for SBP given prior treatments for SBP and history of esophageal varices and c/o of vague abd pain.  VBG lactate elevated to 4.7 (possibly from liver disease alone)  - s/p 250 cc 5% albumin for volume resuscitation (pt with low albumin) as patient intravascularly depleted and cannot give NS or LR due to risk of third spacing.  Continue giving albumin q6h.   - follow up blood and urine cultures  - treat with zosyn for now (pt with prior E coli bacteremia, cover broadly for Gram negative organisms). no respiratory sx at this time, holding vanco/azithro for now  -holding home antihypertensives.  would cautiously reintroduce diuretics if tolerated in coming days. clarify propranolol (family saying no longer taking)

## 2019-01-08 NOTE — DIETITIAN INITIAL EVALUATION ADULT. - PROBLEM SELECTOR PLAN 1
MELD 34 on admission, indicating >50% mortality in next 3 months. Was recently on liver transplant candidate list at [facility], however had to be removed due to severe HFrEF, no longer plans for liver transplant  - patient on furosemide 20 mg and spironolactone 25 mg daily. Will hold diuretics for now as pt intravascularly depleted and hypotensive on arrival in setting of aggressive diuretic regimen as outpatient (lasix/aldactone + addition of metolazone 2 wks ago).   - continue lactulose and rifaximin.   - lower concern for hepatic encephalopathy at this time.   --evidence of severe synthetic dysfunction, portal hypertensive, varices, and existing heart failure and now ULICES suggestive of high risk of multi-organ dysfunction in coming days to weeks. Prognosis is poor.   -hepatology eval in am  - palliative care consult in AM as patient no longer transplant candidate.  -c/w albumin as noted below  -c/w abd sono to assess for ascites as below

## 2019-01-08 NOTE — DIETITIAN INITIAL EVALUATION ADULT. - PROBLEM SELECTOR PLAN 7
Currently patient breathing comfortably on room air with no appreciable LE edema.  Continue to monitor off diuretics as above, fluid status will be tenuous in the setting of advanced liver disease, systolic HF, and andrea.

## 2019-01-08 NOTE — PROGRESS NOTE ADULT - PROBLEM SELECTOR PLAN 2
MELD 34 on admission, indicating >50% mortality in next 3 months. Was recently on liver transplant candidate list at [facility], however had to be removed due to severe HFrEF, no longer plans for liver transplant    - Patient on Furosemide 20 mg and spironolactone 25 mg daily, will hold diuretics for now until paracentesis done. Renal function improved.   - Will continue with Lactulose and Rifaximin PO.   - Evidence of severe synthetic dysfunction, portal hypertensive, varices, and existing heart failure. ULICES resolved.   - U/S Abdomen showed small to moderate ascites in the RUQ. Per hepatology, consider diagnostic paracentesis to rule out SBP by IR today 1/8.   - MRCP/ MR abdomen showing changes as discussed above.

## 2019-01-08 NOTE — PROGRESS NOTE ADULT - PROBLEM SELECTOR PLAN 5
PLT count 77k this admission, compared to 75k on previous admission (comparable). This, in addition to elevated INR and hyponatremia, are likely in setting of end stage liver disease.     - Worsening platelets can be secondary to liver cirrhosis. No signs of active bleeding despite Hgb decreasing (tachycardia, hypotension, melena).   - s/p 1 unit PRBC 1/5  - No active bleeding.

## 2019-01-08 NOTE — PROGRESS NOTE ADULT - ASSESSMENT
53M end stage liver disease 2/2 alcoholic cirrhosis, DMII, severe HFrEF (35%), CAD, HTN, presenting with progressively worsening generalized weakness. During admission, he was found to have gram negative rods and gram positive blood cultures but this was thought to be secondary to contaminant. Rest of infectious workup his pending. 2g drop in Hb on 1/5 but HD stable, no signs of bleeding.     Impression:  # Decompensated Cirrhosis: suspected EtOH related - MELD-Na: 33  Patient was listed for transplant at [facility] but given new diagnosis of HFrEF in 10/2018, he was placed on hold on the waitlist with plan to reassess cardiac function. He is awaiting JOHNSON during this admission. Patient refused transfer to [facility] at this time.  HE: On Lactulose + Rifaximin at home  Ascites: small to moderate ascites on US 1/3/19. Has a history of SBP last episode in 10/2018  HCC: No focal masses on US 9/2018  Varices: Small on EGD 8/2017, on spironolactone 25mg daily and lasix 20mg daily at home  Repeat Hep A, B, C serologies negative from 1/4/2019  #GNR and coag negative staph blood cultures  ID following and thought to be secondary to contaminant. Not currently on IV abx.  # Macrocytic Anemia: Low suspicion for active GI bleed as would expect overt GI bleeding given with drop by 2U in one day.    Recommendations:  - Obtain records from [facility]  - Continue lactulose and rifaximin, titrate to 2-3 BM per day  - Follow up MRI with iron quantification given hx of reported iron overload  - MRCP given elevated bili and previously high Alk Phos  - Check B12/Folate given macrocytic anemia  - Repeat TTE  - Repeat diagnostic paracentesis to r/o SBP  - Check TSH, folate, B12 for macrocytic anemia workup  - please check total immunoglobulin, HSV, CMV, and EBV PCR  - Trend MELD labs and CBC daily 53M end stage liver disease 2/2 alcoholic cirrhosis, DM-II, severe HFrEF (<35%), CAD, HTN, presenting with progressively worsening generalized weakness. During admission, he was found to have gram negative rods and gram positive cocci blood cultures, but this was thought to be secondary to contamination. Since yesterday, patient has had H/H drop requiring 1U pRBC this AM and hypotensive to 73/31 (2U total this admission). Rest of infectious workup still pending with IR diagnostic paracentesis scheduled later today. HD stable now, no obvious signs of bleeding.     Impression:  # Decompensated Cirrhosis: suspected EtOH related - MELD-Na: 32 today (34 on admission)  Patient was listed for transplant at [facility] but given new diagnosis of HFrEF in 10/2018, he was placed on hold on wait list with plan to reassess cardiac function. He is awaiting JOHNSON reading during this admission. Patient refused transfer to [facility] at this time.  HE: On Lactulose at home (Not on Rifaximin 2/2 prescription cost)  Ascites: small to moderate ascites on US 1/3/19. Has a history of E.coli SBP last episode in 10/2018  HCC: No focal masses on US 9/2018  Varices: Small on EGD 8/2017, on spironolactone 25mg daily and lasix 20mg daily at home  Repeat Hep A, B, C serologies negative from 1/4/2019.    #GNR and coag negative staph blood cultures  ID following and thought to be secondary to contaminant. Not currently on IV abx.    #Macrocytic Anemia: Low suspicion for active GI bleed as would expect overt GI bleeding given with drop by 2U in one day.  MR abdomen indicates mild iron deposition within liver, cirrhosis, and portal hypertension.   Repeat B12, Folate on 1/7/19 indicates no B9/ B12 deficiency (B12 >2000; B9 >20.0).  Repeat TSH on 1/7/19 wnl (1.81).  Additional less common etiologies that could possibly explain macrocytosis or be a contributing factor to consider include copper deficiency, chronic liver disease affecting lipid composition of RBC membranes, increased endogenous estrogens from pt's liver disease (less likely given ULN folate levels), or drug-induced also less likely in pt.     Recommendations:  - Obtain records from [facility]  - Continue lactulose and rifaximin, titrate to 2-3 BM per day  - MRCP given elevated bili and previously high Alk Phos  - Follow up with TTE reading  - Follow up with planned diagnostic paracentesis scheduled with IR later today  - please check total immunoglobulin, HSV, CMV, and EBV PCR  - Trend MELD labs and CBC daily  - ?serum copper and ceruloplasmin levels 53M end stage liver disease 2/2 alcoholic cirrhosis, DM-II, severe HFrEF (<35%), CAD, HTN, presenting with progressively worsening generalized weakness. During admission, he was found to have gram negative rods and gram positive cocci blood cultures, but this was thought to be secondary to contamination. Since yesterday, patient has had H/H drop requiring 1U pRBC this AM and hypotensive to 73/31 (2U total this admission). Rest of infectious workup still pending with IR diagnostic paracentesis scheduled later today. HD stable now, no obvious signs of bleeding.     Impression:  # Decompensated Cirrhosis: suspected EtOH related - MELD-Na: 32 today (34 on admission)  Patient was listed for transplant at [facility] but given new diagnosis of HFrEF in 10/2018, he was placed on hold on wait list with plan to reassess cardiac function. He is awaiting JOHNSON reading during this admission. Patient refused transfer to [facility] at this time.  HE: On Lactulose at home (Not on Rifaximin 2/2 prescription cost)  Ascites: small to moderate ascites on US 1/3/19. Has a history of E.coli SBP last episode in 10/2018  HCC: No focal masses on US 9/2018  Varices: Small on EGD 8/2017, on spironolactone 25mg daily and lasix 20mg daily at home  Repeat Hep A, B, C serologies negative from 1/4/2019.  MR abdomen indicates mild iron deposition within liver, cirrhosis, and portal hypertension. MRCP unremarkable with patent bile ducts and nonspecific gallbladder wall thickening with no evidence of biliary obstruction. Alk Phos/AST/ALT downtrending.    #GNR and coag negative staph blood cultures  ID following and thought to be secondary to contaminant. Not currently on IV abx.    #Macrocytic Anemia: Low suspicion for active GI bleed as would expect overt GI bleeding given with drop by 2U in one day.  Repeat B12, Folate on 1/7/19 indicates no B9/ B12 deficiency (B12 >2000; B9 >20.0). Repeat TSH on 1/7/19 wnl (1.81).  Less common contributing etiologies possibly explaining macrocytosis to consider include copper deficiency, chronic liver disease affecting lipid composition of RBC membranes, increased endogenous estrogens from pt's liver disease (less likely given ULN folate levels), or drug-induced also less likely in pt.     Recommendations:  - Obtain records from [facility]  - Continue lactulose and rifaximin, titrate to 2-3 BM per day  - Follow up with TTE reading  - Follow up with planned diagnostic paracentesis scheduled with IR later today  - please check total immunoglobulin, HSV, CMV, and EBV PCR  - Trend MELD labs and CBC daily  - ?serum copper and ceruloplasmin levels 53M end stage liver disease 2/2 alcoholic cirrhosis, DM-II, severe HFrEF (<35%), CAD, HTN, presenting with progressively worsening generalized weakness. During admission, he was found to have gram negative rods and gram positive cocci blood cultures, but this was thought to be secondary to contamination. Since yesterday, patient has had H/H drop requiring 1U pRBC this AM and hypotensive to 73/31 (2U total this admission). Rest of infectious workup still pending with IR diagnostic paracentesis scheduled later today. HD stable now, no obvious signs of bleeding.     Impression:  # Decompensated Cirrhosis: suspected EtOH related - MELD-Na: 32 today (34 on admission)  Patient was listed for transplant at [facility] but given new diagnosis of HFrEF in 10/2018, he was placed on hold on wait list with plan to reassess cardiac function. He is awaiting JOHNSON reading during this admission. Patient refused transfer to [facility] at this time.  HE: On Lactulose at home (Not on Rifaximin 2/2 prescription cost)  Ascites: small to moderate ascites on US 1/3/19. Has a history of E.coli SBP last episode in 10/2018  HCC: No focal masses on US 9/2018  Varices: Small on EGD 8/2017, on spironolactone 25mg daily and lasix 20mg daily at home  Repeat Hep A, B, C serologies negative from 1/4/2019.  MR abdomen indicates mild iron deposition within liver, cirrhosis, and portal hypertension. MRCP unremarkable with patent bile ducts and nonspecific gallbladder wall thickening with no evidence of biliary obstruction. Alk Phos/AST/ALT downtrending.    #GNR and coag negative staph blood cultures  ID following and thought to be secondary to contaminant. Not currently on IV abx.    #Macrocytic Anemia: Low suspicion for active GI bleed as would expect overt GI bleeding given with drop by 2U in one day.  Repeat B12, Folate on 1/7/19 indicates no B9/ B12 deficiency (B12 >2000; B9 >20.0). Repeat TSH on 1/7/19 wnl (1.81).  Less common contributing etiologies possibly explaining macrocytosis to consider include copper deficiency, chronic liver disease affecting lipid composition of RBC membranes, increased endogenous estrogens from pt's liver disease (less likely given ULN folate levels), or drug-induced also less likely in pt.     Recommendations:  - Obtain records from [facility]  - Continue lactulose and rifaximin, titrate to 2-3 BM per day  - Follow up with TTE reading  - Follow up with planned diagnostic paracentesis scheduled with IR later today  - please check total immunoglobulin, HSV, CMV, and EBV PCR  - Trend MELD labs and CBC daily  - Consider hematology consult for further w/u of macrocytic anemia (if not copper deficienct as possible contributor to macrocytosis) 53M end stage liver disease 2/2 alcoholic cirrhosis, DM-II, severe HFrEF (<35%), CAD, HTN, presenting with progressively worsening generalized weakness. During admission, he was found to have gram negative rods and gram positive cocci blood cultures, but this was thought to be secondary to contamination. Since yesterday, patient has had H/H drop requiring 1U pRBC this AM and hypotensive to 73/31 (2U total this admission). Rest of infectious workup still pending with IR diagnostic paracentesis scheduled later today. HD stable now, no obvious signs of bleeding.     Impression:  # Decompensated Cirrhosis: suspected EtOH related - MELD-Na: 32 today (34 on admission)  Patient was listed for transplant at [facility] but given new diagnosis of HFrEF in 10/2018, he was placed on hold on wait list with plan to reassess cardiac function. He is awaiting JOHNSON reading during this admission. Patient refused transfer to [facility] at this time.  HE: On Lactulose at home (Not on Rifaximin 2/2 prescription cost)  Ascites: small to moderate ascites on US 1/3/19. Has a history of E.coli SBP last episode in 10/2018  HCC: No focal masses on US 9/2018  Varices: Small on EGD 8/2017, on spironolactone 25mg daily and lasix 20mg daily at home  Repeat Hep A, B, C serologies negative from 1/4/2019.  MR abdomen indicates mild iron deposition within liver, cirrhosis, and portal hypertension. MRCP unremarkable with patent bile ducts and nonspecific gallbladder wall thickening with no evidence of biliary obstruction. Alk Phos/AST/ALT downtrending.    #GNR and coag negative staph blood cultures  ID following and thought to be secondary to contaminant. Not currently on IV abx.    #Macrocytic Anemia: Low suspicion for active GI bleed as would expect overt GI bleeding given with drop by 2U in one day.  Repeat B12, Folate on 1/7/19 indicates no B9/ B12 deficiency (B12 >2000; B9 >20.0). Repeat TSH on 1/7/19 wnl (1.81).  Less common contributing etiologies possibly explaining macrocytosis to consider include copper deficiency, chronic liver disease affecting lipid composition of RBC membranes, increased endogenous estrogens from pt's liver disease (less likely given ULN folate levels), or drug-induced also less likely in pt.     Recommendations:  - Obtain records from [facility]  - Continue lactulose and rifaximin, titrate to 2-3 BM per day  - Follow up with TTE read  - Follow up with planned diagnostic paracentesis scheduled with IR later today  - please check total immunoglobulin, HSV, CMV, and EBV PCR  - Trend MELD labs and CBC daily  - Consider hematology consult for further w/u of macrocytic anemia (if not copper deficienct as possible contributor to macrocytosis) 53M end stage liver disease 2/2 alcoholic cirrhosis, DM-II, severe HFrEF (<35%), CAD, HTN, presenting with progressively worsening generalized weakness. During admission, he was found to have gram negative rods and gram positive cocci blood cultures, but this was thought to be secondary to contamination. Since yesterday, patient has had H/H drop requiring 1U pRBC this AM and hypotensive to 73/31 (2U total this admission). Rest of infectious workup still pending with IR diagnostic paracentesis scheduled later today. HD stable now, no obvious signs of bleeding.     Impression:  #Decompensated Cirrhosis: suspected EtOH related - MELD-Na: 32 today (34 on admission)  Patient was listed for transplant at [facility] but given new diagnosis of HFrEF in 10/2018, he was placed on hold on wait list with plan to reassess cardiac function. He is awaiting JOHNSON reading during this admission. Patient refused transfer to [facility] at this time.  HE: On Lactulose at home (Not on Rifaximin 2/2 prescription cost)  Ascites: small to moderate ascites on US 1/3/19. 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  Immunoglobulin panel: Quantitative IgA, IgM, & IgG (766, 296, & 1915, respectively), [place]/ Lambda Free light chain ratio 1.73 <H>, and BR[place]GER Kappa & Lambda 8.11 and 4.70, respectively.  MR abdomen indicates mild iron deposition within liver, cirrhosis, and portal hypertension. MRCP unremarkable with patent bile ducts and nonspecific gallbladder wall thickening with no evidence of biliary obstruction. Alk Phos/AST/ALT downtrending.    #GNR and coag negative staph blood cultures  ID following and thought to be secondary to contaminant. Not currently on IV abx.    #Macrocytic Anemia: Low suspicion for active GI bleed as would expect overt GI bleeding given with drop by 2U in one day.  Repeat B12, Folate on 1/7/19 indicates no B9/ B12 deficiency (B12 >2000; B9 >20.0). Repeat TSH on 1/7/19 wnl (1.81).  Less common contributing etiologies possibly explaining macrocytosis to consider include copper deficiency, chronic liver disease affecting lipid composition of RBC membranes, increased endogenous estrogens from pt's liver disease (less likely given ULN folate levels), or drug-induced also less likely in pt.     Recommendations:  - Obtain records from [facility]; Forward repeat TTE results from today to [facility] hepatologist, Dr. Stokes for future coordination of care.  - Continue lactulose and rifaximin, titrate to 2-3 BM per day  - Follow up with planned diagnostic paracentesis scheduled with IR 1/8/19  - Trend MELD labs and CBC daily  - Recommend macrocytic anemia workup; please consider drawing the following:  	iron study panel,   	serum haptoglobin,   	absolute retic count and reticulocyte %.  - Please D/C albumin 5% IVPB at this time

## 2019-01-08 NOTE — PROGRESS NOTE ADULT - PROBLEM SELECTOR PLAN 3
Patient with no florid ascites on physical exam; abdomen soft and nondistended.  However, patient previously treated for SBP and patient with esophageal varices.   Patient complains of diffuse abdominal pain particularly in RUQ and LLQ.  - Plan for diagnostic paracentesis today by IR 1/8.   - abdominal ultrasound showing small to moderate ascites.  - Hold off antibiotics as appears blood culture contaminant Patient with no florid ascites on physical exam; abdomen soft and nondistended.  However, patient previously treated for SBP and patient with esophageal varices.   Patient complains of diffuse abdominal pain particularly in RUQ and LLQ.    - Plan for diagnostic paracentesis today by IR 1/8.   - abdominal ultrasound showing small to moderate ascites.  - Hold off antibiotics as appears blood culture contaminant

## 2019-01-08 NOTE — DIETITIAN INITIAL EVALUATION ADULT. - PROBLEM SELECTOR PLAN 6
-uptrending creatinine  -nonoliguric  -ddx includes hepatorenal at this time vs. prerenal/dehydration from diuretics continue to monitor. check urine lytes/sodium/urea  -trend creatinine with fluid challenge  -consider renal eval  -likely not long term HD candidate given poor prognosis and poor liver transplant candidate, family aware.

## 2019-01-08 NOTE — PROGRESS NOTE ADULT - PROBLEM SELECTOR PLAN 1
Hgb dropped from 10 to 7.4, unclear source of bleeding. Patient has component of macrocytic anemia. Transfusion goal of Hgb<7.0     - Hgb<7 today will transfuse 1U PRBC.   - Stool guaiac negative   - GI eval appreciated, no scope for now   - Supratherapeutic INR likely due to end stage liver disease, if bleeding found will need FFP, vit K.

## 2019-01-09 LAB
ALBUMIN SERPL ELPH-MCNC: 3.3 G/DL — SIGNIFICANT CHANGE UP (ref 3.3–5)
ALP SERPL-CCNC: 89 U/L — SIGNIFICANT CHANGE UP (ref 40–120)
ALT FLD-CCNC: 12 U/L — SIGNIFICANT CHANGE UP (ref 10–45)
ANION GAP SERPL CALC-SCNC: 9 MMOL/L — SIGNIFICANT CHANGE UP (ref 5–17)
APTT BLD: 75.2 SEC — HIGH (ref 27.5–36.3)
APTT BLD: 98.2 SEC — HIGH (ref 27.5–36.3)
AST SERPL-CCNC: 61 U/L — HIGH (ref 10–40)
BASOPHILS # BLD AUTO: 0 K/UL — SIGNIFICANT CHANGE UP (ref 0–0.2)
BASOPHILS NFR BLD AUTO: 0.7 % — SIGNIFICANT CHANGE UP (ref 0–2)
BILIRUB DIRECT SERPL-MCNC: 5 MG/DL — HIGH (ref 0–0.2)
BILIRUB SERPL-MCNC: 13 MG/DL — HIGH (ref 0.2–1.2)
BUN SERPL-MCNC: 26 MG/DL — HIGH (ref 7–23)
CALCIUM SERPL-MCNC: 8.4 MG/DL — SIGNIFICANT CHANGE UP (ref 8.4–10.5)
CHLORIDE SERPL-SCNC: 108 MMOL/L — SIGNIFICANT CHANGE UP (ref 96–108)
CO2 SERPL-SCNC: 21 MMOL/L — LOW (ref 22–31)
CREAT SERPL-MCNC: 0.82 MG/DL — SIGNIFICANT CHANGE UP (ref 0.5–1.3)
CULTURE RESULTS: SIGNIFICANT CHANGE UP
CULTURE RESULTS: SIGNIFICANT CHANGE UP
EOSINOPHIL # BLD AUTO: 0.4 K/UL — SIGNIFICANT CHANGE UP (ref 0–0.5)
EOSINOPHIL NFR BLD AUTO: 8.2 % — HIGH (ref 0–6)
FERRITIN SERPL-MCNC: 1415 NG/ML — HIGH (ref 30–400)
GLUCOSE BLDC GLUCOMTR-MCNC: 100 MG/DL — HIGH (ref 70–99)
GLUCOSE BLDC GLUCOMTR-MCNC: 181 MG/DL — HIGH (ref 70–99)
GLUCOSE BLDC GLUCOMTR-MCNC: 192 MG/DL — HIGH (ref 70–99)
GLUCOSE BLDC GLUCOMTR-MCNC: 242 MG/DL — HIGH (ref 70–99)
GLUCOSE SERPL-MCNC: 104 MG/DL — HIGH (ref 70–99)
HAPTOGLOB SERPL-MCNC: <20 MG/DL — LOW (ref 34–200)
HCT VFR BLD CALC: 20.9 % — CRITICAL LOW (ref 39–50)
HGB BLD-MCNC: 7.3 G/DL — LOW (ref 13–17)
INR BLD: 4.37 RATIO — HIGH (ref 0.88–1.16)
INR BLD: ABNORMAL RATIO (ref 0.88–1.16)
IRON SATN MFR SERPL: 83 UG/DL — SIGNIFICANT CHANGE UP (ref 45–165)
IRON SATN MFR SERPL: SIGNIFICANT CHANGE UP (ref 16–55)
LDH SERPL L TO P-CCNC: 191 U/L — SIGNIFICANT CHANGE UP (ref 50–242)
LYMPHOCYTES # BLD AUTO: 0.7 K/UL — LOW (ref 1–3.3)
LYMPHOCYTES # BLD AUTO: 14.1 % — SIGNIFICANT CHANGE UP (ref 13–44)
MAGNESIUM SERPL-MCNC: 2.3 MG/DL — SIGNIFICANT CHANGE UP (ref 1.6–2.6)
MCHC RBC-ENTMCNC: 34.9 GM/DL — SIGNIFICANT CHANGE UP (ref 32–36)
MCHC RBC-ENTMCNC: 35.2 PG — HIGH (ref 27–34)
MCV RBC AUTO: 101 FL — HIGH (ref 80–100)
MONOCYTES # BLD AUTO: 0.5 K/UL — SIGNIFICANT CHANGE UP (ref 0–0.9)
MONOCYTES NFR BLD AUTO: 9.6 % — SIGNIFICANT CHANGE UP (ref 2–14)
NEUTROPHILS # BLD AUTO: 3.6 K/UL — SIGNIFICANT CHANGE UP (ref 1.8–7.4)
NEUTROPHILS NFR BLD AUTO: 67.4 % — SIGNIFICANT CHANGE UP (ref 43–77)
PHOSPHATE SERPL-MCNC: 2.4 MG/DL — LOW (ref 2.5–4.5)
PLATELET # BLD AUTO: 34 K/UL — LOW (ref 150–400)
POTASSIUM SERPL-MCNC: 4.9 MMOL/L — SIGNIFICANT CHANGE UP (ref 3.5–5.3)
POTASSIUM SERPL-SCNC: 4.9 MMOL/L — SIGNIFICANT CHANGE UP (ref 3.5–5.3)
PROT SERPL-MCNC: 5.4 G/DL — LOW (ref 6–8.3)
PROTHROM AB SERPL-ACNC: 52.2 SEC — HIGH (ref 10–12.9)
PROTHROM AB SERPL-ACNC: 53 SEC — HIGH (ref 10–12.9)
RBC # BLD: 2.06 M/UL — LOW (ref 4.2–5.8)
RBC # BLD: 2.08 M/UL — LOW (ref 4.2–5.8)
RBC # FLD: 17.8 % — HIGH (ref 10.3–14.5)
RETICS #: 179 K/UL — HIGH (ref 25–125)
RETICS/RBC NFR: 8.6 % — HIGH (ref 0.5–2.5)
SODIUM SERPL-SCNC: 138 MMOL/L — SIGNIFICANT CHANGE UP (ref 135–145)
SPECIMEN SOURCE: SIGNIFICANT CHANGE UP
SPECIMEN SOURCE: SIGNIFICANT CHANGE UP
TIBC SERPL-MCNC: SIGNIFICANT CHANGE UP (ref 220–430)
TRANSFERRIN SERPL-MCNC: 54 MG/DL — LOW (ref 200–360)
UIBC SERPL-MCNC: <20 UG/DL — LOW (ref 110–370)
WBC # BLD: 5.3 K/UL — SIGNIFICANT CHANGE UP (ref 3.8–10.5)
WBC # FLD AUTO: 5.3 K/UL — SIGNIFICANT CHANGE UP (ref 3.8–10.5)

## 2019-01-09 PROCEDURE — 99233 SBSQ HOSP IP/OBS HIGH 50: CPT | Mod: GC

## 2019-01-09 PROCEDURE — 99232 SBSQ HOSP IP/OBS MODERATE 35: CPT | Mod: GC

## 2019-01-09 RX ORDER — PHYTONADIONE (VIT K1) 5 MG
10 TABLET ORAL ONCE
Qty: 0 | Refills: 0 | Status: COMPLETED | OUTPATIENT
Start: 2019-01-09 | End: 2019-01-09

## 2019-01-09 RX ADMIN — MIDODRINE HYDROCHLORIDE 5 MILLIGRAM(S): 2.5 TABLET ORAL at 13:39

## 2019-01-09 RX ADMIN — Medication 1 MILLIGRAM(S): at 11:52

## 2019-01-09 RX ADMIN — Medication 1: at 18:02

## 2019-01-09 RX ADMIN — Medication 1 TABLET(S): at 11:52

## 2019-01-09 RX ADMIN — Medication 125 MILLILITER(S): at 03:45

## 2019-01-09 RX ADMIN — LACTULOSE 30 GRAM(S): 10 SOLUTION ORAL at 13:39

## 2019-01-09 RX ADMIN — CHLORHEXIDINE GLUCONATE 15 MILLILITER(S): 213 SOLUTION TOPICAL at 18:02

## 2019-01-09 RX ADMIN — Medication 10 MILLIGRAM(S): at 11:51

## 2019-01-09 RX ADMIN — MIDODRINE HYDROCHLORIDE 5 MILLIGRAM(S): 2.5 TABLET ORAL at 05:49

## 2019-01-09 RX ADMIN — LACTULOSE 30 GRAM(S): 10 SOLUTION ORAL at 21:42

## 2019-01-09 RX ADMIN — Medication 100 MILLIGRAM(S): at 11:52

## 2019-01-09 RX ADMIN — CHLORHEXIDINE GLUCONATE 15 MILLILITER(S): 213 SOLUTION TOPICAL at 05:49

## 2019-01-09 RX ADMIN — MIDODRINE HYDROCHLORIDE 5 MILLIGRAM(S): 2.5 TABLET ORAL at 21:20

## 2019-01-09 RX ADMIN — Medication 1: at 13:39

## 2019-01-09 NOTE — PROGRESS NOTE ADULT - SUBJECTIVE AND OBJECTIVE BOX
Dr. Noman Harden  Internal Medicine PGY-1   Pager# [phone]    Patient is a 53y old  Male who presents with a chief complaint of Hypotension, decompensated cirrhosis (08 Jan 2019 09:19)      SUBJECTIVE / OVERNIGHT EVENTS:    MEDICATIONS  (STANDING):  albumin human  5% IVPB 250 milliLiter(s) IV Intermittent every 6 hours  chlorhexidine 0.12% Liquid 15 milliLiter(s) Oral Mucosa two times a day  dextrose 5%. 1000 milliLiter(s) (50 mL/Hr) IV Continuous <Continuous>  dextrose 50% Injectable 12.5 Gram(s) IV Push once  dextrose 50% Injectable 25 Gram(s) IV Push once  dextrose 50% Injectable 25 Gram(s) IV Push once  folic acid 1 milliGRAM(s) Oral daily  insulin lispro (HumaLOG) corrective regimen sliding scale   SubCutaneous three times a day before meals  insulin lispro (HumaLOG) corrective regimen sliding scale   SubCutaneous at bedtime  lactulose Syrup 30 Gram(s) Oral three times a day  lidocaine   Patch 1 Patch Transdermal daily  midodrine 5 milliGRAM(s) Oral three times a day  multivitamin 1 Tablet(s) Oral daily  rifaximin 550 milliGRAM(s) Oral two times a day  thiamine 100 milliGRAM(s) Oral daily    MEDICATIONS  (PRN):  artificial tears (preservative free) Ophthalmic Solution 1 Drop(s) Both EYES daily PRN Dry Eyes  dextrose 40% Gel 15 Gram(s) Oral once PRN Blood Glucose LESS THAN 70 milliGRAM(s)/deciliter  glucagon  Injectable 1 milliGRAM(s) IntraMuscular once PRN Glucose LESS THAN 70 milligrams/deciliter  traMADol 50 milliGRAM(s) Oral every 8 hours PRN Severe Pain (7 - 10)      Vital Signs Last 24 Hrs  T(C): 36.8 (09 Jan 2019 04:51), Max: 37 (08 Jan 2019 21:41)  T(F): 98.2 (09 Jan 2019 04:51), Max: 98.6 (08 Jan 2019 21:41)  HR: 62 (09 Jan 2019 04:51) (62 - 73)  BP: 82/46 (09 Jan 2019 04:51) (73/31 - 121/66)  BP(mean): --  RR: 18 (09 Jan 2019 04:51) (18 - 18)  SpO2: 94% (09 Jan 2019 04:51) (91% - 100%)  CAPILLARY BLOOD GLUCOSE      POCT Blood Glucose.: 154 mg/dL (08 Jan 2019 21:42)  POCT Blood Glucose.: 171 mg/dL (08 Jan 2019 18:04)  POCT Blood Glucose.: 281 mg/dL (08 Jan 2019 13:01)  POCT Blood Glucose.: 126 mg/dL (08 Jan 2019 09:30)    I&O's Summary    07 Jan 2019 07:01  -  08 Jan 2019 07:00  --------------------------------------------------------  IN: 1280 mL / OUT: 0 mL / NET: 1280 mL    08 Jan 2019 07:01  -  09 Jan 2019 06:49  --------------------------------------------------------  IN: 1910 mL / OUT: 300 mL / NET: 1610 mL        PHYSICAL EXAM:    Constitutional: Frail appearing, jaundiced, in no acute distress, comfortable in bed  	ENMT: dry mucous membranes, no pharyngeal erythema or exudates  	Respiratory: lungs CTAB; no wheezing, rales or rhonchi  	Cardiovascular: Normal S1 and S2; no murmurs. Trace LE edema bilaterally.   	Gastrointestinal: Abdomen soft, no distension, no fluid wave, normal bowel sounds. Mild tenderness in Right and Left abdominal flanks.   	Extremities: No clubbing, cyanosis. Borderline +1 pitting edema b/l up to just above the ankles.   	Vascular: 2+ peripheral pulses  	Neurological: No asterixis. No focal deficits appreciated.  	Skin: Jaundiced; no rashes noted. 2 cm x 3 cm ecchymosis non tender no palpable hematoma.     Psychiatric: A&O x 4.      LABS:                        7.3    5.3   )-----------( 34       ( 09 Jan 2019 05:23 )             20.9     01-09    138  |  108  |  26<H>  ----------------------------<  104<H>  4.9   |  21<L>  |  0.82    Ca    8.4      09 Jan 2019 05:25  Phos  2.4     01-09  Mg     2.3     01-09    TPro  5.4<L>  /  Alb  3.3  /  TBili  13.0<H>  /  DBili  x   /  AST  61<H>  /  ALT  12  /  AlkPhos  89  01-09    PT/INR - ( 09 Jan 2019 05:23 )   PT: 53.0 sec;   INR: See Note ratio         PTT - ( 09 Jan 2019 05:23 )  PTT:98.2 sec    WBC Trend: 5.3 K/uL<--, 4.8 K/uL<--, 5.1 K/uL<--, 5.1 K/uL  Hgb Trend: 7.3 g/dL<--, 7.8 g/dL<--, 6.9 g/dL<--, 7.4 g/dL  Hct Trend: 20.9 %<--, 22.2 %<--, 20.0 %<--, 21.5 %  Platelets Trend: 34 K/uL<--, 33 K/uL<--, 35 K/uL<--, 31 K/uL    AST Trend: 61 U/L<--, 69 U/L<--, 77 U/L<--, 92 U/L  ALT Trend: 12 U/L<--, 17 U/L<--, 20 U/L<--, 23 U/L  ALP Trend: 89 U/L<--, 100 U/L<--, 107 U/L<--, 103 U/L  Albumin Trend: 3.3 g/dL<--, 3.2 g/dL<--, 3.0 g/dL<--, 3.0 g/dL  Total Bilirubin Trend: 13.0 mg/dL<--, 12.0 mg/dL<--, 12.2 mg/dL<--, 13.0 mg/dL  INR Trend: See Note ratio<--, 4.07 ratio<--, 3.79 ratio<--, 3.61 ratio Dr. Noman Harden  Internal Medicine PGY-1   Pager# [phone]    Patient is a 53y old  Male who presents with a chief complaint of Hypotension, decompensated cirrhosis (08 Jan 2019 09:19)      SUBJECTIVE / OVERNIGHT EVENTS: No acute events overnight. Patient underwent diagnostic paracentesis yesterday late afternoon. Denies any abdominal pain, nausea, vomiting, chest pain, fevers, chills. Denies any melena.     MEDICATIONS  (STANDING):  albumin human  5% IVPB 250 milliLiter(s) IV Intermittent every 6 hours  chlorhexidine 0.12% Liquid 15 milliLiter(s) Oral Mucosa two times a day  dextrose 5%. 1000 milliLiter(s) (50 mL/Hr) IV Continuous <Continuous>  dextrose 50% Injectable 12.5 Gram(s) IV Push once  dextrose 50% Injectable 25 Gram(s) IV Push once  dextrose 50% Injectable 25 Gram(s) IV Push once  folic acid 1 milliGRAM(s) Oral daily  insulin lispro (HumaLOG) corrective regimen sliding scale   SubCutaneous three times a day before meals  insulin lispro (HumaLOG) corrective regimen sliding scale   SubCutaneous at bedtime  lactulose Syrup 30 Gram(s) Oral three times a day  lidocaine   Patch 1 Patch Transdermal daily  midodrine 5 milliGRAM(s) Oral three times a day  multivitamin 1 Tablet(s) Oral daily  rifaximin 550 milliGRAM(s) Oral two times a day  thiamine 100 milliGRAM(s) Oral daily    MEDICATIONS  (PRN):  artificial tears (preservative free) Ophthalmic Solution 1 Drop(s) Both EYES daily PRN Dry Eyes  dextrose 40% Gel 15 Gram(s) Oral once PRN Blood Glucose LESS THAN 70 milliGRAM(s)/deciliter  glucagon  Injectable 1 milliGRAM(s) IntraMuscular once PRN Glucose LESS THAN 70 milligrams/deciliter  traMADol 50 milliGRAM(s) Oral every 8 hours PRN Severe Pain (7 - 10)      Vital Signs Last 24 Hrs  T(C): 36.8 (09 Jan 2019 04:51), Max: 37 (08 Jan 2019 21:41)  T(F): 98.2 (09 Jan 2019 04:51), Max: 98.6 (08 Jan 2019 21:41)  HR: 62 (09 Jan 2019 04:51) (62 - 73)  BP: 82/46 (09 Jan 2019 04:51) (73/31 - 121/66)  BP(mean): --  RR: 18 (09 Jan 2019 04:51) (18 - 18)  SpO2: 94% (09 Jan 2019 04:51) (91% - 100%)  CAPILLARY BLOOD GLUCOSE      POCT Blood Glucose.: 154 mg/dL (08 Jan 2019 21:42)  POCT Blood Glucose.: 171 mg/dL (08 Jan 2019 18:04)  POCT Blood Glucose.: 281 mg/dL (08 Jan 2019 13:01)  POCT Blood Glucose.: 126 mg/dL (08 Jan 2019 09:30)    I&O's Summary    07 Jan 2019 07:01  -  08 Jan 2019 07:00  --------------------------------------------------------  IN: 1280 mL / OUT: 0 mL / NET: 1280 mL    08 Jan 2019 07:01  -  09 Jan 2019 06:49  --------------------------------------------------------  IN: 1910 mL / OUT: 300 mL / NET: 1610 mL        PHYSICAL EXAM:    Constitutional: Frail appearing, jaundiced, in no acute distress, comfortable in bed  	ENMT: dry mucous membranes, no pharyngeal erythema or exudates  	Respiratory: lungs CTAB; no wheezing, rales or rhonchi  	Cardiovascular: Normal S1 and S2; no murmurs. Trace LE edema bilaterally.   	Gastrointestinal: Abdomen soft, no distension, no fluid wave, normal bowel sounds. Mild tenderness in Right and Left abdominal flanks.   	Extremities: No clubbing, cyanosis. Borderline +1 pitting edema b/l up to just above the ankles.   	Vascular: 2+ peripheral pulses  	Neurological: No asterixis. No focal deficits appreciated.  	Skin: Jaundiced; no rashes noted. 2 cm x 3 cm ecchymosis non tender no palpable hematoma.     Psychiatric: A&O x 4.      LABS:                        7.3    5.3   )-----------( 34       ( 09 Jan 2019 05:23 )             20.9     01-09    138  |  108  |  26<H>  ----------------------------<  104<H>  4.9   |  21<L>  |  0.82    Ca    8.4      09 Jan 2019 05:25  Phos  2.4     01-09  Mg     2.3     01-09    TPro  5.4<L>  /  Alb  3.3  /  TBili  13.0<H>  /  DBili  x   /  AST  61<H>  /  ALT  12  /  AlkPhos  89  01-09    PT/INR - ( 09 Jan 2019 05:23 )   PT: 53.0 sec;   INR: See Note ratio         PTT - ( 09 Jan 2019 05:23 )  PTT:98.2 sec    WBC Trend: 5.3 K/uL<--, 4.8 K/uL<--, 5.1 K/uL<--, 5.1 K/uL  Hgb Trend: 7.3 g/dL<--, 7.8 g/dL<--, 6.9 g/dL<--, 7.4 g/dL  Hct Trend: 20.9 %<--, 22.2 %<--, 20.0 %<--, 21.5 %  Platelets Trend: 34 K/uL<--, 33 K/uL<--, 35 K/uL<--, 31 K/uL    AST Trend: 61 U/L<--, 69 U/L<--, 77 U/L<--, 92 U/L  ALT Trend: 12 U/L<--, 17 U/L<--, 20 U/L<--, 23 U/L  ALP Trend: 89 U/L<--, 100 U/L<--, 107 U/L<--, 103 U/L  Albumin Trend: 3.3 g/dL<--, 3.2 g/dL<--, 3.0 g/dL<--, 3.0 g/dL  Total Bilirubin Trend: 13.0 mg/dL<--, 12.0 mg/dL<--, 12.2 mg/dL<--, 13.0 mg/dL  INR Trend: See Note ratio<--, 4.07 ratio<--, 3.79 ratio<--, 3.61 ratio Dr. Noman Harden  Internal Medicine PGY-1   Pager# [phone]    Patient is a 53y old  Male who presents with a chief complaint of Hypotension, decompensated cirrhosis (08 Jan 2019 09:19)      SUBJECTIVE / OVERNIGHT EVENTS: No acute events overnight. Patient underwent diagnostic paracentesis yesterday late afternoon. Denies any abdominal pain, nausea, vomiting, chest pain, fevers, chills. Denies any melena.     MEDICATIONS  (STANDING):  albumin human  5% IVPB 250 milliLiter(s) IV Intermittent every 6 hours  chlorhexidine 0.12% Liquid 15 milliLiter(s) Oral Mucosa two times a day  dextrose 5%. 1000 milliLiter(s) (50 mL/Hr) IV Continuous <Continuous>  dextrose 50% Injectable 12.5 Gram(s) IV Push once  dextrose 50% Injectable 25 Gram(s) IV Push once  dextrose 50% Injectable 25 Gram(s) IV Push once  folic acid 1 milliGRAM(s) Oral daily  insulin lispro (HumaLOG) corrective regimen sliding scale   SubCutaneous three times a day before meals  insulin lispro (HumaLOG) corrective regimen sliding scale   SubCutaneous at bedtime  lactulose Syrup 30 Gram(s) Oral three times a day  lidocaine   Patch 1 Patch Transdermal daily  midodrine 5 milliGRAM(s) Oral three times a day  multivitamin 1 Tablet(s) Oral daily  rifaximin 550 milliGRAM(s) Oral two times a day  thiamine 100 milliGRAM(s) Oral daily    MEDICATIONS  (PRN):  artificial tears (preservative free) Ophthalmic Solution 1 Drop(s) Both EYES daily PRN Dry Eyes  dextrose 40% Gel 15 Gram(s) Oral once PRN Blood Glucose LESS THAN 70 milliGRAM(s)/deciliter  glucagon  Injectable 1 milliGRAM(s) IntraMuscular once PRN Glucose LESS THAN 70 milligrams/deciliter  traMADol 50 milliGRAM(s) Oral every 8 hours PRN Severe Pain (7 - 10)      Vital Signs Last 24 Hrs  T(C): 36.8 (09 Jan 2019 04:51), Max: 37 (08 Jan 2019 21:41)  T(F): 98.2 (09 Jan 2019 04:51), Max: 98.6 (08 Jan 2019 21:41)  HR: 62 (09 Jan 2019 04:51) (62 - 73)  BP: 82/46 (09 Jan 2019 04:51) (73/31 - 121/66)  BP(mean): --  RR: 18 (09 Jan 2019 04:51) (18 - 18)  SpO2: 94% (09 Jan 2019 04:51) (91% - 100%)  CAPILLARY BLOOD GLUCOSE      POCT Blood Glucose.: 154 mg/dL (08 Jan 2019 21:42)  POCT Blood Glucose.: 171 mg/dL (08 Jan 2019 18:04)  POCT Blood Glucose.: 281 mg/dL (08 Jan 2019 13:01)  POCT Blood Glucose.: 126 mg/dL (08 Jan 2019 09:30)    I&O's Summary    07 Jan 2019 07:01  -  08 Jan 2019 07:00  --------------------------------------------------------  IN: 1280 mL / OUT: 0 mL / NET: 1280 mL    08 Jan 2019 07:01  -  09 Jan 2019 06:49  --------------------------------------------------------  IN: 1910 mL / OUT: 300 mL / NET: 1610 mL        PHYSICAL EXAM:    Constitutional: Frail appearing, jaundiced, in no acute distress, comfortable in bed  	ENMT: dry mucous membranes, no pharyngeal erythema or exudates  	Respiratory: lungs CTAB; no wheezing, rales or rhonchi  	Cardiovascular: Normal S1 and S2; no murmurs. Trace LE edema bilaterally.   	Gastrointestinal: Abdomen soft, no distension, no fluid wave, normal bowel sounds. Mild tenderness in Right and Left abdominal flanks.   	Extremities: No clubbing, cyanosis. Borderline +1 pitting edema b/l up to just above the ankles.   	Vascular: 2+ peripheral pulses  	Neurological: No asterixis. No focal deficits appreciated.  	Skin: Jaundiced; no rashes noted. 2 cm x 3 cm ecchymosis non tender no palpable hematoma.     Psychiatric: A&O x 4.      LABS:                        7.3    5.3   )-----------( 34       ( 09 Jan 2019 05:23 )             20.9     01-09    138  |  108  |  26<H>  ----------------------------<  104<H>  4.9   |  21<L>  |  0.82    Ca    8.4      09 Jan 2019 05:25  Phos  2.4     01-09  Mg     2.3     01-09    TPro  5.4<L>  /  Alb  3.3  /  TBili  13.0<H>  /  DBili  x   /  AST  61<H>  /  ALT  12  /  AlkPhos  89  01-09    PT/INR - ( 09 Jan 2019 05:23 )   PT: 53.0 sec;   INR: See Note ratio         PTT - ( 09 Jan 2019 05:23 )  PTT:98.2 sec    WBC Trend: 5.3 K/uL<--, 4.8 K/uL<--, 5.1 K/uL<--, 5.1 K/uL  Hgb Trend: 7.3 g/dL<--, 7.8 g/dL<--, 6.9 g/dL<--, 7.4 g/dL  Hct Trend: 20.9 %<--, 22.2 %<--, 20.0 %<--, 21.5 %  Platelets Trend: 34 K/uL<--, 33 K/uL<--, 35 K/uL<--, 31 K/uL    AST Trend: 61 U/L<--, 69 U/L<--, 77 U/L<--, 92 U/L  ALT Trend: 12 U/L<--, 17 U/L<--, 20 U/L<--, 23 U/L  ALP Trend: 89 U/L<--, 100 U/L<--, 107 U/L<--, 103 U/L  Albumin Trend: 3.3 g/dL<--, 3.2 g/dL<--, 3.0 g/dL<--, 3.0 g/dL  Total Bilirubin Trend: 13.0 mg/dL<--, 12.0 mg/dL<--, 12.2 mg/dL<--, 13.0 mg/dL  INR Trend: 4.37 ratio<--, See Note ratio<--, 4.07 ratio<--, 3.79 ratio    Bilirubin Direct, Serum (01.09.19 @ 10:33)    Bilirubin Direct, Serum: 5.0 mg/dL    Bilirubin Direct, Serum (01.04.19 @ 06:32)    Bilirubin Direct, Serum: 5.9 mg/dL    Bilirubin Direct, Serum: 6.5 mg/dL (01.02.19 @ 16:55)    Reticulocyte Count (01.09.19 @ 05:23)    RBC Count: 2.08 M/uL    Reticulocyte Percent: 8.6 %    Absolute Reticulocytes: 179.0 K/uL    Lactate Dehydrogenase, Serum (01.09.19 @ 10:33)    Lactate Dehydrogenase, Serum: 191 U/L

## 2019-01-09 NOTE — PROGRESS NOTE ADULT - ASSESSMENT
53M end stage liver disease 2/2 alcoholic cirrhosis, DMII, severe HFrEF (35%), CAD, HTN, presenting with 1 week of generalized weakness and hypotension, likely 2/2 decompensated cirrhosis in the setting of sepsis. Now found to have gram negative rods and gram positive bacteremia explaining sepsis. 1/5 with drop in hemoglobin, HD stable, no s/s of bleeding, will transfuse 1 unit, check stool guaiac and observe closely, GI follow up. Back to baseline SBPs, mentating well and currently improved symptoms compared to admission. MR abdomen showing mild Iron deposition, cirrhosis, and portal hypertension. Now s/p 1U PRBC for hemoglobin drop. CBC abnormalities most likely 2/2 due to cirrhosis (thrombcytopenia and anemia) without evidence of obvious bleed (low Hgb/Hct, tachycardia, hypotension from baseline, negative occult blood stool). Plan for diagnostic paracentesis today by IR on 1/8. TTE showing eccentric LV hypertrophy, with moderate LV dysfunction but improved Ef of 43% from 35%. 53M end stage liver disease 2/2 alcoholic cirrhosis, DMII, severe HFrEF (35%), CAD, HTN, presenting with 1 week of generalized weakness and hypotension, likely 2/2 decompensated cirrhosis in the setting of sepsis. Now found to have gram negative rods and gram positive bacteremia explaining sepsis. 1/5 with drop in hemoglobin, HD stable, no s/s of bleeding, will transfuse 1 unit, check stool guaiac and observe closely, GI follow up. Back to baseline SBPs, mentating well and currently improved symptoms compared to admission. MR abdomen showing mild Iron deposition, cirrhosis, and portal hypertension. Now s/p 1U PRBC for hemoglobin drop. CBC abnormalities most likely 2/2 due to cirrhosis (thrombcytopenia and anemia) without evidence of obvious bleed (low Hgb/Hct, tachycardia, hypotension from baseline, negative occult blood stool). Patient with worsening liver failure (INR now 4.37, bilirubin trending up) will need to be optimized further prior to diagnostic paracentesis by IR. Will administer FFP over 3 hours now, and recheck INR if response will go for paracentesis. Now also s/p Vitamin K PO. Meld Na is 33, poor prognosis within 3 months.

## 2019-01-09 NOTE — PROGRESS NOTE ADULT - ATTENDING COMMENTS
Plan for a diagnostic paracentesis today with FFP in light of elevated INR above 4  Hepatology note appreciated. will monitor LFTs and INR closely

## 2019-01-09 NOTE — PROGRESS NOTE ADULT - ATTENDING COMMENTS
Patient with abnormal but stable liver tests.  Has not had endoscopy recently and will follow up with hepatology as outpatient for surveillance endoscopy.  evaluation continues for anemia.

## 2019-01-09 NOTE — PROGRESS NOTE ADULT - PROBLEM SELECTOR PLAN 1
Hgb dropped from 10 to 7.4, unclear source of bleeding. Patient has component of macrocytic anemia. Transfusion goal of Hgb<7.0     - Hgb<7 today will transfuse 1U PRBC.   - Stool guaiac negative   - GI eval appreciated, no scope for now   - Supratherapeutic INR likely due to end stage liver disease, if bleeding found will need FFP, vit K. Hgb dropped from 10 to 7.4, unclear source of bleeding. Patient has component of macrocytic anemia. Transfusion goal of Hgb<7.0     - Hgb response jumped however now is trending down again. No obvious source of bleed. Will eventually need scope to reevaluate for varices.   - Stool guaiac negative   - GI eval appreciated, no scope for now   - Supratherapeutic INR likely due to end stage liver disease, if bleeding found will need FFP, vit K. Hgb dropped from 10 to 7.4, unclear source of bleeding. Patient has component of macrocytic anemia. Transfusion goal of Hgb<7.0     - Hgb response jumped however now is trending down again. No obvious source of bleed. Will eventually need scope to reevaluate for varices.   - Stool guaiac negative (1/5).   - Supratherapeutic INR 2/2 liver disease. No obvious signs or symptoms of bleeding (tachycardia, hypotension, hematemesis) will continue to monitor.   - Getting 1U FFP 1/9.

## 2019-01-09 NOTE — PROGRESS NOTE ADULT - ASSESSMENT
53M end stage liver disease 2/2 alcoholic cirrhosis, DM-II, severe HFrEF (<35%), CAD, HTN, presenting with progressively worsening generalized weakness. During admission, he was found to have gram negative rods and gram positive cocci blood cultures, but this was thought to be secondary to contamination. Since yesterday, patient has had H/H drop requiring 1U pRBC this AM and hypotensive to 73/31 (2U total this admission). Rest of infectious workup still pending with IR diagnostic paracentesis scheduled later today. HD stable now, no obvious signs of bleeding.     Impression:  #Decompensated Cirrhosis: suspected EtOH related - MELD-Na: 32 today (34 on admission)  Patient was listed for transplant at [facility] but given new diagnosis of HFrEF in 10/2018, he was placed on hold on wait list with plan to reassess cardiac function. He is awaiting JOHNSON reading during this admission. Patient refused transfer to [facility] at this time.  HE: On Lactulose at home (Not on Rifaximin 2/2 prescription cost)  Ascites: small to moderate ascites on US 1/3/19. 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  Immunoglobulin panel: Quantitative IgA, IgM, & IgG (766, 296, & 1915, respectively), [place]/ Lambda Free light chain ratio 1.73 <H>, and BRIDGER Kappa & Lambda 8.11 and 4.70, respectively.  MR abdomen indicates mild iron deposition within liver, cirrhosis, and portal hypertension. MRCP unremarkable with patent bile ducts and nonspecific gallbladder wall thickening with no evidence of biliary obstruction. Alk Phos/AST/ALT downtrending.    #GNR and coag negative staph blood cultures  ID following and thought to be secondary to contaminant. Not currently on IV abx.    #Macrocytic Anemia: Low suspicion for active GI bleed as would expect overt GI bleeding given with drop by 2U in one day.  Repeat B12, Folate on 1/7/19 indicates no B9/ B12 deficiency (B12 >2000; B9 >20.0). Repeat TSH on 1/7/19 wnl (1.81).  Less common contributing etiologies possibly explaining macrocytosis to consider include copper deficiency, chronic liver disease affecting lipid composition of RBC membranes, increased endogenous estrogens from pt's liver disease (less likely given ULN folate levels), or drug-induced also less likely in pt.     Recommendations:  - Obtain records from [facility]; Forward repeat TTE results from today to [facility] hepatologist, Dr. Stokes for future coordination of care.  - Continue lactulose and rifaximin, titrate to 2-3 BM per day  - Follow up with planned diagnostic paracentesis scheduled with IR 1/8/19  - Trend MELD labs and CBC daily  - Recommend macrocytic anemia workup; please consider drawing the following:  	iron study panel,   	serum haptoglobin,   	absolute retic count and reticulocyte %.  - Please D/C albumin 5% IVPB at this time 53M end stage liver disease 2/2 alcoholic cirrhosis, DM-II, severe HFrEF (<35%), CAD, HTN, presenting with progressively worsening generalized weakness. During admission, he was found to have gram negative rods and gram positive cocci blood cultures, but this was thought to be secondary to contamination.     Since yesterday's evaluation and s/p 2nd unit of pRBC, patient has had persistently down-tredning H/H with continued rise of PT/INR. Although liver enzymes levels show improvement, elevated direct and indirect bilirubin levels seem consistenet with cholestatic process such as increased hemolysis. Retic % elevated to 8.6. Rest of infectious workup negative. HD stable now, no obvious signs of bleeding.     Impression:  #Decompensated Cirrhosis: suspected EtOH related - MELD-Na: 33 today (34 on admission)  Patient was listed for transplant at [facility] but given new diagnosis of HFrEF in 10/2018, he was placed on hold on wait list with plan to reassess cardiac function. Repeat JOHNSON this admission indicates improvement of cardiac function from prior study, but still impaired with EF ~45%.   HE: On Lactulose at home (Not on Rifaximin 2/2 prescription cost)  Ascites: small to moderate ascites on US 1/3/19. 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  Immunoglobulin panel: Quantitative IgA, IgM, & IgG (766, 296, & 1915, respectively), [place]/ Lambda Free light chain ratio 1.73 <H>, and BRIDGER Kappa & Lambda 8.11 and 4.70, respectively.  MR abdomen indicates mild iron deposition within liver, cirrhosis, and portal hypertension. MRCP unremarkable with patent bile ducts and nonspecific gallbladder wall thickening with no evidence of biliary obstruction. Alk Phos/AST/ALT downtrending.    #GNR and coag negative staph blood cultures  ID following and thought to be secondary to contaminant. Not currently on IV abx.    #Macrocytic Anemia: Low suspicion for active GI bleed as would expect overt GI bleeding given with drop by 2U in one day.  Repeat B12, Folate on 1/7/19 indicates no B9/ B12 deficiency (B12 >2000; B9 >20.0). Repeat TSH on 1/7/19 wnl (1.81).  Less common contributing etiologies possibly explaining macrocytosis to consider include copper deficiency, chronic liver disease affecting lipid composition of RBC membranes, increased endogenous estrogens from pt's liver disease (less likely given ULN folate levels), or drug-induced also less likely in pt.     Recommendations:  - Obtain records from [facility]; Forward repeat TTE results from today to [facility] hepatologist, Dr. Stokes for future coordination of care.  - Continue lactulose and rifaximin, titrate to 2-3 BM per day  - Follow up with planned diagnostic paracentesis scheduled with IR 1/8/19  - Trend MELD labs and CBC daily  - Follow up with macrocytic anemia workup (iron study, serum haptoglobin);   - Recommend Hematology consult for further workup, evaluation, and management.

## 2019-01-09 NOTE — PROGRESS NOTE ADULT - PROBLEM SELECTOR PLAN 2
MELD 34 on admission, indicating >50% mortality in next 3 months. Was recently on liver transplant candidate list at [facility], however had to be removed due to severe HFrEF, no longer plans for liver transplant    - Patient on Furosemide 20 mg and spironolactone 25 mg daily, will hold diuretics for now until paracentesis done. Renal function improved.   - Will continue with Lactulose and Rifaximin PO.   - Evidence of severe synthetic dysfunction, portal hypertensive, varices, and existing heart failure. ULICES resolved.   - U/S Abdomen showed small to moderate ascites in the RUQ. Per hepatology, consider diagnostic paracentesis to rule out SBP by IR today 1/8.   - MRCP/ MR abdomen showing changes as discussed above. MELD 34 on admission, indicating >50% mortality in next 3 months. Was recently on liver transplant candidate list at [facility], however had to be removed due to severe HFrEF, no longer plans for liver transplant    - Stopped Albumin 5% 250 cc doses today 1/9.   - Meld Na 1/9 is 33 (52.6% mortality within 3 months).   - Patient on Furosemide 20 mg and spironolactone 25 mg daily, will hold diuretics for now until paracentesis done. Renal function improved.   - Will continue with Lactulose and Rifaximin PO.   - Evidence of severe synthetic dysfunction, portal hypertensive, varices, and existing heart failure. ULICES resolved.   - U/S Abdomen showed small to moderate ascites in the RUQ. Per hepatology, consider diagnostic paracentesis to rule out SBP by IR pending INR optimization.   - MRCP/ MR abdomen showing changes as discussed above.

## 2019-01-09 NOTE — PROGRESS NOTE ADULT - PROBLEM SELECTOR PLAN 3
Patient with no florid ascites on physical exam; abdomen soft and nondistended.  However, patient previously treated for SBP and patient with esophageal varices.   Patient complains of diffuse abdominal pain particularly in RUQ and LLQ.    - Plan for diagnostic paracentesis today by IR 1/8.   - abdominal ultrasound showing small to moderate ascites.  - Hold off antibiotics as appears blood culture contaminant Patient with no florid ascites on physical exam; abdomen soft and nondistended.  However, patient previously treated for SBP and patient with esophageal varices.   Patient complains of diffuse abdominal pain particularly in RUQ and LLQ.    - Plan for diagnostic paracentesis pending INR coagulopathy optimization.   - Abdominal ultrasound showing small to moderate ascites.  - Hold off antibiotics as appears blood culture contaminant

## 2019-01-09 NOTE — PROGRESS NOTE ADULT - SUBJECTIVE AND OBJECTIVE BOX
Chief Complaint: Patient is a 53y old  Male who presents with a chief complaint of hypotension, decompensated cirrhosis (09 Jan 2019)    Interval Events:   No acute overnight events. Patient just returned from ECHO study (results pending) and sitting comfortably upright in bed. Patient reports feeling "fine" with no worsening or improvement from yesterday. Had 2-3 NB, non-tarry, BMs since yesterday of loose consistency.     This morning's CBC showed H/H drop below 7 (6.9/20) from yesterday's H/H of 7.4/21.5. 1U pRBC ordered and being prepped at bedside now; no obvious signs of bleeding noted other than mouth from superficial wound. BP also reported at 73/31 ~8a today. Patient denies any shortness of breath, chills, or lightheadedness.    Allergies:  No Known Allergies    Home Medications:    Hospital Medications:  albumin human  5% IVPB 250 milliLiter(s) IV Intermittent every 6 hours  artificial tears (preservative free) Ophthalmic Solution 1 Drop(s) Both EYES daily PRN  chlorhexidine 0.12% Liquid 15 milliLiter(s) Oral Mucosa two times a day  dextrose 40% Gel 15 Gram(s) Oral once PRN  dextrose 5%. 1000 milliLiter(s) IV Continuous <Continuous>  dextrose 50% Injectable 12.5 Gram(s) IV Push once  dextrose 50% Injectable 25 Gram(s) IV Push once  dextrose 50% Injectable 25 Gram(s) IV Push once  folic acid 1 milliGRAM(s) Oral daily  glucagon  Injectable 1 milliGRAM(s) IntraMuscular once PRN  insulin lispro (HumaLOG) corrective regimen sliding scale   SubCutaneous three times a day before meals  insulin lispro (HumaLOG) corrective regimen sliding scale   SubCutaneous at bedtime  lactulose Syrup 30 Gram(s) Oral three times a day  lidocaine   Patch 1 Patch Transdermal daily  midodrine 5 milliGRAM(s) Oral three times a day  multivitamin 1 Tablet(s) Oral daily  rifaximin 550 milliGRAM(s) Oral two times a day  thiamine 100 milliGRAM(s) Oral daily  traMADol 50 milliGRAM(s) Oral every 8 hours PRN    PMHX/PSHX:    Hypertension  Type 2 diabetes mellitus  Congestive heart failure (CHF) with reduced ejection fraction  Esophageal varices  Alcoholic cirrhosis of liver with ascites  Abdominal hernia  H/O cataract    Family history:   Family history of hypercholesterolemia (Father)  Family history of diabetes mellitus (Mother)  No pertinent family history in first degree relatives    ROS:   General: No wt loss, fevers, chills, night sweats, fatigue,   Eyes: Good vision, no reported pain  ENT: No sore throat, pain, runny nose, dysphagia  CV: No pain, palpitations, hypo/hypertension  Resp: No dyspnea, cough, tachypnea, wheezing  GI: No pain, No nausea, No vomiting, No diarrhea, No constipation, No weight loss, No fever, No pruritis, No rectal bleeding, No tarry stools, No dysphagia,  : No pain, bleeding, incontinence, nocturia  Muscle: No pain, weakness  Neuro: No weakness, tingling, memory problems  Psych: No fatigue, insomnia, mood problems, depression  Endocrine: No polyuria, polydipsia, cold/heat intolerance  Heme: No petechiae, ecchymosis, easy bruisability  Skin: No rash, tattoos, scars, edema    PHYSICAL EXAM:   Vital Signs:  Vital Signs Last 24 Hrs  T(C): 36.6 (07 Jan 2019 04:04), Max: 36.7 (06 Jan 2019 12:43)  T(F): 97.9 (07 Jan 2019 04:04), Max: 98.1 (06 Jan 2019 12:43)  HR: 66 (07 Jan 2019 04:04) (66 - 74)  BP: 92/52 (07 Jan 2019 03:24) (92/49 - 98/57)  BP(mean): --  RR: 18 (07 Jan 2019 04:04) (18 - 18)  SpO2: 99% (07 Jan 2019 04:04) (96% - 99%)    GENERAL:  Appears stated age, well-groomed, well-nourished, no apparent distress  HEENT:  NC/AT, conjunctivae pale pink, no thyromegaly, no JVD, sclera +icteric  CHEST:  Full & symmetric excursion, no increased effort, breath sounds clear bilaterally. +gynecomastia  HEART:  Regular rhythm, S1, S2, no murmur/rub/S3/S4, no abdominal bruit, no edema  ABDOMEN:  Soft, non-tender, non-distended, no masses appreciated  EXTREMITIES  no cyanosis, clubbing, or edema  SKIN:  No rash, abscesses, erythema or petechiae seen. Several ecchymoses around PIV sites on bilateral upper extremities. Skin dry and intact.  NEURO:  Alert & oriented x3, no encephalopathy, more responsive and alert relative to yesterday morning prior to his pRBC infusion.    LABS:               7.3    5.3   )-----------( 34       ( 09 Jan 2019 05:23 )             20.9                6.9    5.1   )-----------( 35       ( 08 Jan 2019 06:03)             20.0                01-09    138  |  108  |  26<H>  ----------------------------<  104<H>  4.9   |  21<L>  |  0.82    Ca    8.4      09 Jan 2019 05:25  Phos  2.4     01-09  Mg     2.3     01-09    TPro  5.4<L>  /  Alb  3.3  /  TBili  13.0<H>  /  DBili  x   /  AST  61<H>  /  ALT  12  /  AlkPhos  89  01-09 01-08    136  |  108  |  21  ----------------------------<  114<H>  4.8   |  20<L>  |  0.80    Ca    8.3<L>      08 Jan 2019 06:03  Phos  2.4     01-08  Mg     2.2     01-08    TPro  5.5<L>  /  Alb  3.2<L>  /  TBili  12.0<H>  /  DBili  x   /  AST  69<H>  /  ALT  17  /  AlkPhos  100  01-08    PT/INR - ( 09 Jan 2019 05:23 )   PT: 53.0 sec;   INR: See Note ratio      PT/INR - ( 08 Jan 2019 06:03 )   PT: 48.5 sec;   INR: 4.07 ratio       PT/INR - ( 06 Jan 2019 06:35 )   PT: 42.8 sec;   INR: 3.61 ratio       Imaging:    EXAM: MR ABDOMEN [facility]                        PROCEDURE DATE:  01/06/2019    INTERPRETATION:  CLINICAL INFORMATION: Ethanol cirrhosis and heart   failure with reduced ejection fraction. Jaundice, hypotension.  COMPARISON: CT abdomen and pelvis 9/28/2018, duplex sonography of the   abdomen dated 9/28/2018.  PROCEDURE:   MRI of the abdomen was performed with and without IV contrast.  10 cc of Gadavist administered, 0 cc discarded.     MRCP was performed.  Liver iron quantification with Rhennes protocol.    FINDINGS:    LOWER CHEST: Within normal limits.    LIVER: Cirrhosis. No evidence of hepatocellular cancer. Estimated hepatic iron concentration using a TR of 14 ms: 90 (+/- 30) umol/g (normal <36).    BILE DUCTS: Normal caliber. No evidence of choledocholithiasis.  GALLBLADDER: No evidence of gallstones. Mildly nonspecific wall edema.  SPLEEN: Splenomegaly.   PANCREAS: A 1.5 cm uncinate process cyst. No pancreatic ductal dilatation.  ADRENALS: Within normal limits.  KIDNEYS/URETERS: Within normal limits.    VISUALIZED PORTIONS:    BOWEL: Within normal limits.   PERITONEUM: Small volume ascites.  VESSELS: Hepatic and portal veins are patent. Evidence of portal hypertension as evidenced by a patent paraumbilical vein and additional   upper abdominal varices.  RETROPERITONEUM: No lymphadenopathy.    ABDOMINAL WALL: Within normal limits.  BONES: Within normal limits.    IMPRESSION:   Cirrhosis with evidence of portal hypertension. Small volume ascites.  No evidence of hepatocellular cancer.  Mild hepatic iron deposition.  Portal hypertension.  No evidence of cholecystitis or biliary ductal dilatation.      TTE  with 2-D, M-Mode, and complete spectral and color flow Doppler  Observations:  Mitral Valve: Mitral annular calcification. Mild mitral regurgitation.  Aortic Valve/Aorta: Calcified aortic valve. Peak transaortic valve gradient equals 16 mm Hg, mean transaortic valve gradient equals 10 mm Hg, aortic valve velocity time integral equals 46 cm, consistent with mild aortic stenosis. Peak left ventricular outflow tract gradient equals 5 mm Hg, mean gradient is equal to 3 mm Hg, LVOT velocity time integral equals 27 cm.  Aortic Root: 3.4 cm.  LVOT diameter: 2 cm.  Left Atrium: Normal left atrium.  Left Ventricle: Endocardium not well visualized; moderate global left ventricular dysfunction. Endocardial visualization enhanced with intravenous injection of Ultrasonic Enhancing Agent (Definity). Eccentric left ventricular hypertrophy (dilated left ventricle with normal relative wall thickness).  Right Heart: Normal right atrium. The right ventricle is not well visualized; grossly normal right ventricular systolic function. Normal tricuspid valve. Mild tricuspid regurgitation. Normal pulmonic valve. Mild pulmonic regurgitation.  Pericardium/Pleura: Normal pericardium with no pericardial effusion.  Hemodynamic: Estimated right atrial pressure is 8 mm Hg. Estimated right ventricular systolic pressure equals 31 mm Hg, assuming right atrial pressure equals 8 mm Hg, consistent with normal pulmonary pressures.    Conclusions:  1. Eccentric left ventricular hypertrophy (dilated left ventricle with normal relative wall thickness).  2. Endocardium not well visualized; moderate global left ventricular dysfunction. Endocardial visualization enhanced with intravenous injection of Ultrasonic Enhancing Agent (Definity).  3. The right ventricle is not well visualized; grossly normal right ventricular systolic function. Chief Complaint: Patient is a 53y old  Male who presents with a chief complaint of hypotension, decompensated cirrhosis (09 Jan 2019)    Interval Events:   No acute overnight events. Patient in bed with family at bedside. Patient continues to report feeling "fine". Had 2-3 NB, non-tarry, loose BMs since yesterday.    This morning's CBC shows a declining trajectory of hemoglobin since last pRBC transfusion (7.3<--7.8) Patient denies presence of obvious/ gross blood in stool, urine, or mouth. No SOB, chills, or lightheadedness. Per patient's brother, family has spoken with Dr. Stokes and they would be willing to transfer to [facility] if possible. Will attempt to confirm with Dr. Stokes's office today.    Allergies:  No Known Allergies    Home Medications:    Hospital Medications:  albumin human  5% IVPB 250 milliLiter(s) IV Intermittent every 6 hours  artificial tears (preservative free) Ophthalmic Solution 1 Drop(s) Both EYES daily PRN  chlorhexidine 0.12% Liquid 15 milliLiter(s) Oral Mucosa two times a day  dextrose 40% Gel 15 Gram(s) Oral once PRN  dextrose 5%. 1000 milliLiter(s) IV Continuous <Continuous>  dextrose 50% Injectable 12.5 Gram(s) IV Push once  dextrose 50% Injectable 25 Gram(s) IV Push once  dextrose 50% Injectable 25 Gram(s) IV Push once  folic acid 1 milliGRAM(s) Oral daily  glucagon  Injectable 1 milliGRAM(s) IntraMuscular once PRN  insulin lispro (HumaLOG) corrective regimen sliding scale   SubCutaneous three times a day before meals  insulin lispro (HumaLOG) corrective regimen sliding scale   SubCutaneous at bedtime  lactulose Syrup 30 Gram(s) Oral three times a day  lidocaine   Patch 1 Patch Transdermal daily  midodrine 5 milliGRAM(s) Oral three times a day  multivitamin 1 Tablet(s) Oral daily  rifaximin 550 milliGRAM(s) Oral two times a day  thiamine 100 milliGRAM(s) Oral daily  traMADol 50 milliGRAM(s) Oral every 8 hours PRN    PMHX/PSHX:    Hypertension  Type 2 diabetes mellitus  Congestive heart failure (CHF) with reduced ejection fraction  Esophageal varices  Alcoholic cirrhosis of liver with ascites  Abdominal hernia  H/O cataract    Family history:   Family history of hypercholesterolemia (Father)  Family history of diabetes mellitus (Mother)  No pertinent family history in first degree relatives    ROS:   General: No wt loss, fevers, chills, night sweats, fatigue,   Eyes: Good vision, no reported pain  ENT: No sore throat, pain, runny nose, dysphagia  CV: No pain, palpitations  Resp: No dyspnea, cough, tachypnea, wheezing  GI: No pain, No N/V/D. No constipation, No pruritis, No rectal bleeding, No tarry stools, No dysphagia,  : No pain, bleeding, incontinence, nocturia  Muscle: No pain, weakness  Neuro: No weakness, tingling, memory problems  Psych: No fatigue, insomnia, depression. +irritability.  Endocrine: No polyuria, polydipsia, cold/heat intolerance  Heme: No petechiae.  Skin: No rash, tattoos, scars, edema    PHYSICAL EXAM:   Vital Signs Last 24 Hrs  T(C): 36.7 (09 Jan 2019 12:23), Max: 37.1 (09 Jan 2019 10:23)  T(F): 98 (09 Jan 2019 12:23), Max: 98.8 (09 Jan 2019 10:23)  HR: 64 (09 Jan 2019 12:23) (62 - 73)  BP: 93/50 (09 Jan 2019 12:23) (82/46 - 121/66)  BP(mean): --  RR: 18 (09 Jan 2019 12:23) (18 - 18)  SpO2: 96% (09 Jan 2019 12:23) (94% - 100%)    GENERAL:  Appears stated age, well-groomed, well-nourished, no apparent distress  HEENT:  NC/AT, conjunctivae pale pink, no thyromegaly, no JVD, sclera +icteric  CHEST:  Full & symmetric excursion, no increased effort, breath sounds clear bilaterally. +gynecomastia  HEART:  Regular rhythm, S1, S2, no murmur/rub/S3/S4, no abdominal bruit, no edema  ABDOMEN:  Soft, non-tender, non-distended, no masses appreciated  EXTREMITIES  no cyanosis, clubbing, or edema  SKIN:  No rash, abscesses, erythema or petechiae seen. Several ecchymoses around PIV sites on bilateral upper extremities. Skin dry and intact.  NEURO:  Alert & oriented x3, no encephalopathy, more responsive and alert relative to yesterday morning prior to his pRBC infusion.    LABS:               7.3    5.3   )-----------( 34       ( 09 Jan 2019 05:23 )             20.9                6.9    5.1   )-----------( 35       ( 08 Jan 2019 06:03)             20.0                01-09    138  |  108  |  26<H>  ----------------------------<  104<H>  4.9   |  21<L>  |  0.82    Ca    8.4      09 Jan 2019 05:25  Phos  2.4     01-09  Mg     2.3     01-09    TPro  5.4<L>  /  Alb  3.3  /  TBili  13.0<H>  /  DBili  x   /  AST  61<H>  /  ALT  12  /  AlkPhos  89  01-09      01-08    136  |  108  |  21  ----------------------------<  114<H>  4.8   |  20<L>  |  0.80    Ca    8.3<L>      08 Jan 2019 06:03  Phos  2.4     01-08  Mg     2.2     01-08    TPro  5.5<L>  /  Alb  3.2<L>  /  TBili  12.0<H>  /  DBili  x   /  AST  69<H>  /  ALT  17  /  AlkPhos  100  01-08    PT/INR - ( 09 Jan 2019 05:23 )   PT: 53.0 sec;   INR: See Note ratio      PT/INR - ( 08 Jan 2019 06:03 )   PT: 48.5 sec;   INR: 4.07 ratio          Imaging:    EXAM: MR ABDOMEN [facility]                        PROCEDURE DATE:  01/06/2019    INTERPRETATION:  CLINICAL INFORMATION: Ethanol cirrhosis and heart   failure with reduced ejection fraction. Jaundice, hypotension.  COMPARISON: CT abdomen and pelvis 9/28/2018, duplex sonography of the   abdomen dated 9/28/2018.  PROCEDURE:   MRI of the abdomen was performed with and without IV contrast.  10 cc of Gadavist administered, 0 cc discarded.     MRCP was performed.  Liver iron quantification with Rhennes protocol.    FINDINGS:    LOWER CHEST: Within normal limits.    LIVER: Cirrhosis. No evidence of hepatocellular cancer. Estimated hepatic iron concentration using a TR of 14 ms: 90 (+/- 30) umol/g (normal <36).    BILE DUCTS: Normal caliber. No evidence of choledocholithiasis.  GALLBLADDER: No evidence of gallstones. Mildly nonspecific wall edema.  SPLEEN: Splenomegaly.   PANCREAS: A 1.5 cm uncinate process cyst. No pancreatic ductal dilatation.  ADRENALS: Within normal limits.  KIDNEYS/URETERS: Within normal limits.    VISUALIZED PORTIONS:    BOWEL: Within normal limits.   PERITONEUM: Small volume ascites.  VESSELS: Hepatic and portal veins are patent. Evidence of portal hypertension as evidenced by a patent paraumbilical vein and additional   upper abdominal varices.  RETROPERITONEUM: No lymphadenopathy.    ABDOMINAL WALL: Within normal limits.  BONES: Within normal limits.    IMPRESSION:   Cirrhosis with evidence of portal hypertension. Small volume ascites.  No evidence of hepatocellular cancer.  Mild hepatic iron deposition.  Portal hypertension.  No evidence of cholecystitis or biliary ductal dilatation.      TTE  with 2-D, M-Mode, and complete spectral and color flow Doppler  Observations:  Mitral Valve: Mitral annular calcification. Mild mitral regurgitation.  Aortic Valve/Aorta: Calcified aortic valve. Peak transaortic valve gradient equals 16 mm Hg, mean transaortic valve gradient equals 10 mm Hg, aortic valve velocity time integral equals 46 cm, consistent with mild aortic stenosis. Peak left ventricular outflow tract gradient equals 5 mm Hg, mean gradient is equal to 3 mm Hg, LVOT velocity time integral equals 27 cm.  Aortic Root: 3.4 cm.  LVOT diameter: 2 cm.  Left Atrium: Normal left atrium.  Left Ventricle: Endocardium not well visualized; moderate global left ventricular dysfunction. Endocardial visualization enhanced with intravenous injection of Ultrasonic Enhancing Agent (Definity). Eccentric left ventricular hypertrophy (dilated left ventricle with normal relative wall thickness).  Right Heart: Normal right atrium. The right ventricle is not well visualized; grossly normal right ventricular systolic function. Normal tricuspid valve. Mild tricuspid regurgitation. Normal pulmonic valve. Mild pulmonic regurgitation.  Pericardium/Pleura: Normal pericardium with no pericardial effusion.  Hemodynamic: Estimated right atrial pressure is 8 mm Hg. Estimated right ventricular systolic pressure equals 31 mm Hg, assuming right atrial pressure equals 8 mm Hg, consistent with normal pulmonary pressures.    Conclusions:  1. Eccentric left ventricular hypertrophy (dilated left ventricle with normal relative wall thickness).  2. Endocardium not well visualized; moderate global left ventricular dysfunction. Endocardial visualization enhanced with intravenous injection of Ultrasonic Enhancing Agent (Definity).  3. The right ventricle is not well visualized; grossly normal right ventricular systolic function.

## 2019-01-09 NOTE — CHART NOTE - NSCHARTNOTEFT_GEN_A_CORE
For Interventional Radiology the labs for ascites fluid analysis are    Cell Count  Albumin  Cytology  Amylase  Glucose  Cultures/Gram stain

## 2019-01-10 ENCOUNTER — RESULT REVIEW (OUTPATIENT)
Age: 54
End: 2019-01-10

## 2019-01-10 LAB
ALBUMIN FLD-MCNC: 0.7 G/DL — SIGNIFICANT CHANGE UP
ALBUMIN SERPL ELPH-MCNC: 3.1 G/DL — LOW (ref 3.3–5)
ALP SERPL-CCNC: 85 U/L — SIGNIFICANT CHANGE UP (ref 40–120)
ALT FLD-CCNC: 15 U/L — SIGNIFICANT CHANGE UP (ref 10–45)
ANION GAP SERPL CALC-SCNC: 11 MMOL/L — SIGNIFICANT CHANGE UP (ref 5–17)
APTT BLD: 71.4 SEC — HIGH (ref 27.5–36.3)
AST SERPL-CCNC: 60 U/L — HIGH (ref 10–40)
B PERT IGG+IGM PNL SER: ABNORMAL
BASOPHILS # BLD AUTO: 0.1 K/UL — SIGNIFICANT CHANGE UP (ref 0–0.2)
BASOPHILS NFR BLD AUTO: 1.1 % — SIGNIFICANT CHANGE UP (ref 0–2)
BILIRUB SERPL-MCNC: 13.1 MG/DL — HIGH (ref 0.2–1.2)
BUN SERPL-MCNC: 26 MG/DL — HIGH (ref 7–23)
CALCIUM SERPL-MCNC: 8.2 MG/DL — LOW (ref 8.4–10.5)
CHLORIDE SERPL-SCNC: 104 MMOL/L — SIGNIFICANT CHANGE UP (ref 96–108)
CO2 SERPL-SCNC: 20 MMOL/L — LOW (ref 22–31)
COLOR FLD: YELLOW — SIGNIFICANT CHANGE UP
CREAT SERPL-MCNC: 0.76 MG/DL — SIGNIFICANT CHANGE UP (ref 0.5–1.3)
EOSINOPHIL # BLD AUTO: 0.4 K/UL — SIGNIFICANT CHANGE UP (ref 0–0.5)
EOSINOPHIL NFR BLD AUTO: 6.9 % — HIGH (ref 0–6)
FLUID INTAKE SUBSTANCE CLASS: SIGNIFICANT CHANGE UP
FLUID SEGMENTED GRANULOCYTES: 29 % — SIGNIFICANT CHANGE UP
GLUCOSE BLDC GLUCOMTR-MCNC: 145 MG/DL — HIGH (ref 70–99)
GLUCOSE BLDC GLUCOMTR-MCNC: 207 MG/DL — HIGH (ref 70–99)
GLUCOSE BLDC GLUCOMTR-MCNC: 220 MG/DL — HIGH (ref 70–99)
GLUCOSE BLDC GLUCOMTR-MCNC: 230 MG/DL — HIGH (ref 70–99)
GLUCOSE FLD-MCNC: 228 MG/DL — SIGNIFICANT CHANGE UP
GLUCOSE SERPL-MCNC: 123 MG/DL — HIGH (ref 70–99)
GRAM STN FLD: SIGNIFICANT CHANGE UP
HCT VFR BLD CALC: 20.5 % — CRITICAL LOW (ref 39–50)
HGB BLD-MCNC: 7.1 G/DL — LOW (ref 13–17)
INR BLD: 2.84 RATIO — HIGH (ref 0.88–1.16)
INR BLD: 3.54 RATIO — HIGH (ref 0.88–1.16)
LDH SERPL L TO P-CCNC: 55 U/L — SIGNIFICANT CHANGE UP
LYMPHOCYTES # BLD AUTO: 0.7 K/UL — LOW (ref 1–3.3)
LYMPHOCYTES # BLD AUTO: 13.1 % — SIGNIFICANT CHANGE UP (ref 13–44)
LYMPHOCYTES # FLD: 37 % — SIGNIFICANT CHANGE UP
MAGNESIUM SERPL-MCNC: 2.4 MG/DL — SIGNIFICANT CHANGE UP (ref 1.6–2.6)
MCHC RBC-ENTMCNC: 34.4 GM/DL — SIGNIFICANT CHANGE UP (ref 32–36)
MCHC RBC-ENTMCNC: 35.1 PG — HIGH (ref 27–34)
MCV RBC AUTO: 102 FL — HIGH (ref 80–100)
MESOTHL CELL # FLD: 5 % — SIGNIFICANT CHANGE UP
MONOCYTES # BLD AUTO: 0.5 K/UL — SIGNIFICANT CHANGE UP (ref 0–0.9)
MONOCYTES NFR BLD AUTO: 9.8 % — SIGNIFICANT CHANGE UP (ref 2–14)
MONOS+MACROS # FLD: 29 % — SIGNIFICANT CHANGE UP
NEUTROPHILS # BLD AUTO: 3.7 K/UL — SIGNIFICANT CHANGE UP (ref 1.8–7.4)
NEUTROPHILS NFR BLD AUTO: 69.1 % — SIGNIFICANT CHANGE UP (ref 43–77)
PHOSPHATE SERPL-MCNC: 2.2 MG/DL — LOW (ref 2.5–4.5)
PLATELET # BLD AUTO: 32 K/UL — LOW (ref 150–400)
POTASSIUM SERPL-MCNC: 4.9 MMOL/L — SIGNIFICANT CHANGE UP (ref 3.5–5.3)
POTASSIUM SERPL-SCNC: 4.9 MMOL/L — SIGNIFICANT CHANGE UP (ref 3.5–5.3)
PROT FLD-MCNC: 1.3 G/DL — SIGNIFICANT CHANGE UP
PROT SERPL-MCNC: 5.3 G/DL — LOW (ref 6–8.3)
PROTHROM AB SERPL-ACNC: 33.7 SEC — HIGH (ref 10–12.9)
PROTHROM AB SERPL-ACNC: 42 SEC — HIGH (ref 10–12.9)
RBC # BLD: 2.01 M/UL — LOW (ref 4.2–5.8)
RBC # FLD: 18.2 % — HIGH (ref 10.3–14.5)
RCV VOL RI: 3900 /UL — HIGH (ref 0–5)
SODIUM SERPL-SCNC: 135 MMOL/L — SIGNIFICANT CHANGE UP (ref 135–145)
SPECIMEN SOURCE FLD: SIGNIFICANT CHANGE UP
SPECIMEN SOURCE: SIGNIFICANT CHANGE UP
TOTAL NUCLEATED CELL COUNT, BODY FLUID: 180 /UL — HIGH (ref 0–5)
TUBE TYPE: SIGNIFICANT CHANGE UP
WBC # BLD: 5.4 K/UL — SIGNIFICANT CHANGE UP (ref 3.8–10.5)
WBC # FLD AUTO: 5.4 K/UL — SIGNIFICANT CHANGE UP (ref 3.8–10.5)

## 2019-01-10 PROCEDURE — 88112 CYTOPATH CELL ENHANCE TECH: CPT | Mod: 26

## 2019-01-10 PROCEDURE — 49083 ABD PARACENTESIS W/IMAGING: CPT

## 2019-01-10 PROCEDURE — 99232 SBSQ HOSP IP/OBS MODERATE 35: CPT | Mod: GC

## 2019-01-10 PROCEDURE — 88305 TISSUE EXAM BY PATHOLOGIST: CPT | Mod: 26

## 2019-01-10 RX ORDER — PHYTONADIONE (VIT K1) 5 MG
10 TABLET ORAL ONCE
Qty: 0 | Refills: 0 | Status: COMPLETED | OUTPATIENT
Start: 2019-01-10 | End: 2019-01-10

## 2019-01-10 RX ADMIN — LACTULOSE 30 GRAM(S): 10 SOLUTION ORAL at 21:50

## 2019-01-10 RX ADMIN — MIDODRINE HYDROCHLORIDE 5 MILLIGRAM(S): 2.5 TABLET ORAL at 21:50

## 2019-01-10 RX ADMIN — LACTULOSE 30 GRAM(S): 10 SOLUTION ORAL at 13:24

## 2019-01-10 RX ADMIN — MIDODRINE HYDROCHLORIDE 5 MILLIGRAM(S): 2.5 TABLET ORAL at 05:27

## 2019-01-10 RX ADMIN — Medication 1 TABLET(S): at 09:40

## 2019-01-10 RX ADMIN — Medication 2: at 18:12

## 2019-01-10 RX ADMIN — MIDODRINE HYDROCHLORIDE 5 MILLIGRAM(S): 2.5 TABLET ORAL at 13:24

## 2019-01-10 RX ADMIN — LACTULOSE 30 GRAM(S): 10 SOLUTION ORAL at 05:27

## 2019-01-10 RX ADMIN — Medication 10 MILLIGRAM(S): at 09:39

## 2019-01-10 RX ADMIN — Medication 100 MILLIGRAM(S): at 09:39

## 2019-01-10 RX ADMIN — Medication 1 MILLIGRAM(S): at 09:40

## 2019-01-10 RX ADMIN — Medication 2: at 13:24

## 2019-01-10 RX ADMIN — CHLORHEXIDINE GLUCONATE 15 MILLILITER(S): 213 SOLUTION TOPICAL at 05:28

## 2019-01-10 RX ADMIN — CHLORHEXIDINE GLUCONATE 15 MILLILITER(S): 213 SOLUTION TOPICAL at 17:10

## 2019-01-10 NOTE — PROGRESS NOTE ADULT - SUBJECTIVE AND OBJECTIVE BOX
53 year old male with end stage liver disease 2/2 chronic alcoholic cirrhosis, with esophageal varices, CAD, HfrEF (EF 35%), and DMII and ascites presented to IR for paracentesis.   1 Unit of FFP given during procedure.    Allergies: No Known Allergies      PAST MEDICAL & SURGICAL HISTORY:  Hypertension  Type 2 diabetes mellitus  Congestive heart failure (CHF)  Esophageal varices  Alcoholic cirrhosis of liver with ascites  Abdominal hernia: S/P repair x2  H/O cataract        Pertinent labs:                      7.1    5.4   )-----------( 32       ( 10 Rell 2019 06:31 )             20.5   01-10    135  |  104  |  26<H>  ----------------------------<  123<H>  4.9   |  20<L>  |  0.76    Ca    8.2<L>      10 Rell 2019 06:31  Phos  2.2     01-10  Mg     2.4     01-10    TPro  5.3<L>  /  Alb  3.1<L>  /  TBili  13.1<H>  /  DBili  x   /  AST  60<H>  /  ALT  15  /  AlkPhos  85  01-10  PT/INR - ( 10 Rell 2019 13:46 )   PT: 33.7 sec;   INR: 2.84 ratio         PTT - ( 10 Rell 2019 06:31 )  PTT:71.4 sec    Consent: Procedure/risks/ Benefits explained. Informed consent obtained. Pt verbalizes understanding.         e 53 year old male with end stage liver disease 2/2 chronic alcoholic cirrhosis, with esophageal varices, CAD, HfrEF (EF 35%), and DMII and ascites presented to IR for paracentesis.   1 Unit of FFP given during procedure.  INR and PLT discussed with Dr. Manzanares.     Allergies: No Known Allergies      PAST MEDICAL & SURGICAL HISTORY:  Hypertension  Type 2 diabetes mellitus  Congestive heart failure (CHF)  Esophageal varices  Alcoholic cirrhosis of liver with ascites  Abdominal hernia: S/P repair x2  H/O cataract        Pertinent labs:                      7.1    5.4   )-----------( 32       ( 10 Rell 2019 06:31 )             20.5   01-10    135  |  104  |  26<H>  ----------------------------<  123<H>  4.9   |  20<L>  |  0.76    Ca    8.2<L>      10 Rell 2019 06:31  Phos  2.2     01-10  Mg     2.4     01-10    TPro  5.3<L>  /  Alb  3.1<L>  /  TBili  13.1<H>  /  DBili  x   /  AST  60<H>  /  ALT  15  /  AlkPhos  85  01-10  PT/INR - ( 10 Rell 2019 13:46 )   PT: 33.7 sec;   INR: 2.84 ratio         PTT - ( 10 Rell 2019 06:31 )  PTT:71.4 sec    Consent: Procedure/risks/ Benefits explained. Informed consent obtained. Pt verbalizes understanding.         e

## 2019-01-10 NOTE — PROGRESS NOTE ADULT - PROBLEM SELECTOR PLAN 2
MELD 34 on admission, indicating >50% mortality in next 3 months. Was recently on liver transplant [facility], however had to be removed due to severe HFrEF, no longer plans for liver transplant    - Stopped Albumin 5% 250 cc doses today 1/9.   - Meld Na 1/9 is 33 (52.6% mortality within 3 months).   - Patient on Furosemide 20 mg and spironolactone 25 mg daily, will hold diuretics for now until paracentesis done. Renal function improved.   - Will continue with Lactulose and Rifaximin PO.   - Evidence of severe synthetic dysfunction, portal hypertensive, varices, and existing heart failure. ULICES resolved.   - U/S Abdomen showed small to moderate ascites in the RUQ. Per hepatology, consider diagnostic paracentesis to rule out SBP by IR pending INR optimization.   - MRCP/ MR abdomen showing changes as discussed above. Patient with no florid ascites on physical exam; abdomen soft and nondistended.  However, patient previously treated for SBP and patient with esophageal varices.   Patient complains of diffuse abdominal pain particularly in RUQ and LLQ.    - Plan for diagnostic paracentesis pending INR coagulopathy optimization.   - Abdominal ultrasound showing small to moderate ascites.  - Hold off antibiotics as appears blood culture contaminant Patient with no florid ascites on physical exam; abdomen soft and nondistended.  However, patient previously treated for SBP and patient with esophageal varices.   Patient complains of diffuse abdominal pain particularly in RUQ and LLQ.    - Now s/p diagnostic paracentesis 1/10 will f/u labs.   - Abdominal ultrasound showing small to moderate ascites.  - Hold off antibiotics as appears blood culture contaminant

## 2019-01-10 NOTE — PROGRESS NOTE ADULT - ATTENDING COMMENTS
Patient with decompensated cirrhosis without acute change.  Is undergoing evaluation at [facility] and transplant status being held for cardiac re-evaluation. Patient has chronic anemia and may have GI endoscopy as outpatient .  No evidence of active GI bleeding at present.

## 2019-01-10 NOTE — PROGRESS NOTE ADULT - PROBLEM SELECTOR PLAN 6
Last TTE showed reduced Ef but was in setting of sepsis and bacteremia. Currently appears euvolemic. Fluid status will be tenuous in the setting of advanced liver disease, sepsis, HFrEF, and ULICES.    - Will monitor vitals q4hr and hemodynamics.   - Will attempt to maintain euvolemia by minimizing I's.   - Repeat TTE f/u. Last TTE showed reduced Ef but was in setting of sepsis and bacteremia. Currently appears euvolemic. Fluid status will be tenuous in the setting of advanced liver disease and HFrEF.    - Will monitor vitals q4hr and hemodynamics.   - Will attempt to maintain euvolemia by minimizing intake.  - Repeat TTE done 1/8 shows improvement in Ef 43% from 35% in October (done in [facility]). However still shows systolic dysfunction.

## 2019-01-10 NOTE — PROGRESS NOTE ADULT - SUBJECTIVE AND OBJECTIVE BOX
Dr. Noman Harden  Internal Medicine PGY-1   Pager# [phone]    Patient is a 53y old  Male who presents with a chief complaint of Hypotension, decompensated cirrhosis (09 Jan 2019 09:33)      SUBJECTIVE / OVERNIGHT EVENTS:    MEDICATIONS  (STANDING):  chlorhexidine 0.12% Liquid 15 milliLiter(s) Oral Mucosa two times a day  dextrose 5%. 1000 milliLiter(s) (50 mL/Hr) IV Continuous <Continuous>  dextrose 50% Injectable 12.5 Gram(s) IV Push once  dextrose 50% Injectable 25 Gram(s) IV Push once  dextrose 50% Injectable 25 Gram(s) IV Push once  folic acid 1 milliGRAM(s) Oral daily  insulin lispro (HumaLOG) corrective regimen sliding scale   SubCutaneous three times a day before meals  insulin lispro (HumaLOG) corrective regimen sliding scale   SubCutaneous at bedtime  lactulose Syrup 30 Gram(s) Oral three times a day  lidocaine   Patch 1 Patch Transdermal daily  midodrine 5 milliGRAM(s) Oral three times a day  multivitamin 1 Tablet(s) Oral daily  rifaximin 550 milliGRAM(s) Oral two times a day  thiamine 100 milliGRAM(s) Oral daily    MEDICATIONS  (PRN):  artificial tears (preservative free) Ophthalmic Solution 1 Drop(s) Both EYES daily PRN Dry Eyes  dextrose 40% Gel 15 Gram(s) Oral once PRN Blood Glucose LESS THAN 70 milliGRAM(s)/deciliter  glucagon  Injectable 1 milliGRAM(s) IntraMuscular once PRN Glucose LESS THAN 70 milligrams/deciliter  traMADol 50 milliGRAM(s) Oral every 8 hours PRN Severe Pain (7 - 10)      Vital Signs Last 24 Hrs  T(C): 36.9 (10 Rell 2019 05:25), Max: 37.1 (09 Jan 2019 10:23)  T(F): 98.4 (10 Rell 2019 05:25), Max: 98.8 (09 Jan 2019 10:23)  HR: 61 (10 Rell 2019 05:25) (60 - 85)  BP: 94/51 (10 Rell 2019 05:25) (90/51 - 96/59)  BP(mean): --  RR: 18 (10 Rell 2019 05:25) (18 - 18)  SpO2: 94% (10 Rell 2019 05:25) (92% - 98%)  CAPILLARY BLOOD GLUCOSE      POCT Blood Glucose.: 242 mg/dL (09 Jan 2019 21:38)  POCT Blood Glucose.: 181 mg/dL (09 Jan 2019 17:57)  POCT Blood Glucose.: 192 mg/dL (09 Jan 2019 13:11)  POCT Blood Glucose.: 100 mg/dL (09 Jan 2019 09:11)    I&O's Summary    08 Jan 2019 07:01  -  09 Jan 2019 07:00  --------------------------------------------------------  IN: 1910 mL / OUT: 300 mL / NET: 1610 mL    09 Jan 2019 07:01  -  10 Rell 2019 06:59  --------------------------------------------------------  IN: 910 mL / OUT: 0 mL / NET: 910 mL        PHYSICAL EXAM:  Constitutional: Frail appearing, jaundiced, in no acute distress, comfortable in bed  ENMT: dry mucous membranes, no pharyngeal erythema or exudates  Respiratory: lungs CTAB; no wheezing, rales or rhonchi  Cardiovascular: Normal S1 and S2; no murmurs. Trace LE edema bilaterally.   Gastrointestinal: Abdomen soft, no distension, no fluid wave, normal bowel sounds. Mild tenderness in Right and Left abdominal flanks.   Extremities: No clubbing, cyanosis. Borderline +1 pitting edema b/l up to just above the ankles.   Vascular: 2+ peripheral pulses  Neurological: No asterixis. No focal deficits appreciated.  Skin: Jaundiced; no rashes noted. 2 cm x 3 cm ecchymosis non tender no palpable hematoma.   Psychiatric: A&O x 4.      Iron with Total Binding Capacity in AM (01.09.19 @ 08:13)    Iron - Total Binding Capacity.: Unable to calculate    % Saturation, Iron: Unable to calculate    Iron Total, Serum: 83 ug/dL    Unsaturated Iron Binding Capacity: <20: Test Repeated. ug/dL    Ferritin, Serum in AM (01.09.19 @ 08:13)    Ferritin, Serum: 1415 ng/mL    Transferrin, Serum (01.09.19 @ 08:13)    Transferrin, Serum: 54 mg/dL    Haptoglobin, Serum (01.09.19 @ 13:01)    Haptoglobin, Serum: <20: Test Repeated. mg/dL    Lactate Dehydrogenase, Serum (01.09.19 @ 10:33)    Lactate Dehydrogenase, Serum: 191 U/L Dr. Noman Harden  Internal Medicine PGY-1   Pager# [phone]    Patient is a 53y old  Male who presents with a chief complaint of Hypotension, decompensated cirrhosis (09 Jan 2019 09:33)      SUBJECTIVE / OVERNIGHT EVENTS: No acute events overnight. Patient denies any abdominal pain, fevers, chills, nausea, vomiting, melena or hematochezia. Reports tolerating PO intake and drinking.     MEDICATIONS  (STANDING):  chlorhexidine 0.12% Liquid 15 milliLiter(s) Oral Mucosa two times a day  dextrose 5%. 1000 milliLiter(s) (50 mL/Hr) IV Continuous <Continuous>  dextrose 50% Injectable 12.5 Gram(s) IV Push once  dextrose 50% Injectable 25 Gram(s) IV Push once  dextrose 50% Injectable 25 Gram(s) IV Push once  folic acid 1 milliGRAM(s) Oral daily  insulin lispro (HumaLOG) corrective regimen sliding scale   SubCutaneous three times a day before meals  insulin lispro (HumaLOG) corrective regimen sliding scale   SubCutaneous at bedtime  lactulose Syrup 30 Gram(s) Oral three times a day  lidocaine   Patch 1 Patch Transdermal daily  midodrine 5 milliGRAM(s) Oral three times a day  multivitamin 1 Tablet(s) Oral daily  rifaximin 550 milliGRAM(s) Oral two times a day  thiamine 100 milliGRAM(s) Oral daily    MEDICATIONS  (PRN):  artificial tears (preservative free) Ophthalmic Solution 1 Drop(s) Both EYES daily PRN Dry Eyes  dextrose 40% Gel 15 Gram(s) Oral once PRN Blood Glucose LESS THAN 70 milliGRAM(s)/deciliter  glucagon  Injectable 1 milliGRAM(s) IntraMuscular once PRN Glucose LESS THAN 70 milligrams/deciliter  traMADol 50 milliGRAM(s) Oral every 8 hours PRN Severe Pain (7 - 10)      Vital Signs Last 24 Hrs  T(C): 36.9 (10 Rell 2019 05:25), Max: 37.1 (09 Jan 2019 10:23)  T(F): 98.4 (10 Rell 2019 05:25), Max: 98.8 (09 Jan 2019 10:23)  HR: 61 (10 Rell 2019 05:25) (60 - 85)  BP: 94/51 (10 Rell 2019 05:25) (90/51 - 96/59)  BP(mean): --  RR: 18 (10 Rell 2019 05:25) (18 - 18)  SpO2: 94% (10 Rell 2019 05:25) (92% - 98%)  CAPILLARY BLOOD GLUCOSE      POCT Blood Glucose.: 242 mg/dL (09 Jan 2019 21:38)  POCT Blood Glucose.: 181 mg/dL (09 Jan 2019 17:57)  POCT Blood Glucose.: 192 mg/dL (09 Jan 2019 13:11)  POCT Blood Glucose.: 100 mg/dL (09 Jan 2019 09:11)    I&O's Summary    08 Jan 2019 07:01  -  09 Jan 2019 07:00  --------------------------------------------------------  IN: 1910 mL / OUT: 300 mL / NET: 1610 mL    09 Jan 2019 07:01  -  10 Rell 2019 06:59  --------------------------------------------------------  IN: 910 mL / OUT: 0 mL / NET: 910 mL        PHYSICAL EXAM:  Constitutional: Frail appearing, jaundiced, in no acute distress, comfortable in bed  ENMT: dry mucous membranes, no pharyngeal erythema or exudates  Respiratory: lungs CTAB; no wheezing, rales or rhonchi  Cardiovascular: Normal S1 and S2; no murmurs. Trace LE edema bilaterally.   Gastrointestinal: Abdomen soft, no distension, no fluid wave, normal bowel sounds. Mild tenderness in Right and Left abdominal flanks.   Extremities: No clubbing, cyanosis. Borderline +1 pitting edema b/l up to just above the ankles.   Vascular: 2+ peripheral pulses  Neurological: No asterixis. No focal deficits appreciated.  Skin: Jaundiced; no rashes noted. 2 cm x 3 cm ecchymosis non tender no palpable hematoma.   Psychiatric: A&O x 4.      Iron with Total Binding Capacity in AM (01.09.19 @ 08:13)    Iron - Total Binding Capacity.: Unable to calculate    % Saturation, Iron: Unable to calculate    Iron Total, Serum: 83 ug/dL    Unsaturated Iron Binding Capacity: <20: Test Repeated. ug/dL    Ferritin, Serum in AM (01.09.19 @ 08:13)    Ferritin, Serum: 1415 ng/mL    Transferrin, Serum (01.09.19 @ 08:13)    Transferrin, Serum: 54 mg/dL    Haptoglobin, Serum (01.09.19 @ 13:01)    Haptoglobin, Serum: <20: Test Repeated. mg/dL    Lactate Dehydrogenase, Serum (01.09.19 @ 10:33)    Lactate Dehydrogenase, Serum: 191 U/L      LABS:                        7.1    5.4   )-----------( 32       ( 10 Rell 2019 06:31 )             20.5     01-10    135  |  104  |  26<H>  ----------------------------<  123<H>  4.9   |  20<L>  |  0.76    Ca    8.2<L>      10 Rell 2019 06:31  Phos  2.2     01-10  Mg     2.4     01-10    TPro  5.3<L>  /  Alb  3.1<L>  /  TBili  13.1<H>  /  DBili  x   /  AST  60<H>  /  ALT  15  /  AlkPhos  85  01-10    PT/INR - ( 10 Rell 2019 06:31 )   PT: 42.0 sec;   INR: 3.54 ratio         PTT - ( 10 Rell 2019 06:31 )  PTT:71.4 sec        AST Trend: 60 U/L<--, 61 U/L<--, 69 U/L<--, 77 U/L  ALT Trend: 15 U/L<--, 12 U/L<--, 17 U/L<--, 20 U/L  ALP Trend: 85 U/L<--, 89 U/L<--, 100 U/L<--, 107 U/L  Albumin Trend: 3.1 g/dL<--, 3.3 g/dL<--, 3.2 g/dL<--, 3.0 g/dL  Total Bilirubin Trend: 13.1 mg/dL<--, 13.0 mg/dL<--, 12.0 mg/dL<--, 12.2 mg/dL  INR Trend: 3.54 ratio<--, 4.37 ratio<--, See Note ratio<--, 4.07 ratio    WBC Trend: 5.4 K/uL<--, 5.3 K/uL<--, 4.8 K/uL<--, 5.1 K/uL  Hgb Trend: 7.1 g/dL<--, 7.3 g/dL<--, 7.8 g/dL<--, 6.9 g/dL  Hct Trend: 20.5 %<--, 20.9 %<--, 22.2 %<--, 20.0 %  Platelets Trend: 32 K/uL<--, 34 K/uL<--, 33 K/uL<--, 35 K/uL Dr. Noman Harden  Internal Medicine PGY-1   Pager# [phone]    Patient is a 53y old  Male who presents with a chief complaint of Hypotension, decompensated cirrhosis (09 Jan 2019 09:33)      SUBJECTIVE / OVERNIGHT EVENTS: No acute events overnight. Patient denies any abdominal pain, fevers, chills, nausea, vomiting, melena or hematochezia. Reports tolerating PO intake and drinking.     MEDICATIONS  (STANDING):  chlorhexidine 0.12% Liquid 15 milliLiter(s) Oral Mucosa two times a day  dextrose 5%. 1000 milliLiter(s) (50 mL/Hr) IV Continuous <Continuous>  dextrose 50% Injectable 12.5 Gram(s) IV Push once  dextrose 50% Injectable 25 Gram(s) IV Push once  dextrose 50% Injectable 25 Gram(s) IV Push once  folic acid 1 milliGRAM(s) Oral daily  insulin lispro (HumaLOG) corrective regimen sliding scale   SubCutaneous three times a day before meals  insulin lispro (HumaLOG) corrective regimen sliding scale   SubCutaneous at bedtime  lactulose Syrup 30 Gram(s) Oral three times a day  lidocaine   Patch 1 Patch Transdermal daily  midodrine 5 milliGRAM(s) Oral three times a day  multivitamin 1 Tablet(s) Oral daily  rifaximin 550 milliGRAM(s) Oral two times a day  thiamine 100 milliGRAM(s) Oral daily    MEDICATIONS  (PRN):  artificial tears (preservative free) Ophthalmic Solution 1 Drop(s) Both EYES daily PRN Dry Eyes  dextrose 40% Gel 15 Gram(s) Oral once PRN Blood Glucose LESS THAN 70 milliGRAM(s)/deciliter  glucagon  Injectable 1 milliGRAM(s) IntraMuscular once PRN Glucose LESS THAN 70 milligrams/deciliter  traMADol 50 milliGRAM(s) Oral every 8 hours PRN Severe Pain (7 - 10)      Vital Signs Last 24 Hrs  T(C): 36.9 (10 Rell 2019 05:25), Max: 37.1 (09 Jan 2019 10:23)  T(F): 98.4 (10 Rell 2019 05:25), Max: 98.8 (09 Jan 2019 10:23)  HR: 61 (10 Rell 2019 05:25) (60 - 85)  BP: 94/51 (10 Rell 2019 05:25) (90/51 - 96/59)  BP(mean): --  RR: 18 (10 Rell 2019 05:25) (18 - 18)  SpO2: 94% (10 Rell 2019 05:25) (92% - 98%)  CAPILLARY BLOOD GLUCOSE      POCT Blood Glucose.: 242 mg/dL (09 Jan 2019 21:38)  POCT Blood Glucose.: 181 mg/dL (09 Jan 2019 17:57)  POCT Blood Glucose.: 192 mg/dL (09 Jan 2019 13:11)  POCT Blood Glucose.: 100 mg/dL (09 Jan 2019 09:11)    I&O's Summary    08 Jan 2019 07:01  -  09 Jan 2019 07:00  --------------------------------------------------------  IN: 1910 mL / OUT: 300 mL / NET: 1610 mL    09 Jan 2019 07:01  -  10 Rell 2019 06:59  --------------------------------------------------------  IN: 910 mL / OUT: 0 mL / NET: 910 mL        PHYSICAL EXAM:  Constitutional: Frail appearing, jaundiced, in no acute distress, comfortable in bed  ENMT: dry mucous membranes, no pharyngeal erythema or exudates  Respiratory: lungs CTAB; no wheezing, rales or rhonchi  Cardiovascular: Normal S1 and S2; no murmurs. Trace LE edema bilaterally.   Gastrointestinal: Abdomen soft, no distension, no fluid wave, normal bowel sounds. Mild tenderness in Right and Left abdominal flanks.   Extremities: No clubbing, cyanosis. Borderline +1 pitting edema b/l up to just above the ankles.   Vascular: 2+ peripheral pulses  Neurological: No asterixis. No focal deficits appreciated.  Skin: Jaundiced; no rashes noted. 2 cm x 3 cm ecchymosis non tender no palpable hematoma.   Psychiatric: A&O x 4.      Iron with Total Binding Capacity in AM (01.09.19 @ 08:13)    Iron - Total Binding Capacity.: Unable to calculate    % Saturation, Iron: Unable to calculate    Iron Total, Serum: 83 ug/dL    Unsaturated Iron Binding Capacity: <20: Test Repeated. ug/dL    Ferritin, Serum in AM (01.09.19 @ 08:13)    Ferritin, Serum: 1415 ng/mL    Transferrin, Serum (01.09.19 @ 08:13)    Transferrin, Serum: 54 mg/dL    Haptoglobin, Serum (01.09.19 @ 13:01)    Haptoglobin, Serum: <20: Test Repeated. mg/dL    Lactate Dehydrogenase, Serum (01.09.19 @ 10:33)    Lactate Dehydrogenase, Serum: 191 U/L      LABS:                        7.1    5.4   )-----------( 32       ( 10 Rell 2019 06:31 )             20.5     01-10    135  |  104  |  26<H>  ----------------------------<  123<H>  4.9   |  20<L>  |  0.76    Ca    8.2<L>      10 Rell 2019 06:31  Phos  2.2     01-10  Mg     2.4     01-10    TPro  5.3<L>  /  Alb  3.1<L>  /  TBili  13.1<H>  /  DBili  x   /  AST  60<H>  /  ALT  15  /  AlkPhos  85  01-10    PT/INR - ( 10 Rell 2019 06:31 )   PT: 42.0 sec;   INR: 3.54 ratio         PTT - ( 10 Rell 2019 06:31 )  PTT:71.4 sec        AST Trend: 60 U/L<--, 61 U/L<--, 69 U/L<--, 77 U/L  ALT Trend: 15 U/L<--, 12 U/L<--, 17 U/L<--, 20 U/L  ALP Trend: 85 U/L<--, 89 U/L<--, 100 U/L<--, 107 U/L  Albumin Trend: 3.1 g/dL<--, 3.3 g/dL<--, 3.2 g/dL<--, 3.0 g/dL  Total Bilirubin Trend: 13.1 mg/dL<--, 13.0 mg/dL<--, 12.0 mg/dL<--, 12.2 mg/dL  INR Trend: 3.54 ratio<--, 4.37 ratio<--, See Note ratio<--, 4.07 ratio    WBC Trend: 5.4 K/uL<--, 5.3 K/uL<--, 4.8 K/uL<--, 5.1 K/uL  Hgb Trend: 7.1 g/dL<--, 7.3 g/dL<--, 7.8 g/dL<--, 6.9 g/dL  Hct Trend: 20.5 %<--, 20.9 %<--, 22.2 %<--, 20.0 %  Platelets Trend: 32 K/uL<--, 34 K/uL<--, 33 K/uL<--, 35 K/uL

## 2019-01-10 NOTE — PROGRESS NOTE ADULT - PROBLEM SELECTOR PLAN 4
Patient with leukocytosis to WBC 11.8, hypotension to SBP 70's, with no confirmed infectious source but suspicion for SBP given prior treatments for SBP and history of esophageal varices and c/o of vague abd pain.  VBG lactate elevated to 4.7 (possibly from liver disease alone)     - s/p 250 cc 5% albumin for volume resuscitation (pt with low albumin) as patient intravascularly depleted and cannot give NS or LR due to risk of third spacing.  Continue giving albumin q6h.   - Hold antibiotics per ID, low threshold to start if s/s of infection    - Holding home diuretics and antihypertensives. Patient with leukocytosis to WBC 11.8, hypotension to SBP 70's, with no confirmed infectious source but suspicion for SBP given prior treatments for SBP and history of esophageal varices and c/o of vague abd pain.  VBG lactate elevated to 4.7 (possibly from liver disease alone)     - BPs are now baseline SBP 90s and are holding after stopping albumin.   - s/p 250 cc 5% albumin for volume resuscitation. Stopped Albumin 5% 250 cc doses 1/9.   - Hold antibiotics per ID, low threshold to start if s/s of infection    - Holding home diuretics and antihypertensives.

## 2019-01-10 NOTE — PROGRESS NOTE ADULT - SUBJECTIVE AND OBJECTIVE BOX
Chief Complaint: Patient is a 53y old  Male who presents with a chief complaint of hypotension, decompensated cirrhosis (10 Rell 2019)    Interval Events:   No acute overnight events. Per PCP team, pt unable to obtain diagnostic parecentesis with IR 2/2 worsening synthetic liver function tests, requiring optimization before procedure. Patient received 1U FFP 1/9 with responsive repeat coag panel. Scheduled to receive additional unit of FFP and Vit. K PO 10mg today and paracentesis s/p repeat coag panel results today. Patient in bed with family at bedside. Patient continues to report feeling "fine".     No SOB, chills, or lightheadedness.     Allergies:  No Known Allergies    Home Medications:    Hospital Medications:  albumin human  5% IVPB 250 milliLiter(s) IV Intermittent every 6 hours  artificial tears (preservative free) Ophthalmic Solution 1 Drop(s) Both EYES daily PRN  chlorhexidine 0.12% Liquid 15 milliLiter(s) Oral Mucosa two times a day  dextrose 40% Gel 15 Gram(s) Oral once PRN  dextrose 5%. 1000 milliLiter(s) IV Continuous <Continuous>  dextrose 50% Injectable 12.5 Gram(s) IV Push once  dextrose 50% Injectable 25 Gram(s) IV Push once  dextrose 50% Injectable 25 Gram(s) IV Push once  folic acid 1 milliGRAM(s) Oral daily  glucagon  Injectable 1 milliGRAM(s) IntraMuscular once PRN  insulin lispro (HumaLOG) corrective regimen sliding scale   SubCutaneous three times a day before meals  insulin lispro (HumaLOG) corrective regimen sliding scale   SubCutaneous at bedtime  lactulose Syrup 30 Gram(s) Oral three times a day  lidocaine   Patch 1 Patch Transdermal daily  midodrine 5 milliGRAM(s) Oral three times a day  multivitamin 1 Tablet(s) Oral daily  rifaximin 550 milliGRAM(s) Oral two times a day  thiamine 100 milliGRAM(s) Oral daily  traMADol 50 milliGRAM(s) Oral every 8 hours PRN    PMHX/PSHX:    Hypertension  Congestive heart failure (CHF) with reduced ejection fraction  Esophageal varices  Alcoholic cirrhosis of liver with ascites  Abdominal hernia  H/O cataract    Family history:   Family history of hypercholesterolemia (Father)  Family history of diabetes mellitus (Mother)  No pertinent family history in first degree relatives    ROS:   General: No wt loss, fevers, chills, night sweats, fatigue,   Eyes: Good vision, no reported pain  ENT: No sore throat, pain, runny nose, dysphagia  CV: No pain, palpitations  Resp: No dyspnea, cough, tachypnea, wheezing  GI: No pain, No N/V/D. No constipation, No pruritis, No rectal bleeding, No tarry stools, No dysphagia.  : No pain, bleeding, incontinence, nocturia  Muscle: No pain, weakness  Neuro: No weakness, tingling, memory problems. Limited ability to test attention span using MMSE due to patient unwillingness.  Psych: No fatigue, insomnia, depression. +irritability.  Endocrine: No polyuria, polydipsia, cold/heat intolerance  Heme: No petechiae.  Skin: No rash, tattoos, scars, edema    PHYSICAL EXAM:   Vital Signs Last 24 Hrs  T(C): 36.9 (10 Rell 2019 05:25), Max: 37 (10 Rell 2019 00:15)  T(F): 98.4 (10 Rell 2019 05:25), Max: 98.6 (10 Rell 2019 00:15)  HR: 61 (10 Rell 2019 05:25) (60 - 85)  BP: 94/51 (10 Rell 2019 05:25) (90/51 - 96/59)  BP(mean): --  RR: 18 (10 Rell 2019 05:25) (18 - 18)  SpO2: 94% (10 Rell 2019 05:25) (92% - 98%)    GENERAL:  Appears stated age, well-groomed, well-nourished, no apparent distress  HEENT:  NC/AT, conjunctivae pale pink, no thyromegaly, no JVD, sclera +icteric  CHEST:  Full & symmetric excursion, no increased effort, breath sounds clear bilaterally. +gynecomastia  HEART:  Regular rhythm, S1, S2, no murmur/rub/S3/S4, no abdominal bruit, no edema  ABDOMEN:  Soft, non-tender, non-distended, no masses appreciated  EXTREMITIES  no cyanosis, clubbing, or edema  SKIN:  No rash, abscesses, erythema or petechiae seen. Several ecchymoses around PIV sites on bilateral upper extremities. Skin dry and intact.  NEURO:  Alert & oriented x3, no encephalopathy.    LABS:               7.1    5.4   )-----------( 32       ( 10 Rell 2019 06:31 )             20.5                  7.3    5.3   )-----------( 34       ( 09 Jan 2019 05:23 )             20.9     01-10    135  |  104  |  26<H>  ----------------------------<  123<H>  4.9   |  20<L>  |  0.76    Ca    8.2<L>      10 Rell 2019 06:31  Phos  2.2     01-10  Mg     2.4     01-10    TPro  5.3<L>  /  Alb  3.1<L>  /  TBili  13.1<H>  /  DBili  x   /  AST  60<H>  /  ALT  15  /  AlkPhos  85  01-10    01-09    138  |  108  |  26<H>  ----------------------------<  104<H>  4.9   |  21<L>  |  0.82    Ca    8.4      09 Jan 2019 05:25  Phos  2.4     01-09  Mg     2.3     01-09    TPro  5.4<L>  /  Alb  3.3  /  TBili  13.0<H>  /  DBili  5.0<H>   /  AST  61<H>  /  ALT  12  /  AlkPhos  89  01-09    PT/INR - ( 10 Rell 2019 06:31 )   PT: 42.0 sec;   INR: 3.54 ratio       PT/INR - ( 09 Jan 2019 10:32 )   PT: 52.2 sec;   INR: 4.37 ratio         Imaging:    EXAM: MR ABDOMEN [facility]                        PROCEDURE DATE:  01/06/2019    INTERPRETATION:  CLINICAL INFORMATION: Ethanol cirrhosis and heart   failure with reduced ejection fraction. Jaundice, hypotension.  COMPARISON: CT abdomen and pelvis 9/28/2018, duplex sonography of the   abdomen dated 9/28/2018.  PROCEDURE:   MRI of the abdomen was performed with and without IV contrast.  10 cc of Gadavist administered, 0 cc discarded.   MRCP was performed.  Liver iron quantification with Rhennes protocol.    FINDINGS:    LOWER CHEST: Within normal limits.    LIVER: Cirrhosis. No evidence of hepatocellular cancer. Estimated hepatic iron concentration using a TR of 14 ms: 90 (+/- 30) umol/g (normal <36).    BILE DUCTS: Normal caliber. No evidence of choledocholithiasis.  GALLBLADDER: No evidence of gallstones. Mildly nonspecific wall edema.  SPLEEN: Splenomegaly.   PANCREAS: A 1.5 cm uncinate process cyst. No pancreatic ductal dilatation.  ADRENALS: Within normal limits.  KIDNEYS/URETERS: Within normal limits.    VISUALIZED PORTIONS:  BOWEL: Within normal limits.   PERITONEUM: Small volume ascites.  VESSELS: Hepatic and portal veins are patent. Evidence of portal hypertension as evidenced by a patent paraumbilical vein and additional   upper abdominal varices.  RETROPERITONEUM: No lymphadenopathy.    ABDOMINAL WALL: Within normal limits.  BONES: Within normal limits.    IMPRESSION:   Cirrhosis with evidence of portal hypertension. Small volume ascites.  No evidence of hepatocellular cancer.  Mild hepatic iron deposition.  Portal hypertension.  No evidence of cholecystitis or biliary ductal dilatation.      TTE  with 2-D, M-Mode, and complete spectral and color flow Doppler  Observations:  Mitral Valve: Mitral annular calcification. Mild mitral regurgitation.  Aortic Valve/Aorta: Calcified aortic valve. Peak transaortic valve gradient equals 16 mm Hg, mean transaortic valve gradient equals 10 mm Hg, aortic valve velocity time integral equals 46 cm, consistent with mild aortic stenosis. Peak left ventricular outflow tract gradient equals 5 mm Hg, mean gradient is equal to 3 mm Hg, LVOT velocity time integral equals 27 cm.  Aortic Root: 3.4 cm.  LVOT diameter: 2 cm.  Left Atrium: Normal left atrium.  Left Ventricle: Endocardium not well visualized; moderate global left ventricular dysfunction. Endocardial visualization enhanced with intravenous injection of Ultrasonic Enhancing Agent (Definity). Eccentric left ventricular hypertrophy (dilated left ventricle with normal relative wall thickness).  Right Heart: Normal right atrium. The right ventricle is not well visualized; grossly normal right ventricular systolic function. Normal tricuspid valve. Mild tricuspid regurgitation. Normal pulmonic valve. Mild pulmonic regurgitation.  Pericardium/Pleura: Normal pericardium with no pericardial effusion.  Hemodynamic: Estimated right atrial pressure is 8 mm Hg. Estimated right ventricular systolic pressure equals 31 mm Hg, assuming right atrial pressure equals 8 mm Hg, consistent with normal pulmonary pressures.    Conclusions:  1. Eccentric left ventricular hypertrophy (dilated left ventricle with normal relative wall thickness).  2. Endocardium not well visualized; moderate global left ventricular dysfunction. Endocardial visualization enhanced with intravenous injection of Ultrasonic Enhancing Agent (Definity).  3. The right ventricle is not well visualized; grossly normal right ventricular systolic function. Chief Complaint: Patient is a 53y old  Male who presents with a chief complaint of hypotension, decompensated cirrhosis (10 Rell 2019)    Interval Events:   No acute overnight events. Per PCP team, pt unable to obtain diagnostic parecentesis with IR 2/2 worsening synthetic liver function tests, requiring optimization before procedure. Patient received 1U FFP 1/9 with responsive repeat coag panel. This morning patient received 1 additional unit of FFP and Vit. K PO 10mg, with IR reassessment for paracentesis s/p repeat coag panel results today. Patient in bed with family at bedside. Patient continues to report feeling "fine", eating however notes having poor appetite. Per brother, pt continues to have ~2-3 loose stools, however describes color between brown and "very dark yellow". No SOB, chills, or lightheadedness. No abdominal pain or reported bleeding.    Pt's brother also expressed wanting a doctor to come in and evaluate a "baseball-sized lump" that "looks like a boil" on patient's left buttocks, which causes discomfort whenever he has to sit on toilet for BM. Patient eating now, but states he will get nursing staff to examine and contact physician for evaluation when he stands up for BM again later today.     Allergies:  No Known Allergies    Home Medications:    Hospital Medications:  albumin human  5% IVPB 250 milliLiter(s) IV Intermittent every 6 hours  artificial tears (preservative free) Ophthalmic Solution 1 Drop(s) Both EYES daily PRN  chlorhexidine 0.12% Liquid 15 milliLiter(s) Oral Mucosa two times a day  dextrose 40% Gel 15 Gram(s) Oral once PRN  dextrose 5%. 1000 milliLiter(s) IV Continuous <Continuous>  dextrose 50% Injectable 12.5 Gram(s) IV Push once  dextrose 50% Injectable 25 Gram(s) IV Push once  dextrose 50% Injectable 25 Gram(s) IV Push once  folic acid 1 milliGRAM(s) Oral daily  glucagon  Injectable 1 milliGRAM(s) IntraMuscular once PRN  insulin lispro (HumaLOG) corrective regimen sliding scale   SubCutaneous three times a day before meals  insulin lispro (HumaLOG) corrective regimen sliding scale   SubCutaneous at bedtime  lactulose Syrup 30 Gram(s) Oral three times a day  lidocaine   Patch 1 Patch Transdermal daily  midodrine 5 milliGRAM(s) Oral three times a day  multivitamin 1 Tablet(s) Oral daily  rifaximin 550 milliGRAM(s) Oral two times a day  thiamine 100 milliGRAM(s) Oral daily  traMADol 50 milliGRAM(s) Oral every 8 hours PRN    PMHX/PSHX:    Hypertension  Congestive heart failure (CHF) with reduced ejection fraction  Esophageal varices  Alcoholic cirrhosis of liver with ascites  Abdominal hernia  H/O cataract    Family history:   Family history of hypercholesterolemia (Father)  Family history of diabetes mellitus (Mother)  No pertinent family history in first degree relatives    ROS:   General: No wt loss, fevers, chills, night sweats, fatigue,   Eyes: Good vision, no reported pain  ENT: No sore throat, pain, runny nose, dysphagia  CV: No pain, palpitations  Resp: No dyspnea, cough, tachypnea, wheezing  GI: No pain, No N/V/D. No constipation, No pruritis, No rectal bleeding, No tarry stools, No dysphagia.  : No pain, bleeding, incontinence, nocturia  Muscle: No pain, weakness  Neuro: No weakness, tingling, memory problems. Limited ability to test attention span using MMSE due to patient unwillingness.  Psych: No fatigue, insomnia, depression. +irritability.  Endocrine: No polyuria, polydipsia, cold/heat intolerance  Heme: No petechiae.  Skin: No rash, tattoos, scars, edema    PHYSICAL EXAM:   Vital Signs Last 24 Hrs  T(C): 36.9 (10 Rell 2019 05:25), Max: 37 (10 Rell 2019 00:15)  T(F): 98.4 (10 Rell 2019 05:25), Max: 98.6 (10 Rell 2019 00:15)  HR: 61 (10 Rell 2019 05:25) (60 - 85)  BP: 94/51 (10 Rell 2019 05:25) (90/51 - 96/59)  BP(mean): --  RR: 18 (10 Rell 2019 05:25) (18 - 18)  SpO2: 94% (10 Rell 2019 05:25) (92% - 98%)    GENERAL:  Appears stated age, well-groomed, well-nourished, no apparent distress  HEENT:  NC/AT, conjunctivae pale pink, no thyromegaly, no JVD, sclera +icteric  CHEST:  Full & symmetric excursion, no increased effort, breath sounds clear bilaterally. +gynecomastia  HEART:  Regular rhythm, S1, S2, no murmur/rub/S3/S4, no abdominal bruit, no edema  ABDOMEN:  Soft, non-tender, non-distended, no masses appreciated  EXTREMITIES  no cyanosis, clubbing, or edema  SKIN:  No rash, abscesses, erythema or petechiae seen. Several ecchymoses around PIV sites on bilateral upper extremities. Skin dry and intact.  NEURO:  Alert & oriented x3, no encephalopathy.    LABS:               7.1    5.4   )-----------( 32       ( 10 Rell 2019 06:31 )             20.5                  7.3    5.3   )-----------( 34       ( 09 Jan 2019 05:23 )             20.9     01-10    135  |  104  |  26<H>  ----------------------------<  123<H>  4.9   |  20<L>  |  0.76    Ca    8.2<L>      10 Rell 2019 06:31  Phos  2.2     01-10  Mg     2.4     01-10    TPro  5.3<L>  /  Alb  3.1<L>  /  TBili  13.1<H>  /  DBili  x   /  AST  60<H>  /  ALT  15  /  AlkPhos  85  01-10    01-09    138  |  108  |  26<H>  ----------------------------<  104<H>  4.9   |  21<L>  |  0.82    Ca    8.4      09 Jan 2019 05:25  Phos  2.4     01-09  Mg     2.3     01-09    TPro  5.4<L>  /  Alb  3.3  /  TBili  13.0<H>  /  DBili  5.0<H>   /  AST  61<H>  /  ALT  12  /  AlkPhos  89  01-09      PT/INR - ( 10 Rell 2019 06:31 )   PT: 42.0 sec;   INR: 3.54 ratio     PT/INR - ( 09 Jan 2019 10:32 )   PT: 52.2 sec;   INR: 4.37 ratio         Imaging:    EXAM: MR ABDOMEN [facility]                      01/06/2019    INTERPRETATION:  CLINICAL INFORMATION: Ethanol cirrhosis and heart failure with reduced ejection fraction. Jaundice, hypotension.  COMPARISON: CT abdomen and pelvis 9/28/2018, duplex sonography of the abdomen dated 9/28/2018.  PROCEDURE:   MRI of the abdomen was performed with and w/o IV contrast.  10 cc of Gadavist administered, 0 cc discarded.   MRCP was performed.  Liver iron quantification with Rhennes protocol.    FINDINGS:  LOWER CHEST: Within normal limits.    LIVER: Cirrhosis. No evidence of hepatocellular cancer. Estimated hepatic iron concentration using a TR of 14 ms: 90 (+/- 30) umol/g (normal <36).    BILE DUCTS: Normal caliber. No evidence of choledocholithiasis.  GALLBLADDER: No evidence of gallstones. Mildly nonspecific wall edema.  SPLEEN: Splenomegaly.   PANCREAS: A 1.5 cm uncinate process cyst. No pancreatic ductal dilatation.  ADRENALS: Within normal limits.  KIDNEYS/URETERS: Within normal limits.    VISUALIZED PORTIONS:  BOWEL: Within normal limits.   PERITONEUM: Small volume ascites.  VESSELS: Hepatic and portal veins are patent. Evidence of portal hypertension as evidenced by a patent paraumbilical vein and additional upper abdominal varices.  RETROPERITONEUM: No lymphadenopathy.    ABDOMINAL WALL: Within normal limits.  BONES: Within normal limits.    IMPRESSION:   Cirrhosis with evidence of portal hypertension. Small volume ascites.  No evidence of hepatocellular cancer.  Mild hepatic iron deposition.  Portal hypertension.  No evidence of cholecystitis or biliary ductal dilatation.  -----------------------------------------------------------------------------------------------------------------------------------------------    EXAM: TTE  with 2-D, M-Mode, and complete spectral and color flow Doppler  Observations:  Mitral Valve: Mitral annular calcification. Mild mitral regurgitation.  Aortic Valve/Aorta: Calcified aortic valve. Peak transaortic valve gradient equals 16 mm Hg, mean transaortic valve gradient equals 10 mm Hg, aortic valve velocity time integral equals 46 cm, consistent with mild aortic stenosis. Peak left ventricular outflow tract gradient equals 5 mm Hg, mean gradient is equal to 3 mm Hg, LVOT velocity time integral equals 27 cm.  Aortic Root: 3.4 cm.  LVOT diameter: 2 cm.  Left Atrium: Normal left atrium.  Left Ventricle: Endocardium not well visualized; moderate global left ventricular dysfunction. Endocardial visualization enhanced with intravenous injection of Ultrasonic Enhancing Agent (Definity). Eccentric left ventricular hypertrophy (dilated left ventricle with normal relative wall thickness).  Right Heart: Normal right atrium. The right ventricle is not well visualized; grossly normal right ventricular systolic function. Normal tricuspid valve. Mild tricuspid regurgitation. Normal pulmonic valve. Mild pulmonic regurgitation.  Pericardium/Pleura: Normal pericardium with no pericardial effusion.  Hemodynamic: Estimated right atrial pressure is 8 mm Hg. Estimated right ventricular systolic pressure equals 31 mm Hg, assuming right atrial pressure equals 8 mm Hg, consistent with normal pulmonary pressures.    Conclusions:  1. Eccentric LVH (dilated LV with normal relative wall thickness).  2. Endocardium not well visualized; moderate global LV dysfunction. Endocardial visualization enhanced with IV injection of Ultrasonic Enhancing Agent (Definity).  3. The RV is not well visualized; grossly normal RV systolic function.

## 2019-01-10 NOTE — PROGRESS NOTE ADULT - ASSESSMENT
53M end stage liver disease 2/2 alcoholic cirrhosis, DM-II, severe HFrEF (<35%), CAD, HTN, presenting with progressively worsening generalized weakness. During admission, he was found to have gram negative rods and gram positive cocci blood cultures, but this was thought to be secondary to contamination.     #Decompensated Cirrhosis: suspected EtOH related - MELD-Na: 32 today (34 on admission)  Patient was listed for transplant at [facility] but given new diagnosis of HFrEF in 10/2018, he was placed on hold on wait list with plan to reassess cardiac function. Repeat JOHNSON this admission indicates improvement of cardiac function from prior study, but still impaired with EF 43% from 35%.   TTE w/ Doppler results on 1/8/19 Faxed to Dr. Stokes's office for reference, will receive f/u call confirming receipt.  HE: On Lactulose at home (Not on Rifaximin 2/2 prescription cost)  Ascites: small to moderate ascites on US 1/3/19. Has a history of E.coli SBP last episode in 10/2018  HCC: No focal masses on US 9/2018 and on repeat MR Abdomen 1/6/19.  Varices: Small on EGD 8/2017, on spironolactone 25mg and lasix 20mg daily at home  Repeat Hep A, B, C serologies and CMV, HSV, & EBV PCR all negative (1/4/19).  Immunoglobulin panel: Quantitative IgA, IgM, & IgG (766, 296, & 1915, respectively), [place]/ Lambda Free light chain ratio 1.73 <H>, and BRIDGER Kappa & Lambda 8.11 and 4.70, respectively.  MR abdomen indicates mild iron deposition within liver, cirrhosis, and portal hypertension. MRCP unremarkable with patent bile ducts and nonspecific gallbladder wall thickening with no evidence of biliary obstruction. Alk Phos/AST/ALT downtrending.    #GNR and coag negative staph blood cultures  ID following and thought to be secondary to contaminant. Not currently on IV abx.    #Macrocytic Anemia: Low suspicion for active GI bleed as would expect overt GI bleeding given with drop by 2U in one day.  Repeat B12, Folate on 1/7/19 indicates no B9/ B12 deficiency (B12 >2000; B9 >20.0). Repeat TSH on 1/7/19 wnl (1.81).  Less common contributing etiologies possibly explaining macrocytosis to consider include copper deficiency, chronic liver disease affecting lipid composition of RBC membranes, increased endogenous estrogens from pt's liver disease (less likely given ULN folate levels), or drug-induced also less likely in pt.   HD stable now, no obvious signs of bleeding. Iron studies 1/9/18 less concerning for hemolysis, however impaired liver function may be in part contributing to decreased synthesis of carrier proteins.    Recommendations:  - Continue lactulose and rifaximin, titrate to 2-3 BM per day  - Follow up with planned IR diagnostic paracentesis per PCP team s/p second unit of FFP and repeat INR today.  - Trend MELD labs and CBC daily  - Recommend Hematology consult for further workup, evaluation, and management of macrocytosis. 53M end stage liver disease 2/2 alcoholic cirrhosis, DM-II, severe HFrEF (<35%), CAD, HTN, presenting with progressively worsening generalized weakness. During admission, he was found to have gram negative rods and gram positive cocci blood cultures, but this was thought to be secondary to contamination.     #Decompensated Cirrhosis: suspected EtOH related - MELD-Na: 32 today (34 on admission)  Patient was listed for [facility] at [facility] but given new diagnosis of HFrEF in 10/2018, he was placed on hold on wait list with plan to reassess cardiac function. Repeat JOHNSON this admission indicates improvement of cardiac function from prior study, but still impaired with EF 43% from 35%.  TTE w/ Doppler results on 1/8/19 Faxed to Dr. Stokes's office for reference, will receive f/u call confirming review and receipt.  HE: On Lactulose and rifaximin in hospital, lactulose only at home (Not on Rifaximin 2/2 prescription cost)  Ascites: small volume ascites on MR Abdomen 1/6/19. Has a history of E.coli SBP last episode in 10/2018.  HCC: No focal masses on US 9/2018 and on repeat MR Abdomen 1/6/19.  Varices: Small on EGD 8/2017, on spironolactone 25mg and lasix 20mg daily at home. Propranolol d/c'ed 10/2018 due to persistent hypotension.  Repeat viral infectious w/u (Hep A, B, C serologies; CMV, HSV, EBV PCR) all negative (1/4/19). Total Immunoglobulin panel: Quantitative IgA, IgM, & IgG (766, 296, & 1915, respectively), [place]/ Lambda Free light chain ratio 1.73 <H>, and BRIDGER Kappa & Lambda 8.11 and 4.70, respectively.   MR abdomen indicates mild iron deposition within liver, cirrhosis, and portal hypertension. MRCP unremarkable with patent bile ducts and nonspecific gallbladder wall thickening with no evidence of biliary obstruction. Alk Phos/AST/ALT downtrending.    #GNR and coag negative staph blood cultures  ID following and thought to be secondary to contaminant. Not currently on IV abx.    #Macrocytic Anemia: Low suspicion for active GI bleed as would expect overt GI bleeding given with drop by 2U in one day.  Repeat B12, Folate on 1/7/19 indicates no B9/ B12 deficiency (B12 >2000; B9 >20.0). Repeat TSH on 1/7/19 wnl (1.81).  Less common contributing etiologies possibly explaining macrocytosis to consider include copper deficiency, chronic liver disease affecting lipid composition of RBC membranes, increased endogenous estrogens from pt's liver disease (less likely given ULN folate levels), or drug-induced also less likely in pt. HD stable now, no obvious signs of bleeding. Iron studies 1/9/18 less concerning for hemolysis, however impaired liver function may be in part contributing to decreased synthesis of carrier proteins.    Recommendations:  - Continue lactulose and rifaximin, titrate to 2-3 BM per day  - Follow up with planned IR diagnostic paracentesis per PCP team s/p second unit of FFP and repeat INR today.  - Trend MELD labs and CBC daily  - Recommend Hematology consult for further workup, evaluation, and management of macrocytosis.

## 2019-01-10 NOTE — PROGRESS NOTE ADULT - PROBLEM SELECTOR PLAN 3
Patient with no florid ascites on physical exam; abdomen soft and nondistended.  However, patient previously treated for SBP and patient with esophageal varices.   Patient complains of diffuse abdominal pain particularly in RUQ and LLQ.    - Plan for diagnostic paracentesis pending INR coagulopathy optimization.   - Abdominal ultrasound showing small to moderate ascites.  - Hold off antibiotics as appears blood culture contaminant Hgb dropped from 10 to 7.4, unclear source of bleeding. Patient has component of macrocytic anemia. Transfusion goal of Hgb<7.0. Occult blood negative.  - Iron studies consistent with anemia of chronic disease (High Ferritin, Low Transferrin).  - Hemolysis labs show low Haptoglobin (<20), however normal LDH. Using bilirubin is non specific given liver pathology. Given no ULICES or hyperkalemia episodes less convinced of hemolysis.   - Hgb response jumped however now is trending down again. No obvious source of bleed. Will eventually need scope to reevaluate for varices.   - Supratherapeutic INR 2/2 liver disease. No obvious signs or symptoms of bleeding (tachycardia, hypotension, hematemesis) will continue to monitor.   - 1U FFP given 1/9 and Vitamin K 10 mg PO INR now 3.54. Will transfuse 1U FFP today for IR procedure.

## 2019-01-10 NOTE — PROGRESS NOTE ADULT - PROBLEM SELECTOR PLAN 5
PLT count 77k this admission, compared to 75k on previous admission (comparable). This, in addition to elevated INR and hyponatremia, are likely in setting of end stage liver disease.     - Worsening platelets can be secondary to liver cirrhosis. No signs of active bleeding despite Hgb decreasing (tachycardia, hypotension, melena).   - s/p 1 unit PRBC 1/5  - No active bleeding. PLT count 77k this admission, compared to 75k on previous admission (comparable). This, in addition to elevated INR and hyponatremia, are likely in setting of end stage liver disease.     - Worsening platelets can be secondary to liver cirrhosis. No signs of active bleeding despite Hgb decreasing (tachycardia, hypotension, melena).   - No active bleeding.

## 2019-01-10 NOTE — PROGRESS NOTE ADULT - ASSESSMENT
53M end stage liver disease 2/2 alcoholic cirrhosis, DMII, severe HFrEF (35%), CAD, HTN, presenting with 1 week of generalized weakness and hypotension, likely 2/2 decompensated cirrhosis in the setting of sepsis. Now found to have gram negative rods and gram positive bacteremia explaining sepsis. 1/5 with drop in hemoglobin, HD stable, no s/s of bleeding, will transfuse 1 unit, check stool guaiac and observe closely, GI follow up. Back to baseline SBPs, mentating well and currently improved symptoms compared to admission. MR abdomen showing mild Iron deposition, cirrhosis, and portal hypertension. Now s/p 1U PRBC for hemoglobin drop. CBC abnormalities most likely 2/2 due to cirrhosis (thrombcytopenia and anemia) without evidence of obvious bleed (low Hgb/Hct, tachycardia, hypotension from baseline, negative occult blood stool). Patient with worsening liver failure (INR now 4.37, bilirubin trending up) will need to be optimized further prior to diagnostic paracentesis by IR. Will administer FFP over 3 hours now, and recheck INR if response will go for paracentesis. Now also s/p Vitamin K PO. Meld Na is 33, poor prognosis within 3 months. 53M end stage liver disease 2/2 alcoholic cirrhosis, DMII, severe HFrEF (35%), CAD, HTN, presenting with 1 week of generalized weakness and hypotension, likely 2/2 decompensated cirrhosis in the setting of sepsis. Now found to have gram negative rods and gram positive bacteremia explaining sepsis. 1/5 with drop in hemoglobin, HD stable, no s/s of bleeding, will transfuse 1 unit, check stool guaiac and observe closely, GI follow up. Back to baseline SBPs, mentating well and currently improved symptoms compared to admission. MR abdomen showing mild Iron deposition, cirrhosis, and portal hypertension. Now s/p 1U PRBC for hemoglobin drop. CBC abnormalities most likely 2/2 due to cirrhosis (thrombcytopenia and anemia) without evidence of obvious bleed (low Hgb/Hct, tachycardia, hypotension from baseline, negative occult blood stool). Patient with worsening liver failure (INR now 3.54, bilirubin trending up) will need to be optimized further prior to diagnostic paracentesis by IR. Received 1U FFP 1/9, will optimize further with Vitamin K PO and 1 more unit of FFP today and recheck INR if continues to respond will go for paracentesis. Meld Na is 32 poor prognosis within 3 months. 53M end stage liver disease 2/2 alcoholic cirrhosis, DMII, severe HFrEF (35%), CAD, HTN, presenting with 1 week of generalized weakness and hypotension, likely 2/2 decompensated cirrhosis in the setting of sepsis. Back to baseline SBPs, mentating well and currently improved symptoms compared to admission. MR abdomen showing mild Iron deposition, cirrhosis, and portal hypertension. CBC abnormalities most likely 2/2 due to cirrhosis (thrombcytopenia and anemia) without evidence of obvious bleed (low Hgb/Hct, tachycardia, hypotension from baseline, negative occult blood stool). Patient with worsening liver failure (INR now 3.54 now 2.84 s/p 2U FFP total, bilirubin trending up).  Now s/p diagnostic paracentesis will f/u labs. Meld Na is 32 poor prognosis within 3 months.

## 2019-01-10 NOTE — PROGRESS NOTE ADULT - PROBLEM SELECTOR PLAN 1
Hgb dropped from 10 to 7.4, unclear source of bleeding. Patient has component of macrocytic anemia. Transfusion goal of Hgb<7.0     - Hgb response jumped however now is trending down again. No obvious source of bleed. Will eventually need scope to reevaluate for varices.   - Stool guaiac negative (1/5).   - Supratherapeutic INR 2/2 liver disease. No obvious signs or symptoms of bleeding (tachycardia, hypotension, hematemesis) will continue to monitor.   - Getting 1U FFP 1/9. MELD 34 on admission, indicating >50% mortality in next 3 months. Was recently on liver transplant candidate list at [facility], however had to be removed due to severe HFrEF, no longer plans for liver transplant    - Stopped Albumin 5% 250 cc doses 1/9.   - Meld Na 1/10 is 32  - Patient on Furosemide 20 mg and spironolactone 25 mg daily, will hold diuretics for now until paracentesis done. Renal function improved.   - Will continue with Lactulose and Rifaximin PO.   - Evidence of severe synthetic dysfunction, portal hypertensive, varices, and existing heart failure. ULICES resolved.   - U/S Abdomen showed small to moderate ascites in the RUQ. Per hepatology, consider diagnostic paracentesis to rule out SBP by IR pending INR optimization.   - MRCP/ MR abdomen showing changes as discussed above.

## 2019-01-11 DIAGNOSIS — T14.8XXA OTHER INJURY OF UNSPECIFIED BODY REGION, INITIAL ENCOUNTER: ICD-10-CM

## 2019-01-11 LAB
ALBUMIN SERPL ELPH-MCNC: 3.1 G/DL — LOW (ref 3.3–5)
ALP SERPL-CCNC: 93 U/L — SIGNIFICANT CHANGE UP (ref 40–120)
ALT FLD-CCNC: 12 U/L — SIGNIFICANT CHANGE UP (ref 10–45)
ANION GAP SERPL CALC-SCNC: 9 MMOL/L — SIGNIFICANT CHANGE UP (ref 5–17)
AST SERPL-CCNC: 57 U/L — HIGH (ref 10–40)
BASOPHILS # BLD AUTO: 0 K/UL — SIGNIFICANT CHANGE UP (ref 0–0.2)
BASOPHILS NFR BLD AUTO: 0.5 % — SIGNIFICANT CHANGE UP (ref 0–2)
BILIRUB SERPL-MCNC: 13.7 MG/DL — HIGH (ref 0.2–1.2)
BUN SERPL-MCNC: 23 MG/DL — SIGNIFICANT CHANGE UP (ref 7–23)
CALCIUM SERPL-MCNC: 8.6 MG/DL — SIGNIFICANT CHANGE UP (ref 8.4–10.5)
CHLORIDE SERPL-SCNC: 108 MMOL/L — SIGNIFICANT CHANGE UP (ref 96–108)
CO2 SERPL-SCNC: 19 MMOL/L — LOW (ref 22–31)
CREAT SERPL-MCNC: 0.73 MG/DL — SIGNIFICANT CHANGE UP (ref 0.5–1.3)
EOSINOPHIL # BLD AUTO: 0.4 K/UL — SIGNIFICANT CHANGE UP (ref 0–0.5)
EOSINOPHIL NFR BLD AUTO: 7.3 % — HIGH (ref 0–6)
GLUCOSE BLDC GLUCOMTR-MCNC: 152 MG/DL — HIGH (ref 70–99)
GLUCOSE BLDC GLUCOMTR-MCNC: 193 MG/DL — HIGH (ref 70–99)
GLUCOSE BLDC GLUCOMTR-MCNC: 196 MG/DL — HIGH (ref 70–99)
GLUCOSE BLDC GLUCOMTR-MCNC: 234 MG/DL — HIGH (ref 70–99)
GLUCOSE SERPL-MCNC: 137 MG/DL — HIGH (ref 70–99)
HCT VFR BLD CALC: 20.5 % — CRITICAL LOW (ref 39–50)
HGB BLD-MCNC: 7 G/DL — CRITICAL LOW (ref 13–17)
INR BLD: 3.09 RATIO — HIGH (ref 0.88–1.16)
LYMPHOCYTES # BLD AUTO: 0.6 K/UL — LOW (ref 1–3.3)
LYMPHOCYTES # BLD AUTO: 13 % — SIGNIFICANT CHANGE UP (ref 13–44)
MAGNESIUM SERPL-MCNC: 2.4 MG/DL — SIGNIFICANT CHANGE UP (ref 1.6–2.6)
MCHC RBC-ENTMCNC: 34.4 GM/DL — SIGNIFICANT CHANGE UP (ref 32–36)
MCHC RBC-ENTMCNC: 35.9 PG — HIGH (ref 27–34)
MCV RBC AUTO: 104 FL — HIGH (ref 80–100)
MONOCYTES # BLD AUTO: 0.6 K/UL — SIGNIFICANT CHANGE UP (ref 0–0.9)
MONOCYTES NFR BLD AUTO: 11.7 % — SIGNIFICANT CHANGE UP (ref 2–14)
NEUTROPHILS # BLD AUTO: 3.4 K/UL — SIGNIFICANT CHANGE UP (ref 1.8–7.4)
NEUTROPHILS NFR BLD AUTO: 67.5 % — SIGNIFICANT CHANGE UP (ref 43–77)
NIGHT BLUE STAIN TISS: SIGNIFICANT CHANGE UP
PHOSPHATE SERPL-MCNC: 2.5 MG/DL — SIGNIFICANT CHANGE UP (ref 2.5–4.5)
PLATELET # BLD AUTO: 37 K/UL — LOW (ref 150–400)
POTASSIUM SERPL-MCNC: 4.8 MMOL/L — SIGNIFICANT CHANGE UP (ref 3.5–5.3)
POTASSIUM SERPL-SCNC: 4.8 MMOL/L — SIGNIFICANT CHANGE UP (ref 3.5–5.3)
PROT SERPL-MCNC: 5.2 G/DL — LOW (ref 6–8.3)
PROTHROM AB SERPL-ACNC: 36.8 SEC — HIGH (ref 10–12.9)
RBC # BLD: 1.96 M/UL — LOW (ref 4.2–5.8)
RBC # FLD: 18.2 % — HIGH (ref 10.3–14.5)
SODIUM SERPL-SCNC: 136 MMOL/L — SIGNIFICANT CHANGE UP (ref 135–145)
SPECIMEN SOURCE: SIGNIFICANT CHANGE UP
WBC # BLD: 5 K/UL — SIGNIFICANT CHANGE UP (ref 3.8–10.5)
WBC # FLD AUTO: 5 K/UL — SIGNIFICANT CHANGE UP (ref 3.8–10.5)

## 2019-01-11 PROCEDURE — 99232 SBSQ HOSP IP/OBS MODERATE 35: CPT | Mod: GC

## 2019-01-11 PROCEDURE — 99233 SBSQ HOSP IP/OBS HIGH 50: CPT | Mod: GC

## 2019-01-11 PROCEDURE — 99232 SBSQ HOSP IP/OBS MODERATE 35: CPT

## 2019-01-11 PROCEDURE — 76882 US LMTD JT/FCL EVL NVASC XTR: CPT | Mod: 26,RT

## 2019-01-11 RX ORDER — PHYTONADIONE (VIT K1) 5 MG
5 TABLET ORAL DAILY
Qty: 0 | Refills: 0 | Status: DISCONTINUED | OUTPATIENT
Start: 2019-01-11 | End: 2019-01-11

## 2019-01-11 RX ORDER — FUROSEMIDE 40 MG
20 TABLET ORAL DAILY
Qty: 0 | Refills: 0 | Status: DISCONTINUED | OUTPATIENT
Start: 2019-01-11 | End: 2019-01-17

## 2019-01-11 RX ORDER — SPIRONOLACTONE 25 MG/1
50 TABLET, FILM COATED ORAL
Qty: 0 | Refills: 0 | Status: DISCONTINUED | OUTPATIENT
Start: 2019-01-11 | End: 2019-01-17

## 2019-01-11 RX ORDER — PHYTONADIONE (VIT K1) 5 MG
10 TABLET ORAL DAILY
Qty: 0 | Refills: 0 | Status: COMPLETED | OUTPATIENT
Start: 2019-01-11 | End: 2019-01-13

## 2019-01-11 RX ADMIN — Medication 20 MILLIGRAM(S): at 11:05

## 2019-01-11 RX ADMIN — LACTULOSE 30 GRAM(S): 10 SOLUTION ORAL at 06:27

## 2019-01-11 RX ADMIN — Medication 1: at 18:12

## 2019-01-11 RX ADMIN — Medication 100 MILLIGRAM(S): at 11:06

## 2019-01-11 RX ADMIN — SPIRONOLACTONE 50 MILLIGRAM(S): 25 TABLET, FILM COATED ORAL at 17:39

## 2019-01-11 RX ADMIN — Medication 1: at 09:03

## 2019-01-11 RX ADMIN — Medication 10 MILLIGRAM(S): at 18:12

## 2019-01-11 RX ADMIN — Medication 1 MILLIGRAM(S): at 11:06

## 2019-01-11 RX ADMIN — Medication 1 TABLET(S): at 11:06

## 2019-01-11 RX ADMIN — Medication 1: at 13:19

## 2019-01-11 RX ADMIN — MIDODRINE HYDROCHLORIDE 5 MILLIGRAM(S): 2.5 TABLET ORAL at 06:28

## 2019-01-11 RX ADMIN — LACTULOSE 30 GRAM(S): 10 SOLUTION ORAL at 13:20

## 2019-01-11 RX ADMIN — MIDODRINE HYDROCHLORIDE 5 MILLIGRAM(S): 2.5 TABLET ORAL at 13:21

## 2019-01-11 RX ADMIN — CHLORHEXIDINE GLUCONATE 15 MILLILITER(S): 213 SOLUTION TOPICAL at 17:39

## 2019-01-11 NOTE — PROGRESS NOTE ADULT - PROBLEM SELECTOR PLAN 1
MELD 34 on admission, indicating >50% mortality in next 3 months. Was recently on liver transplant candidate list at [facility], however had to be removed due to severe HFrEF, no longer plans for liver transplant    - Stopped Albumin 5% 250 cc doses 1/9.   - Meld Na 1/10 is 32  - Patient on Furosemide 20 mg and spironolactone 25 mg daily, will hold diuretics for now until paracentesis done. Renal function improved.   - Will continue with Lactulose and Rifaximin PO.   - Evidence of severe synthetic dysfunction, portal hypertensive, varices, and existing heart failure. ULICES resolved.   - U/S Abdomen showed small to moderate ascites in the RUQ. Per hepatology, consider diagnostic paracentesis to rule out SBP by IR pending INR optimization.   - MRCP/ MR abdomen showing changes as discussed above. MELD 34 on admission, indicating >50% mortality in next 3 months. Was recently on liver transplant candidate list at [facility], however had to be removed due to severe HFrEF, no longer plans for liver transplant    - Stopped Albumin 5% 250 cc doses 1/9.   - Meld Na 1/10 is 32  - Reinitiated Spironolactone 50 mg PO qd and Furosemide 20 mg PO qd.  - Will continue with Lactulose and Rifaximin PO.   - Evidence of severe synthetic dysfunction, portal hypertensive, varices, and existing heart failure. ULICES resolved.   - U/S Abdomen showed small to moderate ascites in the RUQ. Per hepatology, consider diagnostic paracentesis to rule out SBP by IR pending INR optimization.   - MRCP/ MR abdomen showing changes as discussed above. MELD 34 on admission, indicating >50% mortality in next 3 months. Was recently on liver transplant candidate list at [facility], however had to be removed due to severe HFrEF, no longer plans for liver transplant    - Stopped Albumin 5% 250 cc doses 1/9.   - Reinitiated Spironolactone 50 mg PO qd and Furosemide 20 mg PO qd.  - Will continue with Lactulose and Rifaximin PO.   - Evidence of severe synthetic dysfunction, portal hypertensive, varices, and existing heart failure. ULICES resolved.   - U/S Abdomen showed small to moderate ascites in the RUQ. MRCP/ MR abdomen showing changes consistent with cirrhosis and portal hypertension.   - Patient to have EGD done on Monday to evaluate for presence of varices will be optimized further for this with FFP (Goal INR 1.8 or less) and Platelets (Goal >50)

## 2019-01-11 NOTE — PROGRESS NOTE ADULT - PROBLEM SELECTOR PLAN 4
Patient with leukocytosis to WBC 11.8, hypotension to SBP 70's, with no confirmed infectious source but suspicion for SBP given prior treatments for SBP and history of esophageal varices and c/o of vague abd pain.  VBG lactate elevated to 4.7 (possibly from liver disease alone)     - BPs are now baseline SBP 90s and are holding after stopping albumin.   - s/p 250 cc 5% albumin for volume resuscitation. Stopped Albumin 5% 250 cc doses 1/9.   - Hold antibiotics per ID, low threshold to start if s/s of infection    - Holding home diuretics and antihypertensives. PLT count 77k this admission, compared to 75k on previous admission (comparable). This, in addition to elevated INR and hyponatremia, are likely in setting of end stage liver disease.     - Worsening platelets can be secondary to liver cirrhosis. No signs of active bleeding despite Hgb decreasing (tachycardia, hypotension, melena).   - No active bleeding. Hgb dropped from 10 to 7.4, unclear source of bleeding. Patient has component of macrocytic anemia. Transfusion goal of Hgb<7.0. Occult blood negative.    - Iron studies consistent with anemia of chronic disease (High Ferritin, Low Transferrin).  - Hemolysis labs show low Haptoglobin (<20), however normal LDH. Using bilirubin is non specific given liver pathology. Given no ULICES or hyperkalemia episodes less convinced of hemolysis.   - Hgb response jumped however now is trending down again. No obvious source of bleed. Will eventually need scope to reevaluate for varices.   - Supratherapeutic INR 2/2 liver disease. No obvious signs or symptoms of bleeding (tachycardia, hypotension, hematemesis) will continue to monitor.   - s/p 3U FFP total over the course of 1/9-1/10 for optimization prior to diagnostic paracentesis.  - Anemia most likely explained by hematoma of right buttock.

## 2019-01-11 NOTE — PHYSICAL THERAPY INITIAL EVALUATION ADULT - PERTINENT HX OF CURRENT PROBLEM, REHAB EVAL
Patient is a 53 year old male with end stage liver disease 2/2 chronic alcoholic cirrhosis, with esophageal varices, CAD, HfrEF (EF 35%), and DMII, presenting with 1 week of generalized weakness and poor PO intake. Pt found to be hypotensive 2/2 decompensated cirrhosis.

## 2019-01-11 NOTE — PROGRESS NOTE ADULT - ASSESSMENT
53M end stage liver disease 2/2 alcoholic cirrhosis, DMII, severe HFrEF (35%), CAD, HTN, presenting with 1 week of generalized weakness and hypotension, likely 2/2 decompensated cirrhosis in the setting of sepsis. Back to baseline SBPs, mentating well and currently improved symptoms compared to admission. MR abdomen showing mild Iron deposition, cirrhosis, and portal hypertension. CBC abnormalities most likely 2/2 due to cirrhosis (thrombcytopenia and anemia) without evidence of obvious bleed (low Hgb/Hct, tachycardia, hypotension from baseline, negative occult blood stool). Patient with worsening liver failure (INR now 3.54 now 2.84 s/p 2U FFP total, bilirubin trending up).  Now s/p diagnostic paracentesis will f/u labs. Meld Na is 32 poor prognosis within 3 months. 53M end stage liver disease 2/2 alcoholic cirrhosis, DMII, severe HFrEF (35%), CAD, HTN, presenting with 1 week of generalized weakness and hypotension, likely 2/2 decompensated cirrhosis in the setting of sepsis. Back to baseline SBPs, mentating well and currently improved symptoms compared to admission. MR abdomen showing mild Iron deposition, cirrhosis, and portal hypertension. CBC abnormalities most likely 2/2 due to cirrhosis (thrombcytopenia and anemia) without evidence of obvious bleed (low Hgb/Hct, tachycardia, hypotension from baseline, negative occult blood stool). Patient with worsening liver failure (INR now 3.54 now 2.84 s/p 2U FFP total, bilirubin trending up).  Now s/p diagnostic paracentesis will not showing signs of SBP (PMN<250, afebrile, non tender), with worsening bilirubin, anemia despite transfusions now with possible R. buttock hematoma. Patients SBPs stable after stopping albumin and on home dose Midodrine, will resume diuretics and low dose today and observe. 53M end stage liver disease 2/2 alcoholic cirrhosis, DMII, severe HFrEF (35%), CAD, HTN, presenting with 1 week of generalized weakness and hypotension, likely 2/2 decompensated cirrhosis in the setting of sepsis. Back to baseline SBPs, mentating well and currently improved symptoms compared to admission. MR abdomen showing mild Iron deposition, cirrhosis, and portal hypertension. CBC abnormalities most likely 2/2 due to cirrhosis (thrombcytopenia and anemia) without evidence of obvious bleed (low Hgb/Hct, tachycardia, hypotension from baseline, negative occult blood stool). Patient with worsening liver failure (INR now 3.54 now 2.84 s/p 2U FFP total, bilirubin trending up).  Now s/p diagnostic paracentesis will not showing signs of SBP (PMN<250, afebrile, non tender), with worsening bilirubin, anemia despite transfusions now with R. gluteal hematoma of 13 cm most likely explaining patients anemia and pain. Will stabilize coagulopathy with FFP and platelet transfusions. Patients SBPs stable after stopping albumin and on home dose Midodrine, will resume diuretics and low dose today and observe.

## 2019-01-11 NOTE — PROGRESS NOTE ADULT - SUBJECTIVE AND OBJECTIVE BOX
comfortable, aert, not SOB    PAST MEDICAL & SURGICAL HISTORY:  Hypertension  Type 2 diabetes mellitus  Congestive heart failure (CHF)  Esophageal varices  Alcoholic cirrhosis of liver with ascites  Abdominal hernia: S/P repair x2  H/O cataract    Medications:  artificial tears (preservative free) Ophthalmic Solution 1 Drop(s) Both EYES daily PRN  chlorhexidine 0.12% Liquid 15 milliLiter(s) Oral Mucosa two times a day  dextrose 40% Gel 15 Gram(s) Oral once PRN  dextrose 5%. 1000 milliLiter(s) IV Continuous <Continuous>  dextrose 50% Injectable 12.5 Gram(s) IV Push once  dextrose 50% Injectable 25 Gram(s) IV Push once  dextrose 50% Injectable 25 Gram(s) IV Push once  folic acid 1 milliGRAM(s) Oral daily  furosemide    Tablet 20 milliGRAM(s) Oral daily  glucagon  Injectable 1 milliGRAM(s) IntraMuscular once PRN  insulin lispro (HumaLOG) corrective regimen sliding scale   SubCutaneous three times a day before meals  insulin lispro (HumaLOG) corrective regimen sliding scale   SubCutaneous at bedtime  lactulose Syrup 30 Gram(s) Oral three times a day  lidocaine   Patch 1 Patch Transdermal daily  midodrine 5 milliGRAM(s) Oral three times a day  multivitamin 1 Tablet(s) Oral daily  rifaximin 550 milliGRAM(s) Oral two times a day  spironolactone 50 milliGRAM(s) Oral two times a day  thiamine 100 milliGRAM(s) Oral daily  traMADol 50 milliGRAM(s) Oral every 8 hours PRN      Vitals:  T(C): 36.9 (19 @ 06:00), Max: 37.2 (01-10-19 @ 21:05)  HR: 64 (19 @ 06:00) (56 - 65)  BP: 94/56 (19 @ 06:00) (91/53 - 101/60)  BP(mean): --  RR: 18 (19 @ 06:00) (18 - 18)  SpO2: 96% (19 @ 06:00) (93% - 97%)  Wt(kg): --  Daily     Daily Weight in k (2019 06:00)  I&O's Summary    10 Rell 2019 07:01  -  2019 07:00  --------------------------------------------------------  IN: 1200 mL / OUT: 0 mL / NET: 1200 mL        Physical Exam:  Appearance: [x ] Normal [ ] NAD  Eyes: [ ] PERRL [ ] EOMI  HENT: [x ] Normal oral muscosa [ ]NC/AT  Cardiovascular: [x ] S1 [x ] S2 [ ] RRR [ ] No m/r/g [x ]No edema [ ] JVP  Procedural Access Site: ] No hematoma [ ] Non-tender to palpation [ ] 2+ pulse [ ] No bruit [ ] No Ecchymosis  Respiratory: [x ] Clear to auscultation bilaterally  Gastrointestinal: [ x] Soft [ ] Non-tender [ ] Non-distended [ ] BS+  Musculoskeletal: [ ] No clubbing [ ] No joint deformity   Neurologic: [x ] Non-focal  Lymphatic: x[ ] No lymphadenopathy  Psychiatry: [ ] AAOx3 [ ] Mood & affect appropriate  Skin: [ ] No rashes [ ] No ecchymoses [ ] No cyanosis        136  |  108  |  23  ----------------------------<  137<H>  4.8   |  19<L>  |  0.73    Ca    8.6      2019 06:47  Phos  2.5       Mg     2.4         TPro  5.2<L>  /  Alb  3.1<L>  /  TBili  13.7<H>  /  DBili  x   /  AST  57<H>  /  ALT  12  /  AlkPhos  93      PT/INR - ( 2019 06:47 )   PT: 36.8 sec;   INR: 3.09 ratio         PTT - ( 10 Rell 2019 06:31 )  PTT:71.4 sec              ECG:    Echo:    Stress Testing:     Cath:    Imaging:    Interpretation of Telemetry:

## 2019-01-11 NOTE — PROGRESS NOTE ADULT - ASSESSMENT
Remains without significant edema on Lasix 20 and Aldactone 50.  Echo shows improvement in LVEF, although not back to normal.  Cardiac status is stable at present and can continue with current Rx.    M Trenton  [phone]

## 2019-01-11 NOTE — PROGRESS NOTE ADULT - SUBJECTIVE AND OBJECTIVE BOX
Dr. Noman Harden  Internal Medicine PGY-1   Pager# [phone]    Patient is a 53y old  Male who presents with a chief complaint of hypotension, decompensated cirrhosis (10 Rell 2019 15:37)      SUBJECTIVE / OVERNIGHT EVENTS:    MEDICATIONS  (STANDING):  chlorhexidine 0.12% Liquid 15 milliLiter(s) Oral Mucosa two times a day  dextrose 5%. 1000 milliLiter(s) (50 mL/Hr) IV Continuous <Continuous>  dextrose 50% Injectable 12.5 Gram(s) IV Push once  dextrose 50% Injectable 25 Gram(s) IV Push once  dextrose 50% Injectable 25 Gram(s) IV Push once  folic acid 1 milliGRAM(s) Oral daily  insulin lispro (HumaLOG) corrective regimen sliding scale   SubCutaneous three times a day before meals  insulin lispro (HumaLOG) corrective regimen sliding scale   SubCutaneous at bedtime  lactulose Syrup 30 Gram(s) Oral three times a day  lidocaine   Patch 1 Patch Transdermal daily  midodrine 5 milliGRAM(s) Oral three times a day  multivitamin 1 Tablet(s) Oral daily  rifaximin 550 milliGRAM(s) Oral two times a day  thiamine 100 milliGRAM(s) Oral daily    MEDICATIONS  (PRN):  artificial tears (preservative free) Ophthalmic Solution 1 Drop(s) Both EYES daily PRN Dry Eyes  dextrose 40% Gel 15 Gram(s) Oral once PRN Blood Glucose LESS THAN 70 milliGRAM(s)/deciliter  glucagon  Injectable 1 milliGRAM(s) IntraMuscular once PRN Glucose LESS THAN 70 milligrams/deciliter  traMADol 50 milliGRAM(s) Oral every 8 hours PRN Severe Pain (7 - 10)      Vital Signs Last 24 Hrs  T(C): 36.9 (11 Jan 2019 06:00), Max: 37.2 (10 Rell 2019 21:05)  T(F): 98.5 (11 Jan 2019 06:00), Max: 98.9 (10 Rell 2019 21:05)  HR: 64 (11 Jan 2019 06:00) (56 - 65)  BP: 94/56 (11 Jan 2019 06:00) (91/53 - 101/60)  BP(mean): --  RR: 18 (11 Jan 2019 06:00) (18 - 18)  SpO2: 96% (11 Jan 2019 06:00) (93% - 97%)  CAPILLARY BLOOD GLUCOSE      POCT Blood Glucose.: 230 mg/dL (10 Rell 2019 22:02)  POCT Blood Glucose.: 220 mg/dL (10 Rell 2019 18:10)  POCT Blood Glucose.: 207 mg/dL (10 Rell 2019 13:09)  POCT Blood Glucose.: 145 mg/dL (10 Rell 2019 09:09)    I&O's Summary    10 Rell 2019 07:01  -  11 Jan 2019 07:00  --------------------------------------------------------  IN: 1200 mL / OUT: 0 mL / NET: 1200 mL        PHYSICAL EXAM:  GENERAL: NAD, well-developed  HEAD:  Atraumatic, Normocephalic  EYES: EOMI, PERRLA, conjunctiva and sclera clear  NECK: Supple, No JVD  CHEST/LUNG: Clear to auscultation bilaterally; No wheeze  HEART: Regular rate and rhythm; No murmurs, rubs, or gallops  ABDOMEN: Soft, Nontender, Nondistended; Bowel sounds present  EXTREMITIES:  2+ Peripheral Pulses, No clubbing, cyanosis, or edema  PSYCH: AAOx3  NEUROLOGY: non-focal  SKIN: No rashes or lesions Dr. Noman Harden  Internal Medicine PGY-1   Pager# [phone]    Patient is a 53y old  Male who presents with a chief complaint of hypotension, decompensated cirrhosis (10 Rell 2019 15:37)      SUBJECTIVE / OVERNIGHT EVENTS: No acute events overnight. Patient denies any abdominal pain, nausea, vomiting, chest pain. Reports worsening pain on the right buttock, exacerbated when sitting on the toilet but no associated with actually having a BM.    MEDICATIONS  (STANDING):  chlorhexidine 0.12% Liquid 15 milliLiter(s) Oral Mucosa two times a day  dextrose 5%. 1000 milliLiter(s) (50 mL/Hr) IV Continuous <Continuous>  dextrose 50% Injectable 12.5 Gram(s) IV Push once  dextrose 50% Injectable 25 Gram(s) IV Push once  dextrose 50% Injectable 25 Gram(s) IV Push once  folic acid 1 milliGRAM(s) Oral daily  insulin lispro (HumaLOG) corrective regimen sliding scale   SubCutaneous three times a day before meals  insulin lispro (HumaLOG) corrective regimen sliding scale   SubCutaneous at bedtime  lactulose Syrup 30 Gram(s) Oral three times a day  lidocaine   Patch 1 Patch Transdermal daily  midodrine 5 milliGRAM(s) Oral three times a day  multivitamin 1 Tablet(s) Oral daily  rifaximin 550 milliGRAM(s) Oral two times a day  thiamine 100 milliGRAM(s) Oral daily    MEDICATIONS  (PRN):  artificial tears (preservative free) Ophthalmic Solution 1 Drop(s) Both EYES daily PRN Dry Eyes  dextrose 40% Gel 15 Gram(s) Oral once PRN Blood Glucose LESS THAN 70 milliGRAM(s)/deciliter  glucagon  Injectable 1 milliGRAM(s) IntraMuscular once PRN Glucose LESS THAN 70 milligrams/deciliter  traMADol 50 milliGRAM(s) Oral every 8 hours PRN Severe Pain (7 - 10)      Vital Signs Last 24 Hrs  T(C): 36.9 (11 Jan 2019 06:00), Max: 37.2 (10 Rell 2019 21:05)  T(F): 98.5 (11 Jan 2019 06:00), Max: 98.9 (10 Rell 2019 21:05)  HR: 64 (11 Jan 2019 06:00) (56 - 65)  BP: 94/56 (11 Jan 2019 06:00) (91/53 - 101/60)  BP(mean): --  RR: 18 (11 Jan 2019 06:00) (18 - 18)  SpO2: 96% (11 Jan 2019 06:00) (93% - 97%)  CAPILLARY BLOOD GLUCOSE      POCT Blood Glucose.: 230 mg/dL (10 Rell 2019 22:02)  POCT Blood Glucose.: 220 mg/dL (10 Rell 2019 18:10)  POCT Blood Glucose.: 207 mg/dL (10 Rell 2019 13:09)  POCT Blood Glucose.: 145 mg/dL (10 Rell 2019 09:09)    I&O's Summary    10 Rell 2019 07:01  -  11 Jan 2019 07:00  --------------------------------------------------------  IN: 1200 mL / OUT: 0 mL / NET: 1200 mL        PHYSICAL EXAM:  Constitutional: Frail appearing, jaundiced, in no acute distress, comfortable in bed  ENMT: dry mucous membranes, no pharyngeal erythema or exudates  Respiratory: lungs CTAB; no wheezing, rales or rhonchi  Cardiovascular: Normal S1 and S2; no murmurs. Trace LE edema bilaterally.   Gastrointestinal: Abdomen soft, no distension, no fluid wave, normal bowel sounds. Mild tenderness in Right and Left abdominal flanks.   Extremities: No clubbing, cyanosis. Borderline +1 pitting edema b/l up to just above the ankles.   Vascular: 2+ peripheral pulses  Neurological: No asterixis. No focal deficits appreciated.  Skin: Jaundiced; no rashes noted. 2 cm x 3 cm ecchymosis non tender no palpable hematoma. Patient with R. buttock palpable mass with bruising on the skin. Tender.   Psychiatric: A&O x 4.          LABS:                        7.0    5.0   )-----------( 37       ( 11 Jan 2019 06:47 )             20.5     01-11    136  |  108  |  23  ----------------------------<  137<H>  4.8   |  19<L>  |  0.73    Ca    8.6      11 Jan 2019 06:47  Phos  2.5     01-11  Mg     2.4     01-11    TPro  5.2<L>  /  Alb  3.1<L>  /  TBili  13.7<H>  /  DBili  x   /  AST  57<H>  /  ALT  12  /  AlkPhos  93  01-11    PT/INR - ( 11 Jan 2019 06:47 )   PT: 36.8 sec;   INR: 3.09 ratio         PTT - ( 10 Rell 2019 06:31 )  PTT:71.4 sec        WBC Trend: 5.0 K/uL<--, 5.4 K/uL<--, 5.3 K/uL<--, 4.8 K/uL  Hgb Trend: 7.0 g/dL<--, 7.1 g/dL<--, 7.3 g/dL<--, 7.8 g/dL  Hct Trend: 20.5 %<--, 20.5 %<--, 20.9 %<--, 22.2 %<  Platelets Trend: 37 K/uL<--, 32 K/uL<--, 34 K/uL<--, 33 K/uL      AST Trend: 57 U/L<--, 60 U/L<--, 61 U/L<--, 69 U/L  ALT Trend: 12 U/L<--, 15 U/L<--, 12 U/L<--, 17 U/L  ALP Trend: 93 U/L<--, 85 U/L<--, 89 U/L<--, 100 U/L  Albumin Trend: 3.1 g/dL<--, 3.1 g/dL<--, 3.3 g/dL<--, 3.2 g/dL  Total Bilirubin Trend: 13.7 mg/dL<--, 13.1 mg/dL<--, 13.0 mg/dL<--, 12.0 mg/dL  INR Trend: 3.09 ratio<--, 2.84 ratio<--, 3.54 ratio<--, 4.37 ratio      Cell Count, Body Fluid (01.10.19 @ 16:53)    Fluid Segmented Granulocytes: 29: Differential is based on 100 cells counted after cytocentrifugation. %    Fluid Type: Peritoneal fl    Body Fluid Appearance: Slightly Cloudy    BF Lymphocytes: 37 %    Monocyte/Macrophage Count - Body Fluid: 29 %    Mesothelial Cells - Body Fluid: 5 %    Tube Type: Sterile    Color - Body Fluid: Yellow    Total Nucleated Cell Count, Body Fluid: 180: Includes WBC and other nucleated cells if present. /uL    Total RBC Count: 3900 /uL Dr. Noman Harden  Internal Medicine PGY-1   Pager# [phone]    Patient is a 53y old  Male who presents with a chief complaint of hypotension, decompensated cirrhosis (10 Rell 2019 15:37)      SUBJECTIVE / OVERNIGHT EVENTS: No acute events overnight. Patient denies any abdominal pain, nausea, vomiting, chest pain. Reports worsening pain on the right buttock, exacerbated when sitting on the toilet but no associated with actually having a BM.    MEDICATIONS  (STANDING):  chlorhexidine 0.12% Liquid 15 milliLiter(s) Oral Mucosa two times a day  dextrose 5%. 1000 milliLiter(s) (50 mL/Hr) IV Continuous <Continuous>  dextrose 50% Injectable 12.5 Gram(s) IV Push once  dextrose 50% Injectable 25 Gram(s) IV Push once  dextrose 50% Injectable 25 Gram(s) IV Push once  folic acid 1 milliGRAM(s) Oral daily  insulin lispro (HumaLOG) corrective regimen sliding scale   SubCutaneous three times a day before meals  insulin lispro (HumaLOG) corrective regimen sliding scale   SubCutaneous at bedtime  lactulose Syrup 30 Gram(s) Oral three times a day  lidocaine   Patch 1 Patch Transdermal daily  midodrine 5 milliGRAM(s) Oral three times a day  multivitamin 1 Tablet(s) Oral daily  rifaximin 550 milliGRAM(s) Oral two times a day  thiamine 100 milliGRAM(s) Oral daily    MEDICATIONS  (PRN):  artificial tears (preservative free) Ophthalmic Solution 1 Drop(s) Both EYES daily PRN Dry Eyes  dextrose 40% Gel 15 Gram(s) Oral once PRN Blood Glucose LESS THAN 70 milliGRAM(s)/deciliter  glucagon  Injectable 1 milliGRAM(s) IntraMuscular once PRN Glucose LESS THAN 70 milligrams/deciliter  traMADol 50 milliGRAM(s) Oral every 8 hours PRN Severe Pain (7 - 10)      Vital Signs Last 24 Hrs  T(C): 36.9 (11 Jan 2019 06:00), Max: 37.2 (10 Rell 2019 21:05)  T(F): 98.5 (11 Jan 2019 06:00), Max: 98.9 (10 Rell 2019 21:05)  HR: 64 (11 Jan 2019 06:00) (56 - 65)  BP: 94/56 (11 Jan 2019 06:00) (91/53 - 101/60)  BP(mean): --  RR: 18 (11 Jan 2019 06:00) (18 - 18)  SpO2: 96% (11 Jan 2019 06:00) (93% - 97%)  CAPILLARY BLOOD GLUCOSE      POCT Blood Glucose.: 230 mg/dL (10 Rell 2019 22:02)  POCT Blood Glucose.: 220 mg/dL (10 Rell 2019 18:10)  POCT Blood Glucose.: 207 mg/dL (10 Rell 2019 13:09)  POCT Blood Glucose.: 145 mg/dL (10 Rell 2019 09:09)    I&O's Summary    10 Rell 2019 07:01  -  11 Jan 2019 07:00  --------------------------------------------------------  IN: 1200 mL / OUT: 0 mL / NET: 1200 mL        PHYSICAL EXAM:  Constitutional: Frail appearing, jaundiced, in no acute distress, comfortable in bed  ENMT: dry mucous membranes, no pharyngeal erythema or exudates  Respiratory: lungs CTAB; no wheezing, rales or rhonchi  Cardiovascular: Normal S1 and S2; no murmurs. Trace LE edema bilaterally.   Gastrointestinal: Abdomen soft, no distension, no fluid wave, normal bowel sounds. Mild tenderness in Right and Left abdominal flanks.   Extremities: No clubbing, cyanosis. Borderline +1 pitting edema b/l up to just above the ankles.   Vascular: 2+ peripheral pulses  Neurological: No asterixis. No focal deficits appreciated.  Skin: Jaundiced; no rashes noted. 2 cm x 3 cm ecchymosis non tender no palpable hematoma. Patient with R. buttock palpable mass with bruising on the skin. Tender.   Psychiatric: A&O x 4.          LABS:                        7.0    5.0   )-----------( 37       ( 11 Jan 2019 06:47 )             20.5     01-11    136  |  108  |  23  ----------------------------<  137<H>  4.8   |  19<L>  |  0.73    Ca    8.6      11 Jan 2019 06:47  Phos  2.5     01-11  Mg     2.4     01-11    TPro  5.2<L>  /  Alb  3.1<L>  /  TBili  13.7<H>  /  DBili  x   /  AST  57<H>  /  ALT  12  /  AlkPhos  93  01-11    PT/INR - ( 11 Jan 2019 06:47 )   PT: 36.8 sec;   INR: 3.09 ratio         PTT - ( 10 Rell 2019 06:31 )  PTT:71.4 sec        WBC Trend: 5.0 K/uL<--, 5.4 K/uL<--, 5.3 K/uL<--, 4.8 K/uL  Hgb Trend: 7.0 g/dL<--, 7.1 g/dL<--, 7.3 g/dL<--, 7.8 g/dL  Hct Trend: 20.5 %<--, 20.5 %<--, 20.9 %<--, 22.2 %<  Platelets Trend: 37 K/uL<--, 32 K/uL<--, 34 K/uL<--, 33 K/uL      AST Trend: 57 U/L<--, 60 U/L<--, 61 U/L<--, 69 U/L  ALT Trend: 12 U/L<--, 15 U/L<--, 12 U/L<--, 17 U/L  ALP Trend: 93 U/L<--, 85 U/L<--, 89 U/L<--, 100 U/L  Albumin Trend: 3.1 g/dL<--, 3.1 g/dL<--, 3.3 g/dL<--, 3.2 g/dL  Total Bilirubin Trend: 13.7 mg/dL<--, 13.1 mg/dL<--, 13.0 mg/dL<--, 12.0 mg/dL  INR Trend: 3.09 ratio<--, 2.84 ratio<--, 3.54 ratio<--, 4.37 ratio      Cell Count, Body Fluid (01.10.19 @ 16:53)    Fluid Segmented Granulocytes: 29: Differential is based on 100 cells counted after cytocentrifugation. %    Fluid Type: Peritoneal fl    Body Fluid Appearance: Slightly Cloudy    BF Lymphocytes: 37 %    Monocyte/Macrophage Count - Body Fluid: 29 %    Mesothelial Cells - Body Fluid: 5 %    Tube Type: Sterile    Color - Body Fluid: Yellow    Total Nucleated Cell Count, Body Fluid: 180: Includes WBC and other nucleated cells if present. /uL    Total RBC Count: 3900 /uL    < from: US Extremity Nonvasc Limited, Right (01.11.19 @ 14:14) >  INTERPRETATION:      CLINICAL INFORMATION: Elevated INR. Cirrhosis. Right buttock bruising and   mass. Assess for hematoma.    TECHNIQUE: An ultrasound examination of the right gluteal soft tissues   was performed.    COMPARISON: CT abdomen and pelvis 09/28/2018.    FINDINGS: In the area of concern in the right buttocks, there is an 11 x   2.4 x 13.3 cm hypoechoic collection with internal echoes and through   transmission, which given clinical history likely reflects a hematoma.   There is overlying edema.    IMPRESSION: A 13.3 cm right buttock hematoma.

## 2019-01-11 NOTE — PROGRESS NOTE ADULT - PROBLEM SELECTOR PLAN 3
Hgb dropped from 10 to 7.4, unclear source of bleeding. Patient has component of macrocytic anemia. Transfusion goal of Hgb<7.0. Occult blood negative.  - Iron studies consistent with anemia of chronic disease (High Ferritin, Low Transferrin).  - Hemolysis labs show low Haptoglobin (<20), however normal LDH. Using bilirubin is non specific given liver pathology. Given no ULICES or hyperkalemia episodes less convinced of hemolysis.   - Hgb response jumped however now is trending down again. No obvious source of bleed. Will eventually need scope to reevaluate for varices.   - Supratherapeutic INR 2/2 liver disease. No obvious signs or symptoms of bleeding (tachycardia, hypotension, hematemesis) will continue to monitor.   - 1U FFP given 1/9 and Vitamin K 10 mg PO INR now 3.54. Will transfuse 1U FFP today for IR procedure. Hgb dropped from 10 to 7.4, unclear source of bleeding. Patient has component of macrocytic anemia. Transfusion goal of Hgb<7.0. Occult blood negative.    - Iron studies consistent with anemia of chronic disease (High Ferritin, Low Transferrin).  - Hemolysis labs show low Haptoglobin (<20), however normal LDH. Using bilirubin is non specific given liver pathology. Given no ULICES or hyperkalemia episodes less convinced of hemolysis.   - Hgb response jumped however now is trending down again. No obvious source of bleed. Will eventually need scope to reevaluate for varices.   - Supratherapeutic INR 2/2 liver disease. No obvious signs or symptoms of bleeding (tachycardia, hypotension, hematemesis) will continue to monitor.   - s/p 3U FFP total over the course of 1/9-1/10 for optimization prior to diagnostic paracentesis. However INR now starting to trend back up most likely 2/2 liver cirrhosis. US of right gluteal region done on 1/11 showing 13 cm hematoma. Most likely in the setting of bleed given patients elevated INR.    - Will continue to monitor for expansion and physical exam for compartment syndrome changes.  - Serial ultrasounds to assess stability.  - Will stabilize INR and Platelet count as above to minimize expansion.

## 2019-01-11 NOTE — PROGRESS NOTE ADULT - PROBLEM SELECTOR PLAN 5
PLT count 77k this admission, compared to 75k on previous admission (comparable). This, in addition to elevated INR and hyponatremia, are likely in setting of end stage liver disease.     - Worsening platelets can be secondary to liver cirrhosis. No signs of active bleeding despite Hgb decreasing (tachycardia, hypotension, melena).   - No active bleeding. Last TTE showed reduced Ef but was in setting of sepsis and bacteremia. Currently appears euvolemic. Fluid status will be tenuous in the setting of advanced liver disease and HFrEF.    - Will monitor vitals q4hr and hemodynamics.   - Will attempt to maintain euvolemia by minimizing intake.  - Repeat TTE done 1/8 shows improvement in Ef 43% from 35% in October (done in [facility]). However still shows systolic dysfunction.  - Reinitiated Spironolactone 50 mg PO qd and Furosemide 20 mg PO qd.

## 2019-01-11 NOTE — PROGRESS NOTE ADULT - ASSESSMENT
53M end stage liver disease 2/2 alcoholic cirrhosis, DM-II, severe HFrEF (<35%), CAD, HTN, presenting with progressively worsening generalized weakness. During admission, he was found to have gram negative rods and gram positive cocci blood cultures, but this was thought to be secondary to contamination.     #Decompensated Cirrhosis: suspected EtOH related - MELD-Na: 29 today (34 on admission)  Patient was listed for transplant at [facility] but given new diagnosis of HFrEF in 10/2018, he was placed on hold on wait list with plan to reassess cardiac function. Repeat JOHNSON this admission indicates improvement of cardiac function from prior study, but still impaired with EF 43% from 35%. TTE w/ Doppler results on 1/8/19 Faxed to Dr. Stokes's office for reference, will receive f/u call confirming review and receipt.  HE: On Lactulose and rifaximin in hospital, lactulose only at home (Not on Rifaximin 2/2 prescription cost)  Ascites: small volume ascites on MR Abdomen 1/6/19. Has a history of E.coli SBP last episode in 10/2018.  HCC: No focal masses on US 9/2018 and on repeat MR Abdomen 1/6/19.  Varices: Small on EGD 8/2017, on spironolactone 25mg and lasix 20mg daily at home. Propranolol d/c'ed 10/2018 due to persistent hypotension.  Repeat viral infectious w/u (Hep A, B, C serologies; CMV, HSV, EBV PCR) all negative (1/4/19). Total Immunoglobulin panel: Quantitative IgA, IgM, & IgG (766, 296, & 1915, respectively), [place]/ Lambda Free light chain ratio 1.73 <H>, and [place]ID[place] Kappa & Lambda 8.11 and 4.70, respectively.   MR abdomen indicates mild iron deposition within liver, cirrhosis, and portal hypertension. MRCP unremarkable with patent bile ducts and nonspecific gallbladder wall thickening with no evidence of biliary obstruction. Alk Phos/AST/ALT downtrending.    #GNR and coag negative staph blood cultures  ID following and thought to be secondary to contaminant. Not currently on IV abx.    #Macrocytic Anemia: Low suspicion for active GI bleed as would expect overt GI bleeding given with drop by 2U in one day.  Repeat B12, Folate on 1/7/19 indicates no B9/ B12 deficiency (B12 >2000; B9 >20.0). Repeat TSH on 1/7/19 wnl (1.81).  Less common contributing etiologies possibly explaining macrocytosis to consider include copper deficiency, chronic liver disease affecting lipid composition of RBC membranes, increased endogenous estrogens from pt's liver disease (less likely given ULN folate levels), or drug-induced also less likely in pt. HD stable now, no obvious signs of bleeding. Iron studies 1/9/18 less concerning for hemolysis, however impaired liver function may be in part contributing to decreased synthesis of carrier proteins.    Recommendations:  - Continue lactulose and rifaximin, titrate to 2-3 BM per day  - Follow up with diagnostic paracentesis labwork  - Trend MELD labs and CBC daily  - Recommend Hematology outpatient followup for further workup, evaluation, and management of macrocytosis. 53M end stage liver disease 2/2 alcoholic cirrhosis, DM-II, severe HFrEF (<35%), CAD, HTN, presenting with progressively worsening generalized weakness. During admission, he was found to have gram negative rods and gram positive cocci blood cultures, but this was thought to be secondary to contamination.     #Decompensated Cirrhosis: suspected EtOH related - MELD-Na: 29 today (34 on admission)  Patient was listed for transplant at [facility] but given new diagnosis of HFrEF in 10/2018, he was placed on hold on wait list with plan to reassess cardiac function. Repeat JOHNSON this admission indicates improvement of cardiac function from prior study, but still impaired with EF 43% from 35%. TTE w/ Doppler results on 1/8/19 Faxed to Dr. Stokes's office for reference, will receive f/u call confirming review and receipt.  HE: On Lactulose and rifaximin in hospital, lactulose only at home (Not on Rifaximin 2/2 prescription cost)  Ascites: small volume ascites on MR Abdomen 1/6/19. Has a history of E.coli SBP last episode in 10/2018.  HCC: No focal masses on US 9/2018 and on repeat MR Abdomen 1/6/19.  Varices: Small on EGD 8/2017, on spironolactone 25mg and lasix 20mg daily at home. Propranolol d/c'ed 10/2018 due to persistent hypotension.  Repeat viral infectious w/u (Hep A, B, C serologies; CMV, HSV, EBV PCR) all negative (1/4/19). Total Immunoglobulin panel: Quantitative IgA, IgM, & IgG (766, 296, & 1915, respectively), [place]/ Lambda Free light chain ratio 1.73 <H>, and BRIDGER Kappa & Lambda 8.11 and 4.70, respectively.   MR abdomen indicates mild iron deposition within liver, cirrhosis, and portal hypertension. MRCP unremarkable with patent bile ducts and nonspecific gallbladder wall thickening with no evidence of biliary obstruction. Alk Phos/AST/ALT downtrending.    #GNR and coag negative staph blood cultures  ID following and thought to be secondary to contaminant. Not currently on IV abx.    #Macrocytic Anemia: Low suspicion for active GI bleed as would expect overt GI bleeding given with drop by 2U in one day.  Repeat B12, Folate on 1/7/19 indicates no B9/ B12 deficiency (B12 >2000; B9 >20.0). Repeat TSH on 1/7/19 wnl (1.81).  Less common contributing etiologies possibly explaining macrocytosis to consider include copper deficiency, chronic liver disease affecting lipid composition of RBC membranes, increased endogenous estrogens from pt's liver disease (less likely given ULN folate levels), or drug-induced also less likely in pt. HD stable now, no obvious signs of bleeding. Iron studies 1/9/18 less concerning for hemolysis, however impaired liver function may be in part contributing to decreased synthesis of carrier proteins.    Recommendations:  - Continue lactulose and rifaximin, titrate to 2-3 BM per day  - Trend MELD labs and CBC daily  - Patient can be discharged on increased diuretics--> Spironolactone 100mg and Lasix 40mg  - Recommend follow-up with Dr. Stokes, [facility] hepatologist, within 1 week of discharge.  - Recommend discharge to Physical therapy for reconditioning  - Recommend Hematology outpatient followup for further workup, evaluation, and management of macrocytosis. 53M end stage liver disease 2/2 alcoholic cirrhosis, HFrEF, CAD, HTN, presenting with progressively worsening generalized weakness. During admission, he was found to have gram negative rods and gram positive cocci blood cultures, but this was thought to be secondary to contamination.     #Decompensated Cirrhosis: suspected EtOH related - MELD-Na: 29 today (34 on admission)  Patient was listed for transplant at [facility] but given new diagnosis of HFrEF in 10/2018, he was placed on hold on wait list with plan to reassess cardiac function. Repeat JOHNSON this admission indicates improvement of cardiac function from prior study, but still impaired with EF 43% from 35%. TTE w/ Doppler results on 1/8/19 Faxed to Dr. Stokes's office for reference, will receive f/u call confirming review and receipt.  HE: On Lactulose and rifaximin in hospital, lactulose only at home (Not on Rifaximin 2/2 prescription cost)  Ascites: small volume ascites on MR Abdomen 1/6/19. Has a history of E.coli SBP last episode in 10/2018.  HCC: No focal masses on US 9/2018 and on repeat MR Abdomen 1/6/19.  Varices: Small on EGD 8/2017, on spironolactone 25mg and lasix 20mg daily at home. Propranolol d/c'ed 10/2018 due to persistent hypotension.  Repeat viral infectious w/u (Hep A, B, C serologies; CMV, HSV, EBV PCR) all negative (1/4/19). Total Immunoglobulin panel: Quantitative IgA, IgM, & IgG (766, 296, & 1915, respectively), [place]/ Lambda Free light chain ratio 1.73 <H>, and BRIDGER Kappa & Lambda 8.11 and 4.70, respectively.   MR abdomen indicates mild iron deposition within liver, cirrhosis, and portal hypertension. MRCP unremarkable with patent bile ducts and nonspecific gallbladder wall thickening with no evidence of biliary obstruction. Alk Phos/AST/ALT downtrending.    #GNR and coag negative staph blood cultures  ID following and thought to be secondary to contaminant. Not currently on IV abx.    #Macrocytic Anemia: Low suspicion for active GI bleed as would expect overt GI bleeding given with drop by 2U in one day.  Repeat B12, Folate on 1/7/19 indicates no B9/ B12 deficiency (B12 >2000; B9 >20.0). Repeat TSH on 1/7/19 wnl (1.81).  Less common contributing etiologies possibly explaining macrocytosis to consider include copper deficiency, chronic liver disease affecting lipid composition of RBC membranes, increased endogenous estrogens from pt's liver disease (less likely given ULN folate levels), or drug-induced also less likely in pt. HD stable now, no obvious signs of bleeding. Iron studies 1/9/18 less concerning for hemolysis, however impaired liver function may be in part contributing to decreased synthesis of carrier proteins.    Recommendations:  - Continue lactulose and rifaximin, titrate to 2-3 BM per day.  - Trend MELD labs and CBC daily.  - Patient can be discharged on increased diuretics--> Spironolactone 100mg and Lasix 40mg.  - Please continue Vit. K 10mg PO qd supplementation at home.  - Recommend follow-up with Dr. Stokes, [facility] hepatologist, within 1 week of discharge.  - Recommend discharge to Physical therapy for reconditioning.  - Recommend Hematology outpatient followup for further workup, evaluation, and management of macrocytosis. 53M end stage liver disease 2/2 alcoholic cirrhosis, HFrEF, CAD, HTN, presenting with progressively worsening generalized weakness. During admission, he was found to have gram negative rods and gram positive cocci blood cultures, but this was thought to be secondary to contamination.     #Decompensated Cirrhosis: suspected EtOH related - MELD-Na: 29 today (34 on admission)  Patient was listed for transplant at [facility] but given new diagnosis of HFrEF in 10/2018, he was placed on hold on wait list with plan to reassess cardiac function. Repeat JOHNSON this admission indicates improvement of cardiac function from prior study, but still impaired with EF 43% from 35%. TTE w/ Doppler results on 1/8/19 Faxed to Dr. Stokes's office for reference, will receive f/u call confirming review and receipt.  HE: On Lactulose and rifaximin in hospital, lactulose only at home (Not on Rifaximin 2/2 prescription cost)  Ascites: small volume ascites on MR Abdomen 1/6/19. Has a history of E.coli SBP last episode in 10/2018.  HCC: No focal masses on US 9/2018 and on repeat MR Abdomen 1/6/19.  Varices: Small on EGD 8/2017, on spironolactone 25mg and lasix 20mg daily at home. Propranolol d/c'ed 10/2018 due to persistent hypotension.  Repeat viral infectious w/u (Hep A, B, C serologies; CMV, HSV, EBV PCR) all negative (1/4/19). Total Immunoglobulin panel: Quantitative IgA, IgM, & IgG (766, 296, & 1915, respectively), [place]/ Lambda Free light chain ratio 1.73 <H>, and BRIDGER Kappa & Lambda 8.11 and 4.70, respectively.   MR abdomen indicates mild iron deposition within liver, cirrhosis, and portal hypertension. MRCP unremarkable with patent bile ducts and nonspecific gallbladder wall thickening with no evidence of biliary obstruction. Alk Phos/AST/ALT downtrending.    #GNR and coag negative staph blood cultures  ID following and thought to be secondary to contaminant. Not currently on IV abx.    #Macrocytic Anemia: Low suspicion for active GI bleed as would expect overt GI bleeding given with drop by 2U in one day.  Repeat B12, Folate on 1/7/19 indicates no B9/ B12 deficiency (B12 >2000; B9 >20.0). Repeat TSH on 1/7/19 wnl (1.81).  Less common contributing etiologies possibly explaining macrocytosis to consider include copper deficiency, chronic liver disease affecting lipid composition of RBC membranes, increased endogenous estrogens from pt's liver disease (less likely given ULN folate levels), or drug-induced also less likely in pt. HD stable now, no obvious signs of bleeding. Iron studies 1/9/18 less concerning for hemolysis, however impaired liver function may be in part contributing to decreased synthesis of carrier proteins.    Recommendations:  - Continue lactulose and rifaximin, titrate to 2-3 BM per day.  - Trend MELD labs and CBC daily.  - Recommend cardiology consultation for cardiac evaluation and clearance to undergo EGD procedure.  - Recommend heart failure team consultation for evaluation of possible inotropes to increase EF to goal of 55-60% (43% currently).  - Please begin platelet transfusions with platelet goal >50k  - Please begin FFP transfusions with goal INR <2.0  - Recommend EGD coordination during this admission s/p cardiology clearance and optimization.  - NPO after midnight of Sunday 1/13/19 in anticipation of EGD 1/14/19.  - Please continue Vit. K 10mg PO qd supplementation.  - Recommend follow-up with Dr. Stokes, [facility] hepatologist, within 1 week of discharge.  - Recommend discharge to Physical therapy for reconditioning.  - Patient can be discharged on increased diuretics--> Spironolactone 100mg and Lasix 40mg.  - Recommend Hematology outpatient followup for further workup, evaluation, and management of macrocytosis.

## 2019-01-11 NOTE — PROGRESS NOTE ADULT - ATTENDING COMMENTS
Patient with anemia and has severe liver decompensation .  Will have coag support planned and plan is to perform EGD when optimized.  Use vit K supplements.  increase diuretics .  Suggest heart failure consult to optimize function since this is limiting factor in his listing for transplant.  Without improvement, he will not regain active listing for liver transplant.

## 2019-01-11 NOTE — PROGRESS NOTE ADULT - PROBLEM SELECTOR PLAN 2
Patient with no florid ascites on physical exam; abdomen soft and nondistended.  However, patient previously treated for SBP and patient with esophageal varices.   Patient complains of diffuse abdominal pain particularly in RUQ and LLQ.    - Now s/p diagnostic paracentesis 1/10 will f/u labs.   - Abdominal ultrasound showing small to moderate ascites.  - Hold off antibiotics as appears blood culture contaminant MELD 34 on admission, indicating >50% mortality in next 3 months. Was recently on liver transplant candidate list at [facility], however had to be removed due to severe HFrEF, no longer plans for liver transplant    - s/p Diagnostic paracentesis showing no SBP (PMN<250).   - Stopped Albumin 5% 250 cc doses 1/9. Reinitiated Spironolactone 50 mg PO qd and Furosemide 20 mg PO qd.  - Will continue with Lactulose and Rifaximin PO.   - Evidence of severe synthetic dysfunction, portal hypertensive, varices, and existing heart failure. ULICES resolved.   - MRCP/ MR abdomen showing changes as discussed above. MELD 34 on admission, indicating >50% mortality in next 3 months. Was recently on liver transplant candidate list at [facility], however had to be removed due to severe HFrEF, no longer plans for liver transplant    - s/p Diagnostic paracentesis showing no SBP (PMN<250).   - Stopped Albumin 5% 250 cc doses 1/9. Reinitiated Spironolactone 50 mg PO qd and Furosemide 20 mg PO qd.  - Will continue with Lactulose and Rifaximin PO.   - Evidence of severe synthetic dysfunction, portal hypertensive, varices, and existing heart failure. ULICES resolved.

## 2019-01-11 NOTE — PROGRESS NOTE ADULT - PROBLEM SELECTOR PLAN 7
No active bleeding at this time. Pt has dried blood on teeth, from dry lips/scab picking, denies hematemesis, hgb at baseline.  Patient was previously on propranolol for prophylaxis- however states he is no longer taking it. Likely 2/2 hypotension. Clarify medication with patient's PCP or pharmacy in AM.    -c/w sbp coverage as noted above. Last HbA1c 6.1% in 2016. Holding home Glipizide.     - Repeat A1c <5 patient is not diabetic.   - FS qac and qhs, with insulin SSI. No active bleeding at this time. Pt has dried blood on teeth, from dry lips/scab picking, denies hematemesis, hgb at baseline.  Patient was previously on propranolol for prophylaxis- however states he is no longer taking it. Likely 2/2 hypotension.     - Will evaluate the presence of new varices on 1/14 with EGD.

## 2019-01-11 NOTE — CHART NOTE - NSCHARTNOTEFT_GEN_A_CORE
Spoke to patients Hepatologist at [facility] (Dr. Stokes) and updated her on the patients condition. She thinks patient would be safer to get endoscopy done in this inpatient stay as opposed being discharged or being sent to [facility] for the procedure. I spoke to Hepatology team about this who recommend optimizing patients coagulopathy and thrombocytopenia for possible EGD on 1/14 Monday.     Spoke to Cardiology Dr. Glass in regards to optimizing patients HFrEF and currently given patients use of Midodrine and soft systolic blood pressures it would not be ideal to start patient on HFrEF regimen (afterload reduces, Beta blockers etc.).

## 2019-01-11 NOTE — PROGRESS NOTE ADULT - SUBJECTIVE AND OBJECTIVE BOX
Chief Complaint: Patient is a 53y old  Male who presents with a chief complaint of hypotension, decompensated cirrhosis (11 Jan 2019)    Interval Events:   No acute overnight events. Patient received diagnostic parencetesis 1/10/19 and 80cc fluid removed, pending results. No SOB, chills, or lightheadedness. No abdominal pain or reported bleeding.    Allergies:  No Known Allergies    Home Medications:    Hospital Medications:  albumin human  5% IVPB 250 milliLiter(s) IV Intermittent every 6 hours  artificial tears (preservative free) Ophthalmic Solution 1 Drop(s) Both EYES daily PRN  chlorhexidine 0.12% Liquid 15 milliLiter(s) Oral Mucosa two times a day  dextrose 40% Gel 15 Gram(s) Oral once PRN  dextrose 5%. 1000 milliLiter(s) IV Continuous <Continuous>  dextrose 50% Injectable 12.5 Gram(s) IV Push once  dextrose 50% Injectable 25 Gram(s) IV Push once  dextrose 50% Injectable 25 Gram(s) IV Push once  folic acid 1 milliGRAM(s) Oral daily  glucagon  Injectable 1 milliGRAM(s) IntraMuscular once PRN  insulin lispro (HumaLOG) corrective regimen sliding scale   SubCutaneous three times a day before meals  insulin lispro (HumaLOG) corrective regimen sliding scale   SubCutaneous at bedtime  lactulose Syrup 30 Gram(s) Oral three times a day  lidocaine   Patch 1 Patch Transdermal daily  midodrine 5 milliGRAM(s) Oral three times a day  multivitamin 1 Tablet(s) Oral daily  rifaximin 550 milliGRAM(s) Oral two times a day  thiamine 100 milliGRAM(s) Oral daily  traMADol 50 milliGRAM(s) Oral every 8 hours PRN    PMHX/PSHX:    Hypertension  Congestive heart failure (CHF) with reduced ejection fraction  Esophageal varices  Alcoholic cirrhosis of liver with ascites  Abdominal hernia  H/O cataract    Family history:   Family history of hypercholesterolemia (Father)  Family history of diabetes mellitus (Mother)  No pertinent family history in first degree relatives    ROS:   General: No wt loss, fevers, chills, night sweats, fatigue,   Eyes: Good vision, no reported pain  ENT: No sore throat, pain, runny nose, dysphagia  CV: No pain, palpitations  Resp: No dyspnea, cough, tachypnea, wheezing  GI: No pain, No N/V/D. No constipation, No pruritis, No rectal bleeding, No tarry stools, No dysphagia.  : No pain, bleeding, incontinence, nocturia  Muscle: No pain, weakness  Neuro: No weakness, tingling, memory problems. Limited ability to test attention span using MMSE due to patient unwillingness.  Psych: No fatigue, insomnia, depression. +irritability.  Endocrine: No polyuria, polydipsia, cold/heat intolerance  Heme: No petechiae.  Skin: No rash, tattoos, scars, edema    PHYSICAL EXAM:   Vital Signs Last 24 Hrs  T(C): 36.9 (11 Jan 2019 06:00), Max: 37.2 (10 Rell 2019 21:05)  T(F): 98.5 (11 Jan 2019 06:00), Max: 98.9 (10 Rell 2019 21:05)  HR: 64 (11 Jan 2019 06:00) (56 - 65)  BP: 94/56 (11 Jan 2019 06:00) (91/53 - 101/60)  BP(mean): --  RR: 18 (11 Jan 2019 06:00) (18 - 18)  SpO2: 96% (11 Jan 2019 06:00) (93% - 97%)    GENERAL:  Appears stated age, well-groomed, well-nourished, no apparent distress  HEENT:  NC/AT, conjunctivae pale pink, no thyromegaly, no JVD, sclera +icteric  CHEST:  Full & symmetric excursion, no increased effort, breath sounds clear bilaterally. +gynecomastia  HEART:  Regular rhythm, S1, S2, no murmur/rub/S3/S4, no abdominal bruit, no edema  ABDOMEN:  Soft, non-tender, non-distended, no masses appreciated  EXTREMITIES  no cyanosis, clubbing, or edema  SKIN:  No rash, abscesses, erythema or petechiae seen. Several ecchymoses around PIV sites on bilateral upper extremities. Skin dry and intact.  NEURO:  Alert & oriented x3, no encephalopathy.    LABS:                          7.0    5.0   )-----------( 37       ( 11 Jan 2019 06:47 )             20.5                7.1    5.4   )-----------( 32       ( 10 Rell 2019 06:31 )             20.5     01-11    136  |  108  |  23  ----------------------------<  137<H>  4.8   |  19<L>  |  0.73    Ca    8.6      11 Jan 2019 06:47  Phos  2.5     01-11  Mg     2.4     01-11    TPro  5.2<L>  /  Alb  3.1<L>  /  TBili  13.7<H>  /  DBili  x   /  AST  57<H>  /  ALT  12  /  AlkPhos  93  01-11    01-10    135  |  104  |  26<H>  ----------------------------<  123<H>  4.9   |  20<L>  |  0.76    Ca    8.2<L>      10 Rell 2019 06:31  Phos  2.2     01-10  Mg     2.4     01-10    TPro  5.3<L>  /  Alb  3.1<L>  /  TBili  13.1<H>  /  DBili  x   /  AST  60<H>  /  ALT  15  /  AlkPhos  85  01-10    PT/INR - ( 10 Rell 2019 13:46 )   PT: 33.7 sec;   INR: 2.84 ratio    PT/INR - ( 10 Rell 2019 06:31 )   PT: 42.0 sec;   INR: 3.54 ratio          Imaging:    EXAM: MR ABDOMEN WAW IC                      01/06/2019    INTERPRETATION:  CLINICAL INFORMATION: Ethanol cirrhosis and heart failure with reduced ejection fraction. Jaundice, hypotension.  COMPARISON: CT abdomen and pelvis 9/28/2018, duplex sonography of the abdomen dated 9/28/2018.  PROCEDURE:   MRI of the abdomen was performed with and w/o IV contrast.  10 cc of Gadavist administered, 0 cc discarded.   MRCP was performed.  Liver iron quantification with Rhennes protocol.    FINDINGS:  LOWER CHEST: Within normal limits.    LIVER: Cirrhosis. No evidence of hepatocellular cancer. Estimated hepatic iron concentration using a TR of 14 ms: 90 (+/- 30) umol/g (normal <36).    BILE DUCTS: Normal caliber. No evidence of choledocholithiasis.  GALLBLADDER: No evidence of gallstones. Mildly nonspecific wall edema.  SPLEEN: Splenomegaly.   PANCREAS: A 1.5 cm uncinate process cyst. No pancreatic ductal dilatation.  ADRENALS: Within normal limits.  KIDNEYS/URETERS: Within normal limits.    VISUALIZED PORTIONS:  BOWEL: Within normal limits.   PERITONEUM: Small volume ascites.  VESSELS: Hepatic and portal veins are patent. Evidence of portal hypertension as evidenced by a patent paraumbilical vein and additional upper abdominal varices.  RETROPERITONEUM: No lymphadenopathy.    ABDOMINAL WALL: Within normal limits.  BONES: Within normal limits.    IMPRESSION:   Cirrhosis with evidence of portal hypertension. Small volume ascites.  No evidence of hepatocellular cancer.  Mild hepatic iron deposition.  Portal hypertension.  No evidence of cholecystitis or biliary ductal dilatation.  -----------------------------------------------------------------------------------------------------------------------------------------------    EXAM: TTE  with 2-D, M-Mode, and complete spectral and color flow Doppler  Observations:  Mitral Valve: Mitral annular calcification. Mild mitral regurgitation.  Aortic Valve/Aorta: Calcified aortic valve. Peak transaortic valve gradient equals 16 mm Hg, mean transaortic valve gradient equals 10 mm Hg, aortic valve velocity time integral equals 46 cm, consistent with mild aortic stenosis. Peak left ventricular outflow tract gradient equals 5 mm Hg, mean gradient is equal to 3 mm Hg, LVOT velocity time integral equals 27 cm.  Aortic Root: 3.4 cm.  LVOT diameter: 2 cm.  Left Atrium: Normal left atrium.  Left Ventricle: Endocardium not well visualized; moderate global left ventricular dysfunction. Endocardial visualization enhanced with intravenous injection of Ultrasonic Enhancing Agent (Definity). Eccentric left ventricular hypertrophy (dilated left ventricle with normal relative wall thickness).  Right Heart: Normal right atrium. The right ventricle is not well visualized; grossly normal right ventricular systolic function. Normal tricuspid valve. Mild tricuspid regurgitation. Normal pulmonic valve. Mild pulmonic regurgitation.  Pericardium/Pleura: Normal pericardium with no pericardial effusion.  Hemodynamic: Estimated right atrial pressure is 8 mm Hg. Estimated right ventricular systolic pressure equals 31 mm Hg, assuming right atrial pressure equals 8 mm Hg, consistent with normal pulmonary pressures.    Conclusions:  1. Eccentric LVH (dilated LV with normal relative wall thickness).  2. Endocardium not well visualized; moderate global LV dysfunction. Endocardial visualization enhanced with IV injection of Ultrasonic Enhancing Agent (Definity).  3. The RV is not well visualized; grossly normal RV systolic function. Chief Complaint: Patient is a 53y old  Male who presents with a chief complaint of hypotension, decompensated cirrhosis (11 Jan 2019)    Interval Events:   No acute overnight events. Patient received diagnostic parencetesis 1/10/19 and 70cc fluid removed, pending results. No SOB, chills, or lightheadedness. No abdominal pain or reported bleeding.    Allergies:  No Known Allergies    Home Medications:    Hospital Medications:  albumin human  5% IVPB 250 milliLiter(s) IV Intermittent every 6 hours  artificial tears (preservative free) Ophthalmic Solution 1 Drop(s) Both EYES daily PRN  chlorhexidine 0.12% Liquid 15 milliLiter(s) Oral Mucosa two times a day  dextrose 40% Gel 15 Gram(s) Oral once PRN  dextrose 5%. 1000 milliLiter(s) IV Continuous <Continuous>  dextrose 50% Injectable 12.5 Gram(s) IV Push once  dextrose 50% Injectable 25 Gram(s) IV Push once  dextrose 50% Injectable 25 Gram(s) IV Push once  folic acid 1 milliGRAM(s) Oral daily  glucagon  Injectable 1 milliGRAM(s) IntraMuscular once PRN  insulin lispro (HumaLOG) corrective regimen sliding scale   SubCutaneous three times a day before meals  insulin lispro (HumaLOG) corrective regimen sliding scale   SubCutaneous at bedtime  lactulose Syrup 30 Gram(s) Oral three times a day  lidocaine   Patch 1 Patch Transdermal daily  midodrine 5 milliGRAM(s) Oral three times a day  multivitamin 1 Tablet(s) Oral daily  rifaximin 550 milliGRAM(s) Oral two times a day  thiamine 100 milliGRAM(s) Oral daily  traMADol 50 milliGRAM(s) Oral every 8 hours PRN    PMHX/PSHX:    Hypertension  Congestive heart failure (CHF) with reduced ejection fraction  Esophageal varices  Alcoholic cirrhosis of liver with ascites  Abdominal hernia  H/O cataract    Family history:   Family history of hypercholesterolemia (Father)  Family history of diabetes mellitus (Mother)  No pertinent family history in first degree relatives    ROS:   General: No wt loss, fevers, chills, night sweats, fatigue,   Eyes: Good vision, no reported pain  ENT: No sore throat, pain, runny nose, dysphagia  CV: No pain, palpitations  Resp: No dyspnea, cough, tachypnea, wheezing  GI: No pain, No N/V/D. No constipation, No pruritis, No rectal bleeding, No tarry stools, No dysphagia.  : No pain, bleeding, incontinence, nocturia  Muscle: No pain, weakness  Neuro: No weakness, tingling, memory problems. Limited ability to test attention span using MMSE due to patient unwillingness.  Psych: No fatigue, insomnia, depression. +irritability.  Endocrine: No polyuria, polydipsia, cold/heat intolerance  Heme: No petechiae.  Skin: No rash, tattoos, scars, edema    PHYSICAL EXAM:   Vital Signs Last 24 Hrs  T(C): 36.9 (11 Jan 2019 06:00), Max: 37.2 (10 Rell 2019 21:05)  T(F): 98.5 (11 Jan 2019 06:00), Max: 98.9 (10 Rell 2019 21:05)  HR: 64 (11 Jan 2019 06:00) (56 - 65)  BP: 94/56 (11 Jan 2019 06:00) (91/53 - 101/60)  BP(mean): --  RR: 18 (11 Jan 2019 06:00) (18 - 18)  SpO2: 96% (11 Jan 2019 06:00) (93% - 97%)    GENERAL:  Appears stated age, well-groomed, well-nourished, no apparent distress  HEENT:  NC/AT, conjunctivae pale pink, no thyromegaly, no JVD, sclera +icteric  CHEST:  Full & symmetric excursion, no increased effort, breath sounds clear bilaterally. +gynecomastia  HEART:  Regular rhythm, S1, S2, no murmur/rub/S3/S4, no abdominal bruit, no edema  ABDOMEN:  Soft, non-tender, non-distended, no masses appreciated  EXTREMITIES  no cyanosis, clubbing, or edema  SKIN:  No rash, abscesses, erythema or petechiae seen. Several ecchymoses around PIV sites on bilateral upper extremities. Skin dry and intact.  NEURO:  Alert & oriented x3, no encephalopathy.    LABS:                          7.0    5.0   )-----------( 37       ( 11 Jan 2019 06:47 )             20.5                7.1    5.4   )-----------( 32       ( 10 Rell 2019 06:31 )             20.5     01-11    136  |  108  |  23  ----------------------------<  137<H>  4.8   |  19<L>  |  0.73    Ca    8.6      11 Jan 2019 06:47  Phos  2.5     01-11  Mg     2.4     01-11    TPro  5.2<L>  /  Alb  3.1<L>  /  TBili  13.7<H>  /  DBili  x   /  AST  57<H>  /  ALT  12  /  AlkPhos  93  01-11    01-10    135  |  104  |  26<H>  ----------------------------<  123<H>  4.9   |  20<L>  |  0.76    Ca    8.2<L>      10 Rell 2019 06:31  Phos  2.2     01-10  Mg     2.4     01-10    TPro  5.3<L>  /  Alb  3.1<L>  /  TBili  13.1<H>  /  DBili  x   /  AST  60<H>  /  ALT  15  /  AlkPhos  85  01-10    PT/INR - ( 10 Rell 2019 13:46 )   PT: 33.7 sec;   INR: 2.84 ratio    PT/INR - ( 10 Rell 2019 06:31 )   PT: 42.0 sec;   INR: 3.54 ratio       Diagnostic Paracentesis results:  Albumin, Fluid: 0.7 g/dL (01-10-19 @ 16:53)  Lactate Dehydrogenase, Fluid: 55 U/L (01-10-19 @ 16:53)  Total Protein, Fluid: 1.3 g/dL (01-10-19 @ 16:53)  Glucose, Fluid: 228 mg/dL (01-10-19 @ 16:53)    Color- Body Fluid: Yellow  Total Nucleated Cell Count, Body Fluid: 180 cells/ uL  Total RBC count: 3900 cells/ uL  Fluid type: Peritoneal fluid  Body Fluid appearance: Slightly cloudy  Fluid Segmented Granulocytes: 29%  BF Lymphocytes: 37%  Monocyte/ Macrophage Count, Body Fluid: 29%  Mesothelial cells, Body Fluid: 5%  Fluid source: Peritoneal       Imaging:    EXAM: MR ABDOMEN WAW IC                      01/06/2019    INTERPRETATION:  CLINICAL INFORMATION: Ethanol cirrhosis and heart failure with reduced ejection fraction. Jaundice, hypotension.  COMPARISON: CT abdomen and pelvis 9/28/2018, duplex sonography of the abdomen dated 9/28/2018.  PROCEDURE:   MRI of the abdomen was performed with and w/o IV contrast.  10 cc of Gadavist administered, 0 cc discarded.   MRCP was performed.  Liver iron quantification with Rhennes protocol.    FINDINGS:  LOWER CHEST: Within normal limits.    LIVER: Cirrhosis. No evidence of hepatocellular cancer. Estimated hepatic iron concentration using a TR of 14 ms: 90 (+/- 30) umol/g (normal <36).    BILE DUCTS: Normal caliber. No evidence of choledocholithiasis.  GALLBLADDER: No evidence of gallstones. Mildly nonspecific wall edema.  SPLEEN: Splenomegaly.   PANCREAS: A 1.5 cm uncinate process cyst. No pancreatic ductal dilatation.  ADRENALS: Within normal limits.  KIDNEYS/URETERS: Within normal limits.    VISUALIZED PORTIONS:  BOWEL: Within normal limits.   PERITONEUM: Small volume ascites.  VESSELS: Hepatic and portal veins are patent. Evidence of portal hypertension as evidenced by a patent paraumbilical vein and additional upper abdominal varices.  RETROPERITONEUM: No lymphadenopathy.    ABDOMINAL WALL: Within normal limits.  BONES: Within normal limits.    IMPRESSION:   Cirrhosis with evidence of portal hypertension. Small volume ascites.  No evidence of hepatocellular cancer.  Mild hepatic iron deposition.  Portal hypertension.  No evidence of cholecystitis or biliary ductal dilatation.  -----------------------------------------------------------------------------------------------------------------------------------------------    EXAM: TTE  with 2-D, M-Mode, and complete spectral and color flow Doppler  Observations:  Mitral Valve: Mitral annular calcification. Mild mitral regurgitation.  Aortic Valve/Aorta: Calcified aortic valve. Peak transaortic valve gradient equals 16 mm Hg, mean transaortic valve gradient equals 10 mm Hg, aortic valve velocity time integral equals 46 cm, consistent with mild aortic stenosis. Peak left ventricular outflow tract gradient equals 5 mm Hg, mean gradient is equal to 3 mm Hg, LVOT velocity time integral equals 27 cm.  Aortic Root: 3.4 cm.  LVOT diameter: 2 cm.  Left Atrium: Normal left atrium.  Left Ventricle: Endocardium not well visualized; moderate global left ventricular dysfunction. Endocardial visualization enhanced with intravenous injection of Ultrasonic Enhancing Agent (Definity). Eccentric left ventricular hypertrophy (dilated left ventricle with normal relative wall thickness).  Right Heart: Normal right atrium. The right ventricle is not well visualized; grossly normal right ventricular systolic function. Normal tricuspid valve. Mild tricuspid regurgitation. Normal pulmonic valve. Mild pulmonic regurgitation.  Pericardium/Pleura: Normal pericardium with no pericardial effusion.  Hemodynamic: Estimated right atrial pressure is 8 mm Hg. Estimated right ventricular systolic pressure equals 31 mm Hg, assuming right atrial pressure equals 8 mm Hg, consistent with normal pulmonary pressures.    Conclusions:  1. Eccentric LVH (dilated LV with normal relative wall thickness).  2. Endocardium not well visualized; moderate global LV dysfunction. Endocardial visualization enhanced with IV injection of Ultrasonic Enhancing Agent (Definity).  3. The RV is not well visualized; grossly normal RV systolic function.

## 2019-01-11 NOTE — PROGRESS NOTE ADULT - PROBLEM SELECTOR PLAN 6
Last TTE showed reduced Ef but was in setting of sepsis and bacteremia. Currently appears euvolemic. Fluid status will be tenuous in the setting of advanced liver disease and HFrEF.    - Will monitor vitals q4hr and hemodynamics.   - Will attempt to maintain euvolemia by minimizing intake.  - Repeat TTE done 1/8 shows improvement in Ef 43% from 35% in October (done in [facility]). However still shows systolic dysfunction. No active bleeding at this time. Pt has dried blood on teeth, from dry lips/scab picking, denies hematemesis, hgb at baseline.  Patient was previously on propranolol for prophylaxis- however states he is no longer taking it. Likely 2/2 hypotension. Clarify medication with patient's PCP or pharmacy in AM.    -c/w sbp coverage as noted above. Last TTE showed reduced Ef but was in setting of sepsis and bacteremia. Currently appears euvolemic. Fluid status will be tenuous in the setting of advanced liver disease and HFrEF.    - Will monitor vitals q4hr and hemodynamics.   - Will attempt to maintain euvolemia by minimizing intake.  - Repeat TTE done 1/8 shows improvement in Ef 43% from 35% in October (done in [facility]). However still shows systolic dysfunction.  - Reinitiated Spironolactone 50 mg PO qd and Furosemide 20 mg PO qd.

## 2019-01-11 NOTE — PHYSICAL THERAPY INITIAL EVALUATION ADULT - ADDITIONAL COMMENTS
Pt lives in a private co-op with his brother and brother's family on the main floor; pt has no steps to negotiate.

## 2019-01-12 LAB
ALBUMIN SERPL ELPH-MCNC: 3 G/DL — LOW (ref 3.3–5)
ALP SERPL-CCNC: 100 U/L — SIGNIFICANT CHANGE UP (ref 40–120)
ALT FLD-CCNC: 13 U/L — SIGNIFICANT CHANGE UP (ref 10–45)
ANION GAP SERPL CALC-SCNC: 9 MMOL/L — SIGNIFICANT CHANGE UP (ref 5–17)
AST SERPL-CCNC: 55 U/L — HIGH (ref 10–40)
BILIRUB SERPL-MCNC: 14.4 MG/DL — HIGH (ref 0.2–1.2)
BUN SERPL-MCNC: 22 MG/DL — SIGNIFICANT CHANGE UP (ref 7–23)
CALCIUM SERPL-MCNC: 8.8 MG/DL — SIGNIFICANT CHANGE UP (ref 8.4–10.5)
CHLORIDE SERPL-SCNC: 107 MMOL/L — SIGNIFICANT CHANGE UP (ref 96–108)
CO2 SERPL-SCNC: 20 MMOL/L — LOW (ref 22–31)
CREAT SERPL-MCNC: 0.68 MG/DL — SIGNIFICANT CHANGE UP (ref 0.5–1.3)
GLUCOSE BLDC GLUCOMTR-MCNC: 147 MG/DL — HIGH (ref 70–99)
GLUCOSE BLDC GLUCOMTR-MCNC: 174 MG/DL — HIGH (ref 70–99)
GLUCOSE BLDC GLUCOMTR-MCNC: 249 MG/DL — HIGH (ref 70–99)
GLUCOSE BLDC GLUCOMTR-MCNC: 252 MG/DL — HIGH (ref 70–99)
GLUCOSE BLDC GLUCOMTR-MCNC: 281 MG/DL — HIGH (ref 70–99)
GLUCOSE SERPL-MCNC: 130 MG/DL — HIGH (ref 70–99)
HCT VFR BLD CALC: 19.6 % — CRITICAL LOW (ref 39–50)
HCT VFR BLD CALC: 20.8 % — CRITICAL LOW (ref 39–50)
HGB BLD-MCNC: 6.8 G/DL — CRITICAL LOW (ref 13–17)
HGB BLD-MCNC: 7 G/DL — CRITICAL LOW (ref 13–17)
INR BLD: 3.09 RATIO — HIGH (ref 0.88–1.16)
MAGNESIUM SERPL-MCNC: 2.2 MG/DL — SIGNIFICANT CHANGE UP (ref 1.6–2.6)
MCHC RBC-ENTMCNC: 33.9 GM/DL — SIGNIFICANT CHANGE UP (ref 32–36)
MCHC RBC-ENTMCNC: 34.5 GM/DL — SIGNIFICANT CHANGE UP (ref 32–36)
MCHC RBC-ENTMCNC: 35.6 PG — HIGH (ref 27–34)
MCHC RBC-ENTMCNC: 36.1 PG — HIGH (ref 27–34)
MCV RBC AUTO: 105 FL — HIGH (ref 80–100)
MCV RBC AUTO: 105 FL — HIGH (ref 80–100)
PHOSPHATE SERPL-MCNC: 2.6 MG/DL — SIGNIFICANT CHANGE UP (ref 2.5–4.5)
PLATELET # BLD AUTO: 31 K/UL — LOW (ref 150–400)
PLATELET # BLD AUTO: 43 K/UL — LOW (ref 150–400)
POTASSIUM SERPL-MCNC: 4.6 MMOL/L — SIGNIFICANT CHANGE UP (ref 3.5–5.3)
POTASSIUM SERPL-SCNC: 4.6 MMOL/L — SIGNIFICANT CHANGE UP (ref 3.5–5.3)
PROT SERPL-MCNC: 5.3 G/DL — LOW (ref 6–8.3)
PROTHROM AB SERPL-ACNC: 36.5 SEC — HIGH (ref 10–12.9)
RBC # BLD: 1.87 M/UL — LOW (ref 4.2–5.8)
RBC # BLD: 1.98 M/UL — LOW (ref 4.2–5.8)
RBC # FLD: 18.3 % — HIGH (ref 10.3–14.5)
RBC # FLD: 18.7 % — HIGH (ref 10.3–14.5)
SODIUM SERPL-SCNC: 136 MMOL/L — SIGNIFICANT CHANGE UP (ref 135–145)
WBC # BLD: 5 K/UL — SIGNIFICANT CHANGE UP (ref 3.8–10.5)
WBC # BLD: 5.4 K/UL — SIGNIFICANT CHANGE UP (ref 3.8–10.5)
WBC # FLD AUTO: 5 K/UL — SIGNIFICANT CHANGE UP (ref 3.8–10.5)
WBC # FLD AUTO: 5.4 K/UL — SIGNIFICANT CHANGE UP (ref 3.8–10.5)

## 2019-01-12 PROCEDURE — 74177 CT ABD & PELVIS W/CONTRAST: CPT | Mod: 26

## 2019-01-12 PROCEDURE — 99233 SBSQ HOSP IP/OBS HIGH 50: CPT | Mod: GC

## 2019-01-12 RX ADMIN — SPIRONOLACTONE 50 MILLIGRAM(S): 25 TABLET, FILM COATED ORAL at 17:52

## 2019-01-12 RX ADMIN — Medication 10 MILLIGRAM(S): at 09:20

## 2019-01-12 RX ADMIN — Medication 3: at 14:18

## 2019-01-12 RX ADMIN — MIDODRINE HYDROCHLORIDE 5 MILLIGRAM(S): 2.5 TABLET ORAL at 21:23

## 2019-01-12 RX ADMIN — LIDOCAINE 1 PATCH: 4 CREAM TOPICAL at 21:29

## 2019-01-12 RX ADMIN — LACTULOSE 30 GRAM(S): 10 SOLUTION ORAL at 21:23

## 2019-01-12 RX ADMIN — Medication 1 TABLET(S): at 09:19

## 2019-01-12 RX ADMIN — Medication 20 MILLIGRAM(S): at 06:12

## 2019-01-12 RX ADMIN — SPIRONOLACTONE 50 MILLIGRAM(S): 25 TABLET, FILM COATED ORAL at 06:12

## 2019-01-12 RX ADMIN — MIDODRINE HYDROCHLORIDE 5 MILLIGRAM(S): 2.5 TABLET ORAL at 14:05

## 2019-01-12 RX ADMIN — LACTULOSE 30 GRAM(S): 10 SOLUTION ORAL at 06:13

## 2019-01-12 RX ADMIN — LIDOCAINE 1 PATCH: 4 CREAM TOPICAL at 14:05

## 2019-01-12 RX ADMIN — Medication 100 MILLIGRAM(S): at 09:19

## 2019-01-12 RX ADMIN — Medication 1: at 22:06

## 2019-01-12 RX ADMIN — LACTULOSE 30 GRAM(S): 10 SOLUTION ORAL at 14:04

## 2019-01-12 RX ADMIN — Medication 1: at 18:29

## 2019-01-12 RX ADMIN — Medication 1 MILLIGRAM(S): at 09:19

## 2019-01-12 RX ADMIN — MIDODRINE HYDROCHLORIDE 5 MILLIGRAM(S): 2.5 TABLET ORAL at 06:11

## 2019-01-12 NOTE — PROGRESS NOTE ADULT - ASSESSMENT
53M end stage liver disease 2/2 alcoholic cirrhosis, DMII, severe HFrEF (35%), CAD, HTN, presenting with 1 week of generalized weakness and hypotension, likely 2/2 decompensated cirrhosis in the setting of sepsis. Back to baseline SBPs, mentating well and currently improved symptoms compared to admission. MR abdomen showing mild Iron deposition, cirrhosis, and portal hypertension. CBC abnormalities most likely 2/2 due to cirrhosis (thrombcytopenia and anemia) without evidence of obvious bleed (low Hgb/Hct, tachycardia, hypotension from baseline, negative occult blood stool). Patient with worsening liver failure (INR now 3.54 now 2.84 s/p 2U FFP total, bilirubin trending up).  Now s/p diagnostic paracentesis will not showing signs of SBP (PMN<250, afebrile, non tender), with worsening bilirubin, anemia despite transfusions now with R. gluteal hematoma of 13 cm most likely explaining patients anemia and pain. Will stabilize coagulopathy with FFP and platelet transfusions. Patients SBPs stable after stopping albumin and on home dose Midodrine, will resume diuretics and low dose today and observe. 53M end stage liver disease 2/2 alcoholic cirrhosis, DMII, severe HFrEF (35%), CAD, HTN, presenting with 1 week of generalized weakness and hypotension, likely 2/2 decompensated cirrhosis in the setting of sepsis. Back to baseline SBPs, mentating well and currently improved symptoms compared to admission. MR abdomen showing mild Iron deposition, cirrhosis, and portal hypertension. CBC abnormalities most likely 2/2 due to cirrhosis (thrombcytopenia and anemia) without evidence of obvious bleed (low Hgb/Hct, tachycardia, hypotension from baseline, negative occult blood stool). Patient with worsening liver failure (INR now 3.54 now 2.84 s/p 2U FFP total, bilirubin trending up).  Now s/p diagnostic paracentesis will not showing signs of SBP (PMN<250, afebrile, non tender), with worsening bilirubin, anemia despite transfusions now with R. gluteal hematoma of 13 cm most likely explaining patients anemia and pain. Will continue stabilize coagulopathy with FFP and platelet transfusions. Will evaluate extent of hematoma with CT Angio. 53M end stage liver disease 2/2 alcoholic cirrhosis, DMII, severe HFrEF (35%), CAD, HTN, presenting with 1 week of generalized weakness and hypotension, likely 2/2 decompensated cirrhosis in the setting of sepsis. Back to baseline SBPs, mentating well and currently improved symptoms compared to admission. MR abdomen showing mild Iron deposition, cirrhosis, and portal hypertension. CBC abnormalities most likely 2/2 due to cirrhosis (thrombcytopenia and anemia) without evidence of obvious bleed (low Hgb/Hct, tachycardia, hypotension from baseline, negative occult blood stool). Patient with worsening liver failure (INR now 3.54 now 2.84 s/p 2U FFP total, bilirubin trending up).  s/p diagnostic paracentesis will not showing signs of SBP (PMN<250, afebrile, non tender), with worsening bilirubin, anemia despite transfusions now with R. gluteal hematoma of 13 cm most likely explaining patients anemia and pain. Will continue stabilize coagulopathy with FFP and platelet transfusions. Will evaluate extent of hematoma with CT Angio.

## 2019-01-12 NOTE — PROGRESS NOTE ADULT - PROBLEM SELECTOR PLAN 7
No active bleeding at this time. Pt has dried blood on teeth, from dry lips/scab picking, denies hematemesis, hgb at baseline.  Patient was previously on propranolol for prophylaxis- however states he is no longer taking it. Likely 2/2 hypotension.     - Will evaluate the presence of new varices on 1/14 with EGD. Last HbA1c 6.1% in 2016. Holding home Glipizide.     - Repeat A1c <5 patient is not diabetic.   - FS qac and qhs, with insulin SSI. Last HbA1c 6.1% in 2016. Holding home Glipizide.    Repeat A1c <5 patient is not diabetic.   - FS qac and qhs, with insulin SSI.

## 2019-01-12 NOTE — PROGRESS NOTE ADULT - SUBJECTIVE AND OBJECTIVE BOX
Dr. Noman Harden  Internal Medicine PGY-1   Pager# [phone]    Patient is a 53y old  Male who presents with a chief complaint of hypotension, decompensated cirrhosis (11 Jan 2019 08:15)      SUBJECTIVE / OVERNIGHT EVENTS:    MEDICATIONS  (STANDING):  chlorhexidine 0.12% Liquid 15 milliLiter(s) Oral Mucosa two times a day  dextrose 5%. 1000 milliLiter(s) (50 mL/Hr) IV Continuous <Continuous>  dextrose 50% Injectable 12.5 Gram(s) IV Push once  dextrose 50% Injectable 25 Gram(s) IV Push once  dextrose 50% Injectable 25 Gram(s) IV Push once  folic acid 1 milliGRAM(s) Oral daily  furosemide    Tablet 20 milliGRAM(s) Oral daily  insulin lispro (HumaLOG) corrective regimen sliding scale   SubCutaneous three times a day before meals  insulin lispro (HumaLOG) corrective regimen sliding scale   SubCutaneous at bedtime  lactulose Syrup 30 Gram(s) Oral three times a day  lidocaine   Patch 1 Patch Transdermal daily  midodrine 5 milliGRAM(s) Oral three times a day  multivitamin 1 Tablet(s) Oral daily  phytonadione   Solution 10 milliGRAM(s) Oral daily  rifaximin 550 milliGRAM(s) Oral two times a day  spironolactone 50 milliGRAM(s) Oral two times a day  thiamine 100 milliGRAM(s) Oral daily    MEDICATIONS  (PRN):  artificial tears (preservative free) Ophthalmic Solution 1 Drop(s) Both EYES daily PRN Dry Eyes  dextrose 40% Gel 15 Gram(s) Oral once PRN Blood Glucose LESS THAN 70 milliGRAM(s)/deciliter  glucagon  Injectable 1 milliGRAM(s) IntraMuscular once PRN Glucose LESS THAN 70 milligrams/deciliter      Vital Signs Last 24 Hrs  T(C): 36.7 (12 Jan 2019 06:06), Max: 37.2 (11 Jan 2019 11:51)  T(F): 98 (12 Jan 2019 06:06), Max: 98.9 (11 Jan 2019 11:51)  HR: 61 (12 Jan 2019 06:06) (57 - 67)  BP: 95/57 (12 Jan 2019 06:06) (88/49 - 96/51)  BP(mean): --  RR: 18 (12 Jan 2019 06:06) (16 - 18)  SpO2: 95% (12 Jan 2019 06:06) (93% - 98%)  CAPILLARY BLOOD GLUCOSE      POCT Blood Glucose.: 234 mg/dL (11 Jan 2019 22:38)  POCT Blood Glucose.: 196 mg/dL (11 Jan 2019 17:36)  POCT Blood Glucose.: 193 mg/dL (11 Jan 2019 13:08)  POCT Blood Glucose.: 152 mg/dL (11 Jan 2019 08:53)    I&O's Summary    11 Jan 2019 07:01  -  12 Jan 2019 07:00  --------------------------------------------------------  IN: 360 mL / OUT: 0 mL / NET: 360 mL        PHYSICAL EXAM:  Constitutional: Frail appearing, jaundiced, in no acute distress, comfortable in bed  ENMT: dry mucous membranes, no pharyngeal erythema or exudates  Respiratory: lungs CTAB; no wheezing, rales or rhonchi  Cardiovascular: Normal S1 and S2; no murmurs. Trace LE edema bilaterally.   Gastrointestinal: Abdomen soft, no distension, no fluid wave, normal bowel sounds. Mild tenderness in Right and Left abdominal flanks.   Extremities: No clubbing, cyanosis. Borderline +1 pitting edema b/l up to just above the ankles.   Vascular: 2+ peripheral pulses  Neurological: No asterixis. No focal deficits appreciated.  Skin: Jaundiced; no rashes noted. 2 cm x 3 cm ecchymosis non tender no palpable hematoma. Patient with R. buttock palpable mass with bruising on the skin. Tender.   Psychiatric: A&O x 4. Dr. Noman Harden  Internal Medicine PGY-1   Pager# [phone]    Patient is a 53y old  Male who presents with a chief complaint of hypotension, decompensated cirrhosis (11 Jan 2019 08:15)      SUBJECTIVE / OVERNIGHT EVENTS: No acute events overnight.     MEDICATIONS  (STANDING):  chlorhexidine 0.12% Liquid 15 milliLiter(s) Oral Mucosa two times a day  dextrose 5%. 1000 milliLiter(s) (50 mL/Hr) IV Continuous <Continuous>  dextrose 50% Injectable 12.5 Gram(s) IV Push once  dextrose 50% Injectable 25 Gram(s) IV Push once  dextrose 50% Injectable 25 Gram(s) IV Push once  folic acid 1 milliGRAM(s) Oral daily  furosemide    Tablet 20 milliGRAM(s) Oral daily  insulin lispro (HumaLOG) corrective regimen sliding scale   SubCutaneous three times a day before meals  insulin lispro (HumaLOG) corrective regimen sliding scale   SubCutaneous at bedtime  lactulose Syrup 30 Gram(s) Oral three times a day  lidocaine   Patch 1 Patch Transdermal daily  midodrine 5 milliGRAM(s) Oral three times a day  multivitamin 1 Tablet(s) Oral daily  phytonadione   Solution 10 milliGRAM(s) Oral daily  rifaximin 550 milliGRAM(s) Oral two times a day  spironolactone 50 milliGRAM(s) Oral two times a day  thiamine 100 milliGRAM(s) Oral daily    MEDICATIONS  (PRN):  artificial tears (preservative free) Ophthalmic Solution 1 Drop(s) Both EYES daily PRN Dry Eyes  dextrose 40% Gel 15 Gram(s) Oral once PRN Blood Glucose LESS THAN 70 milliGRAM(s)/deciliter  glucagon  Injectable 1 milliGRAM(s) IntraMuscular once PRN Glucose LESS THAN 70 milligrams/deciliter      Vital Signs Last 24 Hrs  T(C): 36.7 (12 Jan 2019 06:06), Max: 37.2 (11 Jan 2019 11:51)  T(F): 98 (12 Jan 2019 06:06), Max: 98.9 (11 Jan 2019 11:51)  HR: 61 (12 Jan 2019 06:06) (57 - 67)  BP: 95/57 (12 Jan 2019 06:06) (88/49 - 96/51)  BP(mean): --  RR: 18 (12 Jan 2019 06:06) (16 - 18)  SpO2: 95% (12 Jan 2019 06:06) (93% - 98%)  CAPILLARY BLOOD GLUCOSE      POCT Blood Glucose.: 234 mg/dL (11 Jan 2019 22:38)  POCT Blood Glucose.: 196 mg/dL (11 Jan 2019 17:36)  POCT Blood Glucose.: 193 mg/dL (11 Jan 2019 13:08)  POCT Blood Glucose.: 152 mg/dL (11 Jan 2019 08:53)    I&O's Summary    11 Jan 2019 07:01  -  12 Jan 2019 07:00  --------------------------------------------------------  IN: 360 mL / OUT: 0 mL / NET: 360 mL        PHYSICAL EXAM:  Constitutional: Frail appearing, jaundiced, in no acute distress, comfortable in bed  ENMT: dry mucous membranes, no pharyngeal erythema or exudates  Respiratory: lungs CTAB; no wheezing, rales or rhonchi  Cardiovascular: Normal S1 and S2; no murmurs. Trace LE edema bilaterally.   Gastrointestinal: Abdomen soft, no distension, no fluid wave, normal bowel sounds. Extremities: No clubbing, cyanosis. Borderline +1 pitting edema b/l up to just above the ankles.   Vascular: 2+ peripheral pulses  Neurological: No asterixis. No focal deficits appreciated.  Skin: Jaundiced; no rashes noted. 2 cm x 3 cm ecchymosis non tender no palpable hematoma. Patient with R. buttock palpable mass with bruising on the skin on medial aspect near gluteal cleft and lateral aspect near hip. No extension of bruising. Tender.   Psychiatric: A&O x 4.      LABS:                        6.8    5.0   )-----------( 31       ( 12 Jan 2019 07:03 )             19.6     01-12    136  |  107  |  22  ----------------------------<  130<H>  4.6   |  20<L>  |  0.68    Ca    8.8      12 Jan 2019 07:01  Phos  2.6     01-12  Mg     2.2     01-12    TPro  5.3<L>  /  Alb  3.0<L>  /  TBili  14.4<H>  /  DBili  x   /  AST  55<H>  /  ALT  13  /  AlkPhos  100  01-12    PT/INR - ( 12 Jan 2019 07:01 )   PT: 36.5 sec;   INR: 3.09 ratio      WBC Trend: 5.0 K/uL<--, 5.0 K/uL<--, 5.4 K/uL<--, 5.3 K/uL  Hgb Trend: 6.8 g/dL<--, 7.0 g/dL<--, 7.1 g/dL<--, 7.3 g/dL  Hct Trend: 19.6 %<--, 20.5 %<--, 20.5 %<--, 20.9 %  Platelets Trend: 31 K/uL<--, 37 K/uL<--, 32 K/uL<--, 34 K/uL     AST Trend: 55 U/L<--, 57 U/L<--, 60 U/L<--, 61 U/L  ALT Trend: 13 U/L<--, 12 U/L<--, 15 U/L<--, 12 U/L  ALP Trend: 100 U/L<--, 93 U/L<--, 85 U/L<--, 89 U/L  Albumin Trend: 3.0 g/dL<--, 3.1 g/dL<--, 3.1 g/dL<--, 3.3 g/dL  Total Bilirubin Trend: 14.4 mg/dL<--, 13.7 mg/dL<--, 13.1 mg/dL<--, 13.0 mg/dL  INR Trend: 3.09 ratio<--, 3.09 ratio<--, 2.84 ratio<--, 3.54 ratio Dr. Noman Harden  Internal Medicine PGY-1   Pager# [phone]    Patient is a 53y old  Male who presents with a chief complaint of hypotension, decompensated cirrhosis (11 Jan 2019 08:15)      SUBJECTIVE / OVERNIGHT EVENTS: No acute events overnight. Patient denies any nausea, vomiting, chest pain, SOB, disturbances in sleep. Brothers at bedside explained about plan and care.      MEDICATIONS  (STANDING):  chlorhexidine 0.12% Liquid 15 milliLiter(s) Oral Mucosa two times a day  dextrose 5%. 1000 milliLiter(s) (50 mL/Hr) IV Continuous <Continuous>  dextrose 50% Injectable 12.5 Gram(s) IV Push once  dextrose 50% Injectable 25 Gram(s) IV Push once  dextrose 50% Injectable 25 Gram(s) IV Push once  folic acid 1 milliGRAM(s) Oral daily  furosemide    Tablet 20 milliGRAM(s) Oral daily  insulin lispro (HumaLOG) corrective regimen sliding scale   SubCutaneous three times a day before meals  insulin lispro (HumaLOG) corrective regimen sliding scale   SubCutaneous at bedtime  lactulose Syrup 30 Gram(s) Oral three times a day  lidocaine   Patch 1 Patch Transdermal daily  midodrine 5 milliGRAM(s) Oral three times a day  multivitamin 1 Tablet(s) Oral daily  phytonadione   Solution 10 milliGRAM(s) Oral daily  rifaximin 550 milliGRAM(s) Oral two times a day  spironolactone 50 milliGRAM(s) Oral two times a day  thiamine 100 milliGRAM(s) Oral daily    MEDICATIONS  (PRN):  artificial tears (preservative free) Ophthalmic Solution 1 Drop(s) Both EYES daily PRN Dry Eyes  dextrose 40% Gel 15 Gram(s) Oral once PRN Blood Glucose LESS THAN 70 milliGRAM(s)/deciliter  glucagon  Injectable 1 milliGRAM(s) IntraMuscular once PRN Glucose LESS THAN 70 milligrams/deciliter      Vital Signs Last 24 Hrs  T(C): 36.7 (12 Jan 2019 06:06), Max: 37.2 (11 Jan 2019 11:51)  T(F): 98 (12 Jan 2019 06:06), Max: 98.9 (11 Jan 2019 11:51)  HR: 61 (12 Jan 2019 06:06) (57 - 67)  BP: 95/57 (12 Jan 2019 06:06) (88/49 - 96/51)  BP(mean): --  RR: 18 (12 Jan 2019 06:06) (16 - 18)  SpO2: 95% (12 Jan 2019 06:06) (93% - 98%)  CAPILLARY BLOOD GLUCOSE      POCT Blood Glucose.: 234 mg/dL (11 Jan 2019 22:38)  POCT Blood Glucose.: 196 mg/dL (11 Jan 2019 17:36)  POCT Blood Glucose.: 193 mg/dL (11 Jan 2019 13:08)  POCT Blood Glucose.: 152 mg/dL (11 Jan 2019 08:53)    I&O's Summary    11 Jan 2019 07:01  -  12 Jan 2019 07:00  --------------------------------------------------------  IN: 360 mL / OUT: 0 mL / NET: 360 mL        PHYSICAL EXAM:  Constitutional: Frail appearing, jaundiced, in no acute distress, comfortable in bed  ENMT: dry mucous membranes, no pharyngeal erythema or exudates  Respiratory: lungs CTAB; no wheezing, rales or rhonchi  Cardiovascular: Normal S1 and S2; no murmurs. Trace LE edema bilaterally.   Gastrointestinal: Abdomen soft, no distension, no fluid wave, normal bowel sounds. Extremities: No clubbing, cyanosis. Borderline +1 pitting edema b/l up to just above the ankles.   Vascular: 2+ peripheral pulses  Neurological: No asterixis. No focal deficits appreciated.  Skin: Jaundiced; no rashes noted. 2 cm x 3 cm ecchymosis non tender no palpable hematoma. Patient with R. buttock palpable mass with bruising on the skin on medial aspect near gluteal cleft and lateral aspect near hip. No extension of bruising. Tender.   Psychiatric: A&O x 4.      LABS:                        6.8    5.0   )-----------( 31       ( 12 Jan 2019 07:03 )             19.6     01-12    136  |  107  |  22  ----------------------------<  130<H>  4.6   |  20<L>  |  0.68    Ca    8.8      12 Jan 2019 07:01  Phos  2.6     01-12  Mg     2.2     01-12    TPro  5.3<L>  /  Alb  3.0<L>  /  TBili  14.4<H>  /  DBili  x   /  AST  55<H>  /  ALT  13  /  AlkPhos  100  01-12    PT/INR - ( 12 Jan 2019 07:01 )   PT: 36.5 sec;   INR: 3.09 ratio      WBC Trend: 5.0 K/uL<--, 5.0 K/uL<--, 5.4 K/uL<--, 5.3 K/uL  Hgb Trend: 6.8 g/dL<--, 7.0 g/dL<--, 7.1 g/dL<--, 7.3 g/dL  Hct Trend: 19.6 %<--, 20.5 %<--, 20.5 %<--, 20.9 %  Platelets Trend: 31 K/uL<--, 37 K/uL<--, 32 K/uL<--, 34 K/uL     AST Trend: 55 U/L<--, 57 U/L<--, 60 U/L<--, 61 U/L  ALT Trend: 13 U/L<--, 12 U/L<--, 15 U/L<--, 12 U/L  ALP Trend: 100 U/L<--, 93 U/L<--, 85 U/L<--, 89 U/L  Albumin Trend: 3.0 g/dL<--, 3.1 g/dL<--, 3.1 g/dL<--, 3.3 g/dL  Total Bilirubin Trend: 14.4 mg/dL<--, 13.7 mg/dL<--, 13.1 mg/dL<--, 13.0 mg/dL  INR Trend: 3.09 ratio<--, 3.09 ratio<--, 2.84 ratio<--, 3.54 ratio

## 2019-01-12 NOTE — PROGRESS NOTE ADULT - PROBLEM SELECTOR PLAN 5
PLT count 77k this admission, compared to 75k on previous admission (comparable). This, in addition to elevated INR and hyponatremia, are likely in setting of end stage liver disease.     - Worsening platelets can be secondary to liver cirrhosis. No signs of active bleeding despite Hgb decreasing (tachycardia, hypotension, melena).   - No active bleeding. Last TTE showed reduced Ef but was in setting of sepsis and bacteremia. Currently appears euvolemic. Fluid status will be tenuous in the setting of advanced liver disease and HFrEF.    - Will monitor vitals q4hr and hemodynamics.   - Will attempt to maintain euvolemia by minimizing intake.  - Repeat TTE done 1/8 shows improvement in Ef 43% from 35% in October (done in [facility]). However still shows systolic dysfunction.  - Reinitiated Spironolactone 50 mg PO qd and Furosemide 20 mg PO qd.

## 2019-01-12 NOTE — PROGRESS NOTE ADULT - PROBLEM SELECTOR PLAN 4
Hgb dropped from 10 to 7.4, unclear source of bleeding. Patient has component of macrocytic anemia. Transfusion goal of Hgb<7.0. Occult blood negative.    - Iron studies consistent with anemia of chronic disease (High Ferritin, Low Transferrin).  - Hemolysis labs show low Haptoglobin (<20), however normal LDH. Using bilirubin is non specific given liver pathology. Given no ULICES or hyperkalemia episodes less convinced of hemolysis.   - Hgb response jumped however now is trending down again. No obvious source of bleed. Will eventually need scope to reevaluate for varices.   - Supratherapeutic INR 2/2 liver disease. No obvious signs or symptoms of bleeding (tachycardia, hypotension, hematemesis) will continue to monitor.   - s/p 3U FFP total over the course of 1/9-1/10 for optimization prior to diagnostic paracentesis.  - Anemia most likely explained by hematoma of right buttock. PLT count 77k this admission, compared to 75k on previous admission (comparable). This, in addition to elevated INR and hyponatremia, are likely in setting of end stage liver disease.     - Worsening platelets can be secondary to liver cirrhosis. No signs of active bleeding despite Hgb decreasing (tachycardia, hypotension, melena).   - No active bleeding.

## 2019-01-12 NOTE — PROGRESS NOTE ADULT - PROBLEM SELECTOR PLAN 1
MELD 34 on admission, indicating >50% mortality in next 3 months. Was recently on liver transplant candidate list at [facility], however had to be removed due to severe HFrEF, no longer plans for liver transplant    - Stopped Albumin 5% 250 cc doses 1/9.   - Reinitiated Spironolactone 50 mg PO qd and Furosemide 20 mg PO qd.  - Will continue with Lactulose and Rifaximin PO.   - Evidence of severe synthetic dysfunction, portal hypertensive, varices, and existing heart failure. ULICES resolved.   - U/S Abdomen showed small to moderate ascites in the RUQ. MRCP/ MR abdomen showing changes consistent with cirrhosis and portal hypertension.   - Patient to have EGD done on Monday to evaluate for presence of varices will be optimized further for this with FFP (Goal INR 1.8 or less) and Platelets (Goal >50) MELD 34 on admission, indicating >50% mortality in next 3 months. Was recently on liver transplant candidate list at [facility], however had to be removed due to severe HFrEF, no longer plans for liver transplant  - Stopped Albumin 5% 250 cc doses 1/9.   - Reinitiated Spironolactone 50 mg PO qd and Furosemide 20 mg PO qd.  - Will continue with Lactulose and Rifaximin PO.   - Evidence of severe synthetic dysfunction, portal hypertensive, varices, and existing heart failure. ULICES resolved.   - U/S Abdomen showed small to moderate ascites in the RUQ. MRCP/ MR abdomen showing changes consistent with cirrhosis and portal hypertension.   - Patient to have EGD done on Monday to evaluate for presence of varices will be optimized further for this with FFP (Goal INR 1.8 or less) and Platelets (Goal >50)

## 2019-01-12 NOTE — PROGRESS NOTE ADULT - PROBLEM SELECTOR PLAN 6
Last TTE showed reduced Ef but was in setting of sepsis and bacteremia. Currently appears euvolemic. Fluid status will be tenuous in the setting of advanced liver disease and HFrEF.    - Will monitor vitals q4hr and hemodynamics.   - Will attempt to maintain euvolemia by minimizing intake.  - Repeat TTE done 1/8 shows improvement in Ef 43% from 35% in October (done in [facility]). However still shows systolic dysfunction.  - Reinitiated Spironolactone 50 mg PO qd and Furosemide 20 mg PO qd. No active bleeding at this time. Pt has dried blood on teeth, from dry lips/scab picking, denies hematemesis, hgb at baseline.  Patient was previously on propranolol for prophylaxis- however states he is no longer taking it. Likely 2/2 hypotension.     - Will evaluate the presence of new varices on 1/14 with EGD.

## 2019-01-12 NOTE — PROGRESS NOTE ADULT - PROBLEM SELECTOR PLAN 2
MELD 34 on admission, indicating >50% mortality in next 3 months. Was recently on liver transplant candidate list at [facility], however had to be removed due to severe HFrEF, no longer plans for liver transplant    - s/p Diagnostic paracentesis showing no SBP (PMN<250).   - Stopped Albumin 5% 250 cc doses 1/9. Reinitiated Spironolactone 50 mg PO qd and Furosemide 20 mg PO qd.  - Will continue with Lactulose and Rifaximin PO.   - Evidence of severe synthetic dysfunction, portal hypertensive, varices, and existing heart failure. ULICSE resolved. US of right gluteal region done on 1/11 showing 13 cm hematoma. Most likely in the setting of bleed given patients elevated INR.    - Will continue to monitor for expansion and physical exam for compartment syndrome changes.  - Serial ultrasounds to assess stability.  - Will stabilize INR and Platelet count as above to minimize expansion.  - Will administer 1U Platelets and 1U FFP today 1/12 to mitigate the effects of liver coagulopathy and thrombocytopenia. Will recheck CBC 1 hr s/p transfusions.   - CT A/P with contrast to assess for extent of bleed and see if there active extravasation.

## 2019-01-12 NOTE — PROVIDER CONTACT NOTE (CRITICAL VALUE NOTIFICATION) - BACKGROUND
Patient admitted for decompensated cirrhosis. 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

## 2019-01-12 NOTE — PROGRESS NOTE ADULT - PROBLEM SELECTOR PLAN 3
US of right gluteal region done on 1/11 showing 13 cm hematoma. Most likely in the setting of bleed given patients elevated INR.    - Will continue to monitor for expansion and physical exam for compartment syndrome changes.  - Serial ultrasounds to assess stability.  - Will stabilize INR and Platelet count as above to minimize expansion. Hgb dropped from 10 to 7.4, unclear source of bleeding. Patient has component of macrocytic anemia. Transfusion goal of Hgb<7.0. Occult blood negative.    - Iron studies consistent with anemia of chronic disease (High Ferritin, Low Transferrin).  - Hemolysis labs show low Haptoglobin (<20), however normal LDH. Using bilirubin is non specific given liver pathology. Given no ULICES or hyperkalemia episodes less convinced of hemolysis.   - Hgb response jumped however now is trending down again. No obvious source of bleed. Will eventually need scope to reevaluate for varices.   - Supratherapeutic INR 2/2 liver disease. No obvious signs or symptoms of bleeding (tachycardia, hypotension, hematemesis) will continue to monitor.   - s/p 3U FFP total over the course of 1/9-1/10 for optimization prior to diagnostic paracentesis.  - Anemia most likely explained by hematoma of right buttock.

## 2019-01-12 NOTE — PROGRESS NOTE ADULT - ATTENDING COMMENTS
extremely poor prognosis  needs liver transplant  transfuse as needed  cta to evaluate hematoma and r/o active bleed given downtrending Hb, would monitor cbc q8hr for now, bps soft extremely poor prognosis  really needs liver transplant but not candidate due to HF  needs goc discussion  transfuse as needed  cta to evaluate hematoma and r/o active bleed given downtrending Hb, would monitor cbc q8hr for now, bps soft extremely poor prognosis  really needs liver transplant but not candidate due to HF  needs goc discussion  transfuse as needed  cta to evaluate hematoma and r/o active bleed given downtrending Hb, would monitor cbc q8hr for now, bps soft but at baseline

## 2019-01-13 LAB
ALBUMIN SERPL ELPH-MCNC: 3 G/DL — LOW (ref 3.3–5)
ALP SERPL-CCNC: 90 U/L — SIGNIFICANT CHANGE UP (ref 40–120)
ALT FLD-CCNC: 15 U/L — SIGNIFICANT CHANGE UP (ref 10–45)
ANION GAP SERPL CALC-SCNC: 10 MMOL/L — SIGNIFICANT CHANGE UP (ref 5–17)
APTT BLD: 50.7 SEC — HIGH (ref 27.5–36.3)
AST SERPL-CCNC: 48 U/L — HIGH (ref 10–40)
BILIRUB SERPL-MCNC: 14.8 MG/DL — HIGH (ref 0.2–1.2)
BLD GP AB SCN SERPL QL: NEGATIVE — SIGNIFICANT CHANGE UP
BUN SERPL-MCNC: 18 MG/DL — SIGNIFICANT CHANGE UP (ref 7–23)
CALCIUM SERPL-MCNC: 8.6 MG/DL — SIGNIFICANT CHANGE UP (ref 8.4–10.5)
CHLORIDE SERPL-SCNC: 105 MMOL/L — SIGNIFICANT CHANGE UP (ref 96–108)
CO2 SERPL-SCNC: 21 MMOL/L — LOW (ref 22–31)
CREAT SERPL-MCNC: 0.67 MG/DL — SIGNIFICANT CHANGE UP (ref 0.5–1.3)
GLUCOSE BLDC GLUCOMTR-MCNC: 148 MG/DL — HIGH (ref 70–99)
GLUCOSE BLDC GLUCOMTR-MCNC: 168 MG/DL — HIGH (ref 70–99)
GLUCOSE BLDC GLUCOMTR-MCNC: 196 MG/DL — HIGH (ref 70–99)
GLUCOSE BLDC GLUCOMTR-MCNC: 250 MG/DL — HIGH (ref 70–99)
GLUCOSE SERPL-MCNC: 140 MG/DL — HIGH (ref 70–99)
HCT VFR BLD CALC: 18 % — CRITICAL LOW (ref 39–50)
HCT VFR BLD CALC: 25.4 % — LOW (ref 39–50)
HGB BLD-MCNC: 6.4 G/DL — CRITICAL LOW (ref 13–17)
HGB BLD-MCNC: 8.9 G/DL — LOW (ref 13–17)
INR BLD: 2.17 RATIO — HIGH (ref 0.88–1.16)
INR BLD: 2.81 RATIO — HIGH (ref 0.88–1.16)
MAGNESIUM SERPL-MCNC: 1.9 MG/DL — SIGNIFICANT CHANGE UP (ref 1.6–2.6)
MCHC RBC-ENTMCNC: 34.4 PG — HIGH (ref 27–34)
MCHC RBC-ENTMCNC: 34.9 GM/DL — SIGNIFICANT CHANGE UP (ref 32–36)
MCHC RBC-ENTMCNC: 35.5 GM/DL — SIGNIFICANT CHANGE UP (ref 32–36)
MCHC RBC-ENTMCNC: 37.1 PG — HIGH (ref 27–34)
MCV RBC AUTO: 105 FL — HIGH (ref 80–100)
MCV RBC AUTO: 98.6 FL — SIGNIFICANT CHANGE UP (ref 80–100)
PHOSPHATE SERPL-MCNC: 2.4 MG/DL — LOW (ref 2.5–4.5)
PLATELET # BLD AUTO: 53 K/UL — LOW (ref 150–400)
PLATELET # BLD AUTO: 68 K/UL — LOW (ref 150–400)
POTASSIUM SERPL-MCNC: 4.8 MMOL/L — SIGNIFICANT CHANGE UP (ref 3.5–5.3)
POTASSIUM SERPL-SCNC: 4.8 MMOL/L — SIGNIFICANT CHANGE UP (ref 3.5–5.3)
PROT SERPL-MCNC: 5.2 G/DL — LOW (ref 6–8.3)
PROTHROM AB SERPL-ACNC: 25.3 SEC — HIGH (ref 10–12.9)
PROTHROM AB SERPL-ACNC: 33.4 SEC — HIGH (ref 10–12.9)
RBC # BLD: 1.72 M/UL — LOW (ref 4.2–5.8)
RBC # BLD: 2.58 M/UL — LOW (ref 4.2–5.8)
RBC # FLD: 19 % — HIGH (ref 10.3–14.5)
RBC # FLD: 21.3 % — HIGH (ref 10.3–14.5)
RH IG SCN BLD-IMP: POSITIVE — SIGNIFICANT CHANGE UP
SODIUM SERPL-SCNC: 136 MMOL/L — SIGNIFICANT CHANGE UP (ref 135–145)
WBC # BLD: 5.2 K/UL — SIGNIFICANT CHANGE UP (ref 3.8–10.5)
WBC # BLD: 7.7 K/UL — SIGNIFICANT CHANGE UP (ref 3.8–10.5)
WBC # FLD AUTO: 5.2 K/UL — SIGNIFICANT CHANGE UP (ref 3.8–10.5)
WBC # FLD AUTO: 7.7 K/UL — SIGNIFICANT CHANGE UP (ref 3.8–10.5)

## 2019-01-13 PROCEDURE — 99223 1ST HOSP IP/OBS HIGH 75: CPT | Mod: GC

## 2019-01-13 PROCEDURE — 99233 SBSQ HOSP IP/OBS HIGH 50: CPT | Mod: GC

## 2019-01-13 PROCEDURE — 99232 SBSQ HOSP IP/OBS MODERATE 35: CPT

## 2019-01-13 RX ORDER — PROTHROMBIN COMPLEX CONCENTRATE (HUMAN) 25.5; 16.5; 24; 22; 22; 26 [IU]/ML; [IU]/ML; [IU]/ML; [IU]/ML; [IU]/ML; [IU]/ML
2500 POWDER, FOR SOLUTION INTRAVENOUS ONCE
Qty: 0 | Refills: 0 | Status: COMPLETED | OUTPATIENT
Start: 2019-01-13 | End: 2019-01-13

## 2019-01-13 RX ORDER — CALCIUM GLUCONATE 100 MG/ML
2 VIAL (ML) INTRAVENOUS ONCE
Qty: 0 | Refills: 0 | Status: DISCONTINUED | OUTPATIENT
Start: 2019-01-13 | End: 2019-01-13

## 2019-01-13 RX ADMIN — Medication 10 MILLIGRAM(S): at 08:41

## 2019-01-13 RX ADMIN — MIDODRINE HYDROCHLORIDE 5 MILLIGRAM(S): 2.5 TABLET ORAL at 05:24

## 2019-01-13 RX ADMIN — LACTULOSE 30 GRAM(S): 10 SOLUTION ORAL at 13:01

## 2019-01-13 RX ADMIN — SPIRONOLACTONE 50 MILLIGRAM(S): 25 TABLET, FILM COATED ORAL at 18:34

## 2019-01-13 RX ADMIN — Medication 62.5 MILLIMOLE(S): at 20:20

## 2019-01-13 RX ADMIN — Medication 20 MILLIGRAM(S): at 05:24

## 2019-01-13 RX ADMIN — LACTULOSE 30 GRAM(S): 10 SOLUTION ORAL at 21:57

## 2019-01-13 RX ADMIN — Medication 1 MILLIGRAM(S): at 08:41

## 2019-01-13 RX ADMIN — MIDODRINE HYDROCHLORIDE 5 MILLIGRAM(S): 2.5 TABLET ORAL at 21:57

## 2019-01-13 RX ADMIN — Medication 2: at 12:52

## 2019-01-13 RX ADMIN — LIDOCAINE 1 PATCH: 4 CREAM TOPICAL at 13:01

## 2019-01-13 RX ADMIN — LACTULOSE 30 GRAM(S): 10 SOLUTION ORAL at 05:24

## 2019-01-13 RX ADMIN — PROTHROMBIN COMPLEX CONCENTRATE (HUMAN) 400 INTERNATIONAL UNIT(S): 25.5; 16.5; 24; 22; 22; 26 POWDER, FOR SOLUTION INTRAVENOUS at 12:11

## 2019-01-13 RX ADMIN — Medication 100 MILLIGRAM(S): at 08:41

## 2019-01-13 RX ADMIN — SPIRONOLACTONE 50 MILLIGRAM(S): 25 TABLET, FILM COATED ORAL at 05:24

## 2019-01-13 RX ADMIN — Medication 1 TABLET(S): at 08:41

## 2019-01-13 RX ADMIN — Medication 1: at 18:33

## 2019-01-13 RX ADMIN — MIDODRINE HYDROCHLORIDE 5 MILLIGRAM(S): 2.5 TABLET ORAL at 13:02

## 2019-01-13 NOTE — PROGRESS NOTE ADULT - PROBLEM SELECTOR PLAN 2
US of right gluteal region done on 1/11 showing 13 cm hematoma. Most likely in the setting of bleed given patients elevated INR.    - Will continue to monitor for expansion and physical exam for compartment syndrome changes.  - Serial ultrasounds to assess stability.  - Will stabilize INR and Platelet count as above to minimize expansion.  - Will administer 1U Platelets and 1U FFP today 1/12 to mitigate the effects of liver coagulopathy and thrombocytopenia. Will recheck CBC 1 hr s/p transfusions.   - CT A/P with contrast to assess for extent of bleed and see if there active extravasation.  - Surgery consulted overnight, appreciate input  - IR also consulted overnight for possible embolization US of right gluteal region done on 1/11 showing 13 cm hematoma. CTA A/P on 1/12 with prelim read demonstrating active extravasation into hematoma. Received total of 2 units platelets, 1 unit FFP, and 1 unit PRBC in past 24 hours with no response in Hgb (7.0 --> 6.4).     - Serial ultrasounds to assess stability  - Physical compression of hematoma for 20-30 minutes to encourage clot formation  - Will order K-centra and FFP to reverse INR  - Trend CBC q8h  - Surgery consulted overnight, appreciate input  - IR consulted this morning, recommend reversal of INR and ICU consult for closer monitoring given active blood loss MELD >30 indicative of >50% mortality within next 3 months. On last admission, was removed from liver transplant list at [facility] due to decline in heart function (EF 35%). Hepatology following.     - Continue spironolactone 50mg daily and furosemide 20mg daily  - Continue lactulose and rifaximin at current doses  - Evidence of severe synthetic dysfunction, portal hypertension  - Had abdominal US showing small to moderate ascites, now s/p diagnostic paracentesis by IR. MRCP/ MR abdomen showing changes consistent with cirrhosis and portal hypertension.   - Pending EGD Monday 1/14 to evaluate presence of varices; will need to have INR 1.8 or less and platelets above 50K; will likely postpone EGD given active bleeding into hematoma

## 2019-01-13 NOTE — PROGRESS NOTE ADULT - PROBLEM SELECTOR PLAN 1
MELD 34 on admission, indicating >50% mortality in next 3 months. Was recently on liver transplant candidate list at [facility], however had to be removed due to severe HFrEF, no longer plans for liver transplant.     - Stopped Albumin 5% 250 cc doses 1/9  - Reinitiated Spironolactone 50 mg PO qd and Furosemide 20 mg PO qd  - Will continue with Lactulose and Rifaximin PO  - Evidence of severe synthetic dysfunction, portal hypertensive, varices, and existing heart failure. ULICES resolved.   - U/S Abdomen showed small to moderate ascites in the RUQ. MRCP/ MR abdomen showing changes consistent with cirrhosis and portal hypertension.   - Patient to have EGD done on Monday to evaluate for presence of varices will be optimized further for this with FFP (Goal INR 1.8 or less) and Platelets (Goal >50) US of right gluteal region done on 1/11 showing 13 cm hematoma. CTA A/P on 1/12 with prelim read demonstrating active extravasation into hematoma. Received total of 2 units platelets, 1 unit FFP, and 1 unit PRBC in past 24 hours with no response in Hgb (7.0 --> 6.4).     - Serial ultrasounds to assess stability  - Physical compression of hematoma for 20-30 minutes to encourage clot formation  - Will order K-centra and FFP to reverse INR  - Trend CBC q8h  - Surgery consulted overnight, appreciate input  - IR consulted this morning, recommend reversal of INR and ICU consult for closer monitoring given active blood loss US of right gluteal region done on 1/11 showing 13 cm hematoma. CTA A/P on 1/12 with prelim read demonstrating active extravasation into hematoma. Received total of 2 units platelets, 1 unit FFP, and 1 unit PRBC in past 24 hours. Hgb 7.0-->6.4.     - Serial exams and ultrasound to assess stability  - Physical compression of hematoma for 20-30 minutes to encourage clot formation  - Will order Kcentra 2500 units and 1 unit FFP to reverse INR  - Trend CBC q8h  - Surgery consulted overnight, appreciate input  - IR consulted this morning, recommend reversal of INR and ICU consult for closer monitoring given active blood loss  - MICU consulted, appreciate recommendations

## 2019-01-13 NOTE — CONSULT NOTE ADULT - SUBJECTIVE AND OBJECTIVE BOX
Interventional Radiology Consult Note:    This is a 53 year old male with end stage liver disease 2/2 chronic alcoholic cirrhosis, with esophageal varices, HfrEF (EF 35%), CAD, and DMII. He presents with 1 week of generalized weakness and hypotension and currently admitted for acute decompensated liver failure. Patient currently takes lasix 20 mg daily and spironolactone 25 mg daily.  Brother at bedside reports taking daily BP and HR on patient, which average in mid 80s-low 90s systolic, and HR 60s. While admitted, patient noted to have downtrending H&H and reported pain and discomfort in his right gluteal region for past several weeks. CTA was performed and revealed a right gluteal hematoma with focus of active contrast extravasation. He was given 2 units pRBC and 2 units of Plts. Repeat Hgb/Hct pending.    PAST MEDICAL & SURGICAL HISTORY:  Hypertension  Type 2 diabetes mellitus  Congestive heart failure (CHF)  Esophageal varices  Alcoholic cirrhosis of liver with ascites  Abdominal hernia: S/P repair x2  H/O cataract      FAMILY HISTORY:  Family history of hypercholesterolemia (Father)  Family history of diabetes mellitus (Mother)      Allergies    No Known Allergies    MEDICATIONS  (STANDING):  chlorhexidine 0.12% Liquid 15 milliLiter(s) Oral Mucosa two times a day  dextrose 5%. 1000 milliLiter(s) (50 mL/Hr) IV Continuous <Continuous>  dextrose 50% Injectable 12.5 Gram(s) IV Push once  dextrose 50% Injectable 25 Gram(s) IV Push once  dextrose 50% Injectable 25 Gram(s) IV Push once  folic acid 1 milliGRAM(s) Oral daily  furosemide    Tablet 20 milliGRAM(s) Oral daily  insulin lispro (HumaLOG) corrective regimen sliding scale   SubCutaneous three times a day before meals  insulin lispro (HumaLOG) corrective regimen sliding scale   SubCutaneous at bedtime  lactulose Syrup 30 Gram(s) Oral three times a day  lidocaine   Patch 1 Patch Transdermal daily  midodrine 5 milliGRAM(s) Oral three times a day  multivitamin 1 Tablet(s) Oral daily  prothrombin complex concentrate IVPB (KCENTRA) 2500 International Unit(s) IV Intermittent once  rifaximin 550 milliGRAM(s) Oral two times a day  sodium phosphate IVPB 15 milliMole(s) IV Intermittent once  spironolactone 50 milliGRAM(s) Oral two times a day  thiamine 100 milliGRAM(s) Oral daily    MEDICATIONS  (PRN):  artificial tears (preservative free) Ophthalmic Solution 1 Drop(s) Both EYES daily PRN Dry Eyes  dextrose 40% Gel 15 Gram(s) Oral once PRN Blood Glucose LESS THAN 70 milliGRAM(s)/deciliter  glucagon  Injectable 1 milliGRAM(s) IntraMuscular once PRN Glucose LESS THAN 70 milligrams/deciliter    Vital Signs Last 24 Hrs  T(C): 36.8 (13 Jan 2019 08:55), Max: 36.8 (12 Jan 2019 19:53)  T(F): 98.3 (13 Jan 2019 08:55), Max: 98.3 (13 Jan 2019 08:55)  HR: 63 (13 Jan 2019 08:55) (63 - 70)  BP: 94/55 (13 Jan 2019 08:55) (86/52 - 101/62)  BP(mean): --  RR: 18 (13 Jan 2019 08:55) (16 - 19)  SpO2: 94% (13 Jan 2019 08:55) (93% - 98%)    CBC                        6.4    5.2   )-----------( 53       ( 13 Jan 2019 05:36 )             18.0       Chemistry  01-13    136  |  105  |  18  ----------------------------<  140<H>  4.8   |  21<L>  |  0.67    Ca    8.6      13 Jan 2019 05:36  Phos  2.4     01-13  Mg     1.9     01-13    TPro  5.2<L>  /  Alb  3.0<L>  /  TBili  14.8<H>  /  DBili  x   /  AST  48<H>  /  ALT  15  /  AlkPhos  90  01-13      PT/INR - ( 13 Jan 2019 05:36 )   PT: 33.4 sec;   INR: 2.81 ratio

## 2019-01-13 NOTE — PROGRESS NOTE ADULT - ASSESSMENT
53M end stage liver disease 2/2 alcoholic cirrhosis, DMII, severe HFrEF (35%), CAD, HTN, presenting with 1 week of generalized weakness and hypotension, likely 2/2 decompensated cirrhosis in the setting of sepsis. Back to baseline SBPs, mentating well and currently improved symptoms compared to admission. MR abdomen showing mild Iron deposition, cirrhosis, and portal hypertension. CBC abnormalities most likely 2/2 due to cirrhosis (thrombcytopenia and anemia) without evidence of obvious bleed (low Hgb/Hct, tachycardia, hypotension from baseline, negative occult blood stool). Patient with worsening liver failure (INR now 3.54 now 2.84 s/p 2U FFP total, bilirubin trending up).  s/p diagnostic paracentesis will not showing signs of SBP (PMN<250, afebrile, non tender), with worsening bilirubin, anemia despite transfusions now with R. gluteal hematoma of 13 cm most likely explaining patients anemia and pain. Will continue stabilize coagulopathy with FFP and platelet transfusions. Will evaluate extent of hematoma with CT Angio. 53M end stage liver disease 2/2 alcoholic cirrhosis, T2DM, HFrEF (35%), CAD, HTN, presenting with 1 week of generalized weakness and hypotension, admitted for acute decompensated liver failure. Now s/p diagnostic paracentesis showing no evidence of SBP. Course c/b enlarging R gluteal hematoma with decreasing hemoglobin despite continued blood transfusions.

## 2019-01-13 NOTE — CONSULT NOTE ADULT - ASSESSMENT
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 Patient has required 1 u pRBC and 2 u plt. Patient found on CT A/P to have active extravasation in subcutaneous tissue of R gluteal hematoma, measuring 6.5x4.8 cm.    - Recommend reversing INR as able to prevent further bleeding of hematoma  - Compression as tolerated  - Recommend contacting IR for possible embolization of bleeding vessel    Discussed with surgery attending Dr. Lee [place] PGY-2  Surgery Pager [phone]

## 2019-01-13 NOTE — PROGRESS NOTE ADULT - PROBLEM SELECTOR PLAN 3
Hgb dropped from 10 to 7.4, unclear source of bleeding. Patient has component of macrocytic anemia. Transfusion goal of Hgb<7.0. Occult blood negative.    - Iron studies consistent with anemia of chronic disease (High Ferritin, Low Transferrin).  - Hemolysis labs show low Haptoglobin (<20), however normal LDH. Using bilirubin is non specific given liver pathology. Given no ULICES or hyperkalemia episodes less convinced of hemolysis.   - Hgb response jumped however now is trending down again. Potential bleeding sources include hematoma and esophageal varices.  - s/p 3U FFP total over the course of 1/9-1/10 for optimization prior to diagnostic paracentesis Likely multifactorial related to anemia of chronic disease and active blood loss.     - Trend CBC q8h  - Transfuse blood products as above

## 2019-01-13 NOTE — PROGRESS NOTE ADULT - PROBLEM SELECTOR PLAN 7
Last HbA1c 6.1% in 2016. Holding home Glipizide. Well-controlled; current A1c is <5.     - FS with meals and at bedtime  - Continue insulin SS

## 2019-01-13 NOTE — CONSULT NOTE ADULT - ASSESSMENT
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 Patient seen and examined, only c/o right buttocks pain. Receiving first unit PRBC at this time (4th during this hospital stay), also received 5U FFP this hospital stay, and 2U platelets. Hemodynamically stable, no lightheadedness/dizziness. Hgb dropped to 6.4 this AM from 7.0 last night. This is prior to him receiving his first unit of blood.     #Anemia 2/2 hematoma w/ active extravasation  - patient has a h/o HF and as such, would prefer K centra to reverse coagulopathy over FFP as patient would need 4-5U FFP and that is a significant amount of volume  - hemodynamically stable so would reverse coagulopathy first then give more PRBC transfusion to correct anemia  - urgent IR consult for embolization    Patient does not require MICU level care at this time. Thank you for allowing us to participate in the care of this patient. Please re-consult as needed.    Kandi Kwok MD  PGY-2, Internal Medicine  [place]: [phone]  [facility]: [phone]

## 2019-01-13 NOTE — PROGRESS NOTE ADULT - PROBLEM SELECTOR PLAN 5
Last TTE showed reduced Ef but was in setting of sepsis and bacteremia. Currently appears euvolemic. Fluid status will be tenuous in the setting of advanced liver disease and HFrEF.    - Will monitor vitals q4hr and hemodynamics.   - Will attempt to maintain euvolemia by minimizing intake.  - Repeat TTE done 1/8 shows improvement in Ef 43% from 35% in October (done at [facility]). However still shows systolic dysfunction.  - Reinitiated Spironolactone 50 mg PO qd and Furosemide 20 mg PO qd. Last TTE (done at [facility]) showing reduced EF of 35%, however, echo was performed when patient was septic and may not have been accurate representation of baseline heart function.     - Vital signs q4h  - Repeat TTE 1/8 showing improvement in EF 43% from 35% in October, still with systolic dysfunction  - Strict I/Os, daily weight

## 2019-01-13 NOTE — CONSULT NOTE ADULT - SUBJECTIVE AND OBJECTIVE BOX
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 Patient's brother is at bedside. Patient denies fevers but reports feeling very cold and has multiple blankets on. Mild intermittent cough with no sputum for the past 2-3 days. No dyspnea at rest, but does have moderate dyspnea on exertion. No chest pain. Patient has intermittent abdominal pain in the LLQ and occasionally RUQ which he describes as a burning sensation. Patient denies hemoptysis/hematemesis or bloody stools. Denies nausea/vomiting in general. Denies abdominal distension (states it has gotten better). He notes at least 20 pound weight loss in the past month (mainly due to resolution of abdominal ascites compared to last month). Patient was recently admitted to our hospital system followed by [facility] in 10- 11/2018 with abdominal distension and lower extremity edema, was initially placed on liver transplant list however was removed from list due to HF with severely reduced EF.  Prior to the [facility] transfer, patient was treated for severe sepsis from E coli bacteremia from presumed SBP, treated with ceftriaxone. Patient also recently had episode of LE edema 3 weeks ago- was started metolazone in addition to home lasix and spironolactone. However, the metolazone was discontinued 1 week ago. Patient currently takes lasix 20 mg daily and spironolactone 25 mg daily.  Brother at bedside reports taking daily BP and HR on patient, which average in mid 80s-low 90s systolic, and HR 60s.     In the ED, pt afebrile, T 97.4, HR 62, BP as low as 73/78, satting 100% on room air. Labs notable for WBC 11.8, PLT 77, INR 2.81, K+ 5.9, Cr 1.33, Total Bilirubin 12.8. Lactate on VBG 4.7.   Patient given 250 cc 5% albumin with improvement in bp to SBP 95/58. Also given 1g IV ceftriaxone for empiric SBP treatment. (02 Jan 2019 23:26)    Patient noted to have downtrending H&H and recently reported gluteal hematoma, requiring multiple transfusions of 1 u pRBC and 2 u platelets. Patient reports that he has been having pain and discomfort in his right gluteus for the last several weeks, not noted prior to 1/11 on current admission. General surgery consulted for active extravasation of gluteal hematoma.    Allergies:   PAST MEDICAL & SURGICAL HISTORY:  Hypertension  Type 2 diabetes mellitus  Congestive heart failure (CHF)  Esophageal varices  Alcoholic cirrhosis of liver with ascites  Abdominal hernia: S/P repair x2  H/O cataract    FAMILY HISTORY:  Family history of hypercholesterolemia (Father)  Family history of diabetes mellitus (Mother)    ADVANCE DIRECTIVES: Presumed Full Code     REVIEW OF SYSTEMS:   General: Non-Contributory  Skin/Breast: Non-Contributory  Ophthalmologic: Non-Contributory  ENMT: Non-Contributory  Respiratory and Thorax: Non-Contributory  Cardiovascular: Non-Contributory  Gastrointestinal: Non-Contributory  Genitourinary: Non-Contributory  Musculoskeletal: Non-Contributory  Neurological: Non-Contributory  Psychiatric: Non-Contributory  Hematology/Lymphatics: Non-Contributory  Endocrine: Non-Contributory  Allergic/Immunologic: Non-Contributory    HOME MEDICATIONS:   albumin human 5% intravenous solution: 250 milliliter(s) intravenous every 6 hours (04 Jan 2019 13:35)  chlorhexidine 0.12% mucous membrane liquid: 15 milliliter(s) mucous membrane 2 times a day (04 Jan 2019 13:35)  Dextrose 5% in Water intravenous solution: 1000 milliliter(s) intravenous  (04 Jan 2019 13:35)  folic acid 1 mg oral tablet: 1 tab(s) orally once a day (03 Jan 2019 10:58)  lactulose 10 g/15 mL oral syrup: 30 milliliter(s) orally 2 times a day, As Needed (03 Jan 2019 10:58)  midodrine 10 mg oral tablet: 1 tab(s) orally 3 times a day (03 Jan 2019 10:58)  Nephro-[place] oral tablet: 1 tab(s) orally once a day (03 Jan 2019 10:58)  ocular lubricant ophthalmic solution: 1 drop(s) to each affected eye once a day, As needed, Dry Eyes (04 Jan 2019 13:35)  piperacillin-tazobactam 2 g-0.25 g intravenous injection: 3.375 gram(s) intravenous in dextrose 5% 100 mL IV intermittent q8hr (04 Jan 2019 13:35)  rifAXIMin 550 mg oral tablet: 1 tab(s) orally 2 times a day (03 Jan 2019 10:58)  thiamine 100 mg oral tablet: 1 tab(s) orally once a day (03 Jan 2019 10:58)  traMADol 50 mg oral tablet: 1 tab(s) orally every 8 hours, As needed, Severe Pain (7 - 10) (04 Jan 2019 13:35)  vancomycin: 1 gram(s) intravenous once a day infuse over 60 minutes.  (04 Jan 2019 13:35)  Visine 0.05% ophthalmic solution: 1 drop(s) in each eye , As Needed (03 Jan 2019 10:58)    CURRENT MEDICATIONS:   --------------------------------------------------------------------------------------  Neurologic Medications    Respiratory Medications    Cardiovascular Medications  furosemide    Tablet 20 milliGRAM(s) Oral daily  midodrine 5 milliGRAM(s) Oral three times a day  spironolactone 50 milliGRAM(s) Oral two times a day    Gastrointestinal Medications  dextrose 5%. 1000 milliLiter(s) IV Continuous <Continuous>  folic acid 1 milliGRAM(s) Oral daily  lactulose Syrup 30 Gram(s) Oral three times a day  multivitamin 1 Tablet(s) Oral daily  phytonadione   Solution 10 milliGRAM(s) Oral daily  thiamine 100 milliGRAM(s) Oral daily    Genitourinary Medications    Hematologic/Oncologic Medications    Antimicrobial/Immunologic Medications  rifaximin 550 milliGRAM(s) Oral two times a day    Endocrine/Metabolic Medications  dextrose 40% Gel 15 Gram(s) Oral once PRN Blood Glucose LESS THAN 70 milliGRAM(s)/deciliter  dextrose 50% Injectable 12.5 Gram(s) IV Push once  dextrose 50% Injectable 25 Gram(s) IV Push once  dextrose 50% Injectable 25 Gram(s) IV Push once  glucagon  Injectable 1 milliGRAM(s) IntraMuscular once PRN Glucose LESS THAN 70 milligrams/deciliter  insulin lispro (HumaLOG) corrective regimen sliding scale   SubCutaneous three times a day before meals  insulin lispro (HumaLOG) corrective regimen sliding scale   SubCutaneous at bedtime    Topical/Other Medications  artificial tears (preservative free) Ophthalmic Solution 1 Drop(s) Both EYES daily PRN Dry Eyes  chlorhexidine 0.12% Liquid 15 milliLiter(s) Oral Mucosa two times a day  lidocaine   Patch 1 Patch Transdermal daily    --------------------------------------------------------------------------------------  VITAL SIGNS, INS/OUTS (last 24 hours):  --------------------------------------------------------------------------------------  Vital Signs Last 24 Hrs  T(C): 36.4 (13 Jan 2019 04:07), Max: 36.8 (12 Jan 2019 19:53)  T(F): 97.6 (13 Jan 2019 04:07), Max: 98.2 (12 Jan 2019 19:53)  HR: 70 (13 Jan 2019 04:07) (63 - 70)  BP: 97/50 (13 Jan 2019 04:07) (90/49 - 101/62)  BP(mean): --  RR: 18 (13 Jan 2019 04:07) (16 - 19)  SpO2: 93% (13 Jan 2019 04:07) (93% - 98%)  --------------------------------------------------------------------------------------    EXAM  General: NAD, resting comfortably, jaundiced  Resp: unlabored breathing on RA  CV: HDS, rrr  Abd: soft, distended, nontender  Back: R gluteus with minimal ecchymosis, soft, minimally tender  Extr: wwp    LABS  --------------------------------------------------------------------------------------  CBC (01-13 @ 05:36)                          6.4<LL>                   5.2     )--------------(  53<L>      --    % Neuts, --    % Lymphs, ANC: --                              18.0<LL>  CBC (01-12 @ 21:44)                          7.0<LL>                   5.4     )--------------(  43<L>      --    % Neuts, --    % Lymphs, ANC: --                              20.8<LL>    BMP (01-13 @ 05:36)       136     |  105     |  18    			Ca++ --      Ca 8.6          ---------------------------------( 140<H>		Mg 1.9          4.8     |  21<L>   |  0.67  			Ph 2.4<L>  BMP (01-12 @ 07:01)       136     |  107     |  22    			Ca++ --      Ca 8.8          ---------------------------------( 130<H>		Mg 2.2          4.6     |  20<L>   |  0.68  			Ph 2.6       LFTs (01-13 @ 05:36)      TPro 5.2<L> / Alb 3.0<L> / TBili 14.8<H> / DBili -- / AST 48<H> / ALT 15 / AlkPhos 90  LFTs (01-12 @ 07:01)      TPro 5.3<L> / Alb 3.0<L> / TBili 14.4<H> / DBili -- / AST 55<H> / ALT 13 / AlkPhos 100    Coags (01-13 @ 05:36)  aPTT -- / INR 2.81<H> / PT 33.4<H>  Coags (01-12 @ 07:01)  aPTT -- / INR 3.09<H> / PT 36.5<H>    -> Peritoneal Peritoneal Fluid Culture (01-10 @ 21:17)       Few polymorphonuclear leukocytes per low power field  No organisms seen  by cytocentrifuge    NG    No growth    -> .Blood Blood Culture (01-04 @ 08:56)     NG    NG    No growth at 5 days.      --------------------------------------------------------------------------------------    OTHER LABS    IMAGING RESULTS  < from: CT Abdomen and Pelvis w/ IV Cont (01.12.19 @ 20:25) >  INTERPRETATION:  Moderate ascites.   Right gluteal hematoma with active extravasation measuring 6.4 x 4.8 cm   with surrounding fat stranding (6:170).   Follow up official report.    ******PRELIMINARY REPORT******    ******PRELIMINARY REPORT******          NIKKI BURT M.D., RADIOLOGY RESIDENT    < end of copied text >

## 2019-01-13 NOTE — PROGRESS NOTE ADULT - PROBLEM SELECTOR PLAN 4
PLT count 77k this admission, compared to 75k on previous admission (comparable). This, in addition to elevated INR and hyponatremia, are likely in setting of end stage liver disease.     - Worsening platelets can be secondary to liver cirrhosis Worsening thrombocytopenia since admission in setting of decompensated cirrhosis.     - Trend CBC q8h  - Transfuse for platelets <50K

## 2019-01-13 NOTE — CONSULT NOTE ADULT - ATTENDING COMMENTS
Patient seen and examined data reviewed.  Consulted for bleeding into gluteus.  He is HD stable.  He is receiving 2 u PRBC and coagulopathy is being corrected.  Does not require transfer to the MICU at this time.  Please reconsult as needed.

## 2019-01-13 NOTE — CHART NOTE - NSCHARTNOTEFT_GEN_A_CORE
Gastroenterology Brief Note   - patient tentatively planned for EGD tomorrow, pending am INR   - made NPO after midnight   - please obtain 1:00 am labs, and make sure hemoglobin >7 and platelets >50   - communicated to covering resident over the phone

## 2019-01-13 NOTE — CHART NOTE - NSCHARTNOTEFT_GEN_A_CORE
examined patient at bedside.  denied sob, cp, n/v.  still receiving unit of blood.  still c/o pain in R gluteus    Vital Signs Last 24 Hrs  T(C): 36.6 (13 Jan 2019 16:17), Max: 36.8 (12 Jan 2019 19:53)  T(F): 97.9 (13 Jan 2019 16:17), Max: 98.3 (13 Jan 2019 08:55)  HR: 61 (13 Jan 2019 16:17) (61 - 70)  BP: 94/56 (13 Jan 2019 16:17) (86/52 - 100/61)  BP(mean): --  RR: 18 (13 Jan 2019 16:17) (16 - 19)  SpO2: 94% (13 Jan 2019 16:17) (93% - 98%)    brief PE:  cards:  RRR, no m/g/r  Pulm:  cTab  ext:  no hematoma noted on R, ttp                          6.4    5.2   )-----------( 53       ( 13 Jan 2019 05:36 )             18.0     A/P:  53M end stage liver disease 2/2 alcoholic cirrhosis, T2DM, HFrEF (35%), CAD, HTN, presenting with 1 week of generalized weakness and hypotension, admitted for acute decompensated liver failure. Now s/p diagnostic paracentesis showing no evidence of SBP. Course c/b enlarging R gluteal hematoma with decreasing hemoglobin despite continued blood transfusions, no receiving PRBCs.      plan:  - c/w PRbCs.  - cbc s/p transfusion.  - monitor H/H q6    Stephanie López MD, PhD  PGY-2| Internal Medicine  [phone] / [phone]

## 2019-01-13 NOTE — PROGRESS NOTE ADULT - ASSESSMENT
Per above notes has gluteal hematoma which is cause of pain and contributes to anemia.  Cardiac status remains stable and is tolerating significant anemia pretty well.  Is cleared for endoscopy or any radiologic or surgical procedures tomorrow.  Will continue with current Rx.     JONG Chowdhury  [phone]

## 2019-01-13 NOTE — PROGRESS NOTE ADULT - ATTENDING COMMENTS
Events noted.  CT report reviewed, IR note appreciated  getting PRBC   Key centra and FFP  MICU  serial and close monitoring of CBC and INR  monitor Vitals closely   really needs liver transplant but not candidate due to HF  would eventually needs GOC discussion and palliative.  Overall, poor prognosis

## 2019-01-13 NOTE — PROGRESS NOTE ADULT - SUBJECTIVE AND OBJECTIVE BOX
Darline Eng PGY2  Pager [phone]    Internal Medicine Team 3 Progress Note    INTERVAL HPI / SUBJECTIVE:      REVIEW OF SYSTEMS:   Constitutional: no fatigue, fever, chills, generalized weakness  HEENT: no eye pain, vision changes, rhinorrhea, sore throat  Respiratory: no cough, wheezing, shortness of breath  CV: no chest pain, palpitations, dyspnea on exertion, orthopnea, PND  GI: no decreased appetite, nausea, vomiting, abdominal pain, diarrhea, constipation, melena  : no dysuria, hematuria, urinary frequency, incontinence, nocturia  MSK: no joint or muscle pain, muscle weakness, joint swelling  Skin: no rashes, bruising, hair loss, color change  Neuro: no headache, dizziness, fainting, paresthesias, memory loss  Endo: no heat or cold intolerance, increased thirst, hot flashes  Psych: no changes in mood    MEDICATIONS:   MEDICATIONS  (STANDING):  chlorhexidine 0.12% Liquid 15 milliLiter(s) Oral Mucosa two times a day  dextrose 5%. 1000 milliLiter(s) (50 mL/Hr) IV Continuous <Continuous>  dextrose 50% Injectable 12.5 Gram(s) IV Push once  dextrose 50% Injectable 25 Gram(s) IV Push once  dextrose 50% Injectable 25 Gram(s) IV Push once  folic acid 1 milliGRAM(s) Oral daily  furosemide    Tablet 20 milliGRAM(s) Oral daily  insulin lispro (HumaLOG) corrective regimen sliding scale   SubCutaneous three times a day before meals  insulin lispro (HumaLOG) corrective regimen sliding scale   SubCutaneous at bedtime  lactulose Syrup 30 Gram(s) Oral three times a day  lidocaine   Patch 1 Patch Transdermal daily  midodrine 5 milliGRAM(s) Oral three times a day  multivitamin 1 Tablet(s) Oral daily  phytonadione   Solution 10 milliGRAM(s) Oral daily  rifaximin 550 milliGRAM(s) Oral two times a day  spironolactone 50 milliGRAM(s) Oral two times a day  thiamine 100 milliGRAM(s) Oral daily    MEDICATIONS  (PRN):  artificial tears (preservative free) Ophthalmic Solution 1 Drop(s) Both EYES daily PRN Dry Eyes  dextrose 40% Gel 15 Gram(s) Oral once PRN Blood Glucose LESS THAN 70 milliGRAM(s)/deciliter  glucagon  Injectable 1 milliGRAM(s) IntraMuscular once PRN Glucose LESS THAN 70 milligrams/deciliter    ALLERGIES:   No Known Allergies    OBJECTIVE:  Vital Signs Last 24 Hrs  T(C): 36.4 (13 Jan 2019 04:07), Max: 36.8 (12 Jan 2019 19:53)  T(F): 97.6 (13 Jan 2019 04:07), Max: 98.2 (12 Jan 2019 19:53)  HR: 70 (13 Jan 2019 04:07) (63 - 70)  BP: 97/50 (13 Jan 2019 04:07) (90/49 - 101/62)  RR: 18 (13 Jan 2019 04:07) (16 - 19)  SpO2: 93% (13 Jan 2019 04:07) (93% - 98%)    I&O's Summary    12 Jan 2019 07:01  -  13 Jan 2019 07:00  --------------------------------------------------------  IN: 700 mL / OUT: 0 mL / NET: 700 mL    PHYSICAL EXAM:  General: Well-appearing, NAD  HEENT:  EOMI, PERRL, conjunctiva and sclera clear, normal oropharynx  Neck:  Supple, no JVD, normal thyroid  Chest/Lungs: CTA B/L, no rales, rhonchi, rub or wheezing  Heart: RRR, normal S1, S2, no murmurs or gallops  Abdomen: Soft, nondistended, NTTP, normoactive bowel sounds  Extremities: Peripheral pulses 2+ B/L, no edema, cyanosis or clubbing  Skin: Warm, well-perfused, no rashes or lesions  Neurological: A&Ox3, no focal deficits      LABS:      IMAGING:       Labs and imaging personally reviewed and interpreted [ x ]  Consult notes reviewed [ x ]  Case discussed with consultants [ x ] Darline Eng PGY2  Pager [phone]    Internal Medicine Team 3 Progress Note    INTERVAL HPI / SUBJECTIVE:  Patient noted to have drop in Hgb from 7.0 to 6.4 this morning despite receiving 1 unit PRBC.   CT angio A/P yesterday demonstrated active extravasation into R gluteal hematoma.   General Surgery consulted overnight; recommended IR evaluation for possible embolization.   This morning, patient seen and examined at bedside. Reports pain from hematoma. Denies lightheadedness, chest pain, dyspnea, hemoptysis.     REVIEW OF SYSTEMS:   Constitutional: no fatigue, fever, chills, generalized weakness  HEENT: no eye pain, vision changes, rhinorrhea, sore throat  Respiratory: no cough, wheezing, shortness of breath  CV: no chest pain, palpitations, dyspnea on exertion, orthopnea, PND  GI: no decreased appetite, nausea, vomiting, abdominal pain, diarrhea, constipation, melena  : no dysuria, hematuria, urinary frequency, incontinence, nocturia  MSK: no joint or muscle pain, muscle weakness, joint swelling  Skin: no rashes, bruising, hair loss, color change  Neuro: no headache, dizziness, fainting, paresthesias, memory loss  Endo: no heat or cold intolerance, increased thirst, hot flashes  Psych: no changes in mood    MEDICATIONS:   MEDICATIONS  (STANDING):  chlorhexidine 0.12% Liquid 15 milliLiter(s) Oral Mucosa two times a day  dextrose 5%. 1000 milliLiter(s) (50 mL/Hr) IV Continuous <Continuous>  dextrose 50% Injectable 12.5 Gram(s) IV Push once  dextrose 50% Injectable 25 Gram(s) IV Push once  dextrose 50% Injectable 25 Gram(s) IV Push once  folic acid 1 milliGRAM(s) Oral daily  furosemide    Tablet 20 milliGRAM(s) Oral daily  insulin lispro (HumaLOG) corrective regimen sliding scale   SubCutaneous three times a day before meals  insulin lispro (HumaLOG) corrective regimen sliding scale   SubCutaneous at bedtime  lactulose Syrup 30 Gram(s) Oral three times a day  lidocaine   Patch 1 Patch Transdermal daily  midodrine 5 milliGRAM(s) Oral three times a day  multivitamin 1 Tablet(s) Oral daily  phytonadione   Solution 10 milliGRAM(s) Oral daily  rifaximin 550 milliGRAM(s) Oral two times a day  spironolactone 50 milliGRAM(s) Oral two times a day  thiamine 100 milliGRAM(s) Oral daily    MEDICATIONS  (PRN):  artificial tears (preservative free) Ophthalmic Solution 1 Drop(s) Both EYES daily PRN Dry Eyes  dextrose 40% Gel 15 Gram(s) Oral once PRN Blood Glucose LESS THAN 70 milliGRAM(s)/deciliter  glucagon  Injectable 1 milliGRAM(s) IntraMuscular once PRN Glucose LESS THAN 70 milligrams/deciliter    ALLERGIES:   No Known Allergies    OBJECTIVE:  Vital Signs Last 24 Hrs  T(C): 36.4 (13 Jan 2019 04:07), Max: 36.8 (12 Jan 2019 19:53)  T(F): 97.6 (13 Jan 2019 04:07), Max: 98.2 (12 Jan 2019 19:53)  HR: 70 (13 Jan 2019 04:07) (63 - 70)  BP: 97/50 (13 Jan 2019 04:07) (90/49 - 101/62)  RR: 18 (13 Jan 2019 04:07) (16 - 19)  SpO2: 93% (13 Jan 2019 04:07) (93% - 98%)    I&O's Summary    12 Jan 2019 07:01  -  13 Jan 2019 07:00  --------------------------------------------------------  IN: 700 mL / OUT: 0 mL / NET: 700 mL    PHYSICAL EXAM:  General: Ill-appearing, jaundiced  HEENT: EOMI, PERRL, +scleral icterus bilaterally, dried blood on lips  Neck: Supple, no JVD  Chest/Lungs: CTA B/L, no rales, rhonchi, rub or wheezing  Heart: RRR, normal S1, S2, no murmurs or gallops  Abdomen: Soft, moderately distended, NTTP  Extremities: Peripheral pulses intact, no edema, cyanosis or clubbing  Back: R gluteus with large area of ecchymosis, +TTP  Skin: Warm, well-perfused, hematoma as described above  Neurological: A&Ox3, no focal deficits      LABS:  CBC Full  -  ( 13 Jan 2019 05:36 )  WBC Count : 5.2 K/uL  Hemoglobin : 6.4 g/dL  Hematocrit : 18.0 %  Platelet Count - Automated : 53 K/uL  Mean Cell Volume : 105.0 fl  Mean Cell Hemoglobin : 37.1 pg  Mean Cell Hemoglobin Concentration : 35.5 gm/dL    01-13    136  |  105  |  18  ----------------------------<  140<H>  4.8   |  21<L>  |  0.67    Ca    8.6      13 Jan 2019 05:36  Phos  2.4     01-13  Mg     1.9     01-13    TPro  5.2<L>  /  Alb  3.0<L>  /  TBili  14.8<H>  /  DBili  x   /  AST  48<H>  /  ALT  15  /  AlkPhos  90  01-13  TPro  5.3<L>  /  Alb  3.0<L>  /  TBili  14.4<H>  /  DBili  x   /  AST  55<H>  /  ALT  13  /  AlkPhos  100  01-12  TPro  5.2<L>  /  Alb  3.1<L>  /  TBili  13.7<H>  /  DBili  x   /  AST  57<H>  /  ALT  12  /  AlkPhos  93  01-11    PT/INR - ( 13 Jan 2019 05:36 )   PT: 33.4 sec;   INR: 2.81 ratio        IMAGING:     CT Abdomen and Pelvis w/ IV Cont (01.12.19 @ 20:25)  ******PRELIMINARY REPORT******   INTERPRETATION:  Moderate ascites.   Right gluteal hematoma with active extravasation measuring 6.4 x 4.8 cm with surrounding fat stranding (6:170).   Follow up official report.      Labs and imaging personally reviewed and interpreted [ x ]  Consult notes reviewed [ x ]  Case discussed with consultants [ x ]

## 2019-01-13 NOTE — PROGRESS NOTE ADULT - SUBJECTIVE AND OBJECTIVE BOX
continues to complain of pain in right hip, not short of breath and alert    PAST MEDICAL & SURGICAL HISTORY:  Hypertension  Type 2 diabetes mellitus  Congestive heart failure (CHF)  Esophageal varices  Alcoholic cirrhosis of liver with ascites  Abdominal hernia: S/P repair x2  H/O cataract    Medications:  artificial tears (preservative free) Ophthalmic Solution 1 Drop(s) Both EYES daily PRN  chlorhexidine 0.12% Liquid 15 milliLiter(s) Oral Mucosa two times a day  dextrose 40% Gel 15 Gram(s) Oral once PRN  dextrose 5%. 1000 milliLiter(s) IV Continuous <Continuous>  dextrose 50% Injectable 12.5 Gram(s) IV Push once  dextrose 50% Injectable 25 Gram(s) IV Push once  dextrose 50% Injectable 25 Gram(s) IV Push once  folic acid 1 milliGRAM(s) Oral daily  furosemide    Tablet 20 milliGRAM(s) Oral daily  glucagon  Injectable 1 milliGRAM(s) IntraMuscular once PRN  insulin lispro (HumaLOG) corrective regimen sliding scale   SubCutaneous three times a day before meals  insulin lispro (HumaLOG) corrective regimen sliding scale   SubCutaneous at bedtime  lactulose Syrup 30 Gram(s) Oral three times a day  lidocaine   Patch 1 Patch Transdermal daily  midodrine 5 milliGRAM(s) Oral three times a day  multivitamin 1 Tablet(s) Oral daily  rifaximin 550 milliGRAM(s) Oral two times a day  sodium phosphate IVPB 15 milliMole(s) IV Intermittent once  spironolactone 50 milliGRAM(s) Oral two times a day  thiamine 100 milliGRAM(s) Oral daily      Vitals:  T(C): 36.8 (19 @ 08:55), Max: 36.8 (19 @ 19:53)  HR: 63 (19 @ 08:55) (63 - 70)  BP: 94/55 (19 @ 08:55) (86/52 - 101/62)  BP(mean): --  RR: 18 (19 @ 08:55) (16 - 19)  SpO2: 94% (19 @ 08:55) (93% - 98%)  Wt(kg): --  Daily     Daily Weight in k.4 (2019 04:07)  I&O's Summary    2019 07:01  -  2019 07:00  --------------------------------------------------------  IN: 700 mL / OUT: 0 mL / NET: 700 mL        Physical Exam:  Appearance: [x ] Normal [ ] NAD  Eyes: [ ] PERRL [ ] EOMI  HENT: [x ] Normal oral muscosa [ ]NC/AT  Cardiovascular: [x ] S1 [x ] S2 [x ] RRR [ x] No m/r/g [ ]No edema [ ] JVP  Procedural Access Site: [ ] No hematoma [ ] Non-tender to palpation [ ] 2+ pulse [ ] No bruit [ ] No Ecchymosis  Respiratory: [x ] Clear to auscultation bilaterally  Gastrointestinal: [x ] Soft [ ] Non-tender [ ] Non-distended [ ] BS+  Musculoskeletal: [x ] No clubbing [ ] No joint deformity   Neurologic: [ ] Non-focal  Lymphatic: [x ] No lymphadenopathy  Psychiatry: [ ] AAOx3 [ ] Mood & affect appropriate  Skin: [ ] No rashes [ ] No ecchymoses [ ] No cyanosis        136  |  105  |  18  ----------------------------<  140<H>  4.8   |  21<L>  |  0.67    Ca    8.6      2019 05:36  Phos  2.4       Mg     1.9         TPro  5.2<L>  /  Alb  3.0<L>  /  TBili  14.8<H>  /  DBili  x   /  AST  48<H>  /  ALT  15  /  AlkPhos  90      PT/INR - ( 2019 05:36 )   PT: 33.4 sec;   INR: 2.81 ratio                       ECG:    Echo:    Stress Testing:     Cath:    Imaging:    Interpretation of Telemetry:

## 2019-01-13 NOTE — CONSULT NOTE ADULT - SUBJECTIVE AND OBJECTIVE BOX
CHIEF COMPLAINT: right buttocks pain    HPI: This is a 52 y/o M w/ a PMH significant for end stage liver disease 2/2 chronic alcoholic cirrhosis, with esophageal varices, CAD, HFrEF (EF 35%), and DMII, presenting with 1 week of generalized weakness and poor PO intake. Patient was found to have gram negative rods and gram positive bacteremia 2/2 unknown infectious source. He received 3 days of vanco and zosyn, but blood cx were subsequently negative and abx were stopped. He is s/p diagnostic paracentesis not showing signs of SBP (PMN<250, afebrile, non tender), with worsening bilirubin, anemia despite transfusions now with right gluteal hematoma of 13 cm most likely explaining patients anemia and pain. MICU is now being consulted for anemia and active extravasation into hematoma. Patient seen and examined, only c/o right buttocks pain. Receiving first unit PRBC at this time (4th during this hospital stay), also received 5U FFP this hospital stay, and 2U platelets. Hemodynamically stable, no lightheadedness/dizziness. Hgb dropped to 6.4 this AM from 7.0 last night. This is prior to him receiving his first unit of blood.     PAST MEDICAL & SURGICAL HISTORY:  Hypertension  Type 2 diabetes mellitus  Congestive heart failure (CHF)  Esophageal varices  Alcoholic cirrhosis of liver with ascites  Abdominal hernia: S/P repair x2  H/O cataract      FAMILY HISTORY:  Family history of hypercholesterolemia (Father)  Family history of diabetes mellitus (Mother)      SOCIAL HISTORY:  Smoking: [ ] Never Smoked [ ] Former Smoker (__ packs x ___ years) [ ] Current Smoker  (__ packs x ___ years)  Substance Use: [ ] Never Used [ ] Used ____  EtOH Use:  Marital Status: [ ] Single [ ]  [ ]  [ ]   Sexual History:   Occupation:  Recent Travel:  Country of Birth:  Advance Directives:    Allergies    No Known Allergies    Intolerances        HOME MEDICATIONS:  Home Medications:  albumin human 5% intravenous solution: 250 milliliter(s) intravenous every 6 hours (04 Jan 2019 13:35)  chlorhexidine 0.12% mucous membrane liquid: 15 milliliter(s) mucous membrane 2 times a day (04 Jan 2019 13:35)  Dextrose 5% in Water intravenous solution: 1000 milliliter(s) intravenous  (04 Jan 2019 13:35)  folic acid 1 mg oral tablet: 1 tab(s) orally once a day (03 Jan 2019 10:58)  lactulose 10 g/15 mL oral syrup: 30 milliliter(s) orally 2 times a day, As Needed (03 Jan 2019 10:58)  midodrine 10 mg oral tablet: 1 tab(s) orally 3 times a day (03 Jan 2019 10:58)  Nephro-[place] oral tablet: 1 tab(s) orally once a day (03 Jan 2019 10:58)  ocular lubricant ophthalmic solution: 1 drop(s) to each affected eye once a day, As needed, Dry Eyes (04 Jan 2019 13:35)  piperacillin-tazobactam 2 g-0.25 g intravenous injection: 3.375 gram(s) intravenous in dextrose 5% 100 mL IV intermittent q8hr (04 Jan 2019 13:35)  rifAXIMin 550 mg oral tablet: 1 tab(s) orally 2 times a day (03 Jan 2019 10:58)  thiamine 100 mg oral tablet: 1 tab(s) orally once a day (03 Jan 2019 10:58)  traMADol 50 mg oral tablet: 1 tab(s) orally every 8 hours, As needed, Severe Pain (7 - 10) (04 Jan 2019 13:35)  vancomycin: 1 gram(s) intravenous once a day infuse over 60 minutes.  (04 Jan 2019 13:35)  Visine 0.05% ophthalmic solution: 1 drop(s) in each eye , As Needed (03 Jan 2019 10:58)      REVIEW OF SYSTEMS:  Constitutional: [ ] negative [ ] fevers [ ] chills [ ] weight loss [ ] weight gain  HEENT: [ ] negative [ ] dry eyes [ ] eye irritation [ ] postnasal drip [ ] nasal congestion  CV: [ ] negative  [ ] chest pain [ ] orthopnea [ ] palpitations [ ] murmur  Resp: [ ] negative [ ] cough [ ] shortness of breath [ ] dyspnea [ ] wheezing [ ] sputum [ ] hemoptysis  GI: [ ] negative [ ] nausea [ ] vomiting [ ] diarrhea [ ] constipation [ ] abd pain [ ] dysphagia   : [ ] negative [ ] dysuria [ ] nocturia [ ] hematuria [ ] increased urinary frequency  Musculoskeletal: [ ] negative [ ] back pain [ ] myalgias [ ] arthralgias [ ] fracture  Skin: [ ] negative [ ] rash [ ] itch  Neurological: [ ] negative [ ] headache [ ] dizziness [ ] syncope [ ] weakness [ ] numbness  Psychiatric: [ ] negative [ ] anxiety [ ] depression  Endocrine: [ ] negative [ ] diabetes [ ] thyroid problem  Hematologic/Lymphatic: [ ] negative [ ] anemia [ ] bleeding problem  Allergic/Immunologic: [ ] negative [ ] itchy eyes [ ] nasal discharge [ ] hives [ ] angioedema  [ ] All other systems negative  [ ] Unable to assess ROS because ________    OBJECTIVE:  ICU Vital Signs Last 24 Hrs  T(C): 36.7 (13 Jan 2019 11:53), Max: 36.8 (12 Jan 2019 19:53)  T(F): 98.1 (13 Jan 2019 11:53), Max: 98.3 (13 Jan 2019 08:55)  HR: 65 (13 Jan 2019 11:53) (63 - 70)  BP: 99/61 (13 Jan 2019 11:53) (86/52 - 101/62)  BP(mean): --  ABP: --  ABP(mean): --  RR: 18 (13 Jan 2019 11:53) (16 - 19)  SpO2: 97% (13 Jan 2019 11:53) (93% - 98%)        01-12 @ 07:01  -  01-13 @ 07:00  --------------------------------------------------------  IN: 700 mL / OUT: 0 mL / NET: 700 mL      CAPILLARY BLOOD GLUCOSE      POCT Blood Glucose.: 148 mg/dL (13 Jan 2019 09:02)      PHYSICAL EXAM:  General: NAD, resting in bed  HEENT: NC/AT, mild scleral icterus, PERRLA, EOMI  Neck: supple, no JVD  Respiratory: CTA b/l, no crackles or wheezes  Cardiovascular: RRR, +S1, S2, no m/r/g  Abdomen: soft, nontender, nondistended, right abdomen s/p paracentesis w/ mild bruising  Extremities: 2+ peripheral pulses, trace edema  Skin: no rashes or lesions  Neurological: nonfocal    LINES: PIV x1, receiving second IV    HOSPITAL MEDICATIONS:  Standing Meds:  chlorhexidine 0.12% Liquid 15 milliLiter(s) Oral Mucosa two times a day  dextrose 5%. 1000 milliLiter(s) IV Continuous <Continuous>  dextrose 50% Injectable 12.5 Gram(s) IV Push once  dextrose 50% Injectable 25 Gram(s) IV Push once  dextrose 50% Injectable 25 Gram(s) IV Push once  folic acid 1 milliGRAM(s) Oral daily  furosemide    Tablet 20 milliGRAM(s) Oral daily  insulin lispro (HumaLOG) corrective regimen sliding scale   SubCutaneous three times a day before meals  insulin lispro (HumaLOG) corrective regimen sliding scale   SubCutaneous at bedtime  lactulose Syrup 30 Gram(s) Oral three times a day  lidocaine   Patch 1 Patch Transdermal daily  midodrine 5 milliGRAM(s) Oral three times a day  multivitamin 1 Tablet(s) Oral daily  rifaximin 550 milliGRAM(s) Oral two times a day  sodium phosphate IVPB 15 milliMole(s) IV Intermittent once  spironolactone 50 milliGRAM(s) Oral two times a day  thiamine 100 milliGRAM(s) Oral daily      PRN Meds:  artificial tears (preservative free) Ophthalmic Solution 1 Drop(s) Both EYES daily PRN  dextrose 40% Gel 15 Gram(s) Oral once PRN  glucagon  Injectable 1 milliGRAM(s) IntraMuscular once PRN      LABS:                        6.4    5.2   )-----------( 53       ( 13 Jan 2019 05:36 )             18.0     Hgb Trend: 6.4<--, 7.0<--, 6.8<--, 7.0<--, 7.1<--  01-13    136  |  105  |  18  ----------------------------<  140<H>  4.8   |  21<L>  |  0.67    Ca    8.6      13 Jan 2019 05:36  Phos  2.4     01-13  Mg     1.9     01-13    TPro  5.2<L>  /  Alb  3.0<L>  /  TBili  14.8<H>  /  DBili  x   /  AST  48<H>  /  ALT  15  /  AlkPhos  90  01-13    Creatinine Trend: 0.67<--, 0.68<--, 0.73<--, 0.76<--, 0.82<--, 0.80<--  PT/INR - ( 13 Jan 2019 05:36 )   PT: 33.4 sec;   INR: 2.81 ratio                   MICROBIOLOGY:     Culture - Fungal, Body Fluid (collected 10 Rell 2019 21:17)  Source: .Body Fluid Peritoneal Fluid  Preliminary Report (11 Jan 2019 06:26):    Testing in progress    Culture - Body Fluid with Gram Stain (collected 10 Rell 2019 21:17)  Source: Peritoneal Peritoneal Fluid  Gram Stain (10 Rell 2019 23:50):    Few polymorphonuclear leukocytes per low power field    No organisms seen    by cytocentrifuge  Preliminary Report (11 Jan 2019 17:46):    No growth    Culture - Acid Fast - Body Fluid w/Smear (collected 10 Rell 2019 21:17)  Source: Peritoneal Peritoneal Fluid        RADIOLOGY:  [ ] Reviewed and interpreted by me    EKG:

## 2019-01-13 NOTE — PROGRESS NOTE ADULT - PROBLEM SELECTOR PLAN 6
No active bleeding at this time. Pt has dried blood on teeth, from dry lips/scab picking, denies hematemesis, hgb at baseline.  Patient was previously on propranolol for prophylaxis- however states he is no longer taking it. Likely 2/2 hypotension.     - Will evaluate the presence of new varices on 1/14 with EGD No evidence of variceal bleeding at this time. Has history of varices s/p banding. Pt has dried blood on teeth but states these are from his lips being dry. Denies hematemesis. Hgb drop likely 2/2 hematoma. No longer on propranolol likely 2/2 hypotension.     - Pending EGD 1/14 to assess presence of varices

## 2019-01-13 NOTE — PROGRESS NOTE ADULT - PROBLEM SELECTOR PLAN 8
DVT ppx: SCDs  Diet: DASH/Consistent Carb  Goals of Care: FULL CODE (confirmed with patient and brother) DVT ppx: SCDs  Diet: NPO until bleeding resolves  Code status: FULL CODE (confirmed with patient and brother)

## 2019-01-13 NOTE — CONSULT NOTE ADULT - ASSESSMENT
53 year old male with end stage liver disease 2/2 cirrhosis, with esophageal varices, CHF (EF 35%) admitted for decompensated liver failure, generalized weakness and hypotension, now found to have right gluteal hematoma with active bleed and acute on chronic anemia. He is chronically hypotensive and not tachycardic. Patient may benefit from pelvic angiography and possible embolization, but patient must be medically optimized prior to any intervention.    Plan:  - Recommend ICU consult.  - Recommend hepatology consult on recommendations for correcting coagulopathy. INR must be corrected or optimized prior to angiography. Currently, the patient's INR is 2.8, which is high risk for puncture site bleeding.  - Recommend more aggressive fluid resuscitation and pRBC transfusions (at least 2 more units) to maintain Hgb >7.0 with f/u serial CBCs.  - IR will continue to follow    Case discussed with Dr. Lino (IR attending) 53 year old male with end stage liver disease 2/2 cirrhosis, with esophageal varices, CHF (EF 35%) admitted for decompensated liver failure, generalized weakness and hypotension, now found to have right gluteal hematoma with active bleed and acute on chronic anemia. He is chronically hypotensive and not tachycardic. Patient may benefit from pelvic angiography and possible embolization, but patient must be medically optimized prior to any intervention.    Plan:  - Recommend ICU consult.  - Recommend hepatology consult on recommendations for correcting coagulopathy. INR must be corrected to <1.5 or optimized prior to angiography. Currently, the patient's INR is 2.8, which is high risk for puncture site bleeding.  Additionally, embolics rely on functional coagulation cascade to work appropriately and, in this case, would unlikely achieve hemostasis.  - Recommend more aggressive fluid resuscitation and pRBC transfusions (at least 2 more units) to maintain Hgb >7.0 with f/u serial CBCs.  - If coagulopathy optimized OR unable to be optimized AND continues to show signs of active hemorrhage (hemodynamic instability, not responding to transfusion) THEN may benefit from angiography and embolization.	  - IR will continue to follow    Case discussed with Dr. Lino (IR attending)

## 2019-01-14 LAB
ALBUMIN SERPL ELPH-MCNC: 3.2 G/DL — LOW (ref 3.3–5)
ALP SERPL-CCNC: 82 U/L — SIGNIFICANT CHANGE UP (ref 40–120)
ALT FLD-CCNC: 16 U/L — SIGNIFICANT CHANGE UP (ref 10–45)
ANION GAP SERPL CALC-SCNC: 13 MMOL/L — SIGNIFICANT CHANGE UP (ref 5–17)
APTT BLD: 54.3 SEC — HIGH (ref 27.5–36.3)
APTT BLD: 56.2 SEC — HIGH (ref 27.5–36.3)
APTT BLD: 73.9 SEC — HIGH (ref 27.5–36.3)
AST SERPL-CCNC: 50 U/L — HIGH (ref 10–40)
BILIRUB SERPL-MCNC: 18.5 MG/DL — HIGH (ref 0.2–1.2)
BUN SERPL-MCNC: 19 MG/DL — SIGNIFICANT CHANGE UP (ref 7–23)
CALCIUM SERPL-MCNC: 9 MG/DL — SIGNIFICANT CHANGE UP (ref 8.4–10.5)
CHLORIDE SERPL-SCNC: 104 MMOL/L — SIGNIFICANT CHANGE UP (ref 96–108)
CO2 SERPL-SCNC: 20 MMOL/L — LOW (ref 22–31)
CREAT SERPL-MCNC: 0.63 MG/DL — SIGNIFICANT CHANGE UP (ref 0.5–1.3)
GLUCOSE BLDC GLUCOMTR-MCNC: 141 MG/DL — HIGH (ref 70–99)
GLUCOSE BLDC GLUCOMTR-MCNC: 153 MG/DL — HIGH (ref 70–99)
GLUCOSE BLDC GLUCOMTR-MCNC: 168 MG/DL — HIGH (ref 70–99)
GLUCOSE BLDC GLUCOMTR-MCNC: 193 MG/DL — HIGH (ref 70–99)
GLUCOSE BLDC GLUCOMTR-MCNC: 222 MG/DL — HIGH (ref 70–99)
GLUCOSE SERPL-MCNC: 154 MG/DL — HIGH (ref 70–99)
HCT VFR BLD CALC: 22.9 % — LOW (ref 39–50)
HCT VFR BLD CALC: 25 % — LOW (ref 39–50)
HCT VFR BLD CALC: 28 % — LOW (ref 39–50)
HGB BLD-MCNC: 7.8 G/DL — LOW (ref 13–17)
HGB BLD-MCNC: 8.7 G/DL — LOW (ref 13–17)
HGB BLD-MCNC: 9.4 G/DL — LOW (ref 13–17)
INR BLD: 2.09 RATIO — HIGH (ref 0.88–1.16)
INR BLD: 2.23 RATIO — HIGH (ref 0.88–1.16)
INR BLD: 2.29 RATIO — HIGH (ref 0.88–1.16)
MAGNESIUM SERPL-MCNC: 1.8 MG/DL — SIGNIFICANT CHANGE UP (ref 1.6–2.6)
MCHC RBC-ENTMCNC: 33.3 PG — SIGNIFICANT CHANGE UP (ref 27–34)
MCHC RBC-ENTMCNC: 33.5 PG — SIGNIFICANT CHANGE UP (ref 27–34)
MCHC RBC-ENTMCNC: 33.6 GM/DL — SIGNIFICANT CHANGE UP (ref 32–36)
MCHC RBC-ENTMCNC: 33.9 GM/DL — SIGNIFICANT CHANGE UP (ref 32–36)
MCHC RBC-ENTMCNC: 34.4 PG — HIGH (ref 27–34)
MCHC RBC-ENTMCNC: 34.8 GM/DL — SIGNIFICANT CHANGE UP (ref 32–36)
MCV RBC AUTO: 98.7 FL — SIGNIFICANT CHANGE UP (ref 80–100)
MCV RBC AUTO: 98.9 FL — SIGNIFICANT CHANGE UP (ref 80–100)
MCV RBC AUTO: 99 FL — SIGNIFICANT CHANGE UP (ref 80–100)
NON-GYNECOLOGICAL CYTOLOGY STUDY: SIGNIFICANT CHANGE UP
PHOSPHATE SERPL-MCNC: 2.9 MG/DL — SIGNIFICANT CHANGE UP (ref 2.5–4.5)
PLATELET # BLD AUTO: 49 K/UL — LOW (ref 150–400)
PLATELET # BLD AUTO: 52 K/UL — LOW (ref 150–400)
PLATELET # BLD AUTO: 56 K/UL — LOW (ref 150–400)
POTASSIUM SERPL-MCNC: 4.8 MMOL/L — SIGNIFICANT CHANGE UP (ref 3.5–5.3)
POTASSIUM SERPL-SCNC: 4.8 MMOL/L — SIGNIFICANT CHANGE UP (ref 3.5–5.3)
PROT SERPL-MCNC: 5.3 G/DL — LOW (ref 6–8.3)
PROTHROM AB SERPL-ACNC: 24.6 SEC — HIGH (ref 10–12.9)
PROTHROM AB SERPL-ACNC: 26.1 SEC — HIGH (ref 10–12.9)
PROTHROM AB SERPL-ACNC: 27 SEC — HIGH (ref 10–12.9)
RBC # BLD: 2.32 M/UL — LOW (ref 4.2–5.8)
RBC # BLD: 2.53 M/UL — LOW (ref 4.2–5.8)
RBC # BLD: 2.83 M/UL — LOW (ref 4.2–5.8)
RBC # FLD: 21.3 % — HIGH (ref 10.3–14.5)
RBC # FLD: 21.6 % — HIGH (ref 10.3–14.5)
RBC # FLD: 21.8 % — HIGH (ref 10.3–14.5)
SODIUM SERPL-SCNC: 137 MMOL/L — SIGNIFICANT CHANGE UP (ref 135–145)
WBC # BLD: 6.2 K/UL — SIGNIFICANT CHANGE UP (ref 3.8–10.5)
WBC # BLD: 6.8 K/UL — SIGNIFICANT CHANGE UP (ref 3.8–10.5)
WBC # BLD: 6.9 K/UL — SIGNIFICANT CHANGE UP (ref 3.8–10.5)
WBC # FLD AUTO: 6.2 K/UL — SIGNIFICANT CHANGE UP (ref 3.8–10.5)
WBC # FLD AUTO: 6.8 K/UL — SIGNIFICANT CHANGE UP (ref 3.8–10.5)
WBC # FLD AUTO: 6.9 K/UL — SIGNIFICANT CHANGE UP (ref 3.8–10.5)

## 2019-01-14 PROCEDURE — 76882 US LMTD JT/FCL EVL NVASC XTR: CPT | Mod: 26,RT

## 2019-01-14 PROCEDURE — 99233 SBSQ HOSP IP/OBS HIGH 50: CPT | Mod: GC

## 2019-01-14 RX ORDER — ZINC SULFATE TAB 220 MG (50 MG ZINC EQUIVALENT) 220 (50 ZN) MG
1 TAB ORAL
Qty: 0 | Refills: 0 | COMMUNITY

## 2019-01-14 RX ORDER — AZTREONAM 2 G
1 VIAL (EA) INJECTION
Qty: 0 | Refills: 0 | COMMUNITY

## 2019-01-14 RX ORDER — LACTULOSE 10 G/15ML
30 SOLUTION ORAL
Qty: 0 | Refills: 0 | COMMUNITY

## 2019-01-14 RX ORDER — MIDODRINE HYDROCHLORIDE 2.5 MG/1
3 TABLET ORAL
Qty: 0 | Refills: 0 | COMMUNITY

## 2019-01-14 RX ORDER — PHYTONADIONE (VIT K1) 5 MG
10 TABLET ORAL ONCE
Qty: 0 | Refills: 0 | Status: COMPLETED | OUTPATIENT
Start: 2019-01-14 | End: 2019-01-14

## 2019-01-14 RX ORDER — PROTHROMBIN COMPLEX CONCENTRATE (HUMAN) 25.5; 16.5; 24; 22; 22; 26 [IU]/ML; [IU]/ML; [IU]/ML; [IU]/ML; [IU]/ML; [IU]/ML
3500 POWDER, FOR SOLUTION INTRAVENOUS ONCE
Qty: 0 | Refills: 0 | Status: COMPLETED | OUTPATIENT
Start: 2019-01-14 | End: 2019-01-14

## 2019-01-14 RX ORDER — MIDODRINE HYDROCHLORIDE 2.5 MG/1
1 TABLET ORAL
Qty: 0 | Refills: 0 | COMMUNITY

## 2019-01-14 RX ADMIN — PROTHROMBIN COMPLEX CONCENTRATE (HUMAN) 400 INTERNATIONAL UNIT(S): 25.5; 16.5; 24; 22; 22; 26 POWDER, FOR SOLUTION INTRAVENOUS at 13:51

## 2019-01-14 RX ADMIN — LIDOCAINE 1 PATCH: 4 CREAM TOPICAL at 13:01

## 2019-01-14 RX ADMIN — Medication 1: at 18:14

## 2019-01-14 RX ADMIN — LIDOCAINE 1 PATCH: 4 CREAM TOPICAL at 18:47

## 2019-01-14 RX ADMIN — LACTULOSE 30 GRAM(S): 10 SOLUTION ORAL at 21:38

## 2019-01-14 RX ADMIN — SPIRONOLACTONE 50 MILLIGRAM(S): 25 TABLET, FILM COATED ORAL at 18:14

## 2019-01-14 RX ADMIN — Medication 102 MILLIGRAM(S): at 15:13

## 2019-01-14 RX ADMIN — MIDODRINE HYDROCHLORIDE 5 MILLIGRAM(S): 2.5 TABLET ORAL at 21:38

## 2019-01-14 RX ADMIN — Medication 100 MILLIGRAM(S): at 18:14

## 2019-01-14 RX ADMIN — Medication 1 MILLIGRAM(S): at 18:14

## 2019-01-14 RX ADMIN — Medication 1 TABLET(S): at 18:14

## 2019-01-14 NOTE — PROGRESS NOTE ADULT - SUBJECTIVE AND OBJECTIVE BOX
Chief Complaint: Patient is a 53y old  Male who presents with a chief complaint of hypotension, decompensated cirrhosis (14 Jan 2019)    Interval Events:       Allergies:  No Known Allergies    Home Medications:    Hospital Medications:  albumin human  5% IVPB 250 milliLiter(s) IV Intermittent every 6 hours  artificial tears (preservative free) Ophthalmic Solution 1 Drop(s) Both EYES daily PRN  chlorhexidine 0.12% Liquid 15 milliLiter(s) Oral Mucosa two times a day  dextrose 40% Gel 15 Gram(s) Oral once PRN  dextrose 5%. 1000 milliLiter(s) IV Continuous <Continuous>  dextrose 50% Injectable 12.5 Gram(s) IV Push once  dextrose 50% Injectable 25 Gram(s) IV Push once  dextrose 50% Injectable 25 Gram(s) IV Push once  folic acid 1 milliGRAM(s) Oral daily  glucagon  Injectable 1 milliGRAM(s) IntraMuscular once PRN  insulin lispro (HumaLOG) corrective regimen sliding scale   SubCutaneous three times a day before meals  insulin lispro (HumaLOG) corrective regimen sliding scale   SubCutaneous at bedtime  lactulose Syrup 30 Gram(s) Oral three times a day  lidocaine   Patch 1 Patch Transdermal daily  midodrine 5 milliGRAM(s) Oral three times a day  multivitamin 1 Tablet(s) Oral daily  rifaximin 550 milliGRAM(s) Oral two times a day  thiamine 100 milliGRAM(s) Oral daily  traMADol 50 milliGRAM(s) Oral every 8 hours PRN    PMHX/PSHX:    Hypertension  Congestive heart failure (CHF) with reduced ejection fraction  Esophageal varices  Alcoholic cirrhosis of liver with ascites  Abdominal hernia  H/O cataract    Family history:   Family history of hypercholesterolemia (Father)  Family history of diabetes mellitus (Mother)  No pertinent family history in first degree relatives    ROS:   General: No wt loss, fevers, chills, night sweats, +fatigue.   Eyes: Good vision, no reported pain  ENT: No sore throat, pain, runny nose, dysphagia  CV: No pain, palpitations  Resp: No dyspnea, cough, tachypnea, wheezing  GI: No pain, No N/V/D. No constipation, No pruritis, No rectal bleeding, No tarry stools, No dysphagia.  : No pain, bleeding, incontinence, nocturia  Muscle: No weakness. +R gluteal pain and tenderness  Neuro: No weakness, tingling, memory problems.   Psych: No fatigue, insomnia. +irritability.  Endocrine: No polyuria, polydipsia, cold/heat intolerance  Heme: No petechiae. +large hematoma on R gluteus  Skin: No rash, tattoos, scars, edema    PHYSICAL EXAM:   Vital Signs Last 24 Hrs  T(C): 36.5 (14 Jan 2019 06:50), Max: 37.1 (13 Jan 2019 20:00)  T(F): 97.7 (14 Jan 2019 06:50), Max: 98.7 (13 Jan 2019 20:00)  HR: 61 (14 Jan 2019 06:50) (57 - 67)  BP: 99/59 (14 Jan 2019 06:50) (92/51 - 100/61)  BP(mean): --  RR: 18 (14 Jan 2019 06:50) (17 - 18)  SpO2: 94% (14 Jan 2019 06:50) (93% - 98%)    GENERAL:  Appears stated age, well-groomed, well-nourished, mild distress  HEENT:  NC/AT, conjunctivae pale pink, no thyromegaly, no JVD, sclera +icteric  CHEST:  Full & symmetric excursion, no increased effort, breath sounds clear bilaterally. +gynecomastia  HEART:  Regular rhythm, S1, S2, no murmur/rub/S3/S4, no abdominal bruit, no edema  ABDOMEN:  Soft, non-tender, non-distended, no masses appreciated  EXTREMITIES  no cyanosis, clubbing, or edema  SKIN:  No rash, abscesses, erythema or petechiae seen. Several ecchymoses around PIV sites on bilateral upper extremities. Skin dry and intact. +Jaundice  NEURO:  Alert & oriented x3, no encephalopathy.    LABS:                          7.8    6.2   )-----------( 52       ( 14 Jan 2019 01:51 )             22.9                           8.9    7.7   )-----------( 68       ( 13 Jan 2019 20:22 )             25.4     01-14    137  |  104  |  19  ----------------------------<  154<H>  4.8   |  20<L>  |  0.63    Ca    9.0      14 Jan 2019 01:51  Phos  2.9     01-14  Mg     1.8     01-14    TPro  5.3<L>  /  Alb  3.2<L>  /  TBili  18.5<H>  /  DBili  x   /  AST  50<H>  /  ALT  16  /  AlkPhos  82  01-14 01-13    136  |  105  |  18  ----------------------------<  140<H>  4.8   |  21<L>  |  0.67    Ca    8.6      13 Jan 2019 05:36  Phos  2.4     01-13  Mg     1.9     01-13    TPro  5.2<L>  /  Alb  3.1<L>  /  TBili  13.7<H>  /  DBili  x   /  AST  57<H>  /  ALT  12  /  AlkPhos  93  01-11    PT/INR - ( 14 Jan 2019 01:51 )   PT: 26.1 sec;   INR: 2.23 ratio    PT/INR - ( 13 Jan 2019 20:22 )   PT: 25.3 sec;   INR: 2.17 ratio      Diagnostic Paracentesis results:  Albumin, Fluid: 0.7 g/dL (01-10-19 @ 16:53)  Lactate Dehydrogenase, Fluid: 55 U/L (01-10-19 @ 16:53)  Total Protein, Fluid: 1.3 g/dL (01-10-19 @ 16:53)  Glucose, Fluid: 228 mg/dL (01-10-19 @ 16:53)    Color- Body Fluid: Yellow  Total Nucleated Cell Count, Body Fluid: 180 cells/ uL  Total RBC count: 3900 cells/ uL  Fluid type: Peritoneal fluid  Body Fluid appearance: Slightly cloudy  Fluid Segmented Granulocytes: 29%  BF Lymphocytes: 37%  Monocyte/ Macrophage Count, Body Fluid: 29%  Mesothelial cells, Body Fluid: 5%  Fluid source: Peritoneal       Imaging:    EXAM: MR ABDOMEN WAW IC                      01/06/2019    INTERPRETATION:  CLINICAL INFORMATION: Ethanol cirrhosis and heart failure with reduced ejection fraction. Jaundice, hypotension.  COMPARISON: CT abdomen and pelvis 9/28/2018, duplex sonography of the abdomen dated 9/28/2018.  PROCEDURE:   MRI of the abdomen was performed with and w/o IV contrast.  10 cc of Gadavist administered, 0 cc discarded.   MRCP was performed.  Liver iron quantification with Rhennes protocol.    FINDINGS:  LOWER CHEST: Within normal limits.    LIVER: Cirrhosis. No evidence of hepatocellular cancer. Estimated hepatic iron concentration using a TR of 14 ms: 90 (+/- 30) umol/g (normal <36).    BILE DUCTS: Normal caliber. No evidence of choledocholithiasis.  GALLBLADDER: No evidence of gallstones. Mildly nonspecific wall edema.  SPLEEN: Splenomegaly.   PANCREAS: A 1.5 cm uncinate process cyst. No pancreatic ductal dilatation.  ADRENALS: Within normal limits.  KIDNEYS/URETERS: Within normal limits.    VISUALIZED PORTIONS:  BOWEL: Within normal limits.   PERITONEUM: Small volume ascites.  VESSELS: Hepatic and portal veins are patent. Evidence of portal hypertension as evidenced by a patent paraumbilical vein and additional upper abdominal varices.  RETROPERITONEUM: No lymphadenopathy.    ABDOMINAL WALL: Within normal limits.  BONES: Within normal limits.    IMPRESSION:   Cirrhosis with evidence of portal hypertension. Small volume ascites.  No evidence of hepatocellular cancer.  Mild hepatic iron deposition.  Portal hypertension.  No evidence of cholecystitis or biliary ductal dilatation.  -----------------------------------------------------------------------------------------------------------------------------------------------    EXAM: TTE  with 2-D, M-Mode, and complete spectral and color flow Doppler	1/08/19  Observations:  Mitral Valve: Mitral annular calcification. Mild mitral regurgitation.  Aortic Valve/Aorta: Calcified aortic valve. Peak transaortic valve gradient equals 16 mm Hg, mean transaortic valve gradient equals 10 mm Hg, aortic valve velocity time integral equals 46 cm, consistent with mild aortic stenosis. Peak left ventricular outflow tract gradient equals 5 mm Hg, mean gradient is equal to 3 mm Hg, LVOT velocity time integral equals 27 cm.  Aortic Root: 3.4 cm.  LVOT diameter: 2 cm.  Left Atrium: Normal left atrium.  Left Ventricle: Endocardium not well visualized; moderate global left ventricular dysfunction. Endocardial visualization enhanced with intravenous injection of Ultrasonic Enhancing Agent (Definity). Eccentric left ventricular hypertrophy (dilated left ventricle with normal relative wall thickness).  Right Heart: Normal right atrium. The right ventricle is not well visualized; grossly normal right ventricular systolic function. Normal tricuspid valve. Mild tricuspid regurgitation. Normal pulmonic valve. Mild pulmonic regurgitation.  Pericardium/Pleura: Normal pericardium with no pericardial effusion.  Hemodynamic: Estimated right atrial pressure is 8 mm Hg. Estimated right ventricular systolic pressure equals 31 mm Hg, assuming right atrial pressure equals 8 mm Hg, consistent with normal pulmonary pressures.    Conclusions:  1. Eccentric LVH (dilated LV with normal relative wall thickness).  2. Endocardium not well visualized; moderate global LV dysfunction. Endocardial visualization enhanced with IV injection of Ultrasonic Enhancing Agent (Definity).  3. The RV is not well visualized; grossly normal RV systolic function.      < from: CT Abdomen and Pelvis w/ IV Cont (01.12.19 @ 20:25) >  EXAM:  CT ABDOMEN AND PELVIS IC                        PROCEDURE DATE:  01/12/2019    INTERPRETATION:  CLINICAL INFORMATION: Right gluteal region hematoma. Evaluate for active bleeding.    COMPARISON: CT abdomen pelvis dated 9/28/2018  PROCEDURE:   CT of the Abdomen and Pelvis was performed with and without intravenous contrast.  Precontrast, Arterial and venous phases were performed.  Intravenous contrast: 90 ml Omnipaque 350. 10 ml discarded.  Oral contrast: None.  Sagittal and coronal reformats were performed.    FINDINGS:    LOWER CHEST: Cardiomegaly. Hypodense blood pool with respect to the IV septum, consistent with anemia. Trace bilateral pleural effusions.    LIVER: Cirrhotic morphology.  BILE DUCTS: Normal caliber.  GALLBLADDER: Distended.  SPLEEN: Splenomegaly.  PANCREAS: Atrophic. A 1.2 cm cystic lesion is again noted in the uncinate process, unchanged.   ADRENALS: Within normal limits.  KIDNEYS/URETERS: Within normal limits.    BLADDER: Within normal limits.  REPRODUCTIVE ORGANS: The prostate is within normal limits.    BOWEL: No bowel obstruction   PERITONEUM: Ascites.  VESSELS:  Recanalized umbilical vein coursing anterior to the ventral abdominal hernia mesh. Mesenteric varices and splenorenal shunt.  Atherosclerotic calcification.  RETROPERITONEUM: No lymphadenopathy.    ABDOMINAL WALL: Recanalized umbilical vein as described above.   Subcutaneous edema.  BONES: Degenerative changes in the spine.    SOFT TISSUES: Approximately 6.0 x 3.7 x 7.8 cm intramuscular hematoma centered in the right gluteus mikki muscle with small blush of contrast  and consistent with active arterial extravasation (series 7 image 508).   Surrounding fluid stranding of the right subcutaneous gluteal tissues. A  0.9 cm hypodense lesion is noted in the subcutaneous tissues of the left lower anterior chest (3:22), of uncertain etiology.    IMPRESSION:   Approximately 6.0 x 3.7 x 7.8 cm intramuscular hematoma centered in the right gluteus mikki muscle with small blush of contrast consistent with active arterial extravasation (series 7 image 508).   Remaining chronic findings as above.    < end of copied text > Chief Complaint: Patient is a 53y old  Male who presents with a chief complaint of hypotension, decompensated cirrhosis (14 Jan 2019)    Interval Events:   Over the weekend, patient had persistent decrease in H/H requiring pRBC transfusions x3 (5 total transfusions since admission). Pt's R hip pain and f/u CTA A/P revealed large intramuscular hematoma (6 x 3.7 x 7.8cm) with arterial extravasation located in right gluteus, likely etiology for acute worsening of anemia throughout this admission. Pt has also received 3U FFP, 2U platelets, K-centra x1 over weekend for coagulopathic optimization.  MICU consulted and does not meet ICU transfer. IR consulted and requires optimization of coagulopathy (goal INR <1.5, plts >50k, Hgb>7) prior to angio and embolization of right gluteal bleed (unless refractory to transfusions with hemodynamic worsening). Cardiology consulted and pt cleared for EGD s/p stable cardiac function at this time.     Patient reports feeling "fine" and "tired" with persistent R hip pain and discomfort and no improvement over weekend. Patient states intermittently getting up to use bathroom only. Last ambulating 8am today. Requires assistance to stand and can ambulate independently. Denies dizziness, N/V/D, bloody or tarry BMs. Denies abdominal discomfort.    Allergies:  No Known Allergies    Home Medications:  Bactrim 800-160 mg daily (since 10/2018)  Midodrine 5mg, three tabs three times/day (45mg total daily)  Lactulose 10mg/15mL; 30mL three times/day (60mg total daily)  Furosemide 40mg daily  Spironolactone 50mg daily  Glipizide 5mg daily  Zinc sulfate 220mg daily    Confirmed medications above via pt's home pharmacy on 1/14/19 ([facility])    [facility] Medications:  albumin human  5% IVPB 250 milliLiter(s) IV Intermittent every 6 hours  artificial tears (preservative free) Ophthalmic Solution 1 Drop(s) Both EYES daily PRN  chlorhexidine 0.12% Liquid 15 milliLiter(s) Oral Mucosa two times a day  dextrose 40% Gel 15 Gram(s) Oral once PRN  dextrose 5%. 1000 milliLiter(s) IV Continuous <Continuous>  dextrose 50% Injectable 12.5 Gram(s) IV Push once  dextrose 50% Injectable 25 Gram(s) IV Push once  dextrose 50% Injectable 25 Gram(s) IV Push once  folic acid 1 milliGRAM(s) Oral daily  glucagon  Injectable 1 milliGRAM(s) IntraMuscular once PRN  insulin lispro (HumaLOG) corrective regimen sliding scale   SubCutaneous three times a day before meals  insulin lispro (HumaLOG) corrective regimen sliding scale   SubCutaneous at bedtime  lactulose Syrup 30 Gram(s) Oral three times a day  lidocaine   Patch 1 Patch Transdermal daily  midodrine 5 milliGRAM(s) Oral three times a day  multivitamin 1 Tablet(s) Oral daily  rifaximin 550 milliGRAM(s) Oral two times a day  thiamine 100 milliGRAM(s) Oral daily  traMADol 50 milliGRAM(s) Oral every 8 hours PRN    PMHX/PSHX:    Hypertension  Congestive heart failure (CHF) with reduced ejection fraction  Esophageal varices  Alcoholic cirrhosis of liver with ascites  Abdominal hernia  H/O cataract    Family history:   Family history of hypercholesterolemia (Father)  Family history of diabetes mellitus (Mother)  No pertinent family history in first degree relatives    ROS:   General: No wt loss, fevers, chills, night sweats, +fatigue.   Eyes: Good vision, no reported pain  ENT: No sore throat, pain, runny nose, dysphagia  CV: No pain, palpitations  Resp: No dyspnea, cough, tachypnea, wheezing  GI: No pain, No N/V/D. No constipation, No pruritis, No rectal bleeding, No tarry stools, No dysphagia.  : No pain, bleeding, incontinence, nocturia  Muscle: No weakness. +R gluteal pain and tenderness  Neuro: No weakness, tingling, memory problems.   Psych: No fatigue, insomnia. +irritability.  Endocrine: No polyuria, polydipsia, cold/heat intolerance  Heme: No petechiae. +large hematoma on R gluteus  Skin: No rash, tattoos, scars, edema    PHYSICAL EXAM:   Vital Signs Last 24 Hrs  T(C): 36.5 (14 Jan 2019 06:50), Max: 37.1 (13 Jan 2019 20:00)  T(F): 97.7 (14 Jan 2019 06:50), Max: 98.7 (13 Jan 2019 20:00)  HR: 61 (14 Jan 2019 06:50) (57 - 67)  BP: 99/59 (14 Jan 2019 06:50) (92/51 - 100/61)  BP(mean): --  RR: 18 (14 Jan 2019 06:50) (17 - 18)  SpO2: 94% (14 Jan 2019 06:50) (93% - 98%)    GENERAL:  Appears stated age, well-groomed, well-nourished, mild distress  HEENT:  NC/AT, conjunctivae pale pink, no thyromegaly, no JVD, sclera +icteric  CHEST:  Full & symmetric excursion, no increased effort, breath sounds clear bilaterally. +gynecomastia  HEART:  Regular rhythm, S1, S2, no murmur/rub/S3/S4, no abdominal bruit, no edema  ABDOMEN:  Soft, non-tender, non-distended, no masses appreciated  EXTREMITIES  no cyanosis, clubbing, or edema  SKIN:  No rash, abscesses, erythema or petechiae seen. Several ecchymoses around PIV sites on bilateral upper extremities. Skin dry and intact. +Jaundice  NEURO:  Alert & oriented x3, no encephalopathy.    LABS:                          7.8    6.2   )-----------( 52       ( 14 Jan 2019 01:51 )             22.9                           8.9    7.7   )-----------( 68       ( 13 Jan 2019 20:22 )             25.4     01-14    137  |  104  |  19  ----------------------------<  154<H>  4.8   |  20<L>  |  0.63    Ca    9.0      14 Jan 2019 01:51  Phos  2.9     01-14  Mg     1.8     01-14    TPro  5.3<L>  /  Alb  3.2<L>  /  TBili  18.5<H>  /  DBili  x   /  AST  50<H>  /  ALT  16  /  AlkPhos  82  01-14 01-13    136  |  105  |  18  ----------------------------<  140<H>  4.8   |  21<L>  |  0.67    Ca    8.6      13 Jan 2019 05:36  Phos  2.4     01-13  Mg     1.9     01-13    TPro  5.2<L>  /  Alb  3.1<L>  /  TBili  13.7<H>  /  DBili  x   /  AST  57<H>  /  ALT  12  /  AlkPhos  93  01-11    PT/INR - ( 14 Jan 2019 01:51 )   PT: 26.1 sec;   INR: 2.23 ratio    PT/INR - ( 13 Jan 2019 20:22 )   PT: 25.3 sec;   INR: 2.17 ratio      Diagnostic Paracentesis results:  Albumin, Fluid: 0.7 g/dL (01-10-19 @ 16:53)  Lactate Dehydrogenase, Fluid: 55 U/L (01-10-19 @ 16:53)  Total Protein, Fluid: 1.3 g/dL (01-10-19 @ 16:53)  Glucose, Fluid: 228 mg/dL (01-10-19 @ 16:53)    Color- Body Fluid: Yellow  Total Nucleated Cell Count, Body Fluid: 180 cells/ uL  Total RBC count: 3900 cells/ uL  Fluid type: Peritoneal fluid  Body Fluid appearance: Slightly cloudy  Fluid Segmented Granulocytes: 29%  BF Lymphocytes: 37%  Monocyte/ Macrophage Count, Body Fluid: 29%  Mesothelial cells, Body Fluid: 5%  Fluid source: Peritoneal       Imaging:    EXAM: MR ABDOMEN WAW IC                      01/06/2019    INTERPRETATION:  CLINICAL INFORMATION: Ethanol cirrhosis and heart failure with reduced ejection fraction. Jaundice, hypotension.  COMPARISON: CT abdomen and pelvis 9/28/2018, duplex sonography of the abdomen dated 9/28/2018.  PROCEDURE:   MRI of the abdomen was performed with and w/o IV contrast.  10 cc of Gadavist administered, 0 cc discarded.   MRCP was performed.  Liver iron quantification with Rhennes protocol.    FINDINGS:  LOWER CHEST: Within normal limits.    LIVER: Cirrhosis. No evidence of hepatocellular cancer. Estimated hepatic iron concentration using a TR of 14 ms: 90 (+/- 30) umol/g (normal <36).    BILE DUCTS: Normal caliber. No evidence of choledocholithiasis.  GALLBLADDER: No evidence of gallstones. Mildly nonspecific wall edema.  SPLEEN: Splenomegaly.   PANCREAS: A 1.5 cm uncinate process cyst. No pancreatic ductal dilatation.  ADRENALS: Within normal limits.  KIDNEYS/URETERS: Within normal limits.    VISUALIZED PORTIONS:  BOWEL: Within normal limits.   PERITONEUM: Small volume ascites.  VESSELS: Hepatic and portal veins are patent. Evidence of portal hypertension as evidenced by a patent paraumbilical vein and additional upper abdominal varices.  RETROPERITONEUM: No lymphadenopathy.    ABDOMINAL WALL: Within normal limits.  BONES: Within normal limits.    IMPRESSION:   Cirrhosis with evidence of portal hypertension. Small volume ascites.  No evidence of hepatocellular cancer.  Mild hepatic iron deposition.  Portal hypertension.  No evidence of cholecystitis or biliary ductal dilatation.  -----------------------------------------------------------------------------------------------------------------------------------------------    EXAM: TTE  with 2-D, M-Mode, and complete spectral and color flow Doppler	1/08/19  Observations:  Mitral Valve: Mitral annular calcification. Mild mitral regurgitation.  Aortic Valve/Aorta: Calcified aortic valve. Peak transaortic valve gradient equals 16 mm Hg, mean transaortic valve gradient equals 10 mm Hg, aortic valve velocity time integral equals 46 cm, consistent with mild aortic stenosis. Peak left ventricular outflow tract gradient equals 5 mm Hg, mean gradient is equal to 3 mm Hg, LVOT velocity time integral equals 27 cm.  Aortic Root: 3.4 cm.  LVOT diameter: 2 cm.  Left Atrium: Normal left atrium.  Left Ventricle: Endocardium not well visualized; moderate global left ventricular dysfunction. Endocardial visualization enhanced with intravenous injection of Ultrasonic Enhancing Agent (Definity). Eccentric left ventricular hypertrophy (dilated left ventricle with normal relative wall thickness).  Right Heart: Normal right atrium. The right ventricle is not well visualized; grossly normal right ventricular systolic function. Normal tricuspid valve. Mild tricuspid regurgitation. Normal pulmonic valve. Mild pulmonic regurgitation.  Pericardium/Pleura: Normal pericardium with no pericardial effusion.  Hemodynamic: Estimated right atrial pressure is 8 mm Hg. Estimated right ventricular systolic pressure equals 31 mm Hg, assuming right atrial pressure equals 8 mm Hg, consistent with normal pulmonary pressures.    Conclusions:  1. Eccentric LVH (dilated LV with normal relative wall thickness).  2. Endocardium not well visualized; moderate global LV dysfunction. Endocardial visualization enhanced with IV injection of Ultrasonic Enhancing Agent (Definity).  3. The RV is not well visualized; grossly normal RV systolic function.      < from: CT Abdomen and Pelvis w/ IV Cont (01.12.19 @ 20:25) >  EXAM:  CT ABDOMEN AND PELVIS IC                        PROCEDURE DATE:  01/12/2019    INTERPRETATION:  CLINICAL INFORMATION: Right gluteal region hematoma. Evaluate for active bleeding.    COMPARISON: CT abdomen pelvis dated 9/28/2018  PROCEDURE:   CT of the Abdomen and Pelvis was performed with and without intravenous contrast.  Precontrast, Arterial and venous phases were performed.  Intravenous contrast: 90 ml Omnipaque 350. 10 ml discarded.  Oral contrast: None.  Sagittal and coronal reformats were performed.    FINDINGS:    LOWER CHEST: Cardiomegaly. Hypodense blood pool with respect to the IV septum, consistent with anemia. Trace bilateral pleural effusions.    LIVER: Cirrhotic morphology.  BILE DUCTS: Normal caliber.  GALLBLADDER: Distended.  SPLEEN: Splenomegaly.  PANCREAS: Atrophic. A 1.2 cm cystic lesion is again noted in the uncinate process, unchanged.   ADRENALS: Within normal limits.  KIDNEYS/URETERS: Within normal limits.    BLADDER: Within normal limits.  REPRODUCTIVE ORGANS: The prostate is within normal limits.    BOWEL: No bowel obstruction   PERITONEUM: Ascites.  VESSELS:  Recanalized umbilical vein coursing anterior to the ventral abdominal hernia mesh. Mesenteric varices and splenorenal shunt.  Atherosclerotic calcification.  RETROPERITONEUM: No lymphadenopathy.    ABDOMINAL WALL: Recanalized umbilical vein as described above.   Subcutaneous edema.  BONES: Degenerative changes in the spine.    SOFT TISSUES: Approximately 6.0 x 3.7 x 7.8 cm intramuscular hematoma centered in the right gluteus mikki muscle with small blush of contrast  and consistent with active arterial extravasation (series 7 image 508).   Surrounding fluid stranding of the right subcutaneous gluteal tissues. A  0.9 cm hypodense lesion is noted in the subcutaneous tissues of the left lower anterior chest (3:22), of uncertain etiology.    IMPRESSION:   Approximately 6.0 x 3.7 x 7.8 cm intramuscular hematoma centered in the right gluteus mikki muscle with small blush of contrast consistent with active arterial extravasation (series 7 image 508).   Remaining chronic findings as above.    < end of copied text >

## 2019-01-14 NOTE — PROGRESS NOTE ADULT - PROBLEM SELECTOR PLAN 5
Last TTE (done at [facility]) showing reduced EF of 35%, however, echo was performed when patient was septic and may not have been accurate representation of baseline heart function.     - Vital signs q4h  - Repeat TTE 1/8 showing improvement in EF 43% from 35% in October, still with systolic dysfunction  - Strict I/Os, daily weight

## 2019-01-14 NOTE — PROGRESS NOTE ADULT - PROBLEM SELECTOR PLAN 1
US of right gluteal region done on 1/11 showing 13 cm hematoma. CTA A/P on 1/12 with prelim read demonstrating active extravasation into hematoma. Received total of 3 units platelets, 2 unit FFP, and 2 unit PRBC in past 48 hours. Hgb 7.8-->9.4.     - Serial exams and ultrasound to assess stability  - Physical compression of hematoma for 20-30 minutes to encourage clot formation  - s/p Kcentra 3500 today. Can dose kcentra BID in combination with FFP.   - Trend CBC and INR q8h goal INR <1.5  - Surgery consulted overnight, appreciate input  - IR consulted, recommend reversal of INR and ICU consult for closer monitoring given active blood loss  - MICU consulted, appreciate recommendations

## 2019-01-14 NOTE — PROGRESS NOTE ADULT - ATTENDING COMMENTS
Patient is seen and RT Gluteal region is noted with large hematoma and is to receive Marisol K and K centra and FFP as needed .   MOnitor for any bleed.       Pily Siddiqi   HOspitalist   [phone]

## 2019-01-14 NOTE — PROGRESS NOTE ADULT - ATTENDING COMMENTS
54 yo M, overweight BMI 28, alcoholic cirrhosis diagnosed 3.5 yrs ago (no alcohol since), decompensated by ascites, SBP in October 2018 (E. coli), small varices that never bled (EGD 2017), off the transplant list at [facility] (Dr. Santos) because of CHF with EF 35%, ?DM, adm. on 1/2/19 with weakness for one weak and right gluteal pain and swelling, found to have an arterial gluteal bleed (CT 1/12, 7.0 cm hematoma), acute blood loss anemia Hb drop from 10.4 (base) to 8.1 on day #2, again to 6.4 on 1/13. MELD-Na is 32. He has small to moderate ascites (US 1/3/19), not tapped.  PLT  have been around 30, now 56 after PLT transfusion, IRN 2.29 (3.0 on 1/12) after transfusion of FFP and Kcentra. IR recommended transfusion to decrease INR to below 1.5, and if bleeding then still continues, will consider embolization.  No sign of GI bleed. On midodrine, sBP around 95 mmHg.  He reports brown stools, including on Sunday and today. On my exam, abdomen nontender, rectal exam showed scant brown stool.    Recommend pressure on his gluteal area as tolerated, give VitK, Kcentra, FFPs. Will plan EGD and colonoscopy before discharge.  Continue lasix 20 mg/d, switch spironolactone from 50 mg bid to 100 mg/d, continue lactulose.

## 2019-01-14 NOTE — PROGRESS NOTE ADULT - PROBLEM SELECTOR PLAN 3
Likely multifactorial related to anemia of chronic disease and active blood loss.     - Trend CBC q8h  - Transfuse blood products as above

## 2019-01-14 NOTE — PROGRESS NOTE ADULT - PROBLEM SELECTOR PLAN 8
DVT ppx: SCDs  Diet: NPO until bleeding resolves  Code status: FULL CODE (confirmed with patient and brother)

## 2019-01-14 NOTE — PROGRESS NOTE ADULT - ASSESSMENT
53M end stage liver disease 2/2 alcoholic cirrhosis, HFrEF, CAD, HTN, presenting with progressively worsening generalized weakness. During admission, he was found to have gram negative rods and gram positive cocci blood cultures, but this was thought to be secondary to contamination.     #Decompensated Cirrhosis: suspected EtOH related - MELD-Na: 27 today 1/14/19 (34 on admission)  Patient was listed for transplant at [facility] but given new diagnosis of HFrEF in 10/2018, he was placed on hold on wait list with plan to reassess cardiac function. Repeat JOHNSON this admission indicates improvement of cardiac function from prior study, but still impaired with EF 43% from 35%. TTE w/ Doppler results on 1/8/19 Faxed to Dr. Stokes's office for reference and f/u.  HE: On Lactulose and rifaximin in hospital, lactulose only at home (Not on Rifaximin 2/2 prescription cost)  Ascites: small volume ascites on MR Abdomen 1/6/19. Has a history of E.coli SBP last episode in 10/2018.  HCC: No focal masses on US 9/2018 and on repeat MR Abdomen 1/6/19.  Varices: Small on EGD 8/2017, on spironolactone 25mg and lasix 20mg daily at home. Propranolol d/c'ed 10/2018 due to persistent hypotension.  Repeat viral infectious w/u (Hep A, B, C serologies; CMV, HSV, EBV PCR) all negative (1/4/19). Total Immunoglobulin panel: Quantitative IgA, IgM, & IgG (766, 296, & 1915, respectively), [place]/ Lambda Free light chain ratio 1.73 <H>, and BRIDGER Kappa & Lambda 8.11 and 4.70, respectively.   MR abdomen indicates mild iron deposition within liver, cirrhosis, and portal hypertension. MRCP unremarkable with patent bile ducts and nonspecific gallbladder wall thickening with no evidence of biliary obstruction. Alk Phos/AST/ALT downtrending.    #GNR and coag negative staph blood cultures  ID following and thought to be secondary to contaminant. Not currently on IV abx.    #Macrocytic Anemia: Low suspicion for active GI bleed as would expect overt GI bleeding given with drop by 2U in one day.  Repeat B12, Folate on 1/7/19 indicates no B9/ B12 deficiency (B12 >2000; B9 >20.0). Repeat TSH on 1/7/19 wnl (1.81).  Less common contributing etiologies possibly explaining macrocytosis to consider include copper deficiency, chronic liver disease affecting lipid composition of RBC membranes, increased endogenous estrogens from pt's liver disease (less likely given ULN folate levels), or drug-induced also less likely in pt. HD stable now, no obvious signs of bleeding. Iron studies 1/9/18 less concerning for hemolysis, however impaired liver function may be in part contributing to decreased synthesis of carrier proteins.    Recommendations:  - Continue lactulose and rifaximin, titrate to 2-3 BM per day.  - Trend MELD labs and CBC daily.  - Recommend heart failure team consultation for evaluation of possible inotropes to increase EF to goal of 55-60% (43% currently).  - Per IR, goal hgb >7, plts >50k, & INR <1.5 OR hemodynamic instability non-responsive to optimization then eligible for angio w/ embolization of R gluteal bleed.  - s/p Cardiology clearance for EGD  - Please continue FFP transfusions, as needed with goal INR <2.0  - Please continue Vit. K 10mg PO qd supplementation.  - Recommend follow-up with Dr. Stokes, [facility] hepatologist, within 1 week of discharge.  - Recommend discharge to Physical therapy for reconditioning.  - Patient can be discharged on increased diuretics--> Spironolactone 100mg and Lasix 40mg.  - Recommend Hematology outpatient followup for further workup, evaluation, and management of macrocytosis. 53M end stage liver disease 2/2 alcoholic cirrhosis, HFrEF, CAD, HTN, presenting with progressively worsening generalized weakness. During admission, he was found to have gram negative rods and gram positive cocci blood cultures, but this was thought to be secondary to contamination.     #Enlarging Hematoma of R buttock: given patient complaints of R hip pain and f/u CTA A/P, pt found to have 6x3.7x7.8cm hematoma with arterial extravasation. Given persistent decrease of H/H of unexplained origin outside of GI tract, this may be most likely cause for acute worsening of anemia requiring pRBC transfusions throughout admission. Since admission has now received total of 5 pRBCs. Now with H/H of 7.8/ 22.9 (1/14/19) from 8.9/ 25.4 yesterday, 1/13. Agree with primary team plan and management (serial exams & US, optimize coagulopathy of hepatic disease with FFP and Vit. K, as needed, CBC trending). Given stable cardiac status, tentatively set for EGD 1/14/19 pending hemodynamic stabilization.    #Decompensated Cirrhosis: suspected EtOH related - MELD-Na: 27 today 1/14/19 (34 on admission)  Patient was listed for transplant at [facility] but given new diagnosis of HFrEF in 10/2018, he was placed on hold on wait list with plan to reassess cardiac function. Repeat JOHNSON this admission indicates improvement of cardiac function from prior study, but still impaired with EF 43% from 35%. TTE w/ Doppler results on 1/8/19 Faxed to Dr. Stokes's office for reference and f/u. Per Cardiologist, Dr. Glass, HFrEF regimen challenging and not ideal to begin at this time given patients use of Midodrine and soft SBPs.  HE: On Lactulose and rifaximin in hospital, lactulose only at home (Not on Rifaximin 2/2 prescription cost)  Ascites: small volume ascites on MR Abdomen 1/6/19. Has a history of E.coli SBP last episode in 10/2018.  HCC: No focal masses on US 9/2018 and on repeat MR Abdomen 1/6/19.  Varices: Small on EGD 8/2017, on spironolactone 25mg and lasix 20mg daily at home. Propranolol d/c'ed 10/2018 due to persistent hypotension.  Repeat viral infectious w/u (Hep A, B, C serologies; CMV, HSV, EBV PCR) all negative (1/4/19). Total Immunoglobulin panel: Quantitative IgA, IgM, & IgG (766, 296, & 1915, respectively), [place]/ Lambda Free light chain ratio 1.73 <H>, and BRIDGER Kappa & Lambda 8.11 and 4.70, respectively.   MR abdomen indicates mild iron deposition within liver, cirrhosis, and portal hypertension. MRCP unremarkable with patent bile ducts and nonspecific gallbladder wall thickening with no evidence of biliary obstruction. Alk Phos/AST/ALT downtrending.    #GNR and coag negative staph blood cultures  ID following and thought to be secondary to contaminant. Not currently on IV abx.    #Macrocytic Anemia: Low suspicion for active GI bleed as would expect overt GI bleeding given with drop by 2U in one day.  Repeat B12, Folate on 1/7/19 indicates no B9/ B12 deficiency (B12 >2000; B9 >20.0). Repeat TSH on 1/7/19 wnl (1.81).  Less common contributing etiologies possibly explaining macrocytosis to consider include copper deficiency, chronic liver disease affecting lipid composition of RBC membranes, increased endogenous estrogens from pt's liver disease (less likely given ULN folate levels), or drug-induced also less likely in pt. HD stable now, no obvious signs of bleeding. Iron studies 1/9/18 less concerning for hemolysis, however impaired liver function may be in part contributing to decreased synthesis of carrier proteins. Most likely cause of acute worsening of anemia likely 2/2 hematoma of right buttock, as described above.      Recommendations:  - Per IR, goal hgb >7, plts >50k, & INR <1.5 OR hemodynamic instability non-responsive to optimization then eligible for angio w/ embolization of R gluteal bleed.  - Continue lactulose and rifaximin, titrate to 2-3 BM per day.  - Trend MELD labs and CBC daily.  - s/p Cardiology clearance for EGD, tentatively set for 1/14/19  - Please continue FFP transfusions, as needed with goal INR <2.0  - Please continue Vit. K 10mg PO qd supplementation.  - Recommend follow-up with Dr. Stokes, [facility] hepatologist, within 1 week of discharge.  - Recommend discharge to Physical therapy for reconditioning.  - Patient can be discharged on increased diuretics--> Spironolactone 100mg and Lasix 40mg. 53M end stage liver disease 2/2 alcoholic cirrhosis, HFrEF, CAD, HTN, presenting with progressively worsening generalized weakness. During admission, he was found to have gram negative rods and gram positive cocci blood cultures, but this was thought to be secondary to contamination.     #Enlarging Hematoma of R buttock: given patient complaints of R hip pain and f/u CTA A/P, pt found to have 6x3.7x7.8cm hematoma with arterial extravasation. Given persistent decrease of H/H of unexplained origin outside of GI tract, this may be most likely cause for acute worsening of anemia requiring pRBC transfusions throughout admission. Since admission has now received total of 5 pRBCs. Now with H/H of 7.8/ 22.9 (1/14/19) from 8.9/ 25.4 yesterday, 1/13. Agree with primary team plan and management (serial exams & US, optimize coagulopathy of hepatic disease with FFP and Vit. K, as needed, CBC trending). Given stable cardiac status, tentatively set for EGD 1/14/19 pending hemodynamic stabilization.    #Decompensated Cirrhosis: suspected EtOH related - MELD-Na: 27 today 1/14/19 (34 on admission)  Patient was listed for transplant at [facility] but given new diagnosis of HFrEF in 10/2018, he was placed on hold on wait list with plan to reassess cardiac function. Repeat JOHNSON this admission indicates improvement of cardiac function from prior study, but still impaired with EF 43% from 35%. TTE w/ Doppler results on 1/8/19 Faxed to Dr. Stokes's office for reference and f/u. Per Cardiologist, Dr. Glass, HFrEF regimen challenging and not ideal to begin at this time given patients use of Midodrine and soft SBPs.  HE: On Lactulose and rifaximin in hospital, lactulose only at home (Not on Rifaximin 2/2 prescription cost)  Ascites: small volume ascites on MR Abdomen 1/6/19. Has a history of E.coli SBP last episode in 10/2018.  HCC: No focal masses on US 9/2018 and on repeat MR Abdomen 1/6/19.  Varices: Small on EGD 8/2017, on spironolactone 25mg and lasix 20mg daily at home. Propranolol d/c'ed 10/2018 due to persistent hypotension.  Repeat viral infectious w/u (Hep A, B, C serologies; CMV, HSV, EBV PCR) all negative (1/4/19). Total Immunoglobulin panel: Quantitative IgA, IgM, & IgG (766, 296, & 1915, respectively), [place]/ Lambda Free light chain ratio 1.73 <H>, and BRIDGER Kappa & Lambda 8.11 and 4.70, respectively.   MR abdomen indicates mild iron deposition within liver, cirrhosis, and portal hypertension. MRCP unremarkable with patent bile ducts and nonspecific gallbladder wall thickening with no evidence of biliary obstruction. Alk Phos/AST/ALT downtrending.    #GNR and coag negative staph blood cultures  ID following and thought to be secondary to contaminant. Not currently on IV abx.    #Macrocytic Anemia: Low suspicion for active GI bleed as would expect overt GI bleeding given with drop by 2U in one day.  Repeat B12, Folate on 1/7/19 indicates no B9/ B12 deficiency (B12 >2000; B9 >20.0). Repeat TSH on 1/7/19 wnl (1.81).  Less common contributing etiologies possibly explaining macrocytosis to consider include copper deficiency, chronic liver disease affecting lipid composition of RBC membranes, increased endogenous estrogens from pt's liver disease (less likely given ULN folate levels), or drug-induced also less likely in pt. HD stable now, no obvious signs of bleeding. Iron studies 1/9/18 less concerning for hemolysis, however impaired liver function may be in part contributing to decreased synthesis of carrier proteins. Most likely cause of acute worsening of anemia likely 2/2 hematoma of right buttock, as described above.      Recommendations:  - Per IR, goal hgb >7, plts >50k, & INR <1.5 OR hemodynamic instability non-responsive to optimization then eligible for angio w/ embolization of R gluteal bleed.  - Continue serial CBC trending and serial exams of R hip for improvement or worsening of R gluteal bleed/ hematoma.  - Trend MELD labs and CBC daily.  - Continue lactulose and rifaximin, titrate to 2-3 BM per day.  - s/p Cardiology clearance for EGD, tentatively set for 1/14/19  - Please continue FFP transfusions, as needed with goal INR <2.0  - Please continue Vit. K 10mg PO qd supplementation.  - Recommend follow-up with Dr. Stokes, [facility] hepatologist, within 1 week of discharge.  - Recommend discharge to Physical therapy for reconditioning.  - Patient can be discharged on increased diuretics--> Spironolactone 100mg and Lasix 40mg. 53M end stage liver disease 2/2 alcoholic cirrhosis, HFrEF, CAD, HTN, presenting with progressively worsening generalized weakness. During admission, he was found to have gram negative rods and gram positive cocci blood cultures, but this was thought to be secondary to contamination.     #Enlarging Hematoma of R buttock: given patient complaints of R hip pain and f/u CTA A/P, pt found to have 6x3.7x7.8cm hematoma with arterial extravasation. Given persistent decrease of H/H of unexplained origin outside of GI tract, this may be most likely cause for acute worsening of anemia requiring pRBC transfusions throughout admission. Since admission has now received total of 5 pRBCs. Now with H/H of 7.8/ 22.9 (1/14/19) from 8.9/ 25.4 yesterday, 1/13. Agree with primary team plan and management (serial exams & US, optimize coagulopathy of hepatic disease with FFP and Vit. K, as needed, CBC trending). Given stable cardiac status, tentatively set for EGD 1/14/19 pending hemodynamic stabilization.    #Decompensated Cirrhosis: suspected EtOH related - MELD-Na: 27 today 1/14/19 (34 on admission)  Patient was listed for transplant at [facility] but given new diagnosis of HFrEF in 10/2018, he was placed on hold on wait list with plan to reassess cardiac function. Repeat JOHNSON this admission indicates improvement of cardiac function from prior study, but still impaired with EF 43% from 35%. TTE w/ Doppler results on 1/8/19 Faxed to Dr. Stokes's office for reference and f/u. Per Cardiologist, Dr. Glass, HFrEF regimen challenging and not ideal to begin at this time given patients use of Midodrine and soft SBPs.  HE: On Lactulose and rifaximin in hospital, lactulose only at home (Not on Rifaximin 2/2 prescription cost)  Ascites: small volume ascites on MR Abdomen 1/6/19. Has a history of E.coli SBP last episode in 10/2018.  HCC: No focal masses on US 9/2018 and on repeat MR Abdomen 1/6/19.  Varices: Small on EGD 8/2017, on spironolactone 25mg and lasix 20mg daily at home. Propranolol d/c'ed 10/2018 due to persistent hypotension.  Repeat viral infectious w/u (Hep A, B, C serologies; CMV, HSV, EBV PCR) all negative (1/4/19). Total Immunoglobulin panel: Quantitative IgA, IgM, & IgG (766, 296, & 1915, respectively), [place]/ Lambda Free light chain ratio 1.73 <H>, and BRIDGER Kappa & Lambda 8.11 and 4.70, respectively.   MR abdomen indicates mild iron deposition within liver, cirrhosis, and portal hypertension. MRCP unremarkable with patent bile ducts and nonspecific gallbladder wall thickening with no evidence of biliary obstruction. Alk Phos/AST/ALT downtrending.    #GNR and coag negative staph blood cultures  ID following and thought to be secondary to contaminant. Not currently on IV abx.    #Macrocytic Anemia: Low suspicion for active GI bleed as would expect overt GI bleeding given with drop by 2U in one day.  Repeat B12, Folate on 1/7/19 indicates no B9/ B12 deficiency (B12 >2000; B9 >20.0). Repeat TSH on 1/7/19 wnl (1.81).  Less common contributing etiologies possibly explaining macrocytosis to consider include copper deficiency, chronic liver disease affecting lipid composition of RBC membranes, increased endogenous estrogens from pt's liver disease (less likely given ULN folate levels), or drug-induced also less likely in pt. HD stable now, no obvious signs of bleeding. Iron studies 1/9/18 less concerning for hemolysis, however impaired liver function may be in part contributing to decreased synthesis of carrier proteins. Most likely cause of acute worsening of anemia likely 2/2 hematoma of right buttock, as described above.      Recommendations:  - Per IR, goal hgb >7, plts >50k, & INR <1.5 OR hemodynamic instability non-responsive to optimization then eligible for angio w/ embolization of R gluteal bleed.  - Continue serial CBC trending and serial exams of R hip for improvement or worsening of R gluteal bleed/ hematoma.  - Trend MELD labs and CBC daily.  - Continue lactulose and rifaximin, titrate to 2-3 BM per day.  - s/p Cardiology clearance for EGD, defer until this Wednesday 1/16/19 pending INR optimization and improvement of R gluteal hematoma  - Please continue FFP transfusions, as needed with goal INR <2.0  - Please continue Vit. K 10mg PO qd supplementation.  - Recommend follow-up with Dr. Stokes, [facility] hepatologist, within 1 week of discharge.  - Recommend discharge to Physical therapy for reconditioning.  - Patient can be discharged on increased diuretics--> Spironolactone 100mg and Lasix 40mg.

## 2019-01-14 NOTE — PROGRESS NOTE ADULT - PROBLEM SELECTOR PLAN 2
MELD >30 indicative of >50% mortality within next 3 months. On last admission, was removed from liver transplant list at [facility] due to decline in heart function (EF 35%). Hepatology following.     - Continue spironolactone 50mg daily and furosemide 20mg daily  - Continue lactulose and rifaximin at current doses  - Evidence of severe synthetic dysfunction, portal hypertension  - Had abdominal US showing small to moderate ascites, now s/p diagnostic paracentesis by IR. MRCP/ MR abdomen showing changes consistent with cirrhosis and portal hypertension.   - Pending EGD after bleed stabilizes to evaluate presence of varices; will need to have INR 1.8 or less and platelets above 50K;

## 2019-01-14 NOTE — PROGRESS NOTE ADULT - PROBLEM SELECTOR PLAN 4
Worsening thrombocytopenia since admission in setting of decompensated cirrhosis.     - Trend CBC q8h  - Transfuse for platelets <50K

## 2019-01-14 NOTE — CHART NOTE - NSCHARTNOTEFT_GEN_A_CORE
Nutrition Follow Up Note  Patient seen for: follow up    Source: pt    Diet : NPOAFTERMN    Patient reports: eating well prior to NPO, no issues related to food or nutrition     PO intake : >75% of meals prior to NPO     Source for PO intake: pt          Daily Weight in k.1 (), Weight in k.4 (), Weight in k (), Weight in k (01-10), Weight in k.9 (), Weight in k.6 (), Weight in k.6 ()  % Weight Change: 10% gain since     Pertinent Medications: MEDICATIONS  (STANDING):  chlorhexidine 0.12% Liquid 15 milliLiter(s) Oral Mucosa two times a day  dextrose 5%. 1000 milliLiter(s) (50 mL/Hr) IV Continuous <Continuous>  dextrose 50% Injectable 12.5 Gram(s) IV Push once  dextrose 50% Injectable 25 Gram(s) IV Push once  dextrose 50% Injectable 25 Gram(s) IV Push once  folic acid 1 milliGRAM(s) Oral daily  furosemide    Tablet 20 milliGRAM(s) Oral daily  insulin lispro (HumaLOG) corrective regimen sliding scale   SubCutaneous three times a day before meals  insulin lispro (HumaLOG) corrective regimen sliding scale   SubCutaneous at bedtime  lactulose Syrup 30 Gram(s) Oral three times a day  lidocaine   Patch 1 Patch Transdermal daily  midodrine 5 milliGRAM(s) Oral three times a day  multivitamin 1 Tablet(s) Oral daily  rifaximin 550 milliGRAM(s) Oral two times a day  spironolactone 50 milliGRAM(s) Oral two times a day  thiamine 100 milliGRAM(s) Oral daily    MEDICATIONS  (PRN):  artificial tears (preservative free) Ophthalmic Solution 1 Drop(s) Both EYES daily PRN Dry Eyes  dextrose 40% Gel 15 Gram(s) Oral once PRN Blood Glucose LESS THAN 70 milliGRAM(s)/deciliter  glucagon  Injectable 1 milliGRAM(s) IntraMuscular once PRN Glucose LESS THAN 70 milligrams/deciliter    Pertinent Labs:  @ 01:51: Na 137, BUN 19, Cr 0.63, <H>, K+ 4.8, Phos 2.9, Mg 1.8, Alk Phos 82, ALT/SGPT 16, AST/SGOT 50<H>, HbA1c 4.8    Finger Sticks:  POCT Blood Glucose.: 168 mg/dL ( @ 01:32)  POCT Blood Glucose.: 168 mg/dL ( @ 22:22)  POCT Blood Glucose.: 196 mg/dL ( @ 18:14)  POCT Blood Glucose.: 250 mg/dL ( @ 12:47)  POCT Blood Glucose.: 148 mg/dL ( @ 09:02)      Skin per nursing documentation: no pressure ulcers  Edema: +1 left foot, right foot    Estimated Needs:   [x ] no change since previous assessment  [ ] recalculated:     Previous Nutrition Diagnosis: Excessive alcohol intake  Nutrition Diagnosis is: ongoing being addressed with supplements      Recommend  1)reinitiate po diet within 24-48     Monitoring and Evaluation:     Continue to monitor Nutritional intake, Tolerance to diet prescription, weights, labs, skin integrity    RD remains available upon request and will follow up per protocol Nutrition Follow Up Note  Patient seen for: follow up  · Reason for Admission	hypotension, decompensated cirrhosis  53M end stage liver disease 2/2 alcoholic cirrhosis, T2DM, HFrEF (35%), CAD, HTN, presenting with 1 week of generalized weakness and hypotension, admitted for acute decompensated liver failure. Now s/p diagnostic paracentesis showing no evidence of SBP. Course c/b enlarging R gluteal hematoma with decreasing hemoglobin despite continued blood transfusions.     Source: pt    Diet : NPOAFTERMN tentatively planned for EGD, pending am INR    Patient reports: eating well prior to NPO, no issues related to food or nutrition     PO intake : >75% of meals prior to NPO     Source for PO intake: pt          Daily Weight in k.1 (), Weight in k.4 (), Weight in k (), Weight in k (01-10), Weight in k.9 (), Weight in k.6 (), Weight in k.6 ()  % Weight Change: 10% gain since -may be related to fluid    Pertinent Medications: MEDICATIONS  (STANDING):  chlorhexidine 0.12% Liquid 15 milliLiter(s) Oral Mucosa two times a day  dextrose 5%. 1000 milliLiter(s) (50 mL/Hr) IV Continuous <Continuous>  dextrose 50% Injectable 12.5 Gram(s) IV Push once  dextrose 50% Injectable 25 Gram(s) IV Push once  dextrose 50% Injectable 25 Gram(s) IV Push once  folic acid 1 milliGRAM(s) Oral daily  furosemide    Tablet 20 milliGRAM(s) Oral daily  insulin lispro (HumaLOG) corrective regimen sliding scale   SubCutaneous three times a day before meals  insulin lispro (HumaLOG) corrective regimen sliding scale   SubCutaneous at bedtime  lactulose Syrup 30 Gram(s) Oral three times a day  lidocaine   Patch 1 Patch Transdermal daily  midodrine 5 milliGRAM(s) Oral three times a day  multivitamin 1 Tablet(s) Oral daily  rifaximin 550 milliGRAM(s) Oral two times a day  spironolactone 50 milliGRAM(s) Oral two times a day  thiamine 100 milliGRAM(s) Oral daily    MEDICATIONS  (PRN):  artificial tears (preservative free) Ophthalmic Solution 1 Drop(s) Both EYES daily PRN Dry Eyes  dextrose 40% Gel 15 Gram(s) Oral once PRN Blood Glucose LESS THAN 70 milliGRAM(s)/deciliter  glucagon  Injectable 1 milliGRAM(s) IntraMuscular once PRN Glucose LESS THAN 70 milligrams/deciliter    Pertinent Labs:  @ 01:51: Na 137, BUN 19, Cr 0.63, <H>, K+ 4.8, Phos 2.9, Mg 1.8, Alk Phos 82, ALT/SGPT 16, AST/SGOT 50<H>, HbA1c 4.8    Finger Sticks:  POCT Blood Glucose.: 168 mg/dL ( @ 01:32)  POCT Blood Glucose.: 168 mg/dL ( @ 22:22)  POCT Blood Glucose.: 196 mg/dL ( @ 18:14)  POCT Blood Glucose.: 250 mg/dL ( @ 12:47)  POCT Blood Glucose.: 148 mg/dL ( @ 09:02)      Skin per nursing documentation: no pressure ulcers  Edema: +1 left foot, right foot    Estimated Needs:   [x ] no change since previous assessment  [ ] recalculated:     Previous Nutrition Diagnosis: Excessive alcohol intake  Nutrition Diagnosis is: ongoing being addressed with supplements      Recommend  1)reinitiate po diet within 24-48 when medically feasible (consistent carbohydrate (snack),dash)   2) continue multivitamin, folic acid, thiamine      Monitoring and Evaluation:     Continue to monitor Nutritional intake, Tolerance to diet prescription, weights, labs, skin integrity    RD remains available upon request and will follow up per protocol  Payton Pfeiffer MA, RD, CDN #[id number]

## 2019-01-14 NOTE — PROGRESS NOTE ADULT - SUBJECTIVE AND OBJECTIVE BOX
Patient is a 53y old  Male who presents with a chief complaint of Hypotension, decompensated cirrhosis (14 Jan 2019 08:38)      SUBJECTIVE / OVERNIGHT EVENTS:  Patient seen and examined at bedside. Overnight the patient was found to have an uptrending INR and downtrending hemoglobin. He was given 1 each of FFP, Plasma and PRBCs. The patient has had no melena, no hematochezia, no hematemesis, no hematuria. He is having increasing hip, thigh and flank pain.     REVIEW OF SYSTEMS:   Constitutional: no fatigue, fever, chills, generalized weakness  HEENT: no eye pain, vision changes, rhinorrhea, sore throat  Respiratory: no cough, wheezing, shortness of breath  CV: no chest pain, palpitations, dyspnea on exertion, orthopnea, PND  GI: no decreased appetite, nausea, vomiting, abdominal pain, diarrhea, constipation, melena  : no dysuria, hematuria, urinary frequency, incontinence, nocturia  MSK: no joint or muscle pain, muscle weakness, joint swelling  Skin: no rashes, bruising, hair loss, color change  Neuro: no headache, dizziness, fainting, paresthesias, memory loss  Endo: no heat or cold intolerance, increased thirst, hot flashes  Psych: no changes in mood    MEDICATIONS  (STANDING):  chlorhexidine 0.12% Liquid 15 milliLiter(s) Oral Mucosa two times a day  dextrose 5%. 1000 milliLiter(s) (50 mL/Hr) IV Continuous <Continuous>  dextrose 50% Injectable 12.5 Gram(s) IV Push once  dextrose 50% Injectable 25 Gram(s) IV Push once  dextrose 50% Injectable 25 Gram(s) IV Push once  folic acid 1 milliGRAM(s) Oral daily  furosemide    Tablet 20 milliGRAM(s) Oral daily  insulin lispro (HumaLOG) corrective regimen sliding scale   SubCutaneous three times a day before meals  insulin lispro (HumaLOG) corrective regimen sliding scale   SubCutaneous at bedtime  lactulose Syrup 30 Gram(s) Oral three times a day  lidocaine   Patch 1 Patch Transdermal daily  midodrine 5 milliGRAM(s) Oral three times a day  multivitamin 1 Tablet(s) Oral daily  phytonadione  IVPB 10 milliGRAM(s) IV Intermittent once  rifaximin 550 milliGRAM(s) Oral two times a day  spironolactone 50 milliGRAM(s) Oral two times a day  thiamine 100 milliGRAM(s) Oral daily    MEDICATIONS  (PRN):  artificial tears (preservative free) Ophthalmic Solution 1 Drop(s) Both EYES daily PRN Dry Eyes  dextrose 40% Gel 15 Gram(s) Oral once PRN Blood Glucose LESS THAN 70 milliGRAM(s)/deciliter  glucagon  Injectable 1 milliGRAM(s) IntraMuscular once PRN Glucose LESS THAN 70 milligrams/deciliter      OBJECTIVE:    Vital Signs Last 24 Hrs  T(C): 36.7 (14 Jan 2019 12:55), Max: 37.1 (13 Jan 2019 20:00)  T(F): 98.1 (14 Jan 2019 12:55), Max: 98.7 (13 Jan 2019 20:00)  HR: 60 (14 Jan 2019 12:55) (57 - 67)  BP: 94/54 (14 Jan 2019 12:55) (92/51 - 100/59)  BP(mean): --  RR: 18 (14 Jan 2019 12:55) (17 - 18)  SpO2: 93% (14 Jan 2019 12:55) (93% - 98%)    CAPILLARY BLOOD GLUCOSE    POCT Blood Glucose.: 153 mg/dL (14 Jan 2019 12:57)  POCT Blood Glucose.: 141 mg/dL (14 Jan 2019 08:48)  POCT Blood Glucose.: 168 mg/dL (14 Jan 2019 01:32)  POCT Blood Glucose.: 168 mg/dL (13 Jan 2019 22:22)  POCT Blood Glucose.: 196 mg/dL (13 Jan 2019 18:14)    I&O's Summary    13 Jan 2019 07:01  -  14 Jan 2019 07:00  --------------------------------------------------------  IN: 480 mL / OUT: 0 mL / NET: 480 mL    PHYSICAL EXAM:  GENERAL: NAD, ill appearing,  jaundice  HEAD:  Atraumatic, Normocephalic  EYES: EOMI, scleral icterus  NECK: Supple  CHEST/LUNG: Clear to auscultation bilaterally; No wheeze  HEART: Regular rate and rhythm; No murmurs, rubs, or gallops  ABDOMEN: Soft, Nontender, Nondistended; Bowel sounds present  EXTREMITIES: expanding hematoma on right buttock and flank, 2+ Peripheral Pulses, No clubbing, cyanosis, or edema  PSYCH: AAOx3    LABS:                        9.4    6.8   )-----------( 56       ( 14 Jan 2019 10:39 )             28.0     Auto Eosinophil # x     / Auto Eosinophil % x     / Auto Neutrophil # x     / Auto Neutrophil % x     / BANDS % x                            7.8    6.2   )-----------( 52       ( 14 Jan 2019 01:51 )             22.9     Auto Eosinophil # x     / Auto Eosinophil % x     / Auto Neutrophil # x     / Auto Neutrophil % x     / BANDS % x                            8.9    7.7   )-----------( 68       ( 13 Jan 2019 20:22 )             25.4     Auto Eosinophil # x     / Auto Eosinophil % x     / Auto Neutrophil # x     / Auto Neutrophil % x     / BANDS % x        01-14    137  |  104  |  19  ----------------------------<  154<H>  4.8   |  20<L>  |  0.63  01-13    136  |  105  |  18  ----------------------------<  140<H>  4.8   |  21<L>  |  0.67    Ca    9.0      14 Jan 2019 01:51  Mg     1.8     01-14  Phos  2.9     01-14  TPro  5.3<L>  /  Alb  3.2<L>  /  TBili  18.5<H>  /  DBili  x   /  AST  50<H>  /  ALT  16  /  AlkPhos  82  01-14  TPro  5.2<L>  /  Alb  3.0<L>  /  TBili  14.8<H>  /  DBili  x   /  AST  48<H>  /  ALT  15  /  AlkPhos  90  01-13    PT/INR - ( 14 Jan 2019 10:39 )   PT: 27.0 sec;   INR: 2.29 ratio         PTT - ( 14 Jan 2019 10:39 )  PTT:56.2 sec      Care Discussed with Consultants/Other Providers:  Care discussed with Hepatology.

## 2019-01-14 NOTE — PROGRESS NOTE ADULT - PROBLEM SELECTOR PLAN 6
No evidence of variceal bleeding at this time. Has history of varices s/p banding. Pt has dried blood on teeth but states these are from his lips being dry. Denies hematemesis. Hgb drop likely 2/2 hematoma. No longer on propranolol likely 2/2 hypotension.     - Pending EGD to assess presence of varices

## 2019-01-15 LAB
ALBUMIN SERPL ELPH-MCNC: 3.1 G/DL — LOW (ref 3.3–5)
ALP SERPL-CCNC: 89 U/L — SIGNIFICANT CHANGE UP (ref 40–120)
ALT FLD-CCNC: 13 U/L — SIGNIFICANT CHANGE UP (ref 10–45)
ANION GAP SERPL CALC-SCNC: 10 MMOL/L — SIGNIFICANT CHANGE UP (ref 5–17)
APTT BLD: 123.6 SEC — CRITICAL HIGH (ref 27.5–36.3)
AST SERPL-CCNC: 49 U/L — HIGH (ref 10–40)
BASOPHILS # BLD AUTO: 0.1 K/UL — SIGNIFICANT CHANGE UP (ref 0–0.2)
BASOPHILS NFR BLD AUTO: 0.9 % — SIGNIFICANT CHANGE UP (ref 0–2)
BILIRUB SERPL-MCNC: 20.7 MG/DL — HIGH (ref 0.2–1.2)
BUN SERPL-MCNC: 18 MG/DL — SIGNIFICANT CHANGE UP (ref 7–23)
CALCIUM SERPL-MCNC: 9.2 MG/DL — SIGNIFICANT CHANGE UP (ref 8.4–10.5)
CHLORIDE SERPL-SCNC: 105 MMOL/L — SIGNIFICANT CHANGE UP (ref 96–108)
CO2 SERPL-SCNC: 21 MMOL/L — LOW (ref 22–31)
CREAT SERPL-MCNC: 0.62 MG/DL — SIGNIFICANT CHANGE UP (ref 0.5–1.3)
CULTURE RESULTS: SIGNIFICANT CHANGE UP
EOSINOPHIL # BLD AUTO: 0.4 K/UL — SIGNIFICANT CHANGE UP (ref 0–0.5)
EOSINOPHIL NFR BLD AUTO: 6.2 % — HIGH (ref 0–6)
GLUCOSE BLDC GLUCOMTR-MCNC: 135 MG/DL — HIGH (ref 70–99)
GLUCOSE BLDC GLUCOMTR-MCNC: 194 MG/DL — HIGH (ref 70–99)
GLUCOSE BLDC GLUCOMTR-MCNC: 219 MG/DL — HIGH (ref 70–99)
GLUCOSE BLDC GLUCOMTR-MCNC: 247 MG/DL — HIGH (ref 70–99)
GLUCOSE SERPL-MCNC: 148 MG/DL — HIGH (ref 70–99)
HCT VFR BLD CALC: 21.1 % — LOW (ref 39–50)
HCT VFR BLD CALC: 23.2 % — LOW (ref 39–50)
HCT VFR BLD CALC: 23.6 % — LOW (ref 39–50)
HGB BLD-MCNC: 7.3 G/DL — LOW (ref 13–17)
HGB BLD-MCNC: 8 G/DL — LOW (ref 13–17)
HGB BLD-MCNC: 8.2 G/DL — LOW (ref 13–17)
INR BLD: ABNORMAL RATIO (ref 0.88–1.16)
INR BLD: ABNORMAL RATIO (ref 0.88–1.16)
LYMPHOCYTES # BLD AUTO: 0.7 K/UL — LOW (ref 1–3.3)
LYMPHOCYTES # BLD AUTO: 10.5 % — LOW (ref 13–44)
MAGNESIUM SERPL-MCNC: 1.6 MG/DL — SIGNIFICANT CHANGE UP (ref 1.6–2.6)
MCHC RBC-ENTMCNC: 33.6 PG — SIGNIFICANT CHANGE UP (ref 27–34)
MCHC RBC-ENTMCNC: 34.1 GM/DL — SIGNIFICANT CHANGE UP (ref 32–36)
MCHC RBC-ENTMCNC: 34.3 PG — HIGH (ref 27–34)
MCHC RBC-ENTMCNC: 34.4 PG — HIGH (ref 27–34)
MCHC RBC-ENTMCNC: 34.6 GM/DL — SIGNIFICANT CHANGE UP (ref 32–36)
MCHC RBC-ENTMCNC: 35.1 GM/DL — SIGNIFICANT CHANGE UP (ref 32–36)
MCV RBC AUTO: 98.1 FL — SIGNIFICANT CHANGE UP (ref 80–100)
MCV RBC AUTO: 98.7 FL — SIGNIFICANT CHANGE UP (ref 80–100)
MCV RBC AUTO: 98.9 FL — SIGNIFICANT CHANGE UP (ref 80–100)
MONOCYTES # BLD AUTO: 0.7 K/UL — SIGNIFICANT CHANGE UP (ref 0–0.9)
MONOCYTES NFR BLD AUTO: 9.9 % — SIGNIFICANT CHANGE UP (ref 2–14)
NEUTROPHILS # BLD AUTO: 5 K/UL — SIGNIFICANT CHANGE UP (ref 1.8–7.4)
NEUTROPHILS NFR BLD AUTO: 72.5 % — SIGNIFICANT CHANGE UP (ref 43–77)
PHOSPHATE SERPL-MCNC: 2.8 MG/DL — SIGNIFICANT CHANGE UP (ref 2.5–4.5)
PLATELET # BLD AUTO: 46 K/UL — LOW (ref 150–400)
PLATELET # BLD AUTO: 49 K/UL — LOW (ref 150–400)
PLATELET # BLD AUTO: 58 K/UL — LOW (ref 150–400)
POTASSIUM SERPL-MCNC: 4.6 MMOL/L — SIGNIFICANT CHANGE UP (ref 3.5–5.3)
POTASSIUM SERPL-SCNC: 4.6 MMOL/L — SIGNIFICANT CHANGE UP (ref 3.5–5.3)
PROT SERPL-MCNC: 5.3 G/DL — LOW (ref 6–8.3)
PROTHROM AB SERPL-ACNC: 107.1 SEC — HIGH (ref 10–12.9)
PROTHROM AB SERPL-ACNC: 70.3 SEC — HIGH (ref 10–12.9)
RBC # BLD: 2.14 M/UL — LOW (ref 4.2–5.8)
RBC # BLD: 2.37 M/UL — LOW (ref 4.2–5.8)
RBC # BLD: 2.39 M/UL — LOW (ref 4.2–5.8)
RBC # FLD: 21.7 % — HIGH (ref 10.3–14.5)
RBC # FLD: 21.8 % — HIGH (ref 10.3–14.5)
RBC # FLD: 22 % — HIGH (ref 10.3–14.5)
SODIUM SERPL-SCNC: 136 MMOL/L — SIGNIFICANT CHANGE UP (ref 135–145)
SPECIMEN SOURCE: SIGNIFICANT CHANGE UP
WBC # BLD: 6.7 K/UL — SIGNIFICANT CHANGE UP (ref 3.8–10.5)
WBC # BLD: 6.9 K/UL — SIGNIFICANT CHANGE UP (ref 3.8–10.5)
WBC # BLD: 9.6 K/UL — SIGNIFICANT CHANGE UP (ref 3.8–10.5)
WBC # FLD AUTO: 6.7 K/UL — SIGNIFICANT CHANGE UP (ref 3.8–10.5)
WBC # FLD AUTO: 6.9 K/UL — SIGNIFICANT CHANGE UP (ref 3.8–10.5)
WBC # FLD AUTO: 9.6 K/UL — SIGNIFICANT CHANGE UP (ref 3.8–10.5)

## 2019-01-15 PROCEDURE — 99232 SBSQ HOSP IP/OBS MODERATE 35: CPT | Mod: GC

## 2019-01-15 PROCEDURE — 99233 SBSQ HOSP IP/OBS HIGH 50: CPT | Mod: GC

## 2019-01-15 PROCEDURE — 99232 SBSQ HOSP IP/OBS MODERATE 35: CPT

## 2019-01-15 RX ORDER — PHYTONADIONE (VIT K1) 5 MG
10 TABLET ORAL ONCE
Qty: 0 | Refills: 0 | Status: COMPLETED | OUTPATIENT
Start: 2019-01-15 | End: 2019-01-15

## 2019-01-15 RX ORDER — LACTULOSE 10 G/15ML
40 SOLUTION ORAL THREE TIMES A DAY
Qty: 0 | Refills: 0 | Status: DISCONTINUED | OUTPATIENT
Start: 2019-01-15 | End: 2019-01-17

## 2019-01-15 RX ADMIN — Medication 20 MILLIGRAM(S): at 09:21

## 2019-01-15 RX ADMIN — LACTULOSE 30 GRAM(S): 10 SOLUTION ORAL at 13:05

## 2019-01-15 RX ADMIN — Medication 1 MILLIGRAM(S): at 09:22

## 2019-01-15 RX ADMIN — MIDODRINE HYDROCHLORIDE 5 MILLIGRAM(S): 2.5 TABLET ORAL at 09:21

## 2019-01-15 RX ADMIN — LIDOCAINE 1 PATCH: 4 CREAM TOPICAL at 00:50

## 2019-01-15 RX ADMIN — MIDODRINE HYDROCHLORIDE 5 MILLIGRAM(S): 2.5 TABLET ORAL at 13:05

## 2019-01-15 RX ADMIN — SPIRONOLACTONE 50 MILLIGRAM(S): 25 TABLET, FILM COATED ORAL at 09:21

## 2019-01-15 RX ADMIN — Medication 102 MILLIGRAM(S): at 16:54

## 2019-01-15 RX ADMIN — Medication 1 TABLET(S): at 09:22

## 2019-01-15 RX ADMIN — Medication 2: at 18:05

## 2019-01-15 RX ADMIN — LACTULOSE 40 GRAM(S): 10 SOLUTION ORAL at 21:39

## 2019-01-15 RX ADMIN — MIDODRINE HYDROCHLORIDE 5 MILLIGRAM(S): 2.5 TABLET ORAL at 21:39

## 2019-01-15 RX ADMIN — SPIRONOLACTONE 50 MILLIGRAM(S): 25 TABLET, FILM COATED ORAL at 17:05

## 2019-01-15 RX ADMIN — Medication 1: at 13:05

## 2019-01-15 RX ADMIN — CHLORHEXIDINE GLUCONATE 15 MILLILITER(S): 213 SOLUTION TOPICAL at 09:21

## 2019-01-15 RX ADMIN — LACTULOSE 30 GRAM(S): 10 SOLUTION ORAL at 09:21

## 2019-01-15 RX ADMIN — Medication 100 MILLIGRAM(S): at 09:22

## 2019-01-15 NOTE — PROGRESS NOTE ADULT - SUBJECTIVE AND OBJECTIVE BOX
Patient is a 53y old  Male who presents with a chief complaint of hypotension, decompensated cirrhosis (15 Rell 2019 13:07)      SUBJECTIVE / OVERNIGHT EVENTS:  Patient seen and examined at bedside.    Other Review of Systems Negative.    MEDICATIONS  (STANDING):  chlorhexidine 0.12% Liquid 15 milliLiter(s) Oral Mucosa two times a day  dextrose 5%. 1000 milliLiter(s) (50 mL/Hr) IV Continuous <Continuous>  dextrose 50% Injectable 12.5 Gram(s) IV Push once  dextrose 50% Injectable 25 Gram(s) IV Push once  dextrose 50% Injectable 25 Gram(s) IV Push once  folic acid 1 milliGRAM(s) Oral daily  furosemide    Tablet 20 milliGRAM(s) Oral daily  insulin lispro (HumaLOG) corrective regimen sliding scale   SubCutaneous three times a day before meals  insulin lispro (HumaLOG) corrective regimen sliding scale   SubCutaneous at bedtime  lactulose Syrup 30 Gram(s) Oral three times a day  lidocaine   Patch 1 Patch Transdermal daily  midodrine 5 milliGRAM(s) Oral three times a day  multivitamin 1 Tablet(s) Oral daily  rifaximin 550 milliGRAM(s) Oral two times a day  spironolactone 50 milliGRAM(s) Oral two times a day  thiamine 100 milliGRAM(s) Oral daily    MEDICATIONS  (PRN):  artificial tears (preservative free) Ophthalmic Solution 1 Drop(s) Both EYES daily PRN Dry Eyes  dextrose 40% Gel 15 Gram(s) Oral once PRN Blood Glucose LESS THAN 70 milliGRAM(s)/deciliter  glucagon  Injectable 1 milliGRAM(s) IntraMuscular once PRN Glucose LESS THAN 70 milligrams/deciliter      OBJECTIVE:    Vital Signs Last 24 Hrs  T(C): 36.6 (15 Rell 2019 11:48), Max: 36.9 (14 Jan 2019 20:51)  T(F): 97.9 (15 Rell 2019 11:48), Max: 98.4 (14 Jan 2019 20:51)  HR: 61 (15 Rell 2019 11:48) (61 - 64)  BP: 99/63 (15 Rell 2019 11:48) (97/59 - 107/65)  BP(mean): --  RR: 18 (15 Rell 2019 11:48) (18 - 18)  SpO2: 95% (15 Rell 2019 11:48) (94% - 100%)    CAPILLARY BLOOD GLUCOSE      POCT Blood Glucose.: 194 mg/dL (15 Rell 2019 12:43)  POCT Blood Glucose.: 135 mg/dL (15 Rell 2019 09:43)  POCT Blood Glucose.: 222 mg/dL (14 Jan 2019 21:31)  POCT Blood Glucose.: 193 mg/dL (14 Jan 2019 18:11)    I&O's Summary    15 Rell 2019 07:01  -  15 Rell 2019 15:24  --------------------------------------------------------  IN: 480 mL / OUT: 0 mL / NET: 480 mL        PHYSICAL EXAM:  GENERAL: NAD, ill appearing  HEAD:  Atraumatic, Normocephalic  EYES: EOMI, scleral icterus  CHEST/LUNG: Clear to auscultation bilaterally; No wheeze  HEART: Regular rate and rhythm; No murmurs, rubs, or gallops  ABDOMEN: Soft, Nontender, Nondistended; Bowel sounds present, swelling and tenderness to palpation near paracentesis site.   EXTREMITIES:  2+ Peripheral Pulses, No clubbing, cyanosis, or edema  PSYCH: AAOx3  NEUROLOGY: non-focal  SKIN: No rashes or lesions    LABS:                        8.0    6.9   )-----------( 49       ( 15 Rell 2019 11:01 )             23.6     Auto Eosinophil # 0.4   / Auto Eosinophil % 6.2   / Auto Neutrophil # 5.0   / Auto Neutrophil % 72.5  / BANDS % x                            8.2    6.7   )-----------( 46       ( 15 Erll 2019 01:01 )             23.2     Auto Eosinophil # x     / Auto Eosinophil % x     / Auto Neutrophil # x     / Auto Neutrophil % x     / BANDS % x                            8.7    6.9   )-----------( 49       ( 14 Jan 2019 17:45 )             25.0     Auto Eosinophil # x     / Auto Eosinophil % x     / Auto Neutrophil # x     / Auto Neutrophil % x     / BANDS % x        01-15    136  |  105  |  18  ----------------------------<  148<H>  4.6   |  21<L>  |  0.62  01-14    137  |  104  |  19  ----------------------------<  154<H>  4.8   |  20<L>  |  0.63    Ca    9.2      15 Rell 2019 11:01  Mg     1.6     01-15  Phos  2.8     01-15  TPro  5.3<L>  /  Alb  3.1<L>  /  TBili  20.7<H>  /  DBili  x   /  AST  49<H>  /  ALT  13  /  AlkPhos  89  01-15  TPro  5.3<L>  /  Alb  3.2<L>  /  TBili  18.5<H>  /  DBili  x   /  AST  50<H>  /  ALT  16  /  AlkPhos  82  01-14    PT/INR - ( 14 Jan 2019 17:45 )   PT: 24.6 sec;   INR: 2.09 ratio    PTT - ( 14 Jan 2019 17:45 )  PTT:73.9 sec    Care Discussed with Consultants/Other Providers:  Care discussed with hepatology, cardiology and    RADIOLOGY & ADDITIONAL TESTS:  (Imaging Personally Reviewed)  US buttock  Findings:    A right buttock hematoma measures 11.3 x 3.1 x 5.6 cm, similar in size   when remeasured on the CT abdomen and pelvis 1/12/2019 and not   significantly changed compared to prior exam 1/11/2019. Compared to the   prior exam, there is decreased echogenicity within the inferior portion   of the hematoma likely related to liquefaction. There is limited   compressibility. Overlying soft tissue edema is decreased when compared   to the prior ultrasound 1/11/2018.    Impression:    Right buttock hematoma not significantly changed in size when compared to   the prior CT abdomen and pelvis 1/12/2019.

## 2019-01-15 NOTE — PROGRESS NOTE ADULT - PROBLEM SELECTOR PLAN 2
MELD >30 indicative of >50% mortality within next 3 months. On last admission, was removed from liver transplant list at [facility] due to decline in heart function (EF 35%). Hepatology following.     - Continue spironolactone 50mg daily and furosemide 20mg daily  - Continue lactulose and rifaximin at current doses  - Evidence of severe synthetic dysfunction, portal hypertension  - Had abdominal US showing small to moderate ascites, now s/p diagnostic paracentesis by IR. MRCP/ MR abdomen showing changes consistent with cirrhosis and portal hypertension.   - Pending EGD after bleed stabilizes to evaluate presence of varices; will need to have INR 1.8 or less and platelets above 50K;

## 2019-01-15 NOTE — PROGRESS NOTE ADULT - PROBLEM SELECTOR PLAN 1
US of right gluteal region done on 1/11 showing 13 cm hematoma. CTA A/P on 1/12 with prelim read demonstrating active extravasation into hematoma. Received total of 3 units platelets, 2 unit FFP, and 2 unit PRBC this admission. Hgb currently 8.0.   - Serial exams and ultrasound to assess stability  - Physical compression of hematoma for 20-30 minutes to encourage clot formation  - s/p Kcentra 3500 yesterda. Can dose kcentra BID in combination with FFP. daily vit k.  - Trend CBC and INR q8h goal INR <1.5  - Surgery consulted overnight, appreciate input  - IR consulted, recommend reversal of INR and ICU consult for closer monitoring given active blood loss  - MICU consulted, appreciate recommendations

## 2019-01-15 NOTE — PROGRESS NOTE ADULT - SUBJECTIVE AND OBJECTIVE BOX
SUBJECTIVE: 54yo Man seen and examined during morning rounds. Patient was transfused a unit of PRBCs after a hemoglobin/hematocrit drop from 9.4/28 to 8.2/23.2 Afebrile, VS stable. Pain well controlled with current regimen.     OBJECTIVE:    Physical Exam:  Gen: Resting in bed, NAD, alert and oriented  Resp: respirations unlabored, no increased WOB   Ext: Right buttock hematoma soft, appropriately tender    Vital Signs Last 24 Hrs  T(C): 36.4 (15 Rell 2019 04:17), Max: 36.9 (14 Jan 2019 20:51)  T(F): 97.5 (15 Rell 2019 04:17), Max: 98.4 (14 Jan 2019 20:51)  HR: 64 (15 Rell 2019 04:17) (60 - 64)  BP: 97/59 (15 Rell 2019 09:20) (94/54 - 107/65)  BP(mean): --  RR: 18 (15 Rell 2019 04:17) (18 - 18)  SpO2: 100% (15 Rell 2019 04:17) (93% - 100%)    I&O's Detail      MEDICATIONS  (STANDING):  chlorhexidine 0.12% Liquid 15 milliLiter(s) Oral Mucosa two times a day  dextrose 5%. 1000 milliLiter(s) (50 mL/Hr) IV Continuous <Continuous>  dextrose 50% Injectable 12.5 Gram(s) IV Push once  dextrose 50% Injectable 25 Gram(s) IV Push once  dextrose 50% Injectable 25 Gram(s) IV Push once  folic acid 1 milliGRAM(s) Oral daily  furosemide    Tablet 20 milliGRAM(s) Oral daily  insulin lispro (HumaLOG) corrective regimen sliding scale   SubCutaneous three times a day before meals  insulin lispro (HumaLOG) corrective regimen sliding scale   SubCutaneous at bedtime  lactulose Syrup 30 Gram(s) Oral three times a day  lidocaine   Patch 1 Patch Transdermal daily  midodrine 5 milliGRAM(s) Oral three times a day  multivitamin 1 Tablet(s) Oral daily  rifaximin 550 milliGRAM(s) Oral two times a day  spironolactone 50 milliGRAM(s) Oral two times a day  thiamine 100 milliGRAM(s) Oral daily    MEDICATIONS  (PRN):  artificial tears (preservative free) Ophthalmic Solution 1 Drop(s) Both EYES daily PRN Dry Eyes  dextrose 40% Gel 15 Gram(s) Oral once PRN Blood Glucose LESS THAN 70 milliGRAM(s)/deciliter  glucagon  Injectable 1 milliGRAM(s) IntraMuscular once PRN Glucose LESS THAN 70 milligrams/deciliter      LABS:                        8.0    6.9   )-----------( 49       ( 15 Rell 2019 11:01 )             23.6       01-14    137  |  104  |  19  ----------------------------<  154<H>  4.8   |  20<L>  |  0.63    Ca    9.0      14 Jan 2019 01:51  Phos  2.9     01-14  Mg     1.8     01-14    TPro  5.3<L>  /  Alb  3.2<L>  /  TBili  18.5<H>  /  DBili  x   /  AST  50<H>  /  ALT  16  /  AlkPhos  82  01-14      --   01-10-19 @ 21:17   No growth

## 2019-01-15 NOTE — PROGRESS NOTE ADULT - ASSESSMENT
53M end stage liver disease 2/2 alcoholic cirrhosis, T2DM, HFrEF (35%), CAD, HTN, presenting with 1 week of generalized weakness and hypotension, admitted for acute decompensated liver failure. Now s/p diagnostic paracentesis showing no evidence of SBP. Course c/b enlarging R gluteal hematoma with decreasing hemoglobin despite continued blood transfusions.

## 2019-01-15 NOTE — PROVIDER CONTACT NOTE (CRITICAL VALUE NOTIFICATION) - ASSESSMENT
Patient A&Ox4, VSS. aPTT 123.6. Patient is ecchymotic. Patient has hematoma in right gluteus mikki. No active external bleeding noted. Patient denies chest pain, lightheadedness, or shortness of breath.

## 2019-01-15 NOTE — PROGRESS NOTE ADULT - PROBLEM SELECTOR PLAN 5
Last TTE (done at [facility]) showing reduced EF of 35%, however, echo was performed when patient was septic and may not have been accurate representation of baseline heart function.     - Vital signs q4h  - Repeat TTE 1/8 showing improvement in EF 43% from 35% in October, still with systolic dysfunction  - Strict I/Os, daily weight

## 2019-01-15 NOTE — PROGRESS NOTE ADULT - ATTENDING COMMENTS
R gluteal hematoma, hematoma at paracentesis site. Acute blood loss anemia with brown stool on rectal exam (1/14) and no observed melena/hematochezia. PLT 46, INR 2.09, PTT 50s.   Transfused on 1/14: 1U PRBCs, 1 FFP; on 1/15: 1 FFP  No obvious cause of gluteal hematoma. May have DIC given elevated PTT.     - would send fibrinogen, D-dimer  - will discuss again with transplant at [facility] given improved cardiac function  - transfuse FFP, PLT

## 2019-01-15 NOTE — PROGRESS NOTE ADULT - ASSESSMENT
Remains stable from cardiac standpoint.  Important to understand that he had normal LVEF and cardiac function on echo and MRI when first diagnosed with cirrhosis, edema,  and ascites.  Cause of decrease in LVEF isn't clear, but has not had any obvious clinical effects since first diagnosed at [facility] and LVEF now has improved, but without any clinical effect.   His BP has always been low and has prevented starting usual medical therapy for HFREF.  No reason to think aggressive therapy for LV dysfunction will have any significant impact on his downhill course.  Will continue with current JONG PAT  [phone]

## 2019-01-15 NOTE — PROGRESS NOTE ADULT - SUBJECTIVE AND OBJECTIVE BOX
still with pain in buttock, not SOB    PAST MEDICAL & SURGICAL HISTORY:  Hypertension  Type 2 diabetes mellitus  Congestive heart failure (CHF)  Esophageal varices  Alcoholic cirrhosis of liver with ascites  Abdominal hernia: S/P repair x2  H/O cataract    Medications:  artificial tears (preservative free) Ophthalmic Solution 1 Drop(s) Both EYES daily PRN  chlorhexidine 0.12% Liquid 15 milliLiter(s) Oral Mucosa two times a day  dextrose 40% Gel 15 Gram(s) Oral once PRN  dextrose 5%. 1000 milliLiter(s) IV Continuous <Continuous>  dextrose 50% Injectable 12.5 Gram(s) IV Push once  dextrose 50% Injectable 25 Gram(s) IV Push once  dextrose 50% Injectable 25 Gram(s) IV Push once  folic acid 1 milliGRAM(s) Oral daily  furosemide    Tablet 20 milliGRAM(s) Oral daily  glucagon  Injectable 1 milliGRAM(s) IntraMuscular once PRN  insulin lispro (HumaLOG) corrective regimen sliding scale   SubCutaneous three times a day before meals  insulin lispro (HumaLOG) corrective regimen sliding scale   SubCutaneous at bedtime  lactulose Syrup 30 Gram(s) Oral three times a day  lidocaine   Patch 1 Patch Transdermal daily  midodrine 5 milliGRAM(s) Oral three times a day  multivitamin 1 Tablet(s) Oral daily  rifaximin 550 milliGRAM(s) Oral two times a day  spironolactone 50 milliGRAM(s) Oral two times a day  thiamine 100 milliGRAM(s) Oral daily      Vitals:  T(C): 36.4 (01-15-19 @ 04:17), Max: 36.9 (19 @ 20:51)  HR: 64 (01-15-19 @ 04:17) (60 - 64)  BP: 99/66 (01-15-19 @ 04:17) (94/54 - 107/65)  BP(mean): --  RR: 18 (01-15-19 @ 04:17) (18 - 18)  SpO2: 100% (01-15-19 @ 04:17) (93% - 100%)  Wt(kg): --  Daily     Daily Weight in k.9 (15 Rell 2019 04:17)  I&O's Summary      Physical Exam:  Appearance: [x ] Normal [ ] NAD  Eyes: [x ] PERRL [ ] EOMI  HENT: [x ] Normal oral muscosa [ ]NC/AT  Cardiovascular: [x ] S1 [x ] S2 [ ] RRR [ ] No m/r/g [x ]No edema [ ] JVP  Procedural Access Site: [ ] No hematoma [ ] Non-tender to palpation [ ] 2+ pulse [ ] No bruit [ ] No Ecchymosis  Respiratory: [ ] Clear to auscultation bilaterally  Gastrointestinal: ascities   Musculoskeletal: [ ] No clubbing [ ] No joint deformity   Neurologic: [x ] Non-focal  Lymphatic: [x ] No lymphadenopathy  Psychiatry: [x ] AAOx3 [ ] Mood & affect appropriate  Skin: [ ] No rashes [ ] No ecchymoses [ ] No cyanosis        137  |  104  |  19  ----------------------------<  154<H>  4.8   |  20<L>  |  0.63    Ca    9.0      2019 01:51  Phos  2.9       Mg     1.8         TPro  5.3<L>  /  Alb  3.2<L>  /  TBili  18.5<H>  /  DBili  x   /  AST  50<H>  /  ALT  16  /  AlkPhos  82      PT/INR - ( 2019 17:45 )   PT: 24.6 sec;   INR: 2.09 ratio         PTT - ( 2019 17:45 )  PTT:73.9 sec              ECG:    Echo:    Stress Testing:     Cath:    Imaging:    Interpretation of Telemetry:

## 2019-01-15 NOTE — PROGRESS NOTE ADULT - SUBJECTIVE AND OBJECTIVE BOX
Chief Complaint:  Patient is a 53y old  Male who presents with a chief complaint of hypotension, decompensated cirrhosis (15 Rell 2019 11:21)    Interval Events:   No acute overnight events. Continues to complain of pain in his buttocks at site of hematoma    Allergies:  No Known Allergies    Hospital Medications:  artificial tears (preservative free) Ophthalmic Solution 1 Drop(s) Both EYES daily PRN  chlorhexidine 0.12% Liquid 15 milliLiter(s) Oral Mucosa two times a day  dextrose 40% Gel 15 Gram(s) Oral once PRN  dextrose 5%. 1000 milliLiter(s) IV Continuous <Continuous>  dextrose 50% Injectable 12.5 Gram(s) IV Push once  dextrose 50% Injectable 25 Gram(s) IV Push once  dextrose 50% Injectable 25 Gram(s) IV Push once  folic acid 1 milliGRAM(s) Oral daily  furosemide    Tablet 20 milliGRAM(s) Oral daily  glucagon  Injectable 1 milliGRAM(s) IntraMuscular once PRN  insulin lispro (HumaLOG) corrective regimen sliding scale   SubCutaneous three times a day before meals  insulin lispro (HumaLOG) corrective regimen sliding scale   SubCutaneous at bedtime  lactulose Syrup 30 Gram(s) Oral three times a day  lidocaine   Patch 1 Patch Transdermal daily  midodrine 5 milliGRAM(s) Oral three times a day  multivitamin 1 Tablet(s) Oral daily  rifaximin 550 milliGRAM(s) Oral two times a day  spironolactone 50 milliGRAM(s) Oral two times a day  thiamine 100 milliGRAM(s) Oral daily      PMHX/PSHX:  Hypertension  Type 2 diabetes mellitus  Congestive heart failure (CHF)  Esophageal varices  Alcoholic cirrhosis of liver with ascites  No pertinent past medical history  Abdominal hernia  H/O cataract    ROS:   General:  No fevers, chills  ENT:  No sore throat or dysphagia  CV:  No pain or palpitations  Resp:  No dyspnea, cough or  wheezing  GI:  No pain, No nausea, No vomiting, No rectal bleeding, No tarry stools,  Skin:  No rash or edema    PHYSICAL EXAM:   Vital Signs:  Vital Signs Last 24 Hrs  T(C): 36.6 (15 Rell 2019 11:48), Max: 36.9 (2019 20:51)  T(F): 97.9 (15 Rell 2019 11:48), Max: 98.4 (2019 20:51)  HR: 61 (15 Rell 2019 11:48) (61 - 64)  BP: 99/63 (15 Rell 2019 11:48) (97/59 - 107/65)  BP(mean): --  RR: 18 (15 Rell 2019 11:48) (18 - 18)  SpO2: 95% (15 Rell 2019 11:48) (94% - 100%)  Daily     Daily Weight in k.9 (15 Rell 2019 04:17)    GENERAL:  NAD, Appears stated age  HEENT:  NC/AT,  conjunctivae clear and pink, scleral icteris  CHEST:  Normal Effort, Breath sounds clear  HEART:  RRR, S1 + S2, no murmurs  ABDOMEN:  Soft, non-tender, non-distended, normoactive bowel sounds  EXTEREMITIES:  no cyanosis or edema  SKIN:  Warm & Dry. + Jaundice  NEURO:  Alert, oriented    LABS:                        8.0    6.9   )-----------( 49       ( 15 Rell 2019 11:01 )             23.6     Mean Cell Volume: 98.7 fl (01-15-19 @ 11:01)    01-15    136  |  105  |  18  ----------------------------<  148<H>  4.6   |  21<L>  |  0.62    Ca    9.2      15 Rell 2019 11:01  Phos  2.8     01-15  Mg     1.6     01-15    TPro  5.3<L>  /  Alb  3.1<L>  /  TBili  20.7<H>  /  DBili  x   /  AST  49<H>  /  ALT  13  /  AlkPhos  89  15    LIVER FUNCTIONS - ( 15 Rell 2019 11:01 )  Alb: 3.1 g/dL / Pro: 5.3 g/dL / ALK PHOS: 89 U/L / ALT: 13 U/L / AST: 49 U/L / GGT: x           PT/INR - ( 2019 17:45 )   PT: 24.6 sec;   INR: 2.09 ratio       PTT - ( 2019 17:45 )  PTT:73.9 sec    Imaging:

## 2019-01-15 NOTE — PROGRESS NOTE ADULT - ASSESSMENT
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 Patient has required 1 u pRBC and 2 u plt. Patient found on CT A/P to have active extravasation in subcutaneous tissue of R gluteal hematoma, measuring 6.5x4.8 cm.    - Continue reversing INR as able to prevent further bleeding of hematoma  - F/u with IR for possible embolization of bleeding vessel  - Compression as tolerated    ALVERTO Palma, PGY-2  Acute Care Surgery  p. [phone] ASSESSMENT:  53 year old male with end stage liver disease 2/2 chronic alcoholic cirrhosis, with esophageal varices, CAD, HfrEF (EF 35%), and DMII, admitted for concerns of SBP and now complaining of R gluteal hematoma. Patient has required 1 u pRBC and 2 u plt. Patient found on CT A/P to have active extravasation in subcutaneous tissue of R gluteal hematoma, measuring 6.5x4.8 cm.    Plan/recommendations:  - Pt responded to PRBCs appropriately and H/H from this morning is currently stable; continue trending H/H  - Continue reversing INR as able to prevent further bleeding of hematoma  - F/u with IR for possible embolization of bleeding vessel  - Compression as tolerated    ALVERTO Palma, PGY-2  Acute Care Surgery  p. [phone]

## 2019-01-15 NOTE — PROGRESS NOTE ADULT - ASSESSMENT
53M end stage liver disease 2/2 alcoholic cirrhosis, HFrEF, CAD, HTN, presenting with progressively worsening generalized weakness. During admission, he was found to have gram negative rods and gram positive cocci blood cultures, but this was thought to be secondary to contamination.     #Enlarging Hematoma of R buttock: Patient with 6x3.7x7.8cm hematoma with arterial extravasation requiring multiple pRBC transfusions (Last transfusion yesterday). IR following  #Decompensated Cirrhosis: suspected EtOH related - MELD-Na: 28 today 1/15/19 (34 on admission)  Patient was listed for transplant at [facility] but given new diagnosis of HFrEF in 10/2018, he was placed on hold on wait list with plan to reassess cardiac function. Repeat JOHNSON this admission indicates improvement of cardiac function from prior study, but still impaired with EF 43% from 35%. TTE w/ Doppler results on 1/8/19 Faxed to Dr. Stokes's office for reference and f/u.   HE: On Lactulose and rifaximin in hospital, lactulose only at home (Not on Rifaximin 2/2 prescription cost)  Ascites: small volume ascites on MR Abdomen 1/6/19. Has a history of E.coli SBP last episode in 10/2018.  HCC: No focal masses on US 9/2018 and on repeat MR Abdomen 1/6/19.  Varices: Small on EGD 8/2017, on spironolactone 25mg and lasix 20mg daily at home. Propranolol d/c'ed 10/2018 due to persistent hypotension.  Repeat viral infectious w/u (Hep A, B, C serologies; CMV, HSV, EBV PCR) all negative (1/4/19). Total Immunoglobulin panel: Quantitative IgA, IgM, & IgG (766, 296, & 1915, respectively), [place]/ Lambda Free light chain ratio 1.73 <H>, and BRIDGER Kappa & Lambda 8.11 and 4.70, respectively.   MR abdomen indicates mild iron deposition within liver, cirrhosis, and portal hypertension. MRCP unremarkable with patent bile ducts and nonspecific gallbladder wall thickening with no evidence of biliary obstruction. Alk Phos/AST/ALT downtrending.  #GNR and coag negative staph blood cultures: ID following and thought to be secondary to contaminant. Not currently on IV abx.  #Macrocytic Anemia: Low suspicion for active GI bleed as would expect overt GI bleeding given with drop by 2U in one day.  Repeat B12, Folate on 1/7/19 indicates no B9/ B12 deficiency (B12 >2000; B9 >20.0). Repeat TSH on 1/7/19 wnl (1.81).  Less common contributing etiologies possibly explaining macrocytosis to consider include copper deficiency, chronic liver disease affecting lipid composition of RBC membranes, increased endogenous estrogens from pt's liver disease (less likely given ULN folate levels), or drug-induced also less likely in pt. HD stable now, no obvious signs of bleeding. Iron studies 1/9/18 less concerning for hemolysis, however impaired liver function may be in part contributing to decreased synthesis of carrier proteins. Most likely cause of acute worsening of anemia likely 2/2 hematoma of right buttock, as described above.      Recommendations:  - Per IR, goal hgb >7, plts >50k, & INR <1.5 OR hemodynamic instability non-responsive to optimization then eligible for angio w/ embolization of R gluteal bleed.  - Continue serial CBC trending and serial exams of R hip for improvement or worsening of R gluteal bleed/ hematoma.  - Trend MELD labs and CBC daily.  - Continue lactulose and rifaximin, titrate to 2-3 BM per day.  - s/p Cardiology clearance for EGD, defer pending INR optimization and improvement of R gluteal hematoma  - Please continue FFP transfusions, as needed with goal INR <2.0  - Please continue Vit. K 10mg PO qd supplementation.  - Recommend follow-up with Dr. Stokes, [facility] hepatologist, within 1 week of discharge.  - Recommend discharge to Physical therapy for reconditioning..    Lilly Ruffin MD  Gastroenterology Fellow  [phone]  Please page on call fellow on weekends and after 5pm on weekdays

## 2019-01-16 VITALS
DIASTOLIC BLOOD PRESSURE: 60 MMHG | TEMPERATURE: 98 F | RESPIRATION RATE: 18 BRPM | OXYGEN SATURATION: 96 % | SYSTOLIC BLOOD PRESSURE: 95 MMHG | HEART RATE: 78 BPM

## 2019-01-16 LAB
ALBUMIN SERPL ELPH-MCNC: 3 G/DL — LOW (ref 3.3–5)
ALP SERPL-CCNC: 78 U/L — SIGNIFICANT CHANGE UP (ref 40–120)
ALT FLD-CCNC: 14 U/L — SIGNIFICANT CHANGE UP (ref 10–45)
ANION GAP SERPL CALC-SCNC: 11 MMOL/L — SIGNIFICANT CHANGE UP (ref 5–17)
APTT BLD: 64.9 SEC — HIGH (ref 27.5–36.3)
APTT BLD: 94.6 SEC — HIGH (ref 27.5–36.3)
AST SERPL-CCNC: 42 U/L — HIGH (ref 10–40)
BASOPHILS # BLD AUTO: 0.1 K/UL — SIGNIFICANT CHANGE UP (ref 0–0.2)
BASOPHILS NFR BLD AUTO: 0 % — SIGNIFICANT CHANGE UP (ref 0–2)
BILIRUB SERPL-MCNC: 20 MG/DL — HIGH (ref 0.2–1.2)
BLD GP AB SCN SERPL QL: NEGATIVE — SIGNIFICANT CHANGE UP
BUN SERPL-MCNC: 21 MG/DL — SIGNIFICANT CHANGE UP (ref 7–23)
CALCIUM SERPL-MCNC: 9.1 MG/DL — SIGNIFICANT CHANGE UP (ref 8.4–10.5)
CHLORIDE SERPL-SCNC: 106 MMOL/L — SIGNIFICANT CHANGE UP (ref 96–108)
CO2 SERPL-SCNC: 20 MMOL/L — LOW (ref 22–31)
CREAT SERPL-MCNC: 0.81 MG/DL — SIGNIFICANT CHANGE UP (ref 0.5–1.3)
D DIMER BLD IA.RAPID-MCNC: 8121 NG/ML DDU — HIGH
EOSINOPHIL # BLD AUTO: 0.5 K/UL — SIGNIFICANT CHANGE UP (ref 0–0.5)
EOSINOPHIL NFR BLD AUTO: 5 % — SIGNIFICANT CHANGE UP (ref 0–6)
FIBRINOGEN PPP-MCNC: 75 MG/DL — CRITICAL LOW (ref 350–510)
GLUCOSE BLDC GLUCOMTR-MCNC: 205 MG/DL — HIGH (ref 70–99)
GLUCOSE BLDC GLUCOMTR-MCNC: 276 MG/DL — HIGH (ref 70–99)
GLUCOSE BLDC GLUCOMTR-MCNC: 279 MG/DL — HIGH (ref 70–99)
GLUCOSE BLDC GLUCOMTR-MCNC: 340 MG/DL — HIGH (ref 70–99)
GLUCOSE BLDC GLUCOMTR-MCNC: 349 MG/DL — HIGH (ref 70–99)
GLUCOSE SERPL-MCNC: 178 MG/DL — HIGH (ref 70–99)
HAPTOGLOB SERPL-MCNC: <20 MG/DL — LOW (ref 34–200)
HCT VFR BLD CALC: 20.8 % — CRITICAL LOW (ref 39–50)
HGB BLD-MCNC: 7 G/DL — CRITICAL LOW (ref 13–17)
INR BLD: 3.12 RATIO — HIGH (ref 0.88–1.16)
INR BLD: ABNORMAL RATIO (ref 0.88–1.16)
LDH SERPL L TO P-CCNC: 313 U/L — HIGH (ref 50–242)
LYMPHOCYTES # BLD AUTO: 11 % — LOW (ref 13–44)
LYMPHOCYTES # BLD AUTO: 2.2 K/UL — SIGNIFICANT CHANGE UP (ref 1–3.3)
MAGNESIUM SERPL-MCNC: 1.6 MG/DL — SIGNIFICANT CHANGE UP (ref 1.6–2.6)
MCHC RBC-ENTMCNC: 33.5 PG — SIGNIFICANT CHANGE UP (ref 27–34)
MCHC RBC-ENTMCNC: 33.7 GM/DL — SIGNIFICANT CHANGE UP (ref 32–36)
MCV RBC AUTO: 99.4 FL — SIGNIFICANT CHANGE UP (ref 80–100)
MONOCYTES # BLD AUTO: 1.3 K/UL — HIGH (ref 0–0.9)
MONOCYTES NFR BLD AUTO: 6 % — SIGNIFICANT CHANGE UP (ref 2–14)
NEUTROPHILS # BLD AUTO: 9.4 K/UL — HIGH (ref 1.8–7.4)
NEUTROPHILS NFR BLD AUTO: 72 % — SIGNIFICANT CHANGE UP (ref 43–77)
PHOSPHATE SERPL-MCNC: 3.2 MG/DL — SIGNIFICANT CHANGE UP (ref 2.5–4.5)
PLATELET # BLD AUTO: 64 K/UL — LOW (ref 150–400)
POTASSIUM SERPL-MCNC: 5 MMOL/L — SIGNIFICANT CHANGE UP (ref 3.5–5.3)
POTASSIUM SERPL-SCNC: 5 MMOL/L — SIGNIFICANT CHANGE UP (ref 3.5–5.3)
PROT SERPL-MCNC: 4.9 G/DL — LOW (ref 6–8.3)
PROTHROM AB SERPL-ACNC: 36.8 SEC — HIGH (ref 10–12.9)
PROTHROM AB SERPL-ACNC: 38.8 SEC — HIGH (ref 10–12.9)
RBC # BLD: 2.1 M/UL — LOW (ref 4.2–5.8)
RBC # FLD: 23 % — HIGH (ref 10.3–14.5)
RH IG SCN BLD-IMP: POSITIVE — SIGNIFICANT CHANGE UP
SODIUM SERPL-SCNC: 137 MMOL/L — SIGNIFICANT CHANGE UP (ref 135–145)
WBC # BLD: 13.6 K/UL — HIGH (ref 3.8–10.5)
WBC # FLD AUTO: 13.6 K/UL — HIGH (ref 3.8–10.5)

## 2019-01-16 PROCEDURE — 99233 SBSQ HOSP IP/OBS HIGH 50: CPT | Mod: GC

## 2019-01-16 PROCEDURE — 74176 CT ABD & PELVIS W/O CONTRAST: CPT | Mod: 26

## 2019-01-16 PROCEDURE — 99222 1ST HOSP IP/OBS MODERATE 55: CPT | Mod: GC

## 2019-01-16 PROCEDURE — 99233 SBSQ HOSP IP/OBS HIGH 50: CPT

## 2019-01-16 PROCEDURE — 76882 US LMTD JT/FCL EVL NVASC XTR: CPT | Mod: 26,RT

## 2019-01-16 RX ORDER — FUROSEMIDE 40 MG
1 TABLET ORAL
Qty: 0 | Refills: 0 | COMMUNITY
Start: 2019-01-16

## 2019-01-16 RX ORDER — LACTULOSE 10 G/15ML
30 SOLUTION ORAL
Qty: 0 | Refills: 0 | COMMUNITY

## 2019-01-16 RX ORDER — PIPERACILLIN AND TAZOBACTAM 4; .5 G/20ML; G/20ML
3.38 INJECTION, POWDER, LYOPHILIZED, FOR SOLUTION INTRAVENOUS ONCE
Qty: 0 | Refills: 0 | Status: DISCONTINUED | OUTPATIENT
Start: 2019-01-16 | End: 2019-01-17

## 2019-01-16 RX ORDER — SPIRONOLACTONE 25 MG/1
1 TABLET, FILM COATED ORAL
Qty: 0 | Refills: 0 | COMMUNITY

## 2019-01-16 RX ORDER — PROTHROMBIN COMPLEX CONCENTRATE (HUMAN) 25.5; 16.5; 24; 22; 22; 26 [IU]/ML; [IU]/ML; [IU]/ML; [IU]/ML; [IU]/ML; [IU]/ML
2500 POWDER, FOR SOLUTION INTRAVENOUS ONCE
Qty: 0 | Refills: 0 | Status: COMPLETED | OUTPATIENT
Start: 2019-01-16 | End: 2019-01-16

## 2019-01-16 RX ORDER — ACETAMINOPHEN 500 MG
1000 TABLET ORAL ONCE
Qty: 0 | Refills: 0 | Status: COMPLETED | OUTPATIENT
Start: 2019-01-16 | End: 2019-01-16

## 2019-01-16 RX ORDER — PROTHROMBIN COMPLEX CONCENTRATE (HUMAN) 25.5; 16.5; 24; 22; 22; 26 [IU]/ML; [IU]/ML; [IU]/ML; [IU]/ML; [IU]/ML; [IU]/ML
2500 POWDER, FOR SOLUTION INTRAVENOUS
Qty: 0 | Refills: 0 | COMMUNITY
Start: 2019-01-16

## 2019-01-16 RX ORDER — PHYTONADIONE (VIT K1) 5 MG
10 TABLET ORAL ONCE
Qty: 0 | Refills: 0 | Status: COMPLETED | OUTPATIENT
Start: 2019-01-16 | End: 2019-01-16

## 2019-01-16 RX ORDER — PIPERACILLIN AND TAZOBACTAM 4; .5 G/20ML; G/20ML
3.38 INJECTION, POWDER, LYOPHILIZED, FOR SOLUTION INTRAVENOUS EVERY 8 HOURS
Qty: 0 | Refills: 0 | Status: DISCONTINUED | OUTPATIENT
Start: 2019-01-16 | End: 2019-01-17

## 2019-01-16 RX ORDER — PROTHROMBIN COMPLEX CONCENTRATE (HUMAN) 25.5; 16.5; 24; 22; 22; 26 [IU]/ML; [IU]/ML; [IU]/ML; [IU]/ML; [IU]/ML; [IU]/ML
2500 POWDER, FOR SOLUTION INTRAVENOUS ONCE
Qty: 0 | Refills: 0 | Status: COMPLETED | OUTPATIENT
Start: 2019-01-16 | End: 2019-12-15

## 2019-01-16 RX ORDER — LACTULOSE 10 G/15ML
60 SOLUTION ORAL
Qty: 0 | Refills: 0 | COMMUNITY
Start: 2019-01-16

## 2019-01-16 RX ORDER — TRAMADOL HYDROCHLORIDE 50 MG/1
50 TABLET ORAL ONCE
Qty: 0 | Refills: 0 | Status: DISCONTINUED | OUTPATIENT
Start: 2019-01-16 | End: 2019-01-17

## 2019-01-16 RX ORDER — PROTHROMBIN COMPLEX CONCENTRATE (HUMAN) 25.5; 16.5; 24; 22; 22; 26 [IU]/ML; [IU]/ML; [IU]/ML; [IU]/ML; [IU]/ML; [IU]/ML
1000 POWDER, FOR SOLUTION INTRAVENOUS ONCE
Qty: 0 | Refills: 0 | Status: COMPLETED | OUTPATIENT
Start: 2019-01-16 | End: 2019-01-16

## 2019-01-16 RX ORDER — SPIRONOLACTONE 25 MG/1
2 TABLET, FILM COATED ORAL
Qty: 0 | Refills: 0 | COMMUNITY
Start: 2019-01-16

## 2019-01-16 RX ORDER — FUROSEMIDE 40 MG
1 TABLET ORAL
Qty: 0 | Refills: 0 | COMMUNITY

## 2019-01-16 RX ADMIN — SPIRONOLACTONE 50 MILLIGRAM(S): 25 TABLET, FILM COATED ORAL at 06:45

## 2019-01-16 RX ADMIN — PROTHROMBIN COMPLEX CONCENTRATE (HUMAN) 400 INTERNATIONAL UNIT(S): 25.5; 16.5; 24; 22; 22; 26 POWDER, FOR SOLUTION INTRAVENOUS at 09:48

## 2019-01-16 RX ADMIN — Medication 102 MILLIGRAM(S): at 19:10

## 2019-01-16 RX ADMIN — PROTHROMBIN COMPLEX CONCENTRATE (HUMAN) 400 INTERNATIONAL UNIT(S): 25.5; 16.5; 24; 22; 22; 26 POWDER, FOR SOLUTION INTRAVENOUS at 20:11

## 2019-01-16 RX ADMIN — Medication 1 MILLIGRAM(S): at 08:59

## 2019-01-16 RX ADMIN — SPIRONOLACTONE 50 MILLIGRAM(S): 25 TABLET, FILM COATED ORAL at 18:35

## 2019-01-16 RX ADMIN — Medication 3: at 13:18

## 2019-01-16 RX ADMIN — MIDODRINE HYDROCHLORIDE 5 MILLIGRAM(S): 2.5 TABLET ORAL at 04:25

## 2019-01-16 RX ADMIN — Medication 400 MILLIGRAM(S): at 18:51

## 2019-01-16 RX ADMIN — Medication 20 MILLIGRAM(S): at 06:44

## 2019-01-16 RX ADMIN — LACTULOSE 40 GRAM(S): 10 SOLUTION ORAL at 06:46

## 2019-01-16 RX ADMIN — LACTULOSE 40 GRAM(S): 10 SOLUTION ORAL at 13:18

## 2019-01-16 RX ADMIN — MIDODRINE HYDROCHLORIDE 5 MILLIGRAM(S): 2.5 TABLET ORAL at 13:18

## 2019-01-16 RX ADMIN — CHLORHEXIDINE GLUCONATE 15 MILLILITER(S): 213 SOLUTION TOPICAL at 06:44

## 2019-01-16 RX ADMIN — Medication 3: at 18:35

## 2019-01-16 RX ADMIN — LACTULOSE 40 GRAM(S): 10 SOLUTION ORAL at 21:30

## 2019-01-16 RX ADMIN — PROTHROMBIN COMPLEX CONCENTRATE (HUMAN) 400 INTERNATIONAL UNIT(S): 25.5; 16.5; 24; 22; 22; 26 POWDER, FOR SOLUTION INTRAVENOUS at 08:10

## 2019-01-16 RX ADMIN — Medication 1 TABLET(S): at 08:59

## 2019-01-16 RX ADMIN — Medication 2: at 22:26

## 2019-01-16 RX ADMIN — Medication 100 MILLIGRAM(S): at 08:59

## 2019-01-16 RX ADMIN — Medication 2: at 08:59

## 2019-01-16 RX ADMIN — MIDODRINE HYDROCHLORIDE 5 MILLIGRAM(S): 2.5 TABLET ORAL at 21:28

## 2019-01-16 NOTE — PROGRESS NOTE ADULT - SUBJECTIVE AND OBJECTIVE BOX
CC: F/U for Leukocytosis    Saw/spoke to patient. Patient with R hip/buttock pain. No fevers, no chills. No cough/sob. No abd pain. No N/V/D. No dysuria, no pyuria. No other new complaints aside from pain. Patient noted to have large buttock hematoma on imaging. ID called back for further eval with WBC.    Allergies  No Known Allergies    ANTIMICROBIALS:  rifaximin 550 two times a day    PE:    Vital Signs Last 24 Hrs  T(C): 36.3 (16 Jan 2019 12:02), Max: 36.8 (15 Rell 2019 18:50)  T(F): 97.4 (16 Jan 2019 12:02), Max: 98.3 (15 Rell 2019 18:50)  HR: 71 (16 Jan 2019 12:02) (60 - 71)  BP: 93/57 (16 Jan 2019 12:02) (90/50 - 103/63)  RR: 18 (16 Jan 2019 12:02) (18 - 18)  SpO2: 96% (16 Jan 2019 12:02) (95% - 98%)    Gen: AOx3, NAD, non-toxic, pleasant  CV: S1+S2 normal, nontachycardic  Resp: Clear bilat, no resp distress, no crackles/wheezes  Abd: Soft, nontender, +BS  Ext: No LE edema, no wounds    LABS:                        7.0    13.6  )-----------( 64       ( 16 Jan 2019 11:03 )             20.8     01-16    137  |  106  |  21  ----------------------------<  178<H>  5.0   |  20<L>  |  0.81    Ca    9.1      16 Jan 2019 07:10  Phos  3.2     01-16  Mg     1.6     01-16    TPro  4.9<L>  /  Alb  3.0<L>  /  TBili  20.0<H>  /  DBili  x   /  AST  42<H>  /  ALT  14  /  AlkPhos  78  01-16    MICROBIOLOGY:    Peritoneal Peritoneal Fluid  01-10-19   No growth at 5 days  --    Few polymorphonuclear leukocytes per low power field  No organisms seen  by cytocentrifuge    .Blood Blood  01-04-19   No growth at 5 days.    .Urine Clean Catch (Midstream)  01-02-19   No growth    .Blood Blood  01-02-19   No growth at 5 days.  --  Blood Culture PCR CoNS  Blood Culture PCR    RADIOLOGY:    1/16 USG:    Findings:    A hematoma is again noted within the right buttock measuring 9.1 x 3.6 x   10.5 cm, previously measuring 11.3 x 3.1 x 5.6 cm. There is decreased   echogenicity consistent with liquefaction. Overlying soft tissue edema is   noted, slightly increased compared to the prior exam.    Impression:    Progressive liquefaction of the right buttock hematoma, slightly   increased in size in the transverse plane. Increased overlying soft   tissue edema when compared to the prior exam.

## 2019-01-16 NOTE — PROGRESS NOTE ADULT - PROBLEM SELECTOR PLAN 5
Last TTE (done at [facility]) showing reduced EF of 35%, however, echo was performed when patient was septic and may not have been accurate representation of baseline heart function.     - Vital signs q4h  - Repeat TTE 1/8 showing improvement in EF 43% from 35% in October, still with systolic dysfunction  - Strict I/Os, daily weight

## 2019-01-16 NOTE — PROGRESS NOTE ADULT - ATTENDING COMMENTS
Last night unmeasurably high INR, PTT increased to >100. Pt. had worse hematoma of right buttock with worse pain, surgery was consulted.  Hb 7.0, Transfused 1U PRBCs, 1FFP, 1 Kcentra overnight.  Now leukocytosis WBC 13.6 with left shift (Neutrophils: 72%,3% metamyelocytes, 3% myelocytes) - concerning for infection such as recurrent SBP.  MELD-Na 31 with bilirubin 20, INR 3.12, creat 0.81, Na 137.    PE: in pain, increased, confluent hematomas right buttock, and right flank around the paracentesis site.  I spoke with his transplant hepatologist, Dr. Stokes, at [facility] and she agreed with transfer to [facility], empiric antibiotics (needs longterm prophylaxis given prior SBP), and not to perform a repeat diagnostic paracentesis given his bleeding.  Continue transfusion with PLT, Kcentra, FFPs, PRBCs.  Bleeding tendency may be due to failing liver, or DIC given elevated PTT, low fibrinogen and very elevated D-dimer - which is possibly caused by sepsis.  Critically ill, end-stage liver disease. Unfortunately, he is currently not a transplant candidate due to CHF - EF on recent echo still reduced with estimated EF of 41%.

## 2019-01-16 NOTE — CONSULT NOTE ADULT - ATTENDING COMMENTS
decompensated cirrhosis, + hematoma, signifciant pain in the R leg/thigh given his hematoma  -difficulty achieving hemostasis  -check dilute thrombin time  -received kcentra earlier today, to get another dose this evening  -if not effective, would give cryo tomorrow in place of ffp (fibrinogen slightly low, possibly dysfibrinogenemia)  -can also try tranexamic acid

## 2019-01-16 NOTE — CONSULT NOTE ADULT - CONSULT REQUESTED DATE/TIME
02-Jan-2019 19:23
03-Jan-2019 08:22
03-Jan-2019 14:33
04-Jan-2019 13:34
13-Jan-2019
13-Jan-2019 12:17
13-Jan-2019 06:30
16-Jan-2019 17:14

## 2019-01-16 NOTE — PROGRESS NOTE ADULT - PROBLEM SELECTOR PLAN 1
- patient now has right buttock, right abdominal, and bilateral upper extremity hematomas.   - Serial exams and ultrasound to assess stability  - Physical compression of hematoma for 20-30 minutes to encourage clot formation  - give kcentra 2500 BID, give 1 unit prbcs, one unit cryoprecipitate, IV vit k.   - Trend CBC and INR q8h goal INR <1.5  - Surgery consulted overnight, appreciate input  - IR consulted, recommend reversal of INR and ICU consult for closer monitoring given active blood loss  - MICU consulted, appreciate recommendations  - f/u hematologic work-up to asses factor function and deficiency.   - Not currently in DIC as no schistocytes on smear. - patient now has right buttock, right abdominal, and bilateral upper extremity hematomas.   - Serial exams and ultrasound to assess stability  - Physical compression of hematoma for 20-30 minutes to encourage clot formation  - give kcentra 2500 BID, give 1 unit prbcs, one unit cryoprecipitate, IV vit k.   - Trend CBC and INR q8h goal INR <1.5  - Surgery consulted overnight, appreciate input  - IR consulted, recommend reversal of INR and ICU consult for closer monitoring given active blood loss  - MICU reconsulted  - f/u hematologic work-up to asses factor function and deficiency.   - Not currently in DIC as no schistocytes on smear.

## 2019-01-16 NOTE — PROVIDER CONTACT NOTE (CRITICAL VALUE NOTIFICATION) - ASSESSMENT
Patient A&Ox4, VSS. SBP runs 90's. NSR on cardiac monitor. Patient denies chest pain, lightheadedness, or shortness of breath. Patient is ecchymotic. Right gluteal hematoma and right abdomen hematoma increased in size since yesterday. MD aware.

## 2019-01-16 NOTE — PROGRESS NOTE ADULT - REASON FOR ADMISSION
Hypotension, decompensated cirrhosis

## 2019-01-16 NOTE — PROGRESS NOTE ADULT - ATTENDING COMMENTS
53 year old male with end stage liver disease 2/2 chronic alcoholic cirrhosis, with esophageal varices, HfrEF (EF 35% but 43% as of 1/8/19), CAD, and DMII was admitted to [facility] on 1/2/19 with c/o 1 week of generalized weakness and hypotension and being managed  for acute decompensated liver failure.  Patient's coagulation profiles have been elevated and not improving after multiple transfusion of FFP, K Centra and Vitamin K administration.  Patient had Negative work up for SBP.   Of note, patient has a RT Gluteal Hematoma for which general surgery was consulted and IR was unable to intervene due to elevated coagulation profile .  On 1/15/19 patient was noted to have hematoma at site of previous Paracentesis that appears to be enlarging.   Case was discussed with the brother about the gravity of the patient's condition.  Case was also discussed with the Hepatology team who have discussed the case with [facility] transplant team.  I have discussed the case with the Pharmacy, Blood bank and Hematology team and the Blood bank has recommended Cryoprecipitate in light of Low fibrinogen.  Patient is being transferred to [facility] Transplant center.   D/C time 45mns.      Pily Siddiqi   Hospitalist   [phone]

## 2019-01-16 NOTE — CONSULT NOTE ADULT - SUBJECTIVE AND OBJECTIVE BOX
HPI:  Patient is a 53 year old male with end stage liver disease 2/2 chronic alcoholic cirrhosis, with esophageal varices, CAD, HfrEF (EF 35%), and DMII, presenting with 1 week of generalized weakness and poor PO intake. Hematology consulted for coagulopathy in underlying liver failure.    Patient was recently admitted  followed by [facility] in 10- 11/2018 with abdominal distension and lower extremity edema, was initially placed on liver transplant list however was removed from list due to HF with severely reduced EF.  Prior to the [facility] transfer, patient was treated for severe sepsis from E coli bacteremia from presumed SBP, treated with ceftriaxone. Patient also recently had episode of LE edema 3 weeks ago- was started metolazone in addition to home lasix and spironolactone. However, the metolazone was discontinued 1 week ago. Patient currently takes lasix 20 mg daily and spironolactone 25 mg daily.  Brother at bedside reports taking daily BP and HR on patient, which average in mid 80s-low 90s systolic, and HR 60s.     In the ED, pt afebrile, T 97.4, HR 62, BP as low as 73/78, satting 100% on room air. Labs notable for WBC 11.8, PLT 77, INR 2.81, K+ 5.9, Cr 1.33, Total Bilirubin 12.8. Lactate on VBG 4.7.   Patient given 250 cc 5% albumin with improvement in bp to SBP 95/58. Also given 1g IV ceftriaxone for empiric SBP treatment. (02 Jan 2019 23:26)      PAST MEDICAL & SURGICAL HISTORY:  Hypertension  Type 2 diabetes mellitus  Congestive heart failure (CHF)  Esophageal varices  Alcoholic cirrhosis of liver with ascites  Abdominal hernia: S/P repair x2  H/O cataract      Allergies    No Known Allergies    Intolerances        MEDICATIONS  (STANDING):  chlorhexidine 0.12% Liquid 15 milliLiter(s) Oral Mucosa two times a day  dextrose 5%. 1000 milliLiter(s) (50 mL/Hr) IV Continuous <Continuous>  dextrose 50% Injectable 12.5 Gram(s) IV Push once  dextrose 50% Injectable 25 Gram(s) IV Push once  dextrose 50% Injectable 25 Gram(s) IV Push once  folic acid 1 milliGRAM(s) Oral daily  furosemide    Tablet 20 milliGRAM(s) Oral daily  insulin lispro (HumaLOG) corrective regimen sliding scale   SubCutaneous three times a day before meals  insulin lispro (HumaLOG) corrective regimen sliding scale   SubCutaneous at bedtime  lactulose Syrup 40 Gram(s) Oral three times a day  lidocaine   Patch 1 Patch Transdermal daily  midodrine 5 milliGRAM(s) Oral three times a day  multivitamin 1 Tablet(s) Oral daily  piperacillin/tazobactam IVPB. 3.375 Gram(s) IV Intermittent every 8 hours  prothrombin complex concentrate IVPB (KCENTRA) 2500 International Unit(s) IV Intermittent once  rifaximin 550 milliGRAM(s) Oral two times a day  spironolactone 50 milliGRAM(s) Oral two times a day  thiamine 100 milliGRAM(s) Oral daily    MEDICATIONS  (PRN):  artificial tears (preservative free) Ophthalmic Solution 1 Drop(s) Both EYES daily PRN Dry Eyes  dextrose 40% Gel 15 Gram(s) Oral once PRN Blood Glucose LESS THAN 70 milliGRAM(s)/deciliter  glucagon  Injectable 1 milliGRAM(s) IntraMuscular once PRN Glucose LESS THAN 70 milligrams/deciliter      FAMILY HISTORY:  Family history of hypercholesterolemia (Father)  Family history of diabetes mellitus (Mother)      SOCIAL HISTORY: No EtOH, no tobacco    REVIEW OF SYSTEMS:    CONSTITUTIONAL: No weakness, fevers or chills  EYES/ENT: No visual changes;  No vertigo or throat pain   NECK: No pain or stiffness  RESPIRATORY: No cough, wheezing, hemoptysis; No shortness of breath  CARDIOVASCULAR: No chest pain or palpitations  GASTROINTESTINAL: No abdominal or epigastric pain. No nausea, vomiting, or hematemesis; No diarrhea or constipation. No melena or hematochezia.  GENITOURINARY: No dysuria, frequency or hematuria  NEUROLOGICAL: No numbness or weakness  SKIN: No itching, burning, rashes, or lesions   All other review of systems is negative unless indicated above.        T(F): 97.9 (01-16-19 @ 16:10), Max: 98.3 (01-15-19 @ 18:50)  HR: 66 (01-16-19 @ 16:10)  BP: 95/51 (01-16-19 @ 16:10)  RR: 18 (01-16-19 @ 16:10)  SpO2: 96% (01-16-19 @ 16:10)  Wt(kg): --    GENERAL: NAD, well-developed  HEAD:  Atraumatic, Normocephalic  EYES: EOMI, PERRLA, conjunctiva and sclera clear  NECK: Supple, No JVD  CHEST/LUNG: Clear to auscultation bilaterally; No wheeze  HEART: Regular rate and rhythm; No murmurs, rubs, or gallops  ABDOMEN: Soft, Nontender, Nondistended; Bowel sounds present  EXTREMITIES:  2+ Peripheral Pulses, No clubbing, cyanosis, or edema  NEUROLOGY: non-focal  SKIN: No rashes or lesions                          7.0    13.6  )-----------( 64       ( 16 Jan 2019 11:03 )             20.8       01-16    137  |  106  |  21  ----------------------------<  178<H>  5.0   |  20<L>  |  0.81    Ca    9.1      16 Jan 2019 07:10  Phos  3.2     01-16  Mg     1.6     01-16    TPro  4.9<L>  /  Alb  3.0<L>  /  TBili  20.0<H>  /  DBili  x   /  AST  42<H>  /  ALT  14  /  AlkPhos  78  01-16      Magnesium, Serum: 1.6 mg/dL (01-16 @ 07:10)  Phosphorus Level, Serum: 3.2 mg/dL (01-16 @ 07:10)  Lactate Dehydrogenase, Serum: 313 U/L (01-16 @ 07:10)      PT/INR - ( 16 Jan 2019 11:43 )   PT: 38.8 sec;   INR: See Note ratio         PTT - ( 16 Jan 2019 11:43 )  PTT:94.6 sec    Peritoneal Peritoneal Fluid  01-10 @ 21:17   No growth at 5 days  --    Few polymorphonuclear leukocytes per low power field  No organisms seen  by cytocentrifuge      .Blood Blood  01-04 @ 08:56   No growth at 5 days.  --  --      .Urine Clean Catch (Midstream)  01-02 @ 22:32   No growth  --  --      .Blood Blood  01-02 @ 19:32   No growth at 5 days.  --  Blood Culture PCR  Blood Culture PCR HPI:  Patient is a 53 year old male with end stage liver disease 2/2 chronic alcoholic cirrhosis, with esophageal varices, CAD, HfrEF (EF 35%), and DMII, presenting with 1 week of generalized weakness and poor PO intake. Hematology consulted for coagulopathy in underlying liver failure.    Patient was recently admitted  followed by [facility] in 10- 11/2018 with abdominal distension and lower extremity edema, was initially placed on liver transplant list however was removed from list due to HF with severely reduced EF.  Prior to the [facility] transfer, patient was treated for severe sepsis from E coli bacteremia from presumed SBP, treated with ceftriaxone.     Here he had a paracentesis done and then after procedure, he started to developed bruising.    In the ED, pt afebrile, T 97.4, HR 62, BP as low as 73/78, satting 100% on room air. Labs notable for WBC 11.8, PLT 77, INR 2.81, K+ 5.9, Cr 1.33, Total Bilirubin 12.8. Lactate on VBG 4.7.   Patient given 250 cc 5% albumin with improvement in bp to SBP 95/58. Also given 1g IV ceftriaxone for empiric SBP treatment. (02 Jan 2019 23:26)      PAST MEDICAL & SURGICAL HISTORY:  Hypertension  Type 2 diabetes mellitus  Congestive heart failure (CHF)  Esophageal varices  Alcoholic cirrhosis of liver with ascites  Abdominal hernia: S/P repair x2  H/O cataract      Allergies    No Known Allergies    Intolerances        MEDICATIONS  (STANDING):  chlorhexidine 0.12% Liquid 15 milliLiter(s) Oral Mucosa two times a day  dextrose 5%. 1000 milliLiter(s) (50 mL/Hr) IV Continuous <Continuous>  dextrose 50% Injectable 12.5 Gram(s) IV Push once  dextrose 50% Injectable 25 Gram(s) IV Push once  dextrose 50% Injectable 25 Gram(s) IV Push once  folic acid 1 milliGRAM(s) Oral daily  furosemide    Tablet 20 milliGRAM(s) Oral daily  insulin lispro (HumaLOG) corrective regimen sliding scale   SubCutaneous three times a day before meals  insulin lispro (HumaLOG) corrective regimen sliding scale   SubCutaneous at bedtime  lactulose Syrup 40 Gram(s) Oral three times a day  lidocaine   Patch 1 Patch Transdermal daily  midodrine 5 milliGRAM(s) Oral three times a day  multivitamin 1 Tablet(s) Oral daily  piperacillin/tazobactam IVPB. 3.375 Gram(s) IV Intermittent every 8 hours  prothrombin complex concentrate IVPB (KCENTRA) 2500 International Unit(s) IV Intermittent once  rifaximin 550 milliGRAM(s) Oral two times a day  spironolactone 50 milliGRAM(s) Oral two times a day  thiamine 100 milliGRAM(s) Oral daily    MEDICATIONS  (PRN):  artificial tears (preservative free) Ophthalmic Solution 1 Drop(s) Both EYES daily PRN Dry Eyes  dextrose 40% Gel 15 Gram(s) Oral once PRN Blood Glucose LESS THAN 70 milliGRAM(s)/deciliter  glucagon  Injectable 1 milliGRAM(s) IntraMuscular once PRN Glucose LESS THAN 70 milligrams/deciliter      FAMILY HISTORY:  Family history of hypercholesterolemia (Father)  Family history of diabetes mellitus (Mother)      SOCIAL HISTORY: No EtOH, no tobacco    REVIEW OF SYSTEMS:    All other review of systems is negative unless indicated above.        T(F): 97.9 (01-16-19 @ 16:10), Max: 98.3 (01-15-19 @ 18:50)  HR: 66 (01-16-19 @ 16:10)  BP: 95/51 (01-16-19 @ 16:10)  RR: 18 (01-16-19 @ 16:10)  SpO2: 96% (01-16-19 @ 16:10)  Wt(kg): --    GENERAL: appears chronically ill  HEAD:  Atraumatic, Normocephalic  EYES: + scleral icterus b/l  NECK: Supple, No JVD  CHEST/LUNG: Clear to auscultation bilaterally; No wheeze  HEART: Regular rate and rhythm; No murmurs, rubs, or gallops  ABDOMEN: anasarca. bowel sounds present  EXTREMITIES: + ecchymoses on right lateral thigh extending to back and abdomen  NEUROLOGY: non-focal  SKIN: No rashes or lesions                          7.0    13.6  )-----------( 64       ( 16 Jan 2019 11:03 )             20.8       01-16    137  |  106  |  21  ----------------------------<  178<H>  5.0   |  20<L>  |  0.81    Ca    9.1      16 Jan 2019 07:10  Phos  3.2     01-16  Mg     1.6     01-16    TPro  4.9<L>  /  Alb  3.0<L>  /  TBili  20.0<H>  /  DBili  x   /  AST  42<H>  /  ALT  14  /  AlkPhos  78  01-16      Magnesium, Serum: 1.6 mg/dL (01-16 @ 07:10)  Phosphorus Level, Serum: 3.2 mg/dL (01-16 @ 07:10)  Lactate Dehydrogenase, Serum: 313 U/L (01-16 @ 07:10)      PT/INR - ( 16 Jan 2019 11:43 )   PT: 38.8 sec;   INR: See Note ratio         PTT - ( 16 Jan 2019 11:43 )  PTT:94.6 sec    Peritoneal Peritoneal Fluid  01-10 @ 21:17   No growth at 5 days  --    Few polymorphonuclear leukocytes per low power field  No organisms seen  by cytocentrifuge      .Blood Blood  01-04 @ 08:56   No growth at 5 days.  --  --      .Urine Clean Catch (Midstream)  01-02 @ 22:32   No growth  --  --      .Blood Blood  01-02 @ 19:32   No growth at 5 days.  --  Blood Culture PCR  Blood Culture PCR

## 2019-01-16 NOTE — PROGRESS NOTE ADULT - SUBJECTIVE AND OBJECTIVE BOX
Chief Complaint: Patient is a 53y old  Male who presents with a chief complaint of hypotension, decompensated cirrhosis (16 Jan 2019)    Interval Events:   Patient reports feeling "fine" and "tired" with persistent R hip pain and discomfort and no improvement over weekend. Patient states intermittently getting up to use bathroom only. Last ambulating 8am today. Requires assistance to stand and can ambulate independently. Denies dizziness, N/V/D, bloody or tarry BMs. Denies abdominal discomfort.    Allergies:  No Known Allergies    Home Medications:  Bactrim 800-160 mg daily (since 10/2018)  Midodrine 5mg, three tabs three times/day (45mg total daily)  Lactulose 10mg/15mL; 30mL three times/day (60mg total daily)  Furosemide 40mg daily  Spironolactone 50mg daily  Glipizide 5mg daily  Zinc sulfate 220mg daily    Confirmed medications above via pt's home pharmacy on 1/14/19 ([facility]    [facility] Medications:  albumin human  5% IVPB 250 milliLiter(s) IV Intermittent every 6 hours  artificial tears (preservative free) Ophthalmic Solution 1 Drop(s) Both EYES daily PRN  chlorhexidine 0.12% Liquid 15 milliLiter(s) Oral Mucosa two times a day  dextrose 40% Gel 15 Gram(s) Oral once PRN  dextrose 5%. 1000 milliLiter(s) IV Continuous <Continuous>  dextrose 50% Injectable 12.5 Gram(s) IV Push once  dextrose 50% Injectable 25 Gram(s) IV Push once  dextrose 50% Injectable 25 Gram(s) IV Push once  folic acid 1 milliGRAM(s) Oral daily  glucagon  Injectable 1 milliGRAM(s) IntraMuscular once PRN  insulin lispro (HumaLOG) corrective regimen sliding scale   SubCutaneous three times a day before meals  insulin lispro (HumaLOG) corrective regimen sliding scale   SubCutaneous at bedtime  lactulose Syrup 30 Gram(s) Oral three times a day  lidocaine   Patch 1 Patch Transdermal daily  midodrine 5 milliGRAM(s) Oral three times a day  multivitamin 1 Tablet(s) Oral daily  rifaximin 550 milliGRAM(s) Oral two times a day  thiamine 100 milliGRAM(s) Oral daily  traMADol 50 milliGRAM(s) Oral every 8 hours PRN    PMHX/PSHX:    Hypertension  Congestive heart failure (CHF) with reduced ejection fraction  Esophageal varices  Alcoholic cirrhosis of liver with ascites  Abdominal hernia  H/O cataract    Family history:   Family history of hypercholesterolemia (Father)  Family history of diabetes mellitus (Mother)  No pertinent family history in first degree relatives    ROS:   General: No wt loss, fevers, chills, night sweats.  Eyes: Good vision, no reported pain  ENT: No sore throat, pain, dysphagia  CV: No pain, palpitations  Resp: No dyspnea, cough, wheezing  GI: No abdominal pain, No N/V/D. No constipation, No pruritis, No rectal bleeding, No tarry stools.  : No pain, bleeding, incontinence, nocturia  Muscle: No weakness. +R gluteal pain and tenderness  Neuro: No weakness, tingling, memory problems.   Psych: No fatigue, no insomnia. +irritability.  Endocrine: No polyuria, polydipsia, cold/heat intolerance  Heme: No petechiae. +large hematoma on R gluteus  Skin: No rash, tattoos, scars, edema    PHYSICAL EXAM:   Vital Signs Last 24 Hrs  T(C): 36.8 (16 Jan 2019 03:59), Max: 36.8 (15 Rell 2019 18:50)  T(F): 98.2 (16 Jan 2019 03:59), Max: 98.3 (15 Rell 2019 18:50)  HR: 70 (16 Jan 2019 03:59) (60 - 70)  BP: 90/50 (16 Jan 2019 06:11) (90/50 - 103/63)  BP(mean): --  RR: 18 (16 Jan 2019 03:59) (18 - 18)  SpO2: 95% (16 Jan 2019 03:59) (95% - 98%)    GENERAL:  Appears stated age, well-nourished, mild distress  HEENT:  NC/AT, conjunctivae pale pink, no thyromegaly, no JVD, sclera +icteric  CHEST:  Full & symmetric excursion, no increased effort, breath sounds clear bilaterally. +gynecomastia  HEART:  Regular rhythm, soft S1, S2, no murmur/rub/S3/S4, no abdominal bruit, no edema  ABDOMEN:  Soft, non-tender, non-distended, no masses appreciated  EXTREMITIES  no cyanosis, clubbing, or edema  SKIN:  No rash, abscesses, erythema or petechiae seen. Several healing ecchymoses around PIV sites on bilateral upper extremities. Skin dry and intact. +Jaundice  NEURO:  Alert & oriented x3, no encephalopathy.    LABS:               7.3    9.6   )-----------( 58       ( 15 Rell 2019 23:23 )             21.1                           7.8    6.2   )-----------( 52       ( 14 Jan 2019 01:51 )             22.9     01-16    137  |  106  |  21  ----------------------------<  178<H>  5.0   |  20<L>  |  0.81    Ca    9.1      16 Jan 2019 07:10  Phos  3.2     01-16  Mg     1.6     01-16    TPro  4.9<L>  /  Alb  3.0<L>  /  TBili  20.0<H>  /  DBili  x   /  AST  42<H>  /  ALT  14  /  AlkPhos  78  01-16    PT/INR - ( 16 Jan 2019 07:11 )   PT: 36.8 sec;   INR: 3.12 ratio    PT/INR - ( 15 Rell 2019 14:47 )   PT: 107.1 sec;   INR: abnormal ratio    Diagnostic Paracentesis results:  Albumin, Fluid: 0.7 g/dL (01-10-19 @ 16:53)  Lactate Dehydrogenase, Fluid: 55 U/L (01-10-19 @ 16:53)  Total Protein, Fluid: 1.3 g/dL (01-10-19 @ 16:53)  Glucose, Fluid: 228 mg/dL (01-10-19 @ 16:53)    Color- Body Fluid: Yellow  Total Nucleated Cell Count, Body Fluid: 180 cells/ uL  Total RBC count: 3900 cells/ uL  Fluid type: Peritoneal fluid  Body Fluid appearance: Slightly cloudy  Fluid Segmented Granulocytes: 29%  BF Lymphocytes: 37%  Monocyte/ Macrophage Count, Body Fluid: 29%  Mesothelial cells, Body Fluid: 5%  Fluid source: Peritoneal       Imaging:    EXAM: MR ABDOMEN WAW IC                      01/06/2019    INTERPRETATION:  CLINICAL INFORMATION: Ethanol cirrhosis and heart failure with reduced ejection fraction. Jaundice, hypotension.  COMPARISON: CT abdomen and pelvis 9/28/2018, duplex sonography of the abdomen dated 9/28/2018.  PROCEDURE:   MRI of the abdomen was performed with and w/o IV contrast.  10 cc of Gadavist administered, 0 cc discarded.   MRCP was performed.  Liver iron quantification with Rhennes protocol.    FINDINGS:  LOWER CHEST: Within normal limits.    LIVER: Cirrhosis. No evidence of hepatocellular cancer. Estimated hepatic iron concentration using a TR of 14 ms: 90 (+/- 30) umol/g (normal <36).    BILE DUCTS: Normal caliber. No evidence of choledocholithiasis.  GALLBLADDER: No evidence of gallstones. Mildly nonspecific wall edema.  SPLEEN: Splenomegaly.   PANCREAS: A 1.5 cm uncinate process cyst. No pancreatic ductal dilatation.  ADRENALS: Within normal limits.  KIDNEYS/URETERS: Within normal limits.    VISUALIZED PORTIONS:  BOWEL: Within normal limits.   PERITONEUM: Small volume ascites.  VESSELS: Hepatic and portal veins are patent. Evidence of portal hypertension as evidenced by a patent paraumbilical vein and additional upper abdominal varices.  RETROPERITONEUM: No lymphadenopathy.    ABDOMINAL WALL: Within normal limits.  BONES: Within normal limits.    IMPRESSION:   Cirrhosis with evidence of portal hypertension. Small volume ascites.  No evidence of hepatocellular cancer.  Mild hepatic iron deposition.  Portal hypertension.  No evidence of cholecystitis or biliary ductal dilatation.  -----------------------------------------------------------------------------------------------------------------------------------------------    EXAM: TTE  with 2-D, M-Mode, and complete spectral and color flow Doppler	1/08/19  Observations:  Mitral Valve: Mitral annular calcification. Mild mitral regurgitation.  Aortic Valve/Aorta: Calcified aortic valve. Peak transaortic valve gradient equals 16 mm Hg, mean transaortic valve gradient equals 10 mm Hg, aortic valve velocity time integral equals 46 cm, consistent with mild aortic stenosis. Peak left ventricular outflow tract gradient equals 5 mm Hg, mean gradient is equal to 3 mm Hg, LVOT velocity time integral equals 27 cm.  Aortic Root: 3.4 cm.  LVOT diameter: 2 cm.  Left Atrium: Normal left atrium.  Left Ventricle: Endocardium not well visualized; moderate global left ventricular dysfunction. Endocardial visualization enhanced with intravenous injection of Ultrasonic Enhancing Agent (Definity). Eccentric left ventricular hypertrophy (dilated left ventricle with normal relative wall thickness).  Right Heart: Normal right atrium. The right ventricle is not well visualized; grossly normal right ventricular systolic function. Normal tricuspid valve. Mild tricuspid regurgitation. Normal pulmonic valve. Mild pulmonic regurgitation.  Pericardium/Pleura: Normal pericardium with no pericardial effusion.  Hemodynamic: Estimated right atrial pressure is 8 mm Hg. Estimated right ventricular systolic pressure equals 31 mm Hg, assuming right atrial pressure equals 8 mm Hg, consistent with normal pulmonary pressures.    Conclusions:  1. Eccentric LVH (dilated LV with normal relative wall thickness).  2. Endocardium not well visualized; moderate global LV dysfunction. Endocardial visualization enhanced with IV injection of Ultrasonic Enhancing Agent (Definity).  3. The RV is not well visualized; grossly normal RV systolic function.      < from: CT Abdomen and Pelvis w/ IV Cont (01.12.19 @ 20:25) >  EXAM:  CT ABDOMEN AND PELVIS IC                        PROCEDURE DATE:  01/12/2019    INTERPRETATION:  CLINICAL INFORMATION: Right gluteal region hematoma. Evaluate for active bleeding.    COMPARISON: CT abdomen pelvis dated 9/28/2018  PROCEDURE:   CT of the Abdomen and Pelvis was performed with and without intravenous contrast.  Precontrast, Arterial and venous phases were performed.  Intravenous contrast: 90 ml Omnipaque 350. 10 ml discarded.  Oral contrast: None.  Sagittal and coronal reformats were performed.    FINDINGS:    LOWER CHEST: Cardiomegaly. Hypodense blood pool with respect to the IV septum, consistent with anemia. Trace bilateral pleural effusions.    LIVER: Cirrhotic morphology.  BILE DUCTS: Normal caliber.  GALLBLADDER: Distended.  SPLEEN: Splenomegaly.  PANCREAS: Atrophic. A 1.2 cm cystic lesion is again noted in the uncinate process, unchanged.   ADRENALS: Within normal limits.  KIDNEYS/URETERS: Within normal limits.    BLADDER: Within normal limits.  REPRODUCTIVE ORGANS: The prostate is within normal limits.    BOWEL: No bowel obstruction   PERITONEUM: Ascites.  VESSELS:  Recanalized umbilical vein coursing anterior to the ventral abdominal hernia mesh. Mesenteric varices and splenorenal shunt.  Atherosclerotic calcification.  RETROPERITONEUM: No lymphadenopathy.    ABDOMINAL WALL: Recanalized umbilical vein as described above.   Subcutaneous edema.  BONES: Degenerative changes in the spine.    SOFT TISSUES: Approximately 6.0 x 3.7 x 7.8 cm intramuscular hematoma centered in the right gluteus mikki muscle with small blush of contrast  and consistent with active arterial extravasation (series 7 image 508).   Surrounding fluid stranding of the right subcutaneous gluteal tissues. A  0.9 cm hypodense lesion is noted in the subcutaneous tissues of the left lower anterior chest (3:22), of uncertain etiology.    IMPRESSION:   Approximately 6.0 x 3.7 x 7.8 cm intramuscular hematoma centered in the right gluteus mikki muscle with small blush of contrast consistent with active arterial extravasation (series 7 image 508).   Remaining chronic findings as above.    < end of copied text > Chief Complaint: Patient is a 53y old  Male who presents with a chief complaint of hypotension, decompensated cirrhosis (16 Jan 2019)    Interval Events:   Pt has received 2U FFP since yesterday with 1U pRBCs this AM. K-Centra 3500IU given this AM. Labs this AM significant for low Fibrinogen (75), elevated D-dimer (8121), and increased LDH (313). On exam, patient found to have shadowing of paracentesis bandage with surrounding hematoma that extends to right flank and right gluteal region.     Patient reports feeling worsening persistent pain of R buttock with radiation to right flank that is tender to palpation/ pressure and worsened with standing from supine position and sitting from standing position. Denies dizziness, N/V/D, bloody or tarry BMs.     Allergies:  No Known Allergies    Home Medications:  Bactrim 800-160 mg daily (since 10/2018)  Midodrine 5mg, three tabs three times/day (45mg total daily)  Lactulose 10mg/15mL; 30mL three times/day (60mg total daily)  Furosemide 40mg daily  Spironolactone 50mg daily  Glipizide 5mg daily  Zinc sulfate 220mg daily    Confirmed medications above via pt's home pharmacy on 1/14/19 ([facility])    [facility]ospital Medications:  albumin human  5% IVPB 250 milliLiter(s) IV Intermittent every 6 hours  artificial tears (preservative free) Ophthalmic Solution 1 Drop(s) Both EYES daily PRN  chlorhexidine 0.12% Liquid 15 milliLiter(s) Oral Mucosa two times a day  dextrose 40% Gel 15 Gram(s) Oral once PRN  dextrose 5%. 1000 milliLiter(s) IV Continuous <Continuous>  dextrose 50% Injectable 12.5 Gram(s) IV Push once  dextrose 50% Injectable 25 Gram(s) IV Push once  dextrose 50% Injectable 25 Gram(s) IV Push once  folic acid 1 milliGRAM(s) Oral daily  glucagon  Injectable 1 milliGRAM(s) IntraMuscular once PRN  insulin lispro (HumaLOG) corrective regimen sliding scale   SubCutaneous three times a day before meals  insulin lispro (HumaLOG) corrective regimen sliding scale   SubCutaneous at bedtime  lactulose Syrup 30 Gram(s) Oral three times a day  lidocaine   Patch 1 Patch Transdermal daily  midodrine 5 milliGRAM(s) Oral three times a day  multivitamin 1 Tablet(s) Oral daily  rifaximin 550 milliGRAM(s) Oral two times a day  thiamine 100 milliGRAM(s) Oral daily  traMADol 50 milliGRAM(s) Oral every 8 hours PRN    PMHX/PSHX:    Hypertension  Congestive heart failure (CHF) with reduced ejection fraction  Esophageal varices  Alcoholic cirrhosis of liver with ascites  Abdominal hernia  H/O cataract    Family history:   Family history of hypercholesterolemia (Father)  Family history of diabetes mellitus (Mother)  No pertinent family history in first degree relatives    ROS:   General: No wt loss, fevers, chills, night sweats.  Eyes: Good vision, no reported pain  ENT: No sore throat, pain, dysphagia  CV: No pain, palpitations  Resp: No dyspnea, cough, wheezing  GI: No abdominal pain, No N/V/D. No constipation, No pruritis, No rectal bleeding, No tarry stools.  : No pain, bleeding, incontinence, nocturia  Muscle: No weakness. +R gluteal pain and tenderness  Neuro: No weakness, tingling, memory problems.   Psych: No fatigue, no insomnia. +irritability.  Endocrine: No polyuria, polydipsia, cold/heat intolerance  Heme: No petechiae. +large hematoma on RUQ extending to R gluteus  Skin: No rash, tattoos, scars, edema    PHYSICAL EXAM:   Vital Signs Last 24 Hrs  T(C): 36.8 (16 Jan 2019 03:59), Max: 36.8 (15 Rell 2019 18:50)  T(F): 98.2 (16 Jan 2019 03:59), Max: 98.3 (15 Rell 2019 18:50)  HR: 70 (16 Jan 2019 03:59) (60 - 70)  BP: 90/50 (16 Jan 2019 06:11) (90/50 - 103/63)  BP(mean): --  RR: 18 (16 Jan 2019 03:59) (18 - 18)  SpO2: 95% (16 Jan 2019 03:59) (95% - 98%)    GENERAL:  Appears stated age, well-nourished, mild distress  HEENT:  NC/AT, conjunctivae pale pink, no thyromegaly, no JVD, sclera +icteric  CHEST:  Full & symmetric excursion, no increased effort, breath sounds clear bilaterally. +gynecomastia  HEART:  Regular rhythm, soft S1, S2, no murmur/rub/S3/S4, no abdominal bruit, no edema  ABDOMEN:  Soft, non-tender, non-distended, no masses appreciated. +tenderness of R flank radiating to R gluteal region worsened with palpation. +Shadowing of paracentesis bandage, otherwise dry and intact.  EXTREMITIES  no cyanosis, clubbing, or edema  SKIN:  No rash, abscesses, erythema or petechiae seen. Several healing ecchymoses around PIV sites on bilateral upper extremities. Large R flank hematoma radiating to R lower back and R buttock. Relative firmness of skin overlying R hip to surrounding area. +Jaundice  NEURO:  Alert & oriented x3, no encephalopathy.    LABS:    Fibrinogen: 75 <L> [350-510 mg/dL]  D-dimer: 8121 <H> [</= 229 ng/mL]  LDH: 313 <H> [ mg/dL]               7.3    9.6   )-----------( 58       ( 15 Rell 2019 23:23 )             21.1                           7.8    6.2   )-----------( 52       ( 14 Jan 2019 01:51 )             22.9     01-16    137  |  106  |  21  ----------------------------<  178<H>  5.0   |  20<L>  |  0.81    Ca    9.1      16 Jan 2019 07:10  Phos  3.2     01-16  Mg     1.6     01-16    TPro  4.9<L>  /  Alb  3.0<L>  /  TBili  20.0<H>  /  DBili  x   /  AST  42<H>  /  ALT  14  /  AlkPhos  78  01-16    PT/INR - ( 16 Jan 2019 07:11 )   PT: 36.8 sec;   INR: 3.12 ratio    PT/INR - ( 15 Rell 2019 14:47 )   PT: 107.1 sec;   INR: abnormal ratio    Diagnostic Paracentesis results:  Albumin, Fluid: 0.7 g/dL (01-10-19 @ 16:53)  Lactate Dehydrogenase, Fluid: 55 U/L (01-10-19 @ 16:53)  Total Protein, Fluid: 1.3 g/dL (01-10-19 @ 16:53)  Glucose, Fluid: 228 mg/dL (01-10-19 @ 16:53)    Color- Body Fluid: Yellow  Total Nucleated Cell Count, Body Fluid: 180 cells/ uL  Total RBC count: 3900 cells/ uL  Fluid type: Peritoneal fluid  Body Fluid appearance: Slightly cloudy  Fluid Segmented Granulocytes: 29%  BF Lymphocytes: 37%  Monocyte/ Macrophage Count, Body Fluid: 29%  Mesothelial cells, Body Fluid: 5%  Fluid source: Peritoneal       Imaging:    EXAM: MR ABDOMEN WAW IC                      01/06/2019    INTERPRETATION:  CLINICAL INFORMATION: Ethanol cirrhosis and heart failure with reduced ejection fraction. Jaundice, hypotension.  COMPARISON: CT abdomen and pelvis 9/28/2018, duplex sonography of the abdomen dated 9/28/2018.  PROCEDURE:   MRI of the abdomen was performed with and w/o IV contrast.  10 cc of Gadavist administered, 0 cc discarded.   MRCP was performed.  Liver iron quantification with Rhennes protocol.    FINDINGS:  LOWER CHEST: Within normal limits.    LIVER: Cirrhosis. No evidence of hepatocellular cancer. Estimated hepatic iron concentration using a TR of 14 ms: 90 (+/- 30) umol/g (normal <36).    BILE DUCTS: Normal caliber. No evidence of choledocholithiasis.  GALLBLADDER: No evidence of gallstones. Mildly nonspecific wall edema.  SPLEEN: Splenomegaly.   PANCREAS: A 1.5 cm uncinate process cyst. No pancreatic ductal dilatation.  ADRENALS: Within normal limits.  KIDNEYS/URETERS: Within normal limits.    VISUALIZED PORTIONS:  BOWEL: Within normal limits.   PERITONEUM: Small volume ascites.  VESSELS: Hepatic and portal veins are patent. Evidence of portal hypertension as evidenced by a patent paraumbilical vein and additional upper abdominal varices.  RETROPERITONEUM: No lymphadenopathy.    ABDOMINAL WALL: Within normal limits.  BONES: Within normal limits.    IMPRESSION:   Cirrhosis with evidence of portal hypertension. Small volume ascites.  No evidence of hepatocellular cancer.  Mild hepatic iron deposition.  Portal hypertension.  No evidence of cholecystitis or biliary ductal dilatation.  -----------------------------------------------------------------------------------------------------------------------------------------------    EXAM: TTE  with 2-D, M-Mode, and complete spectral and color flow Doppler	1/08/19  Observations:  Mitral Valve: Mitral annular calcification. Mild mitral regurgitation.  Aortic Valve/Aorta: Calcified aortic valve. Peak transaortic valve gradient equals 16 mm Hg, mean transaortic valve gradient equals 10 mm Hg, aortic valve velocity time integral equals 46 cm, consistent with mild aortic stenosis. Peak left ventricular outflow tract gradient equals 5 mm Hg, mean gradient is equal to 3 mm Hg, LVOT velocity time integral equals 27 cm.  Aortic Root: 3.4 cm.  LVOT diameter: 2 cm.  Left Atrium: Normal left atrium.  Left Ventricle: Endocardium not well visualized; moderate global left ventricular dysfunction. Endocardial visualization enhanced with intravenous injection of Ultrasonic Enhancing Agent (Definity). Eccentric left ventricular hypertrophy (dilated left ventricle with normal relative wall thickness).  Right Heart: Normal right atrium. The right ventricle is not well visualized; grossly normal right ventricular systolic function. Normal tricuspid valve. Mild tricuspid regurgitation. Normal pulmonic valve. Mild pulmonic regurgitation.  Pericardium/Pleura: Normal pericardium with no pericardial effusion.  Hemodynamic: Estimated right atrial pressure is 8 mm Hg. Estimated right ventricular systolic pressure equals 31 mm Hg, assuming right atrial pressure equals 8 mm Hg, consistent with normal pulmonary pressures.    Conclusions:  1. Eccentric LVH (dilated LV with normal relative wall thickness).  2. Endocardium not well visualized; moderate global LV dysfunction. Endocardial visualization enhanced with IV injection of Ultrasonic Enhancing Agent (Definity).  3. The RV is not well visualized; grossly normal RV systolic function.  --------------------------------------------------------------------------------------    < from: CT Abdomen and Pelvis w/ IV Cont (01.12.19 @ 20:25) >  EXAM:  CT ABDOMEN AND PELVIS IC                        PROCEDURE DATE:  01/12/2019    INTERPRETATION:  CLINICAL INFORMATION: Right gluteal region hematoma. Evaluate for active bleeding.    COMPARISON: CT abdomen pelvis dated 9/28/2018  PROCEDURE:   CT of the Abdomen and Pelvis was performed with and without intravenous contrast.  Precontrast, Arterial and venous phases were performed.  Intravenous contrast: 90 ml Omnipaque 350. 10 ml discarded.  Oral contrast: None.  Sagittal and coronal reformats were performed.    FINDINGS:    LOWER CHEST: Cardiomegaly. Hypodense blood pool with respect to the IV septum, consistent with anemia. Trace bilateral pleural effusions.    LIVER: Cirrhotic morphology.  BILE DUCTS: Normal caliber.  GALLBLADDER: Distended.  SPLEEN: Splenomegaly.  PANCREAS: Atrophic. A 1.2 cm cystic lesion is again noted in the uncinate process, unchanged.   ADRENALS: Within normal limits.  KIDNEYS/URETERS: Within normal limits.    BLADDER: Within normal limits.  REPRODUCTIVE ORGANS: The prostate is within normal limits.    BOWEL: No bowel obstruction   PERITONEUM: Ascites.  VESSELS:  Recanalized umbilical vein coursing anterior to the ventral abdominal hernia mesh. Mesenteric varices and splenorenal shunt.  Atherosclerotic calcification.  RETROPERITONEUM: No lymphadenopathy.    ABDOMINAL WALL: Recanalized umbilical vein as described above.   Subcutaneous edema.  BONES: Degenerative changes in the spine.    SOFT TISSUES: Approximately 6.0 x 3.7 x 7.8 cm intramuscular hematoma centered in the right gluteus mikki muscle with small blush of contrast  and consistent with active arterial extravasation (series 7 image 508).   Surrounding fluid stranding of the right subcutaneous gluteal tissues. A  0.9 cm hypodense lesion is noted in the subcutaneous tissues of the left lower anterior chest (3:22), of uncertain etiology.    IMPRESSION:   Approximately 6.0 x 3.7 x 7.8 cm intramuscular hematoma centered in the right gluteus mikki muscle with small blush of contrast consistent with active arterial extravasation (series 7 image 508).   Remaining chronic findings as above.    < end of copied text >  -----------------------------------------------------------------------------------------------------------------------    EXAM:  US JOINT NONVASC EXT LTD RT                        PROCEDURE DATE:  01/14/2019      INTERPRETATION:  Clinical indication: Right buttock hematoma assess for stability.    Technique: Focused grayscale and color images were obtained over the area of concern on the right buttock.    Comparison: CT abdomen pelvis 1/12/2019 and ultrasound performed 1/11/2019.    Findings:  A right buttock hematoma measures 11.3 x 3.1 x 5.6 cm, similar in size when remeasured on the CT abdomen and pelvis 1/12/2019 and not   significantly changed compared to prior exam 1/11/2019. Compared to the prior exam, there is decreased echogenicity within the inferior portion   of the hematoma likely related to liquefaction. There is limited compressibility. Overlying soft tissue edema is decreased when compared   to the prior ultrasound 1/11/2018.    Impression:    Right buttock hematoma not significantly changed in size when compared to the prior CT abdomen and pelvis 1/12/2019. Chief Complaint: Patient is a 53y old  Male who presents with a chief complaint of hypotension, decompensated cirrhosis (16 Jan 2019)    Interval Events:   Pt has received 2U FFP since yesterday with 1U pRBCs this AM. K-Centra 3500IU given this AM. Labs this AM significant for low Fibrinogen (75), elevated D-dimer (8121), and increased LDH (313). On exam, patient found to have shadowing of paracentesis bandage with surrounding hematoma that extends to right flank and right gluteal region.     Patient reports feeling worsening persistent pain of R buttock with radiation to right flank that is tender to palpation/ pressure and worsened with standing from supine position and sitting from standing position. Denies dizziness, N/V/D, bloody or tarry BMs.     Hepatologist ([facility]): Dr. Stokes (cell #: [phone])        Allergies:  No Known Allergies    Home Medications:  Bactrim 800-160 mg daily (since 10/2018)  Midodrine 5mg, three tabs three times/day (45mg total daily)  Lactulose 10mg/15mL; 30mL three times/day (60mg total daily)  Furosemide 40mg daily  Spironolactone 50mg daily  Glipizide 5mg daily  Zinc sulfate 220mg daily    Confirmed medications above via pt's home pharmacy on 1/14/19 ([facility])    [facility] Medications:  albumin human  5% IVPB 250 milliLiter(s) IV Intermittent every 6 hours  artificial tears (preservative free) Ophthalmic Solution 1 Drop(s) Both EYES daily PRN  chlorhexidine 0.12% Liquid 15 milliLiter(s) Oral Mucosa two times a day  dextrose 40% Gel 15 Gram(s) Oral once PRN  dextrose 5%. 1000 milliLiter(s) IV Continuous <Continuous>  dextrose 50% Injectable 12.5 Gram(s) IV Push once  dextrose 50% Injectable 25 Gram(s) IV Push once  dextrose 50% Injectable 25 Gram(s) IV Push once  folic acid 1 milliGRAM(s) Oral daily  glucagon  Injectable 1 milliGRAM(s) IntraMuscular once PRN  insulin lispro (HumaLOG) corrective regimen sliding scale   SubCutaneous three times a day before meals  insulin lispro (HumaLOG) corrective regimen sliding scale   SubCutaneous at bedtime  lactulose Syrup 30 Gram(s) Oral three times a day  lidocaine   Patch 1 Patch Transdermal daily  midodrine 5 milliGRAM(s) Oral three times a day  multivitamin 1 Tablet(s) Oral daily  rifaximin 550 milliGRAM(s) Oral two times a day  thiamine 100 milliGRAM(s) Oral daily  traMADol 50 milliGRAM(s) Oral every 8 hours PRN    PMHX/PSHX:    Hypertension  Congestive heart failure (CHF) with reduced ejection fraction  Esophageal varices  Alcoholic cirrhosis of liver with ascites  Abdominal hernia  H/O cataract    Family history:   Family history of hypercholesterolemia (Father)  Family history of diabetes mellitus (Mother)  No pertinent family history in first degree relatives    ROS:   General: No wt loss, fevers, chills, night sweats.  Eyes: Good vision, no reported pain  ENT: No sore throat, pain, dysphagia  CV: No pain, palpitations  Resp: No dyspnea, cough, wheezing  GI: No abdominal pain, No N/V/D. No constipation, No pruritis, No rectal bleeding, No tarry stools.  : No pain, bleeding, incontinence, nocturia  Muscle: No weakness. +R gluteal pain and tenderness  Neuro: No weakness, tingling, memory problems.   Psych: No fatigue, no insomnia. +irritability.  Endocrine: No polyuria, polydipsia, cold/heat intolerance  Heme: No petechiae. +large hematoma on RUQ extending to R gluteus  Skin: No rash, tattoos, scars, edema    PHYSICAL EXAM:   Vital Signs Last 24 Hrs  T(C): 36.8 (16 Jan 2019 03:59), Max: 36.8 (15 Rell 2019 18:50)  T(F): 98.2 (16 Jan 2019 03:59), Max: 98.3 (15 Rell 2019 18:50)  HR: 70 (16 Jan 2019 03:59) (60 - 70)  BP: 90/50 (16 Jan 2019 06:11) (90/50 - 103/63)  BP(mean): --  RR: 18 (16 Jan 2019 03:59) (18 - 18)  SpO2: 95% (16 Jan 2019 03:59) (95% - 98%)    GENERAL:  Appears stated age, well-nourished, mild distress  HEENT:  NC/AT, conjunctivae pale pink, no thyromegaly, no JVD, sclera +icteric  CHEST:  Full & symmetric excursion, no increased effort, breath sounds clear bilaterally. +gynecomastia  HEART:  Regular rhythm, soft S1, S2, no murmur/rub/S3/S4, no abdominal bruit, no edema  ABDOMEN:  Soft, non-tender, non-distended, no masses appreciated. +tenderness of R flank radiating to R gluteal region worsened with palpation. +Shadowing of paracentesis bandage, otherwise dry and intact.  EXTREMITIES  no cyanosis, clubbing, or edema  SKIN:  No rash, abscesses, erythema or petechiae seen. Several healing ecchymoses around PIV sites on bilateral upper extremities. Large R flank hematoma radiating to R lower back and R buttock. Relative firmness of skin overlying R hip to surrounding area. +Jaundice  NEURO:  Alert & oriented x3, no encephalopathy.    LABS:    Fibrinogen: 75 <L> [350-510 mg/dL]  D-dimer: 8121 <H> [</= 229 ng/mL]  LDH: 313 <H> [ mg/dL]                            7.0    13.6  )-----------( 64       ( 16 Jan 2019 11:03 )             20.8                  7.3    9.6   )-----------( 58       ( 15 Rell 2019 23:23 )             21.1       01-16    137  |  106  |  21  ----------------------------<  178<H>  5.0   |  20<L>  |  0.81    Ca    9.1      16 Jan 2019 07:10  Phos  3.2     01-16  Mg     1.6     01-16    TPro  4.9<L>  /  Alb  3.0<L>  /  TBili  20.0<H>  /  DBili  x   /  AST  42<H>  /  ALT  14  /  AlkPhos  78  01-16    PT/INR - ( 16 Jan 2019 07:11 )   PT: 36.8 sec;   INR: 3.12 ratio    PT/INR - ( 15 Rell 2019 14:47 )   PT: 107.1 sec;   INR: abnormal ratio    Diagnostic Paracentesis results:  Albumin, Fluid: 0.7 g/dL (01-10-19 @ 16:53)  Lactate Dehydrogenase, Fluid: 55 U/L (01-10-19 @ 16:53)  Total Protein, Fluid: 1.3 g/dL (01-10-19 @ 16:53)  Glucose, Fluid: 228 mg/dL (01-10-19 @ 16:53)    Color- Body Fluid: Yellow  Total Nucleated Cell Count, Body Fluid: 180 cells/ uL  Total RBC count: 3900 cells/ uL  Fluid type: Peritoneal fluid  Body Fluid appearance: Slightly cloudy  Fluid Segmented Granulocytes: 29%  BF Lymphocytes: 37%  Monocyte/ Macrophage Count, Body Fluid: 29%  Mesothelial cells, Body Fluid: 5%  Fluid source: Peritoneal       Imaging:    EXAM: MR ABDOMEN WAW IC                      01/06/2019    INTERPRETATION:  CLINICAL INFORMATION: Ethanol cirrhosis and heart failure with reduced ejection fraction. Jaundice, hypotension.  COMPARISON: CT abdomen and pelvis 9/28/2018, duplex sonography of the abdomen dated 9/28/2018.  PROCEDURE:   MRI of the abdomen was performed with and w/o IV contrast.  10 cc of Gadavist administered, 0 cc discarded.   MRCP was performed.  Liver iron quantification with Rhennes protocol.    FINDINGS:  LOWER CHEST: Within normal limits.    LIVER: Cirrhosis. No evidence of hepatocellular cancer. Estimated hepatic iron concentration using a TR of 14 ms: 90 (+/- 30) umol/g (normal <36).    BILE DUCTS: Normal caliber. No evidence of choledocholithiasis.  GALLBLADDER: No evidence of gallstones. Mildly nonspecific wall edema.  SPLEEN: Splenomegaly.   PANCREAS: A 1.5 cm uncinate process cyst. No pancreatic ductal dilatation.  ADRENALS: Within normal limits.  KIDNEYS/URETERS: Within normal limits.    VISUALIZED PORTIONS:  BOWEL: Within normal limits.   PERITONEUM: Small volume ascites.  VESSELS: Hepatic and portal veins are patent. Evidence of portal hypertension as evidenced by a patent paraumbilical vein and additional upper abdominal varices.  RETROPERITONEUM: No lymphadenopathy.    ABDOMINAL WALL: Within normal limits.  BONES: Within normal limits.    IMPRESSION:   Cirrhosis with evidence of portal hypertension. Small volume ascites.  No evidence of hepatocellular cancer.  Mild hepatic iron deposition.  Portal hypertension.  No evidence of cholecystitis or biliary ductal dilatation.  -----------------------------------------------------------------------------------------------------------------------------------------------    EXAM: TTE  with 2-D, M-Mode, and complete spectral and color flow Doppler	1/08/19  Observations:  Mitral Valve: Mitral annular calcification. Mild mitral regurgitation.  Aortic Valve/Aorta: Calcified aortic valve. Peak transaortic valve gradient equals 16 mm Hg, mean transaortic valve gradient equals 10 mm Hg, aortic valve velocity time integral equals 46 cm, consistent with mild aortic stenosis. Peak left ventricular outflow tract gradient equals 5 mm Hg, mean gradient is equal to 3 mm Hg, LVOT velocity time integral equals 27 cm.  Aortic Root: 3.4 cm.  LVOT diameter: 2 cm.  Left Atrium: Normal left atrium.  Left Ventricle: Endocardium not well visualized; moderate global left ventricular dysfunction. Endocardial visualization enhanced with intravenous injection of Ultrasonic Enhancing Agent (Definity). Eccentric left ventricular hypertrophy (dilated left ventricle with normal relative wall thickness).  Right Heart: Normal right atrium. The right ventricle is not well visualized; grossly normal right ventricular systolic function. Normal tricuspid valve. Mild tricuspid regurgitation. Normal pulmonic valve. Mild pulmonic regurgitation.  Pericardium/Pleura: Normal pericardium with no pericardial effusion.  Hemodynamic: Estimated right atrial pressure is 8 mm Hg. Estimated right ventricular systolic pressure equals 31 mm Hg, assuming right atrial pressure equals 8 mm Hg, consistent with normal pulmonary pressures.    Conclusions:  1. Eccentric LVH (dilated LV with normal relative wall thickness).  2. Endocardium not well visualized; moderate global LV dysfunction. Endocardial visualization enhanced with IV injection of Ultrasonic Enhancing Agent (Definity).  3. The RV is not well visualized; grossly normal RV systolic function.  --------------------------------------------------------------------------------------    < from: CT Abdomen and Pelvis w/ IV Cont (01.12.19 @ 20:25) >  EXAM:  CT ABDOMEN AND PELVIS IC                        PROCEDURE DATE:  01/12/2019    INTERPRETATION:  CLINICAL INFORMATION: Right gluteal region hematoma. Evaluate for active bleeding.    COMPARISON: CT abdomen pelvis dated 9/28/2018  PROCEDURE:   CT of the Abdomen and Pelvis was performed with and without intravenous contrast.  Precontrast, Arterial and venous phases were performed.  Intravenous contrast: 90 ml Omnipaque 350. 10 ml discarded.  Oral contrast: None.  Sagittal and coronal reformats were performed.    FINDINGS:    LOWER CHEST: Cardiomegaly. Hypodense blood pool with respect to the IV septum, consistent with anemia. Trace bilateral pleural effusions.    LIVER: Cirrhotic morphology.  BILE DUCTS: Normal caliber.  GALLBLADDER: Distended.  SPLEEN: Splenomegaly.  PANCREAS: Atrophic. A 1.2 cm cystic lesion is again noted in the uncinate process, unchanged.   ADRENALS: Within normal limits.  KIDNEYS/URETERS: Within normal limits.    BLADDER: Within normal limits.  REPRODUCTIVE ORGANS: The prostate is within normal limits.    BOWEL: No bowel obstruction   PERITONEUM: Ascites.  VESSELS:  Recanalized umbilical vein coursing anterior to the ventral abdominal hernia mesh. Mesenteric varices and splenorenal shunt.  Atherosclerotic calcification.  RETROPERITONEUM: No lymphadenopathy.    ABDOMINAL WALL: Recanalized umbilical vein as described above.   Subcutaneous edema.  BONES: Degenerative changes in the spine.    SOFT TISSUES: Approximately 6.0 x 3.7 x 7.8 cm intramuscular hematoma centered in the right gluteus mikki muscle with small blush of contrast  and consistent with active arterial extravasation (series 7 image 508).   Surrounding fluid stranding of the right subcutaneous gluteal tissues. A  0.9 cm hypodense lesion is noted in the subcutaneous tissues of the left lower anterior chest (3:22), of uncertain etiology.    IMPRESSION:   Approximately 6.0 x 3.7 x 7.8 cm intramuscular hematoma centered in the right gluteus mikki muscle with small blush of contrast consistent with active arterial extravasation (series 7 image 508).   Remaining chronic findings as above.    < end of copied text >  -----------------------------------------------------------------------------------------------------------------------    EXAM:  US JOINT NONVASC EXT LTD RT                        PROCEDURE DATE:  01/14/2019      INTERPRETATION:  Clinical indication: Right buttock hematoma assess for stability.    Technique: Focused grayscale and color images were obtained over the area of concern on the right buttock.    Comparison: CT abdomen pelvis 1/12/2019 and ultrasound performed 1/11/2019.    Findings:  A right buttock hematoma measures 11.3 x 3.1 x 5.6 cm, similar in size when remeasured on the CT abdomen and pelvis 1/12/2019 and not   significantly changed compared to prior exam 1/11/2019. Compared to the prior exam, there is decreased echogenicity within the inferior portion   of the hematoma likely related to liquefaction. There is limited compressibility. Overlying soft tissue edema is decreased when compared   to the prior ultrasound 1/11/2018.    Impression:  Right buttock hematoma not significantly changed in size when compared to the prior CT abdomen and pelvis 1/12/2019.

## 2019-01-16 NOTE — CONSULT NOTE ADULT - ASSESSMENT
Patient is a 53 year old male with end stage liver disease 2/2 chronic alcoholic cirrhosis, with esophageal varices, CAD, HfrEF (EF 35%), and DMII, presenting with 1 week of generalized weakness and poor PO intake. Hematology consulted for coagulopathy in underlying liver failure. Course complicated by right gluteal hematoma     Hematoma- iatrogenic secondary to paracentesis?   can give K centra  if hematoma expanding or if Hgb dropping, give cryo . re-assess daily.   check Factor V, VIII, X  monitor coags daily and fibrinogen daily  transfusion support

## 2019-01-16 NOTE — PROGRESS NOTE ADULT - PROBLEM SELECTOR PLAN 2
MELD >30 indicative of >50% mortality within next 3 months. On last admission, was removed from liver transplant list at [facility] due to decline in heart function (EF 35%). Hepatology following.   - Continue spironolactone 50mg daily and furosemide 20mg daily  - Continue lactulose and rifaximin at current doses  - Pending EGD after bleed stabilizes to evaluate presence of varices; will need to have INR 1.8 or less and platelets above 50K;

## 2019-01-16 NOTE — PROGRESS NOTE ADULT - SUBJECTIVE AND OBJECTIVE BOX
SUBJECTIVE: 52yo Man seen and examined during morning rounds. No acute events overnight. Afebrile, VS stable (soft BPs). Pain well controlled with current regimen.     OBJECTIVE:    Physical Exam:  Gen: Resting in bed, NAD, alert and oriented  Resp: respirations unlabored, no increased WOB   Ext: RLE:    Vital Signs Last 24 Hrs  T(C): 36.8 (16 Jan 2019 03:59), Max: 36.8 (15 Rell 2019 18:50)  T(F): 98.2 (16 Jan 2019 03:59), Max: 98.3 (15 Rell 2019 18:50)  HR: 70 (16 Jan 2019 03:59) (60 - 70)  BP: 92/58 (16 Jan 2019 03:59) (92/58 - 103/63)  BP(mean): --  RR: 18 (16 Jan 2019 03:59) (18 - 18)  SpO2: 95% (16 Jan 2019 03:59) (95% - 98%)    I&O's Detail    15 Rell 2019 07:01  -  16 Jan 2019 05:37  --------------------------------------------------------  IN:    Oral Fluid: 600 mL  Total IN: 600 mL    OUT:    Voided: 275 mL  Total OUT: 275 mL    Total NET: 325 mL          MEDICATIONS  (STANDING):  chlorhexidine 0.12% Liquid 15 milliLiter(s) Oral Mucosa two times a day  dextrose 5%. 1000 milliLiter(s) (50 mL/Hr) IV Continuous <Continuous>  dextrose 50% Injectable 12.5 Gram(s) IV Push once  dextrose 50% Injectable 25 Gram(s) IV Push once  dextrose 50% Injectable 25 Gram(s) IV Push once  folic acid 1 milliGRAM(s) Oral daily  furosemide    Tablet 20 milliGRAM(s) Oral daily  insulin lispro (HumaLOG) corrective regimen sliding scale   SubCutaneous three times a day before meals  insulin lispro (HumaLOG) corrective regimen sliding scale   SubCutaneous at bedtime  lactulose Syrup 40 Gram(s) Oral three times a day  lidocaine   Patch 1 Patch Transdermal daily  midodrine 5 milliGRAM(s) Oral three times a day  multivitamin 1 Tablet(s) Oral daily  rifaximin 550 milliGRAM(s) Oral two times a day  spironolactone 50 milliGRAM(s) Oral two times a day  thiamine 100 milliGRAM(s) Oral daily    MEDICATIONS  (PRN):  artificial tears (preservative free) Ophthalmic Solution 1 Drop(s) Both EYES daily PRN Dry Eyes  dextrose 40% Gel 15 Gram(s) Oral once PRN Blood Glucose LESS THAN 70 milliGRAM(s)/deciliter  glucagon  Injectable 1 milliGRAM(s) IntraMuscular once PRN Glucose LESS THAN 70 milligrams/deciliter      LABS:                        7.3    9.6   )-----------( 58       ( 15 Rell 2019 23:23 )             21.1       01-15    136  |  105  |  18  ----------------------------<  148<H>  4.6   |  21<L>  |  0.62    Ca    9.2      15 Rell 2019 11:01  Phos  2.8     01-15  Mg     1.6     01-15    TPro  5.3<L>  /  Alb  3.1<L>  /  TBili  20.7<H>  /  DBili  x   /  AST  49<H>  /  ALT  13  /  AlkPhos  89  01-15              --   01-10-19 @ 21:17   No growth at 5 days SUBJECTIVE: 54yo Man seen and examined during morning rounds. No acute events overnight. Afebrile, VS stable (soft BPs). Patient much more uncomfortable today; barely able to move in bed due to pain.    OBJECTIVE:    Physical Exam:  Gen: Resting in bed, NAD, alert and oriented  Resp: respirations unlabored, no increased WOB   Ext: RLE: spreading ecchymosis, reduced range of motion due to pain, soft but more tense over buttock than previously    Vital Signs Last 24 Hrs  T(C): 36.8 (16 Jan 2019 03:59), Max: 36.8 (15 Rell 2019 18:50)  T(F): 98.2 (16 Jan 2019 03:59), Max: 98.3 (15 Rell 2019 18:50)  HR: 70 (16 Jan 2019 03:59) (60 - 70)  BP: 92/58 (16 Jan 2019 03:59) (92/58 - 103/63)  BP(mean): --  RR: 18 (16 Jan 2019 03:59) (18 - 18)  SpO2: 95% (16 Jan 2019 03:59) (95% - 98%)    I&O's Detail    15 Rell 2019 07:01  -  16 Jan 2019 05:37  --------------------------------------------------------  IN:    Oral Fluid: 600 mL  Total IN: 600 mL    OUT:    Voided: 275 mL  Total OUT: 275 mL    Total NET: 325 mL      MEDICATIONS  (STANDING):  chlorhexidine 0.12% Liquid 15 milliLiter(s) Oral Mucosa two times a day  dextrose 5%. 1000 milliLiter(s) (50 mL/Hr) IV Continuous <Continuous>  dextrose 50% Injectable 12.5 Gram(s) IV Push once  dextrose 50% Injectable 25 Gram(s) IV Push once  dextrose 50% Injectable 25 Gram(s) IV Push once  folic acid 1 milliGRAM(s) Oral daily  furosemide    Tablet 20 milliGRAM(s) Oral daily  insulin lispro (HumaLOG) corrective regimen sliding scale   SubCutaneous three times a day before meals  insulin lispro (HumaLOG) corrective regimen sliding scale   SubCutaneous at bedtime  lactulose Syrup 40 Gram(s) Oral three times a day  lidocaine   Patch 1 Patch Transdermal daily  midodrine 5 milliGRAM(s) Oral three times a day  multivitamin 1 Tablet(s) Oral daily  rifaximin 550 milliGRAM(s) Oral two times a day  spironolactone 50 milliGRAM(s) Oral two times a day  thiamine 100 milliGRAM(s) Oral daily    MEDICATIONS  (PRN):  artificial tears (preservative free) Ophthalmic Solution 1 Drop(s) Both EYES daily PRN Dry Eyes  dextrose 40% Gel 15 Gram(s) Oral once PRN Blood Glucose LESS THAN 70 milliGRAM(s)/deciliter  glucagon  Injectable 1 milliGRAM(s) IntraMuscular once PRN Glucose LESS THAN 70 milligrams/deciliter      LABS:                        7.3    9.6   )-----------( 58       ( 15 Rell 2019 23:23 )             21.1       01-15    136  |  105  |  18  ----------------------------<  148<H>  4.6   |  21<L>  |  0.62    Ca    9.2      15 Rell 2019 11:01  Phos  2.8     01-15  Mg     1.6     01-15    TPro  5.3<L>  /  Alb  3.1<L>  /  TBili  20.7<H>  /  DBili  x   /  AST  49<H>  /  ALT  13  /  AlkPhos  89  01-15    --   01-10-19 @ 21:17   No growth at 5 days

## 2019-01-16 NOTE — PROGRESS NOTE ADULT - SUBJECTIVE AND OBJECTIVE BOX
Patient is a 53y old  Male who presents with a chief complaint of Hypotension, decompensated cirrhosis (16 Jan 2019 09:15)      SUBJECTIVE / OVERNIGHT EVENTS:  Patient seen and examined at bedside. Overnight the patient was complaining of arm pain, increased flank pain and hip pain. He has no nausea, no vomiting, no diarrhea.     Other Review of Systems Negative.    MEDICATIONS  (STANDING):  chlorhexidine 0.12% Liquid 15 milliLiter(s) Oral Mucosa two times a day  dextrose 5%. 1000 milliLiter(s) (50 mL/Hr) IV Continuous <Continuous>  dextrose 50% Injectable 12.5 Gram(s) IV Push once  dextrose 50% Injectable 25 Gram(s) IV Push once  dextrose 50% Injectable 25 Gram(s) IV Push once  folic acid 1 milliGRAM(s) Oral daily  furosemide    Tablet 20 milliGRAM(s) Oral daily  insulin lispro (HumaLOG) corrective regimen sliding scale   SubCutaneous three times a day before meals  insulin lispro (HumaLOG) corrective regimen sliding scale   SubCutaneous at bedtime  lactulose Syrup 40 Gram(s) Oral three times a day  lidocaine   Patch 1 Patch Transdermal daily  midodrine 5 milliGRAM(s) Oral three times a day  multivitamin 1 Tablet(s) Oral daily  prothrombin complex concentrate IVPB (KCENTRA) 2500 International Unit(s) IV Intermittent once  rifaximin 550 milliGRAM(s) Oral two times a day  spironolactone 50 milliGRAM(s) Oral two times a day  thiamine 100 milliGRAM(s) Oral daily    MEDICATIONS  (PRN):  artificial tears (preservative free) Ophthalmic Solution 1 Drop(s) Both EYES daily PRN Dry Eyes  dextrose 40% Gel 15 Gram(s) Oral once PRN Blood Glucose LESS THAN 70 milliGRAM(s)/deciliter  glucagon  Injectable 1 milliGRAM(s) IntraMuscular once PRN Glucose LESS THAN 70 milligrams/deciliter      OBJECTIVE:    Vital Signs Last 24 Hrs  T(C): 36.6 (16 Jan 2019 16:10), Max: 36.8 (15 Rell 2019 18:50)  T(F): 97.9 (16 Jan 2019 16:10), Max: 98.3 (15 Rell 2019 18:50)  HR: 66 (16 Jan 2019 16:10) (60 - 71)  BP: 95/51 (16 Jan 2019 16:10) (90/50 - 103/63)  BP(mean): --  RR: 18 (16 Jan 2019 16:10) (18 - 18)  SpO2: 96% (16 Jan 2019 16:10) (95% - 98%)    CAPILLARY BLOOD GLUCOSE      POCT Blood Glucose.: 279 mg/dL (16 Jan 2019 12:42)  POCT Blood Glucose.: 205 mg/dL (16 Jan 2019 08:48)  POCT Blood Glucose.: 219 mg/dL (15 Rell 2019 21:34)  POCT Blood Glucose.: 247 mg/dL (15 Rell 2019 18:03)    I&O's Summary    15 Rell 2019 07:01  -  16 Jan 2019 07:00  --------------------------------------------------------  IN: 1300 mL / OUT: 475 mL / NET: 825 mL    16 Jan 2019 07:01  -  16 Jan 2019 16:20  --------------------------------------------------------  IN: 360 mL / OUT: 175 mL / NET: 185 mL        PHYSICAL EXAM:  GENERAL: NAD, well-developed  HEAD:  Atraumatic, Normocephalic  EYES: EOMI, PERRLA, conjunctiva and sclera clear  NECK: Supple, No JVD  CHEST/LUNG: Clear to auscultation bilaterally; No wheeze  HEART: Regular rate and rhythm; No murmurs, rubs, or gallops  ABDOMEN: Soft, Nontender, Nondistended; Bowel sounds present  EXTREMITIES:  2+ Peripheral Pulses, No clubbing, cyanosis, or edema  PSYCH: AAOx3  NEUROLOGY: non-focal  SKIN: No rashes or lesions    LABS:                        7.0    13.6  )-----------( 64       ( 16 Jan 2019 11:03 )             20.8     Auto Eosinophil # 0.5   / Auto Eosinophil % 5.0   / Auto Neutrophil # 9.4   / Auto Neutrophil % 72.0  / BANDS % x                            7.3    9.6   )-----------( 58       ( 15 Rell 2019 23:23 )             21.1     Auto Eosinophil # x     / Auto Eosinophil % x     / Auto Neutrophil # x     / Auto Neutrophil % x     / BANDS % x                            8.0    6.9   )-----------( 49       ( 15 Rell 2019 11:01 )             23.6     Auto Eosinophil # 0.4   / Auto Eosinophil % 6.2   / Auto Neutrophil # 5.0   / Auto Neutrophil % 72.5  / BANDS % x        01-16    137  |  106  |  21  ----------------------------<  178<H>  5.0   |  20<L>  |  0.81  01-15    136  |  105  |  18  ----------------------------<  148<H>  4.6   |  21<L>  |  0.62    Ca    9.1      16 Jan 2019 07:10  Mg     1.6     01-16  Phos  3.2     01-16  TPro  4.9<L>  /  Alb  3.0<L>  /  TBili  20.0<H>  /  DBili  x   /  AST  42<H>  /  ALT  14  /  AlkPhos  78  01-16  TPro  5.3<L>  /  Alb  3.1<L>  /  TBili  20.7<H>  /  DBili  x   /  AST  49<H>  /  ALT  13  /  AlkPhos  89  01-15    PT/INR - ( 16 Jan 2019 11:43 )   PT: 38.8 sec;   INR: See Note ratio         PTT - ( 16 Jan 2019 11:43 )  PTT:94.6 sec              ABG:     VBG:       Care Discussed with Consultants/Other Providers:    RADIOLOGY & ADDITIONAL TESTS:  (Imaging Personally Reviewed) Patient is a 53y old  Male who presents with a chief complaint of Hypotension, decompensated cirrhosis (16 Jan 2019 09:15)      SUBJECTIVE / OVERNIGHT EVENTS:  Patient seen and examined at bedside. Overnight the patient was complaining of arm pain, increased flank pain and hip pain. He has no nausea, no vomiting, no diarrhea.     Other Review of Systems Negative.    MEDICATIONS  (STANDING):  chlorhexidine 0.12% Liquid 15 milliLiter(s) Oral Mucosa two times a day  dextrose 5%. 1000 milliLiter(s) (50 mL/Hr) IV Continuous <Continuous>  dextrose 50% Injectable 12.5 Gram(s) IV Push once  dextrose 50% Injectable 25 Gram(s) IV Push once  dextrose 50% Injectable 25 Gram(s) IV Push once  folic acid 1 milliGRAM(s) Oral daily  furosemide    Tablet 20 milliGRAM(s) Oral daily  insulin lispro (HumaLOG) corrective regimen sliding scale   SubCutaneous three times a day before meals  insulin lispro (HumaLOG) corrective regimen sliding scale   SubCutaneous at bedtime  lactulose Syrup 40 Gram(s) Oral three times a day  lidocaine   Patch 1 Patch Transdermal daily  midodrine 5 milliGRAM(s) Oral three times a day  multivitamin 1 Tablet(s) Oral daily  prothrombin complex concentrate IVPB (KCENTRA) 2500 International Unit(s) IV Intermittent once  rifaximin 550 milliGRAM(s) Oral two times a day  spironolactone 50 milliGRAM(s) Oral two times a day  thiamine 100 milliGRAM(s) Oral daily    MEDICATIONS  (PRN):  artificial tears (preservative free) Ophthalmic Solution 1 Drop(s) Both EYES daily PRN Dry Eyes  dextrose 40% Gel 15 Gram(s) Oral once PRN Blood Glucose LESS THAN 70 milliGRAM(s)/deciliter  glucagon  Injectable 1 milliGRAM(s) IntraMuscular once PRN Glucose LESS THAN 70 milligrams/deciliter      OBJECTIVE:    Vital Signs Last 24 Hrs  T(C): 36.6 (16 Jan 2019 16:10), Max: 36.8 (15 Erll 2019 18:50)  T(F): 97.9 (16 Jan 2019 16:10), Max: 98.3 (15 Rell 2019 18:50)  HR: 66 (16 Jan 2019 16:10) (60 - 71)  BP: 95/51 (16 Jan 2019 16:10) (90/50 - 103/63)  BP(mean): --  RR: 18 (16 Jan 2019 16:10) (18 - 18)  SpO2: 96% (16 Jan 2019 16:10) (95% - 98%)    CAPILLARY BLOOD GLUCOSE      POCT Blood Glucose.: 279 mg/dL (16 Jan 2019 12:42)  POCT Blood Glucose.: 205 mg/dL (16 Jan 2019 08:48)  POCT Blood Glucose.: 219 mg/dL (15 Rell 2019 21:34)  POCT Blood Glucose.: 247 mg/dL (15 Rell 2019 18:03)    I&O's Summary    15 Rell 2019 07:01  -  16 Jan 2019 07:00  --------------------------------------------------------  IN: 1300 mL / OUT: 475 mL / NET: 825 mL    16 Jan 2019 07:01  -  16 Jan 2019 16:20  --------------------------------------------------------  IN: 360 mL / OUT: 175 mL / NET: 185 mL        PHYSICAL EXAM:  GENERAL: NAD, well-developed  HEAD:  Atraumatic, Normocephalic  EYES:  conjunctiva and sclera clear  NECK: Supple, No JVD  CHEST/LUNG: Clear to auscultation bilaterally; No wheeze  HEART: Regular rate and rhythm; No murmurs, rubs, or gallops  ABDOMEN: Soft, Nontender, Nondistended; Bowel sounds present, pos hematoma on the Rt lateral abdomen  EXTREMITIES:  2+ Peripheral Pulses, No clubbing, cyanosis, or edema  PSYCH: AAOx3  NEUROLOGY: non-focal  SKIN:  pos large tender Hematoma on the lateral right gluteal region    LABS:                        7.0    13.6  )-----------( 64       ( 16 Jan 2019 11:03 )             20.8     Auto Eosinophil # 0.5   / Auto Eosinophil % 5.0   / Auto Neutrophil # 9.4   / Auto Neutrophil % 72.0  / BANDS % x                            7.3    9.6   )-----------( 58       ( 15 Rell 2019 23:23 )             21.1     Auto Eosinophil # x     / Auto Eosinophil % x     / Auto Neutrophil # x     / Auto Neutrophil % x     / BANDS % x                            8.0    6.9   )-----------( 49       ( 15 Rell 2019 11:01 )             23.6     Auto Eosinophil # 0.4   / Auto Eosinophil % 6.2   / Auto Neutrophil # 5.0   / Auto Neutrophil % 72.5  / BANDS % x        01-16    137  |  106  |  21  ----------------------------<  178<H>  5.0   |  20<L>  |  0.81  01-15    136  |  105  |  18  ----------------------------<  148<H>  4.6   |  21<L>  |  0.62    Ca    9.1      16 Jan 2019 07:10  Mg     1.6     01-16  Phos  3.2     01-16  TPro  4.9<L>  /  Alb  3.0<L>  /  TBili  20.0<H>  /  DBili  x   /  AST  42<H>  /  ALT  14  /  AlkPhos  78  01-16  TPro  5.3<L>  /  Alb  3.1<L>  /  TBili  20.7<H>  /  DBili  x   /  AST  49<H>  /  ALT  13  /  AlkPhos  89  01-15    PT/INR - ( 16 Jan 2019 11:43 )   PT: 38.8 sec;   INR: See Note ratio         PTT - ( 16 Jan 2019 11:43 )  PTT:94.6 sec              ABG:     VBG:       Care Discussed with Consultants/Other Providers:    RADIOLOGY & ADDITIONAL TESTS:  (Imaging Personally Reviewed)

## 2019-01-16 NOTE — PROVIDER CONTACT NOTE (CRITICAL VALUE NOTIFICATION) - ACTION/TREATMENT ORDERED:
MD made aware. MD to follow up. Continue to monitor patient.
MD made aware. MD to follow up. Continue to monitor patient.
MD made aware. Phytonadione 10mg ivpb is ordered; pending pharmacy to mix medication. Continue to monitor patient.
MD made aware. Repeat type and screen ordered stat and at 2PM with CBC. Continue to monitor patient.
cont to monitor; MD reccomendation prbc
MD made aware. As per MD no need for blood transfusion. Will continue to monitor patient.
MD made aware. RBC transfusion to be ordered. Will continue to monitor patient.
MD notified and aware. will come see pt
Team 3 made aware. Will continue to monitor
Will continue to monitor closely.
no orders made,will continue to monitor
will assess pt
Repeat blood cultures. Will continue to monitor.
Vanco x 1 ordered.

## 2019-01-16 NOTE — CONSULT NOTE ADULT - CONSULT REASON
Bacteremia
Hx of cirrhosis, hyperbilirubinemia
Right gluteal hemorrhage. Possible pelvic angiogram and embolization.
anemia, active extravasation into hematoma
cardiac follow
hypotension
Bleeding
active extravasation in R gluteal hematoma

## 2019-01-16 NOTE — PROGRESS NOTE ADULT - ASSESSMENT
53 year old male with end stage liver disease 2/2 chronic alcoholic cirrhosis, with esophageal varices, CAD, HfrEF (EF 35%), and DMII, presenting with 1 week of generalized weakness and poor PO intake.  Prior found to have BCX with CoNS, thought to be contaminant  Had paracentesis, negative for bacterial peritonitis on 1/10 (180 Nuc cells)  In the interim, found to have buttock hematoma that has been expanding in size, patient s/p multiple units pRBCs  Today developed leukocytosis, so ID called for further eval  No fevers, leukocytosis to 13, nontachycardic; patient with pain at hematoma site but otherwise no new signs sepsis  Suspect WBC reactive to continued bleed/hematoma  Overall, new leukocytosis, new gluteal hematoma, acute bleed  - Continue off abx for now  - Low threshold to start Vanco/Zosyn if any signs acute worsening/sepsis  - F/U pending repeat CT scan  - Check BCXs x 2, check Parasite smear (doubt babesia but multiple blood tranfusions, elevated T bili)  - Care for bleed per surgery/IR    Tereso Collins MD  Pager [phone]  After 5pm and on weekends call [phone] 53 year old male with end stage liver disease 2/2 chronic alcoholic cirrhosis, with esophageal varices, CAD, HfrEF (EF 35%), and DMII, presenting with 1 week of generalized weakness and poor PO intake.  Prior found to have BCX with CoNS, thought to be contaminant  Had paracentesis, negative for bacterial peritonitis on 1/10 (180 Nuc cells)  In the interim, found to have buttock hematoma that has been expanding in size, patient s/p multiple units pRBCs  Today developed leukocytosis, so ID called for further eval  No fevers, leukocytosis to 13, nontachycardic; patient with pain at hematoma site but otherwise no new signs sepsis  Suspect WBC reactive to continued bleed/hematoma  Overall, new leukocytosis, new gluteal hematoma, acute bleed  - Continue off abx for now  - Low threshold to start Vanco/Zosyn if any signs acute worsening/sepsis  - F/U pending repeat CT scan  - Check BCXs x 2, check Parasite smear (doubt babesia but multiple blood tranfusions, elevated T bili)  - Care for bleed per surgery/IR  - Discussed case with medicine team    Tereso Collins MD  Pager [phone]  After 5pm and on weekends call [phone]

## 2019-01-16 NOTE — PROGRESS NOTE ADULT - ASSESSMENT
53M end stage liver disease 2/2 alcoholic cirrhosis, HFrEF, CAD, HTN, presenting with progressively worsening generalized weakness. During admission, he was found to have gram negative rods and gram positive cocci blood cultures, but this was thought to be secondary to contamination.     #Enlarging Hematoma of R buttock: given patient complaints of R hip pain and f/u CTA A/P, pt found to have 6x3.7x7.8cm hematoma with arterial extravasation. Given persistent decrease of H/H of unexplained origin outside of GI tract, this may be most likely cause for acute worsening of anemia requiring pRBC transfusions throughout admission. Since admission has now received total of 5 pRBCs. H/H of 7.8/ 22.9 (1/14/19) from 8.9/ 25.4 day prior (1/13). Agree with primary team plan and management (serial exams & US, optimize coagulopathy of hepatic disease with FFP and Vit. K, as needed, CBC trending). Given stable cardiac status, tentatively set for EGD 1/14/19 pending hemodynamic stabilization.    #Decompensated Cirrhosis: suspected EtOH related - MELD-Na: 31 today 1/16/19 (34 on admission)  Patient was listed for transplant at [facility] but given new diagnosis of HFrEF in 10/2018, he was placed on hold on wait list with plan to reassess cardiac function. Repeat JOHNSON this admission indicates improvement of cardiac function from prior study, but still impaired with EF 43% from 35%. TTE w/ Doppler results on 1/8/19 Faxed to Dr. Stokes's office for reference and f/u. Per Cardiologist, Dr. Glass, HFrEF regimen challenging and not ideal to begin at this time given patients use of Midodrine and soft SBPs.  HE: On Lactulose and rifaximin in hospital, lactulose only at home (Not on Rifaximin 2/2 prescription cost)  Ascites: small volume ascites on MR Abdomen 1/6/19. Has a history of E.coli SBP last episode in 10/2018.  HCC: No focal masses on US 9/2018 and on repeat MR Abdomen 1/6/19.  Varices: Small on EGD 8/2017, on spironolactone 25mg and lasix 20mg daily at home. Propranolol d/c'ed 10/2018 due to persistent hypotension.  Repeat viral infectious w/u (Hep A, B, C serologies; CMV, HSV, EBV PCR) all negative (1/4/19). Total Immunoglobulin panel: Quantitative IgA, IgM, & IgG (766, 296, & 1915, respectively), [place]/ Lambda Free light chain ratio 1.73 <H>, and BRIDGER Kappa & Lambda 8.11 and 4.70, respectively.   MR abdomen indicates mild iron deposition within liver, cirrhosis, and portal hypertension. MRCP unremarkable with patent bile ducts and nonspecific gallbladder wall thickening with no evidence of biliary obstruction. Alk Phos/AST/ALT downtrending.    #GNR and coag negative staph blood cultures  ID following and thought to be secondary to contaminant. Not currently on IV abx.    #Macrocytic Anemia: Low suspicion for active GI bleed as would expect overt GI bleeding given with drop by 2U in one day.  Repeat B12, Folate on 1/7/19 indicates no B9/ B12 deficiency (B12 >2000; B9 >20.0). Repeat TSH on 1/7/19 wnl (1.81).  Less common contributing etiologies possibly explaining macrocytosis to consider include copper deficiency, chronic liver disease affecting lipid composition of RBC membranes, increased endogenous estrogens from pt's liver disease (less likely given ULN folate levels), or drug-induced also less likely in pt. HD stable now, no obvious signs of bleeding. Iron studies 1/9/18 less concerning for hemolysis, however impaired liver function may be in part contributing to decreased synthesis of carrier proteins. Most likely cause of acute worsening of anemia likely 2/2 hematoma of right buttock, as described above.      Recommendations:  - Per IR, goal hgb >7, plts >50k, & INR <1.5 OR hemodynamic instability non-responsive to optimization then eligible for angio w/ embolization of R gluteal bleed.  - Continue serial CBC trending and serial exams of R hip for improvement or worsening of R gluteal bleed/ hematoma.  - Trend MELD labs and CBC daily.  - Continue lactulose and rifaximin, titrate to 2-3 BM per day.  - s/p Cardiology clearance for EGD, defer until this Wednesday 1/16/19 pending INR optimization and improvement of R gluteal hematoma  - Please continue FFP transfusions, as needed with goal INR <2.0  - Please continue Vit. K 10mg PO qd supplementation.  - Recommend follow-up with Dr. Stokes, [facility] hepatologist, within 1 week of discharge.  - Recommend discharge to Physical therapy for reconditioning.  - Patient can be discharged on increased diuretics--> Spironolactone 100mg and Lasix 40mg. 53M end stage liver disease 2/2 alcoholic cirrhosis, HFrEF, CAD, HTN, presenting with progressively worsening generalized weakness. During admission, he was found to have gram negative rods and gram positive cocci blood cultures, but this was thought to be secondary to contamination.     #Enlarging Hematoma of R buttock: given patient complaints of R hip pain and f/u CTA A/P, pt found to have 6x3.7x7.8cm hematoma with arterial extravasation. Given persistent decrease of H/H of unexplained origin outside of GI tract, this may be most likely cause for acute worsening of anemia requiring pRBC transfusions throughout admission. Since admission has now received total of 5 pRBCs. H/H of 7.8/ 22.9 (1/14/19) from 8.9/ 25.4 day prior (1/13). Agree with primary team plan and management (serial exams & US, optimize coagulopathy of hepatic disease with FFP and Vit. K, as needed, CBC trending). Given stable cardiac status, tentatively set for EGD 1/14/19 pending hemodynamic stabilization.    #Decompensated Cirrhosis: suspected EtOH related - MELD-Na: 31 today 1/16/19 (34 on admission)  Patient was listed for transplant at [facility] but given new diagnosis of HFrEF in 10/2018, he was placed on hold on wait list with plan to reassess cardiac function. Repeat JOHNSON this admission indicates improvement of cardiac function from prior study, but still impaired with EF 43% from 35%. TTE w/ Doppler results on 1/8/19 Faxed to Dr. Stokes's office for reference and f/u. Per Cardiologist, Dr. Glass, HFrEF regimen challenging and not ideal to begin at this time given patients use of Midodrine and soft SBPs.  HE: On Lactulose and rifaximin in hospital, lactulose only at home (Not on Rifaximin 2/2 prescription cost)  Ascites: small volume ascites on MR Abdomen 1/6/19. Has a history of E.coli SBP last episode in 10/2018.  HCC: No focal masses on US 9/2018 and on repeat MR Abdomen 1/6/19.  Varices: Small on EGD 8/2017, on spironolactone 25mg and lasix 20mg daily at home. Propranolol d/c'ed 10/2018 due to persistent hypotension.  Repeat viral infectious w/u (Hep A, B, C serologies; CMV, HSV, EBV PCR) all negative (1/4/19). Total Immunoglobulin panel: Quantitative IgA, IgM, & IgG (766, 296, & 1915, respectively), [place]/ Lambda Free light chain ratio 1.73 <H>, and BRIDGER Kappa & Lambda 8.11 and 4.70, respectively.   MR abdomen indicates mild iron deposition within liver, cirrhosis, and portal hypertension. MRCP unremarkable with patent bile ducts and nonspecific gallbladder wall thickening with no evidence of biliary obstruction. Alk Phos/AST/ALT downtrending.    #GNR and coag negative staph blood cultures  ID following and thought to be secondary to contaminant. Not currently on IV abx.    #Macrocytic Anemia: Low suspicion for active GI bleed as would expect overt GI bleeding given with drop by 2U in one day.  Repeat B12, Folate on 1/7/19 indicates no B9/ B12 deficiency (B12 >2000; B9 >20.0). Repeat TSH on 1/7/19 wnl (1.81).  Less common contributing etiologies possibly explaining macrocytosis to consider include copper deficiency, chronic liver disease affecting lipid composition of RBC membranes, increased endogenous estrogens from pt's liver disease (less likely given ULN folate levels), or drug-induced also less likely in pt. HD stable now, no obvious signs of bleeding. Iron studies 1/9/18 less concerning for hemolysis, however impaired liver function may be in part contributing to decreased synthesis of carrier proteins. Most likely cause of acute worsening of anemia likely 2/2 hematoma of right buttock, as described above.      Recommendations:  - Recommend D-Dimer, fibrinogen studies.  - Per IR, goal hgb >7, plts >50k, & INR <1.5 OR hemodynamic instability non-responsive to optimization then eligible for angio w/ embolization of R gluteal bleed.  - Continue serial CBC trending and serial exams of R hip for improvement or worsening of R gluteal bleed/ hematoma.  - Trend MELD labs and CBC daily.  - s/p Cardiology clearance for EGD, defer until this Wednesday 1/16/19 pending INR optimization and improvement of R gluteal hematoma  - Please continue FFP transfusions, as needed with goal INR <1.8  - Please continue Vit. K 10mg PO qd supplementation, Kcentra PRN.  - Continue lactulose and rifaximin, titrate to 2-3 BM per day.  - Recommend follow-up with Dr. Stokes, [facility] hepatologist, within 1 week of discharge.  - Recommend discharge to Physical therapy for reconditioning.  - Patient can be discharged on increased diuretics--> Spironolactone 100mg and Lasix 40mg. 53M end stage liver disease 2/2 alcoholic cirrhosis, HFrEF, CAD, HTN, presenting with progressively worsening generalized weakness. During admission, he was found to have gram negative rods and gram positive cocci blood cultures, but this was thought to be secondary to contamination.     #DIC: given new neutrophil-predominant leukocytosis in the setting of elevated D-dimer, low fibrinogen consumptive coagulopathy 2/2 bacterial infection on superimposed hepatic coagulopathy likely. History of spontaneous, E.coli SBP requiring ppx Bactrim.     #Enlarging Hematoma of R buttock and puncture site bleed: given patient complaints of R hip pain and f/u CTA A/P, pt found to have 6x3.7x7.8cm hematoma with arterial extravasation. Given persistent decrease of H/H of unexplained origin outside of GI tract, this may be most likely cause for acute worsening of anemia requiring pRBC transfusions throughout admission. Since admission has now received total of 5 pRBCs. H/H of 7.8/ 22.9 (1/14/19) from 8.9/ 25.4 day prior (1/13). Agree with primary team plan and management (serial exams & US, optimize coagulopathy of hepatic disease with FFP and Vit. K, as needed, CBC trending). Given stable cardiac status, tentatively set for EGD 1/14/19 pending hemodynamic stabilization.    #Decompensated Cirrhosis: suspected EtOH related - MELD-Na: 31 today 1/16/19 (34 on admission)  Patient was listed for transplant at [facility] but given new diagnosis of HFrEF in 10/2018, he was placed on hold on wait list with plan to reassess cardiac function. Repeat JOHNSON this admission indicates improvement of cardiac function from prior study, but still impaired with EF 43% from 35%. TTE w/ Doppler results on 1/8/19 Faxed to Dr. Stokes's office for reference and f/u. Per Cardiologist, Dr. Glass, HFrEF regimen challenging and not ideal to begin at this time given patients use of Midodrine and soft SBPs.  HE: On Lactulose and rifaximin in hospital, lactulose only at home (Not on Rifaximin 2/2 prescription cost)  Ascites: small volume ascites on MR Abdomen 1/6/19. Has a history of E.coli SBP last episode in 10/2018.  HCC: No focal masses on US 9/2018 and on repeat MR Abdomen 1/6/19.  Varices: Small on EGD 8/2017, on spironolactone 25mg and lasix 20mg daily at home. Propranolol d/c'ed 10/2018 due to persistent hypotension.  Repeat viral infectious w/u (Hep A, B, C serologies; CMV, HSV, EBV PCR) all negative (1/4/19). Total Immunoglobulin panel: Quantitative IgA, IgM, & IgG (766, 296, & 1915, respectively), [place]/ Lambda Free light chain ratio 1.73 <H>, and BRIDGER Kappa & Lambda 8.11 and 4.70, respectively.   MR abdomen indicates mild iron deposition within liver, cirrhosis, and portal hypertension. MRCP unremarkable with patent bile ducts and nonspecific gallbladder wall thickening with no evidence of biliary obstruction. Alk Phos/AST/ALT downtrending.    #GNR and coag negative staph blood cultures  ID following and thought to be secondary to contaminant. Not currently on IV abx.    #Macrocytic Anemia: Low suspicion for active GI bleed as would expect overt GI bleeding given with drop by 2U in one day.  Repeat B12, Folate on 1/7/19 indicates no B9/ B12 deficiency (B12 >2000; B9 >20.0). Repeat TSH on 1/7/19 wnl (1.81).  Less common contributing etiologies possibly explaining macrocytosis to consider include copper deficiency, chronic liver disease affecting lipid composition of RBC membranes, increased endogenous estrogens from pt's liver disease (less likely given ULN folate levels), or drug-induced also less likely in pt. HD stable now, no obvious signs of bleeding. Iron studies 1/9/18 less concerning for hemolysis, however impaired liver function may be in part contributing to decreased synthesis of carrier proteins. Most likely cause of acute worsening of anemia likely 2/2 hematoma of right buttock, as described above.      Recommendations:  - Resume standing Zosyn for empiric coverage of gram negative organisms  - Repeat CXR  - Blood cultures, UA/ UCx  - Continue blood products as needed    - Per IR, goal hgb >7, plts >50k, & INR <1.5 OR hemodynamic instability non-responsive to optimization then eligible for angio w/ embolization of R gluteal bleed.  - Continue serial CBC trending and serial exams of R hip for improvement or worsening of R gluteal bleed/ hematoma.  - Trend MELD labs and CBC daily.  - s/p Cardiology clearance for EGD, defer until this Wednesday 1/16/19 pending INR optimization and improvement of R gluteal hematoma  - Please continue FFP transfusions, as needed with goal INR <1.8  - Please continue Vit. K 10mg PO qd supplementation, Kcentra PRN.  - Continue lactulose and rifaximin, titrate to 2-3 BM per day.  - Recommend follow-up with Dr. Stokes, [facility] hepatologist, within 1 week of discharge. Hepatologist ([facility]): Dr. Stokes (cell #: [phone])  - Recommend discharge to Physical therapy for reconditioning.  - Patient can be discharged on increased diuretics--> Spironolactone 100mg and Lasix 40mg.

## 2019-01-16 NOTE — CONSULT NOTE ADULT - REASON FOR ADMISSION
hypotension, decompensated cirrhosis
weakness
hypotension, decompensated cirrhosis

## 2019-01-16 NOTE — PROGRESS NOTE ADULT - ASSESSMENT
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 Patient has required 1 u pRBC and 2 u plt. Patient found on CT A/P to have active extravasation in subcutaneous tissue of R gluteal hematoma, measuring 6.5x4.8 cm.    Plan/recommendations:  - Downtrending H/H; transfuse PRN  - Continue reversing INR as able to prevent further bleeding of hematoma (continue vit K, consider Kcentra, cryoprecipitate)  - F/u with IR for possible embolization of bleeding vessel  - Compression as tolerated    ALVERTO Palma, PGY-2  Acute Care Surgery  p. [phone] ASSESSMENT:  53 year old male with end stage liver disease 2/2 chronic alcoholic cirrhosis, with esophageal varices, CAD, HfrEF (EF 35%), and DMII, admitted for concerns of SBP and now complaining of R gluteal hematoma. Patient has required 1 u pRBC and 2 u plt. Patient found on CT A/P to have active extravasation in subcutaneous tissue of R gluteal hematoma, measuring 6.5x4.8 cm.    Plan/recommendations:  - Downtrending H/H; transfuse PRN  - Continue reversing INR as able to prevent further bleeding of hematoma (continue vit K, consider Kcentra, cryoprecipitate)  - F/u with IR for possible embolization of bleeding vessel  - Compression as tolerated    ALVERTO Palma, PGY-2  Acute Care Surgery  p. [phone]

## 2019-01-16 NOTE — PROGRESS NOTE ADULT - PROBLEM SELECTOR PLAN 8
DVT ppx: SCDs  Diet: NPO until bleeding resolves  Code status: FULL CODE (confirmed with patient and brother)  - dispo: transfer to [facility].

## 2019-01-16 NOTE — PROVIDER CONTACT NOTE (CRITICAL VALUE NOTIFICATION) - ASSESSMENT
Patient A&Ox4, VSS. SBP runs 90's. NSR on cardiac monitor. Patient denies chest pain, lightheadedness, or shortness of breath. Patient is ecchymotic. Right gluteal hematoma and right abdomen hematoma appears increased in size, MD aware. Patient is s/p KCentra IVPB total 3500 international units this morning.

## 2019-01-16 NOTE — PROGRESS NOTE ADULT - PROBLEM SELECTOR PLAN 4
Worsening thrombocytopenia since admission in setting of decompensated cirrhosis.     - Trend CBC q8h  - Transfuse for platelets <50K Worsening thrombocytopenia since admission in setting of decompensated cirrhosis.   - stable platelet today   - Trend CBC q8h  - Transfuse for platelets <50K

## 2019-01-16 NOTE — PROGRESS NOTE ADULT - PROVIDER SPECIALTY LIST ADULT
Cardiology
Hepatology
Infectious Disease
Internal Medicine
Intervent Radiology
Surgery
Surgery
Hepatology
Internal Medicine

## 2019-01-17 NOTE — PROVIDER CONTACT NOTE (OTHER) - SITUATION
B/P 88/42
Patient refusing to take evening dose of midodrine 5mg. Then refusing midnight vital signs.
patient right flank area and right hip/buttocks firm and swollen.  this area has been  soft and swollen on prior exam. left arm is swollen and ecchymotic.
Pt scheduled for CBC at 2000 and zosyn at 2200 but currently receiving plasma.
Blood glucose 349 mg/dl repeated

## 2019-01-17 NOTE — PROVIDER CONTACT NOTE (OTHER) - REASON
B/P 88/42
Blood glucose abnormal
Patient refusing evening meds and midnight vitals
patient right flank area and right hip/buttocks firm and swollen.
Blood draw and Antibiotic

## 2019-01-17 NOTE — PROVIDER CONTACT NOTE (OTHER) - ACTION/TREATMENT ORDERED:
MD requested to hold blood draw until 1 hour post transfusion, pt with only one IV access so zosyn not administered at 2200. MD requested not to give zosyn as pt is pending transportation to [facility]
Correction scale administered. No additional orders at this time. Continue current plan of care.
MD notified. Will follow up and re take BP and give am meds.
abdominal U/S ordered.
will continue to monitor

## 2019-01-17 NOTE — PROVIDER CONTACT NOTE (OTHER) - BACKGROUND
Patient admitted for spontaneous bacterial peritonitis, decompensated cirrhosis
admitted for alcohol induced Cirrhosis and peritonitis. elevated INR
admitted for decompensated cirrhosis, bacterial peritonitis
decompensated cirrhosis, DM, CHF, ULICES,
Patient admitted for Spontaneous Bacterial Peritonitis, Decompensated Cirrhosis

## 2019-01-17 NOTE — PROVIDER CONTACT NOTE (OTHER) - RECOMMENDATIONS
administer correction scale
patient received 1 unit PRBC, now patient receiving PLASMA 1 unit.
to assess pt

## 2019-01-17 NOTE — PROVIDER CONTACT NOTE (OTHER) - ASSESSMENT
Patient A&Ox4, V/S BP 95/60, HR 78, RR18, Temp 97.7, O2 sat 96% on room air.
Patient A&Ox4, blood glucose 349 mg/dl
Patient A&OX4. Last BP at 20:00 91/52 HR 61. Patient refused midodrine 5 mg and refused vital signs at midnight.
asymptomatic, pt normally runs low blood pressure, ambulated with assistance to bath room
patient right flank area and right hip/buttocks firm and swollen.  this area has been  soft and swollen on prior exam. left arm is swollen and ecchymotic. patient c/o pain with movement but patient refusing pain meds at this time. DR. Bocanegra made aware and seen patient at bedside.

## 2019-02-04 NOTE — DISCHARGE NOTE ADULT - ADDITIONAL INSTRUCTIONS
Fever x last night. Vomiting and diarrhea x today.  Normal UOP , BCR and wet mucus membranes. No pmhx. AS PER [facility] Transfer to [facility]

## 2019-02-09 LAB
CULTURE RESULTS: SIGNIFICANT CHANGE UP
SPECIMEN SOURCE: SIGNIFICANT CHANGE UP

## 2019-03-02 LAB
CULTURE RESULTS: SIGNIFICANT CHANGE UP
SPECIMEN SOURCE: SIGNIFICANT CHANGE UP

## 2019-03-14 ENCOUNTER — TRANSCRIPTION ENCOUNTER (OUTPATIENT)
Age: 54
End: 2019-03-14

## 2019-07-10 PROCEDURE — 76705 ECHO EXAM OF ABDOMEN: CPT

## 2019-07-10 PROCEDURE — 85379 FIBRIN DEGRADATION QUANT: CPT

## 2019-07-10 PROCEDURE — 87799 DETECT AGENT NOS DNA QUANT: CPT

## 2019-07-10 PROCEDURE — 84157 ASSAY OF PROTEIN OTHER: CPT

## 2019-07-10 PROCEDURE — 82247 BILIRUBIN TOTAL: CPT

## 2019-07-10 PROCEDURE — 82042 OTHER SOURCE ALBUMIN QUAN EA: CPT

## 2019-07-10 PROCEDURE — 85730 THROMBOPLASTIN TIME PARTIAL: CPT

## 2019-07-10 PROCEDURE — P9037: CPT

## 2019-07-10 PROCEDURE — 83880 ASSAY OF NATRIURETIC PEPTIDE: CPT

## 2019-07-10 PROCEDURE — 71045 X-RAY EXAM CHEST 1 VIEW: CPT

## 2019-07-10 PROCEDURE — 87015 SPECIMEN INFECT AGNT CONCNTJ: CPT

## 2019-07-10 PROCEDURE — 83605 ASSAY OF LACTIC ACID: CPT

## 2019-07-10 PROCEDURE — 84540 ASSAY OF URINE/UREA-N: CPT

## 2019-07-10 PROCEDURE — 88112 CYTOPATH CELL ENHANCE TECH: CPT

## 2019-07-10 PROCEDURE — 87116 MYCOBACTERIA CULTURE: CPT

## 2019-07-10 PROCEDURE — 85027 COMPLETE CBC AUTOMATED: CPT

## 2019-07-10 PROCEDURE — 86923 COMPATIBILITY TEST ELECTRIC: CPT

## 2019-07-10 PROCEDURE — 74177 CT ABD & PELVIS W/CONTRAST: CPT

## 2019-07-10 PROCEDURE — 85610 PROTHROMBIN TIME: CPT

## 2019-07-10 PROCEDURE — 74183 MRI ABD W/O CNTR FLWD CNTR: CPT

## 2019-07-10 PROCEDURE — 82435 ASSAY OF BLOOD CHLORIDE: CPT

## 2019-07-10 PROCEDURE — 82945 GLUCOSE OTHER FLUID: CPT

## 2019-07-10 PROCEDURE — 84484 ASSAY OF TROPONIN QUANT: CPT

## 2019-07-10 PROCEDURE — 88305 TISSUE EXAM BY PATHOLOGIST: CPT

## 2019-07-10 PROCEDURE — 73521 X-RAY EXAM HIPS BI 2 VIEWS: CPT

## 2019-07-10 PROCEDURE — 87581 M.PNEUMON DNA AMP PROBE: CPT

## 2019-07-10 PROCEDURE — 84300 ASSAY OF URINE SODIUM: CPT

## 2019-07-10 PROCEDURE — 83540 ASSAY OF IRON: CPT

## 2019-07-10 PROCEDURE — A9585: CPT

## 2019-07-10 PROCEDURE — 87206 SMEAR FLUORESCENT/ACID STAI: CPT

## 2019-07-10 PROCEDURE — 87086 URINE CULTURE/COLONY COUNT: CPT

## 2019-07-10 PROCEDURE — 86706 HEP B SURFACE ANTIBODY: CPT

## 2019-07-10 PROCEDURE — 86709 HEPATITIS A IGM ANTIBODY: CPT

## 2019-07-10 PROCEDURE — 87205 SMEAR GRAM STAIN: CPT

## 2019-07-10 PROCEDURE — 87040 BLOOD CULTURE FOR BACTERIA: CPT

## 2019-07-10 PROCEDURE — 86803 HEPATITIS C AB TEST: CPT

## 2019-07-10 PROCEDURE — 96365 THER/PROPH/DIAG IV INF INIT: CPT

## 2019-07-10 PROCEDURE — 85045 AUTOMATED RETICULOCYTE COUNT: CPT

## 2019-07-10 PROCEDURE — 87150 DNA/RNA AMPLIFIED PROBE: CPT

## 2019-07-10 PROCEDURE — 86850 RBC ANTIBODY SCREEN: CPT

## 2019-07-10 PROCEDURE — 85384 FIBRINOGEN ACTIVITY: CPT

## 2019-07-10 PROCEDURE — 84466 ASSAY OF TRANSFERRIN: CPT

## 2019-07-10 PROCEDURE — 81001 URINALYSIS AUTO W/SCOPE: CPT

## 2019-07-10 PROCEDURE — C8929: CPT

## 2019-07-10 PROCEDURE — 87449 NOS EACH ORGANISM AG IA: CPT

## 2019-07-10 PROCEDURE — 49083 ABD PARACENTESIS W/IMAGING: CPT

## 2019-07-10 PROCEDURE — 96375 TX/PRO/DX INJ NEW DRUG ADDON: CPT

## 2019-07-10 PROCEDURE — 84133 ASSAY OF URINE POTASSIUM: CPT

## 2019-07-10 PROCEDURE — 86704 HEP B CORE ANTIBODY TOTAL: CPT

## 2019-07-10 PROCEDURE — 83036 HEMOGLOBIN GLYCOSYLATED A1C: CPT

## 2019-07-10 PROCEDURE — 87633 RESP VIRUS 12-25 TARGETS: CPT

## 2019-07-10 PROCEDURE — 82533 TOTAL CORTISOL: CPT

## 2019-07-10 PROCEDURE — 85732 THROMBOPLASTIN TIME PARTIAL: CPT

## 2019-07-10 PROCEDURE — 82607 VITAMIN B-12: CPT

## 2019-07-10 PROCEDURE — 80202 ASSAY OF VANCOMYCIN: CPT

## 2019-07-10 PROCEDURE — 83615 LACTATE (LD) (LDH) ENZYME: CPT

## 2019-07-10 PROCEDURE — 87075 CULTR BACTERIA EXCEPT BLOOD: CPT

## 2019-07-10 PROCEDURE — 85014 HEMATOCRIT: CPT

## 2019-07-10 PROCEDURE — 82728 ASSAY OF FERRITIN: CPT

## 2019-07-10 PROCEDURE — P9016: CPT

## 2019-07-10 PROCEDURE — 83010 ASSAY OF HAPTOGLOBIN QUANT: CPT

## 2019-07-10 PROCEDURE — C1729: CPT

## 2019-07-10 PROCEDURE — 87070 CULTURE OTHR SPECIMN AEROBIC: CPT

## 2019-07-10 PROCEDURE — 82248 BILIRUBIN DIRECT: CPT

## 2019-07-10 PROCEDURE — 87340 HEPATITIS B SURFACE AG IA: CPT

## 2019-07-10 PROCEDURE — 82330 ASSAY OF CALCIUM: CPT

## 2019-07-10 PROCEDURE — 93005 ELECTROCARDIOGRAM TRACING: CPT

## 2019-07-10 PROCEDURE — P9059: CPT

## 2019-07-10 PROCEDURE — 74176 CT ABD & PELVIS W/O CONTRAST: CPT

## 2019-07-10 PROCEDURE — 82947 ASSAY GLUCOSE BLOOD QUANT: CPT

## 2019-07-10 PROCEDURE — 86900 BLOOD TYPING SEROLOGIC ABO: CPT

## 2019-07-10 PROCEDURE — P9045: CPT

## 2019-07-10 PROCEDURE — 99285 EMERGENCY DEPT VISIT HI MDM: CPT | Mod: 25

## 2019-07-10 PROCEDURE — 83690 ASSAY OF LIPASE: CPT

## 2019-07-10 PROCEDURE — 82746 ASSAY OF FOLIC ACID SERUM: CPT

## 2019-07-10 PROCEDURE — 84443 ASSAY THYROID STIM HORMONE: CPT

## 2019-07-10 PROCEDURE — 84132 ASSAY OF SERUM POTASSIUM: CPT

## 2019-07-10 PROCEDURE — 72170 X-RAY EXAM OF PELVIS: CPT

## 2019-07-10 PROCEDURE — 80053 COMPREHEN METABOLIC PANEL: CPT

## 2019-07-10 PROCEDURE — 86901 BLOOD TYPING SEROLOGIC RH(D): CPT

## 2019-07-10 PROCEDURE — 80048 BASIC METABOLIC PNL TOTAL CA: CPT

## 2019-07-10 PROCEDURE — 97162 PT EVAL MOD COMPLEX 30 MIN: CPT

## 2019-07-10 PROCEDURE — 82803 BLOOD GASES ANY COMBINATION: CPT

## 2019-07-10 PROCEDURE — P9011: CPT

## 2019-07-10 PROCEDURE — 84100 ASSAY OF PHOSPHORUS: CPT

## 2019-07-10 PROCEDURE — 82570 ASSAY OF URINE CREATININE: CPT

## 2019-07-10 PROCEDURE — 76882 US LMTD JT/FCL EVL NVASC XTR: CPT

## 2019-07-10 PROCEDURE — P9017: CPT

## 2019-07-10 PROCEDURE — 89051 BODY FLUID CELL COUNT: CPT

## 2019-07-10 PROCEDURE — 87486 CHLMYD PNEUM DNA AMP PROBE: CPT

## 2019-07-10 PROCEDURE — 83735 ASSAY OF MAGNESIUM: CPT

## 2019-07-10 PROCEDURE — 94640 AIRWAY INHALATION TREATMENT: CPT

## 2019-07-10 PROCEDURE — 84295 ASSAY OF SERUM SODIUM: CPT

## 2019-07-10 PROCEDURE — 87102 FUNGUS ISOLATION CULTURE: CPT

## 2019-07-10 PROCEDURE — 36430 TRANSFUSION BLD/BLD COMPNT: CPT

## 2019-07-10 PROCEDURE — 82962 GLUCOSE BLOOD TEST: CPT

## 2019-07-10 PROCEDURE — 87798 DETECT AGENT NOS DNA AMP: CPT

## 2019-07-10 PROCEDURE — 82274 ASSAY TEST FOR BLOOD FECAL: CPT

## 2019-07-10 PROCEDURE — 82784 ASSAY IGA/IGD/IGG/IGM EACH: CPT

## 2019-07-10 PROCEDURE — 83550 IRON BINDING TEST: CPT

## 2019-12-20 NOTE — PROGRESS NOTE ADULT - PROBLEM SELECTOR PROBLEM 5
76 year old male with resolved small bowel obstruction    - advance to regular diet  - discharge planning if tolerating regular diet Congestive heart failure (CHF)

## 2020-11-01 NOTE — PATIENT PROFILE ADULT - NSPROGENDIFFINTUB_GEN_A_NUR
No - the patient is unable to be screened due to medical condition previously intubated - no problems

## 2020-12-29 NOTE — CONSULT NOTE ADULT - OPHTHALMOLOGIC
You may be receiving a patient experience survey by mail or e-mail from the Urgent Care Staff regarding your visit today.  We value your feedback and hope you can provide us your insight.  Patient Education   Patient Education   Patient Education     Acute Otitis Media with Infection (Child)    Your child has a middle ear infection (acute otitis media). It is caused by bacteria or fungi. The middle ear is the space behind the eardrum. The eustachian tube connects the ear to the nasal passage. The eustachian tubes help drain fluid from the ears. They also keep the air pressure equal inside and outside the ears. These tubes are shorter and more horizontal in children. This makes it more likely for the tubes to become blocked. A blockage lets fluid and pressure build up in the middle ear. Bacteria or fungi can grow in this fluid and cause an ear infection. This infection is commonly known as an earache.  The main symptom of an ear infection is ear pain. Other symptoms may include pulling at the ear, being more fussy than usual, decreased appetite, and vomiting or diarrhea. Your child’s hearing may also be affected. Your child may have had a respiratory infection first.  An ear infection may clear up on its own. Or your child may need to take medicine. After the infection goes away, your child may still have fluid in the middle ear. It may take weeks or months for this fluid to go away. During that time, your child may have temporary hearing loss. But all other symptoms of the earache should be gone.  Home care  Follow these guidelines when caring for your child at home:  · The healthcare provider will likely prescribe medicines for pain. The provider may also prescribe antibiotics or antifungals to treat the infection. These may be liquid medicines to give by mouth. Or they may be ear drops. Follow the provider’s instructions for giving these medicines to your child.  · Because ear infections can clear up on their own,  the provider may suggest waiting for a few days before giving your child medicines for infection.  · To reduce pain, have your child rest in an upright position. Hot or cold compresses held against the ear may help ease pain.  · Keep the ear dry. Have your child wear a shower cap when bathing.  To help prevent future infections:  · Don't smoke near your child. Secondhand smoke raises the risk for ear infections in children.  · Make sure your child gets all appropriate vaccines.  · Do not bottle-feed while your baby is lying on his or her back. (This position can cause middle ear infections because it allows milk to run into the eustachian tubes.)      · If you breastfeed, continue until your child is 6 to 12 months of age.  To apply ear drops:  1. Put the bottle in warm water if the medicine is kept in the refrigerator. Cold drops in the ear are uncomfortable.  2. Have your child lie down on a flat surface. Gently hold your child’s head to 1 side.  3. Remove any drainage from the ear with a clean tissue or cotton swab. Clean only the outer ear. Don’t put the cotton swab into the ear canal.  4. Straighten the ear canal by gently pulling the earlobe up and back.  5. Keep the dropper a half-inch above the ear canal. This will keep the dropper from becoming contaminated. Put the drops against the side of the ear canal.  6. Have your child stay lying down for 2 to 3 minutes. This gives time for the medicine to enter the ear canal. If your child doesn’t have pain, gently massage the outer ear near the opening.  7. Wipe any extra medicine away from the outer ear with a clean cotton ball.  Follow-up care  Follow up with your child’s healthcare provider as directed. Your child will need to have the ear rechecked to make sure the infection has gone away. Check with the healthcare provider to see when they want to see your child.  Special note to parents  If your child continues to get earaches, he or she may need ear tubes.  The provider will put small tubes in your child’s eardrum to help keep fluid from building up. This procedure is a simple and works well.  When to seek medical advice  Unless advised otherwise, call your child's healthcare provider if:  · Your child is 3 months old or younger and has a fever of 100.4°F (38°C) or higher. Your child may need to see a healthcare provider.  · Your child is of any age and has fevers higher than 104°F (40°C) that come back again and again.  Call your child's healthcare provider for any of the following:  · New symptoms, especially swelling around the ear or weakness of face muscles  · Severe pain  · Infection seems to get worse, not better   · Neck pain  · Your child acts very sick or not himself or herself  · Fever or pain do not improve with antibiotics after 48 hours  Date Last Reviewed: 10/1/2017  © 8131-0980 [employer]. [address] [place] [address]. All rights reserved. This information is not intended as a substitute for professional medical care. Always follow your healthcare professional's instructions.           Earache Without Infection (Child)    Earaches can happen without an infection. This can occur when air and fluid build up behind the eardrum, causing pain and reduced hearing. This is called serous otitis media. It means fluid in the middle ear. It can happen when your child has a cold and congestion blocks the passage that drains the middle ear (eustachian tube). It may occur after a middle ear infection caused by bacteria. Or it may sometimes happen with nasal allergies. The earache may come and go. Your child may also hear clicking or popping sounds when chewing or swallowing.  It often takes several weeks to 3 months for the fluid to clear on its own. Oral pain relievers and ear drops help with pain. Decongestants and antihistamines can be used, but they don’t always help. No infection is present, so antibiotics will not help. This  condition can sometimes become an ear infection, so let the healthcare provider know if your child develops a fever or drainage from the ear or if symptoms get worse.  If your child doesn't get better after 3 months, he or she may need surgery to drain the fluid and insert ear tubes (tympanostomy). Your child may also need the tubes if he or she is at risk for speech, language, or learning problems. Or your child may need the ear tubes if he or she has hearing loss.  Home care  Your child's healthcare provider may have you keep an eye on your child (watchful waiting) for up to 3 months. This means letting the provider know if your child's symptoms don't get better or get worse.  Follow-up care  Follow up with your child’s healthcare provider as directed.  When to seek medical advice  Unless advised otherwise, call your child's healthcare provider if:  · Your child has a fever (see Fever and children, below)  · Ear pain that gets worse  · Discharge, blood, or foul odor from ear  · Unusual decreased activity, fussiness, drowsiness, or confusion  · Headache, neck pain, or stiff neck  · New rash  · Frequent diarrhea or vomiting  · Fluid or blood draining from the ear  · Convulsion (seizure)      Fever and children  Always use a digital thermometer to check your child’s temperature. Never use a mercury thermometer.  For infants and toddlers, be sure to use a rectal thermometer correctly. A rectal thermometer may accidentally poke a hole in (perforate) the rectum. It may also pass on germs from the stool. Always follow the product maker’s directions for proper use. If you don’t feel comfortable taking a rectal temperature, use another method. When you talk to your child’s healthcare provider, tell him or her which method you used to take your child’s temperature.  Here are guidelines for fever temperature. Ear temperatures aren’t accurate before 6 months of age. Don’t take an oral temperature until your child is at least  4 years old.  Infant under 3 months old:  · Ask your child’s healthcare provider how you should take the temperature.  · Rectal or forehead (temporal artery) temperature of 100.4°F (38°C) or higher, or as directed by the provider  · Armpit temperature of 99°F (37.2°C) or higher, or as directed by the provider  Child age 3 to 36 months:  · Rectal, forehead (temporal artery), or ear temperature of 102°F (38.9°C) or higher, or as directed by the provider  · Armpit temperature of 101°F (38.3°C) or higher, or as directed by the provider  Child of any age:  · Repeated temperature of 104°F (40°C) or higher, or as directed by the provider  · Fever that lasts more than 24 hours in a child under 2 years old. Or a fever that lasts for 3 days in a child 2 years or older.         Date Last Reviewed: 11/1/2017 © 2000-2018 [employer]. [address] [place] [address]. All rights reserved. This information is not intended as a substitute for professional medical care. Always follow your healthcare professional's instructions.             Amoxicillin Trihydrate Oral suspension  What is this medicine?  AMOXICILLIN (a mox i PRABHAKAR in) is a penicillin antibiotic. It is used to treat certain kinds of bacterial infections. It will not work for colds, flu, or other viral infections.  This medicine may be used for other purposes; ask your health care provider or pharmacist if you have questions.  What should I tell my health care provider before I take this medicine?  They need to know if you have any of these conditions:  · asthma  · kidney disease  · an unusual or allergic reaction to amoxicillin, other penicillins, cephalosporin antibiotics, other medicines, foods, dyes, or preservatives  · pregnant or trying to get pregnant  · breast-feeding  How should I use this medicine?  Take this medicine by mouth. Follow the directions on the prescription label. Shake well before using. Use a specially marked spoon or  dropper to measure every dose. Ask your pharmacist if you do not have one. Household spoons are not accurate. This medicine can be taken with or without food. It can be mixed with a small amount of infant formula, milk, fruit juice, water, or other cold beverage. The mixture should be taken immediately. Take your medicine at regular intervals. Do not take your medicine more often than directed. Finished the full course prescribed by your doctor even if you think your condition is better. Do not stop taking except on your doctor's advice.  Talk to your pediatrician regarding the use of this medicine in children. Special care may be needed.  Overdosage: If you think you have taken too much of this medicine contact a poison control center or emergency room at once.  NOTE: This medicine is only for you. Do not share this medicine with others.  What if I miss a dose?  If you miss a dose, take it as soon as you can. If it is almost time for your next dose, take only that dose. Do not take double or extra doses. There should be an interval of at least 6 to 8 hours between doses.  What may interact with this medicine?  · amiloride  · birth control pills  · chloramphenicol  · macrolides  · probenecid  · sulfonamides  · tetracyclines  This list may not describe all possible interactions. Give your health care provider a list of all the medicines, herbs, non-prescription drugs, or dietary supplements you use. Also tell them if you smoke, drink alcohol, or use illegal drugs. Some items may interact with your medicine.  What should I watch for while using this medicine?  Tell your doctor or health care professional if your symptoms do not improve in 2 or 3 days.  If you are diabetic, you may get a false positive result for sugar in your urine with certain brands of urine tests. Check with your doctor.  Do not treat diarrhea with over-the-counter products. Contact your doctor if you have diarrhea that lasts more than 2 days or if  the diarrhea is severe and watery.  What side effects may I notice from receiving this medicine?  Side effects that you should report to your doctor or health care professional as soon as possible:  · allergic reactions like skin rash, itching or hives, swelling of the face, lips, or tongue  · breathing problems  · dark urine  · redness, blistering, peeling or loosening of the skin, including inside the mouth  · seizures  · severe or watery diarrhea  · trouble passing urine or change in the amount of urine  · unusual bleeding or bruising  · unusually weak or tired  · yellowing of the eyes or skin  Side effects that usually do not require medical attention (report to your doctor or health care professional if they continue or are bothersome):  · dizziness  · headache  · stomach upset  · trouble sleeping  This list may not describe all possible side effects. Call your doctor for medical advice about side effects. You may report side effects to FDA at 3-124-FDA-4226.  Where should I keep my medicine?  Keep out of the reach of children.  After this medicine is mixed by your pharmacist, it is best to store it in a refrigerator. However, it can be kept at room temperature. Throw away unused medicine after 14 days. Do not freeze.  NOTE:This sheet is a summary. It may not cover all possible information. If you have questions about this medicine, talk to your doctor, pharmacist, or health care provider. Copyright© 2016 [employer]            negative

## 2021-01-01 NOTE — ED PROVIDER NOTE - CROS ED ROS STATEMENT
all other ROS negative except as per HPI [facility],   [address], [address]  [place] [address]  Phone: [phone]  Fax: (   )    -  Follow Up Time:

## 2021-06-23 NOTE — PROGRESS NOTE ADULT - PROBLEM SELECTOR PLAN 4
Orders being requested: new cologuard kit  Reason service is needed/diagnosis: kit was accidentally damaged by patient, patient requesting a new kit. Please advise!  When are orders needed by: asap  Where to send Orders: Mail:  to patient  Okay to leave detailed message?  Yes       Patient with no florid ascites on physical exam; abdomen soft and nondistended.  However, patient previously treated for SBP and patient with esophageal varices.   Patient complains of diffuse abdominal pain particularly in RUQ and LLQ.    - abdominal ultrasound showing small to moderate ascites. Patient with leukocytosis to WBC 11.8, hypotension to SBP 70's, with no confirmed infectious source but suspicion for SBP given prior treatments for SBP and history of esophageal varices and c/o of vague abd pain.  VBG lactate elevated to 4.7 (possibly from liver disease alone)     - s/p 250 cc 5% albumin for volume resuscitation (pt with low albumin) as patient intravascularly depleted and cannot give NS or LR due to risk of third spacing.  Continue giving albumin q6h.   - Initially started on Ceftriaxone and Vancomycin. Now on Zosyn to cover from Gram negative organisms. Day 4 IV abx 1/6.   - Started on Vancomycin 1/3 to 1/4 to broaden coverage. Repeat blood cultures negative from 1/4. can likely d/c vanc after negative for 48 hours. Repeat analysis of cultures shows negative. G+ most likely contimanint. As per ID will d/c antibiotics.   - Holding home diuretics and antihypertensives. Patient with leukocytosis to WBC 11.8, hypotension to SBP 70's, with no confirmed infectious source but suspicion for SBP given prior treatments for SBP and history of esophageal varices and c/o of vague abd pain.  VBG lactate elevated to 4.7 (possibly from liver disease alone)     - s/p 250 cc 5% albumin for volume resuscitation (pt with low albumin) as patient intravascularly depleted and cannot give NS or LR due to risk of third spacing.  Continue giving albumin q6h.   - Hold antibiotics per ID, low threshold to start if s/s of infection    - Holding home diuretics and antihypertensives.

## 2021-12-06 NOTE — PATIENT PROFILE ADULT. - NS PRO ABUSE SCREEN SUSPICION NEGLECT YN
Subjective   Patient ID: Mando is a 54 year old male who presents for an acute visit for BL lower leg and foot swelling.     Chief Complaint   Patient presents with   • Office Visit   • Swelling     FEET AND LEGS ( WOTRSE PAST 1 MOTH)     HPI     Notes BL leg and foot swelling in BL legs, R>L intermittent since June. Has been worsening for the past several weeks. +neuropathy, notes numbness and tingling in his feet, worse at night. States walking helps with pain. Taking tylenol 500 mg, 9 pills a day for two months. Denies excessive salt intake, admits eating occasional junk food. Denies SOB, orthopnea, PND.    Hospitalized Oct 12, L arm fracture. Pt states he was drunk at home and had a fall. Was previously drinking 3-8 drinks per day. Attending AA meetings. Motivated to stay sober and has also quit smoking.     Soc Hx - currently lives w his mother in [place]     Visit Vitals  /74 (BP Location: LUE - Left upper extremity, Patient Position: Sitting, Cuff Size: Regular)   Pulse 73   Temp 98.1 °F (36.7 °C) (Tympanic)   Resp 16   Ht 5' 11\" (1.803 m)   Wt 79.8 kg (176 lb)   SpO2 98%   BMI 24.55 kg/m²        has a past medical history of Fracture. He also has no past medical history of Difficult intubation, Failed moderate sedation during procedure, or PONV (postoperative nausea and vomiting).  Patient Active Problem List   Diagnosis   • Current smoker   • Hematuria, microscopic   • Essential hypertension   • Cholelithiasis   • Fatty liver   • Elevated LFTs   • Alcohol abuse   • Ulnar neuropathy of right upper extremity   • Diarrhea   • Elevated serum creatinine   • Vitamin B 12 deficiency   • Folate deficiency   • Sebaceous cyst - Right gluteal area   • S/P excision of skin lesion, follow-up exam   • Peripheral edema   • Chronic midline low back pain without sciatica   • Closed compression fracture of body of L1 vertebra (CMS/HCC)   • Compression fracture of thoracic vertebra with routine healing   • Closed 
nondisplaced comminuted fracture of shaft of left humerus   • Debility   • ACP (advance care planning)   • Macrocytic anemia   • Vitamin D deficiency   • Neuropathic pain of both legs      has a past surgical history that includes skin biopsy; Skin lesion excision (Right, 08/02/2019); Colonoscopy (2019); and back surgery.  family history includes Diabetes in his mother; Hypertension in his mother.  has a current medication list which includes the following prescription(s): nicotine, sertraline, acetaminophen, cholecalciferol, folic acid, lidocaine, amlodipine, losartan, and polyethylene glycol.  ALLERGIES:  Patient has no known allergies.    Review of Systems   Constitutional: Negative.  Negative for fatigue and fever.   HENT: Negative.    Eyes: Negative.    Respiratory: Negative.  Negative for shortness of breath.    Cardiovascular: Positive for leg swelling. Negative for chest pain.   Gastrointestinal: Negative.  Negative for abdominal pain, constipation and diarrhea.   Endocrine: Negative.    Genitourinary: Negative.    Musculoskeletal: Positive for back pain.   Skin: Positive for rash (lower extremity).   Neurological: Positive for numbness (neuropathic pain BLE).   Psychiatric/Behavioral: Negative.        Objective   Physical Exam  Cardiovascular:      Rate and Rhythm: Normal rate and regular rhythm.      Pulses:           Dorsalis pedis pulses are 1+ on the right side and 1+ on the left side.      Heart sounds: Normal heart sounds. No murmur heard.      Pulmonary:      Effort: Pulmonary effort is normal. No respiratory distress.      Breath sounds: Normal breath sounds. No rhonchi or rales.   Abdominal:      General: Abdomen is flat. There is no distension.      Palpations: Abdomen is soft. There is no mass.      Tenderness: There is no abdominal tenderness.   Musculoskeletal:      Right lower leg: Edema (2+ pitting ) present.      Left lower leg: Edema (1+ pitting) present.   Neurological:      Sensory: No 
sensory deficit (vibratory sensation in tact at MTP joint BL).   Psychiatric:         Mood and Affect: Mood normal.         Behavior: Behavior normal.         Patient Active Problem List   Diagnosis   • Current smoker   • Hematuria, microscopic   • Essential hypertension   • Cholelithiasis   • Fatty liver   • Elevated LFTs   • Alcohol abuse   • Ulnar neuropathy of right upper extremity   • Diarrhea   • Elevated serum creatinine   • Vitamin B 12 deficiency   • Folate deficiency   • Sebaceous cyst - Right gluteal area   • S/P excision of skin lesion, follow-up exam   • Peripheral edema   • Chronic midline low back pain without sciatica   • Closed compression fracture of body of L1 vertebra (CMS/HCC)   • Compression fracture of thoracic vertebra with routine healing   • Closed nondisplaced comminuted fracture of shaft of left humerus   • Debility   • ACP (advance care planning)   • Macrocytic anemia   • Vitamin D deficiency   • Neuropathic pain of both legs         Assessment and Plan   Diagnoses and all orders for this visit:    Peripheral edema  - most likely 2/2 lymphedema vs excess sodium intake, less likely d/t CHF, renal or liver failure   - CMP 10/14: ALT 19 AST 20 albumin 2.1   - BMP 10/12: gfr > 90 Cr 0.92   - start on lasix 40 mg qd  - encouraged to wear compression stockings at home    Neuropathic pain of both legs   - start on gabapentin 300 mg BID   - advised to dec use of OTC Tylenol; educated on risks of liver failure    Schedule follow up: as needed - Instructed to call if symptoms worsens, do not improve or new symptoms arise      I have spend 20 minutes with patient more than 50 percent was face to face, counseling and coordinating care.     
unable to assess
no

## 2021-12-29 NOTE — PROVIDER CONTACT NOTE (CRITICAL VALUE NOTIFICATION) - NAME OF MD/NP/PA/DO NOTIFIED:
December 29, 2021      Jacinda Ewing  [id number]  [address]  [place] [address]        Dear Ms. Eiwng:    Our records indicate that you are due for fasting labwork. These orders have been placed and you do not need an appointment to have these drawn. Our [facility]   lab is open Monday-Friday from 7:40 a.m.-7:00 p.m. and Saturdays from 8:00 a.m.- 12 noon.    Please do not eat any food or drink any beverages 12 hours prior to having this lab work completed. You may drink water prior to these tests.  Please take all medications as usual.  Do not drink any alcoholic beverages for 24 hours prior to testing.  If you have any questions, please feel free to call this office at [phone].      Sincerely yours,        Camilla Rice MD  [facility]  [phone]             Noman Harden MD

## 2022-03-23 NOTE — ED ADULT TRIAGE NOTE - HEIGHT IN FEET
6 Suturegard Intro: Intraoperative tissue expansion was performed, utilizing the SUTUREGARD device, in order to reduce wound tension.

## 2022-04-28 NOTE — PROGRESS NOTE ADULT - ASSESSMENT
53M end stage liver disease 2/2 alcoholic cirrhosis, T2DM, HFrEF (35%), CAD, HTN, presenting with 1 week of generalized weakness and hypotension, admitted for acute decompensated liver failure. Now s/p diagnostic paracentesis showing no evidence of SBP. Course c/b enlarging R gluteal hematoma with decreasing hemoglobin despite continued blood transfusions. No intra-abdominal or pericardial free fluid

## 2022-05-17 NOTE — PROGRESS NOTE ADULT - PROBLEM SELECTOR PLAN 2
MELD 34 on admission, indicating >50% mortality in next 3 months. Was recently on liver transplant candidate list at [facility], however had to be removed due to severe HFrEF, no longer plans for liver transplant    - Patient on Furosemide 20 mg and spironolactone 25 mg daily. Will hold diuretics for now as pt intravascularly depleted and hypotensive on arrival in setting of aggressive diuretic regimen as outpatient (lasix/aldactone + addition of metolazone 2 wks ago).   - Will continue with Lactulose and Rifaximin PO.   - Lower concern for hepatic encephalopathy at this time.   - Evidence of severe synthetic dysfunction, portal hypertensive, varices, and existing heart failure and now ULICES. Poor prognosis.  - U/S Abdomen showed small to moderate ascites in the RUQ. Per hepatology, consider diagnostic paracentesis to rule out SBP  - MRCP and MR Abdomen with Iron quantification patient and patient's brother are agreeable.  d/w hepatology to restart lasix/spironolactone Statement Selected MELD 34 on admission, indicating >50% mortality in next 3 months. Was recently on liver transplant candidate list at [facility], however had to be removed due to severe HFrEF, no longer plans for liver transplant    - Patient on Furosemide 20 mg and spironolactone 25 mg daily. Will hold diuretics for now as pt intravascularly depleted and hypotensive on arrival in setting of aggressive diuretic regimen as outpatient (lasix/aldactone + addition of metolazone 2 wks ago).   - Will continue with Lactulose and Rifaximin PO.   - Lower concern for hepatic encephalopathy at this time.   - Evidence of severe synthetic dysfunction, portal hypertensive, varices, and existing heart failure and now ULICES. Poor prognosis.  - U/S Abdomen showed small to moderate ascites in the RUQ. Per hepatology, consider diagnostic paracentesis to rule out SBP  - MRCP and MR Abdomen with Iron quantification patient and patient's brother are agreeable.  - d/w hepatology to restart lasix/spironolactone MELD 34 on admission, indicating >50% mortality in next 3 months. Was recently on liver transplant candidate list at [facility], however had to be removed due to severe HFrEF, no longer plans for liver transplant    - Patient on Furosemide 20 mg and spironolactone 25 mg daily, will hold diuretics for now until paracentesis done. Renal function improved.   - Will continue with Lactulose and Rifaximin PO.   - Evidence of severe synthetic dysfunction, portal hypertensive, varices, and existing heart failure. ULICES resolved.   - U/S Abdomen showed small to moderate ascites in the RUQ. Per hepatology, consider diagnostic paracentesis to rule out SBP by IR tomorrow 1/7.   - MRCP/ MR abdomen showing changes as discussed above.

## 2022-10-02 NOTE — ED PROVIDER NOTE - ENMT, MLM
Airway patent, Nasal mucosa clear. Mouth with normal mucosa. Throat has no vesicles, no oropharyngeal exudates and uvula is midline. Pressure Injury Stage 2 or >

## 2022-11-30 NOTE — CONSULT NOTE ADULT - SUBJECTIVE AND OBJECTIVE BOX
53m PMHx EtOH cirrhosis c/b varices, hepatic encephalopathy on transplant list at [facility] p/w AMS since last night as per brother.    Patient is a 53y old  Male who presents with a chief complaint of AMS    HPI    53m PMHx EtOH cirrhosis c/b varices, hepatic encephalopathy on transplant list at [facility] p/w AMS since last night as per brother. Pt reported abdominal pain started a few days ago, dizzinesss, and today had an episode of lightheadedness alost causing him to fall. Has been has BMs at home which he describes as loose but not watery, adherent to home lactulose. Denies fevers, chills, CP, SOB, palpitations, headaches, vision changes     In ED  - hypotension to SBP 80s, temp 100.6, 90% on RA, gingival bleeding, abdominal pain w/ CT a/p showing esoph varices, moderate ascites, portal hypertensive colopathy, s/p diag paracentesis w/ 37K WBC, 26K RBC, (+) UTI,     MEDICATIONS  (STANDING):  albumin human  5% IVPB 250 milliLiter(s) IV Intermittent once  albumin human 25% IVPB 100 milliLiter(s) IV Intermittent every 6 hours  dextrose 5% + sodium chloride 0.45%. 1000 milliLiter(s) (100 mL/Hr) IV Continuous <Continuous>  piperacillin/tazobactam IVPB. 3.375 Gram(s) IV Intermittent once  piperacillin/tazobactam IVPB. 3.375 Gram(s) IV Intermittent every 8 hours    MEDICATIONS  (PRN):      CAPILLARY BLOOD GLUCOSE      POCT Blood Glucose.: 120 mg/dL (28 Sep 2018 02:26)  POCT Blood Glucose.: 62 mg/dL (27 Sep 2018 22:46)    I&O's Summary      Vital Signs Last 24 Hrs  T(C): 36.7 (28 Sep 2018 05:59), Max: 38.1 (27 Sep 2018 23:00)  T(F): 98.1 (28 Sep 2018 05:59), Max: 100.6 (27 Sep 2018 23:00)  HR: 74 (28 Sep 2018 07:10) (63 - 74)  BP: 92/50 (28 Sep 2018 07:10) (74/43 - 99/58)  BP(mean): --  RR: 18 (28 Sep 2018 07:10) (18 - 24)  SpO2: 100% (28 Sep 2018 07:10) (90% - 100%)    PHYSICAL EXAM:  General: NAD, well-developed  Eyes: EOMI, (+) scleral icteris   Neck: Supple, No JVD  Chest/Lung: Clear to auscultation bilaterally; No wheezes  Heart: Regular rate and rhythm; No murmurs, rubs, or gallops.   Abdom: Soft, Nondistended; Bowel sounds present. (+) diffuse TTP, worse in lower quandrants.   Extremities: No lower extremity edema.   Psych: AAOx3  Neurology: non-focal, strength and sensation grossly intact UE and LE BL. No spinal TTP  Skin: No rashes or lesions    LABS:                        11.2   10.9  )-----------( 68       ( 27 Sep 2018 23:12 )             33.9           132<L>  |  101  |  26<H>  ----------------------------<  61<L>  5.2   |  19<L>  |  1.09    Ca    8.7      27 Sep 2018 23:12    TPro  6.9  /  Alb  2.3<L>  /  TBili  10.9<H>  /  DBili  x   /  AST  106<H>  /  ALT  19  /  AlkPhos  153<H>        PT/INR - ( 27 Sep 2018 23:12 )   PT: 28.2 sec;   INR: 2.56 ratio         PTT - ( 27 Sep 2018 23:12 )  PTT:46.1 sec          06:49 - VBG - pH: 7.35  | pCO2: 38    | pO2: 39    | Lactate: 3.0    02:40 - VBG - pH: 7.34  | pCO2: 43    | pO2: 21    | Lactate: 3.6    23:12 - VBG - pH: 7.37  | pCO2: 40    | pO2: 19    | Lactate: 4.0        Urinalysis Basic - ( 28 Sep 2018 01:49 )    Color: Jodie / Appearance: Slightly Turbid / S.022 / pH: x  Gluc: x / Ketone: Trace  / Bili: Large / Urobili: Negative   Blood: x / Protein: 30 mg/dL / Nitrite: Positive   Leuk Esterase: Negative / RBC: 0-2 /hpf / WBC 0-2 /hpf   Sq Epi: x / Non Sq Epi: x / Bacteria: Few        EKG:   CXR:       RADIOLOGY & ADDITIONAL TESTS:    Imaging Personally Reviewed:    Consultant(s) Notes Reviewed:      Care Discussed with Consultants/Other Providers: 53m PMHx EtOH cirrhosis c/b varices, hepatic encephalopathy on transplant list at [facility] p/w AMS since last night as per brother.    Patient is a 53y old  Male who presents with a chief complaint of AMS    HPI    53m PMHx EtOH cirrhosis c/b varices, hepatic encephalopathy on transplant list at [facility] p/w AMS since last night as per brother. Pt reported abdominal pain started a few days ago, dizzinesss, and today had an episode of lightheadedness alost causing him to fall. Has been has BMs at home which he describes as loose but not watery, adherent to home lactulose. Denies fevers, chills, CP, SOB, palpitations, headaches, vision changes     In ED  - hypotension to SBP 80s, temp 100.6, 90% on RA, gingival bleeding, abdominal pain w/ CT a/p showing esoph varices, moderate ascites, portal hypertensive colopathy, s/p diag paracentesis w/ 37K WBC, 26K RBC, (+) UTI,     MEDICATIONS  (STANDING):  albumin human  5% IVPB 250 milliLiter(s) IV Intermittent once  albumin human 25% IVPB 100 milliLiter(s) IV Intermittent every 6 hours  dextrose 5% + sodium chloride 0.45%. 1000 milliLiter(s) (100 mL/Hr) IV Continuous <Continuous>  piperacillin/tazobactam IVPB. 3.375 Gram(s) IV Intermittent once  piperacillin/tazobactam IVPB. 3.375 Gram(s) IV Intermittent every 8 hours    MEDICATIONS  (PRN):      CAPILLARY BLOOD GLUCOSE      POCT Blood Glucose.: 120 mg/dL (28 Sep 2018 02:26)  POCT Blood Glucose.: 62 mg/dL (27 Sep 2018 22:46)    I&O's Summary      Vital Signs Last 24 Hrs  T(C): 36.7 (28 Sep 2018 05:59), Max: 38.1 (27 Sep 2018 23:00)  T(F): 98.1 (28 Sep 2018 05:59), Max: 100.6 (27 Sep 2018 23:00)  HR: 74 (28 Sep 2018 07:10) (63 - 74)  BP: 92/50 (28 Sep 2018 07:10) (74/43 - 99/58)  BP(mean): --  RR: 18 (28 Sep 2018 07:10) (18 - 24)  SpO2: 100% (28 Sep 2018 07:10) (90% - 100%)    PHYSICAL EXAM:  General: NAD, well-developed  HEENT: EOMI, (+) scleral icteris, dried blood in oral cavity. no active bleeding  Neck: Supple, No JVD  Chest/Lung: (+) mild crackles at BL lung bases  Heart: Regular rate and rhythm; No murmurs, rubs, or gallops.   Abdom: Soft, Nondistended; Bowel sounds present. (+) diffuse TTP, worse in lower quandrants.   Extremities: No lower extremity edema. No (+) asterixis   Psych: AAOx2-3  Neurology: non-focal, strength and sensation grossly intact UE and LE BL. No spinal TTP  Skin: No rashes or lesions    LABS:                        11.2   10.9  )-----------( 68       ( 27 Sep 2018 23:12 )             33.9           132<L>  |  101  |  26<H>  ----------------------------<  61<L>  5.2   |  19<L>  |  1.09    Ca    8.7      27 Sep 2018 23:12    TPro  6.9  /  Alb  2.3<L>  /  TBili  10.9<H>  /  DBili  x   /  AST  106<H>  /  ALT  19  /  AlkPhos  153<H>        PT/INR - ( 27 Sep 2018 23:12 )   PT: 28.2 sec;   INR: 2.56 ratio         PTT - ( 27 Sep 2018 23:12 )  PTT:46.1 sec          06:49 - VBG - pH: 7.35  | pCO2: 38    | pO2: 39    | Lactate: 3.0    02:40 - VBG - pH: 7.34  | pCO2: 43    | pO2: 21    | Lactate: 3.6    23:12 - VBG - pH: 7.37  | pCO2: 40    | pO2: 19    | Lactate: 4.0        Urinalysis Basic - ( 28 Sep 2018 01:49 )    Color: Jodie / Appearance: Slightly Turbid / S.022 / pH: x  Gluc: x / Ketone: Trace  / Bili: Large / Urobili: Negative   Blood: x / Protein: 30 mg/dL / Nitrite: Positive   Leuk Esterase: Negative / RBC: 0-2 /hpf / WBC 0-2 /hpf   Sq Epi: x / Non Sq Epi: x / Bacteria: Few        EKG:   CXR:       RADIOLOGY & ADDITIONAL TESTS:    Imaging Personally Reviewed:    Consultant(s) Notes Reviewed:      Care Discussed with Consultants/Other Providers: Patient is a 53y old  Male who presents with a chief complaint of AMS    HPI    53m PMHx EtOH cirrhosis c/b varices, hepatic encephalopathy on transplant list at [facility] p/w AMS since last night as per brother. Pt reported abdominal pain started a few days ago, dizzinesss, and today had an episode of lightheadedness alost causing him to fall. Has been has BMs at home which he describes as loose but not watery, adherent to home lactulose. Denies fevers, chills, CP, SOB, palpitations, headaches, vision changes     In ED  - hypotension to SBP 80s, temp 100.6, 90% on RA, gingival bleeding, abdominal pain w/ CT a/p showing esoph varices, moderate ascites, portal hypertensive colopathy, s/p diag paracentesis w/ 37K WBC, 26K RBC, (+) UTI,   - received, NS 2.5L total, albumin 25% x 250cc, CTX 2g, zosyn.     MEDICATIONS  (STANDING):  albumin human  5% IVPB 250 milliLiter(s) IV Intermittent once  albumin human 25% IVPB 100 milliLiter(s) IV Intermittent every 6 hours  dextrose 5% + sodium chloride 0.45%. 1000 milliLiter(s) (100 mL/Hr) IV Continuous <Continuous>  piperacillin/tazobactam IVPB. 3.375 Gram(s) IV Intermittent once  piperacillin/tazobactam IVPB. 3.375 Gram(s) IV Intermittent every 8 hours    MEDICATIONS  (PRN):      CAPILLARY BLOOD GLUCOSE      POCT Blood Glucose.: 120 mg/dL (28 Sep 2018 02:26)  POCT Blood Glucose.: 62 mg/dL (27 Sep 2018 22:46)    I&O's Summary      Vital Signs Last 24 Hrs  T(C): 36.7 (28 Sep 2018 05:59), Max: 38.1 (27 Sep 2018 23:00)  T(F): 98.1 (28 Sep 2018 05:59), Max: 100.6 (27 Sep 2018 23:00)  HR: 74 (28 Sep 2018 07:10) (63 - 74)  BP: 92/50 (28 Sep 2018 07:10) (74/43 - 99/58)  BP(mean): --  RR: 18 (28 Sep 2018 07:10) (18 - 24)  SpO2: 100% (28 Sep 2018 07:10) (90% - 100%)    PHYSICAL EXAM:  General: NAD, well-developed  HEENT: EOMI, (+) scleral icteris, dried blood in oral cavity. no active bleeding  Neck: Supple, No JVD  Chest/Lung: (+) mild crackles at BL lung bases  Heart: Regular rate and rhythm; No murmurs, rubs, or gallops.   Abdom: Soft, Nondistended; Bowel sounds present. (+) diffuse TTP, worse in lower quandrants.   Extremities: No lower extremity edema. No (+) asterixis   Psych: AAOx2-3  Neurology: non-focal, strength and sensation grossly intact UE and LE BL. No spinal TTP  Skin: No rashes or lesions    LABS:                        11.2   10.9  )-----------( 68       ( 27 Sep 2018 23:12 )             33.9           132<L>  |  101  |  26<H>  ----------------------------<  61<L>  5.2   |  19<L>  |  1.09    Ca    8.7      27 Sep 2018 23:12    TPro  6.9  /  Alb  2.3<L>  /  TBili  10.9<H>  /  DBili  x   /  AST  106<H>  /  ALT  19  /  AlkPhos  153<H>        PT/INR - ( 27 Sep 2018 23:12 )   PT: 28.2 sec;   INR: 2.56 ratio         PTT - ( 27 Sep 2018 23:12 )  PTT:46.1 sec          06:49 - VBG - pH: 7.35  | pCO2: 38    | pO2: 39    | Lactate: 3.0    02:40 - VBG - pH: 7.34  | pCO2: 43    | pO2: 21    | Lactate: 3.6    23:12 - VBG - pH: 7.37  | pCO2: 40    | pO2: 19    | Lactate: 4.0        Urinalysis Basic - ( 28 Sep 2018 01:49 )    Color: Jodie / Appearance: Slightly Turbid / S.022 / pH: x  Gluc: x / Ketone: Trace  / Bili: Large / Urobili: Negative   Blood: x / Protein: 30 mg/dL / Nitrite: Positive   Leuk Esterase: Negative / RBC: 0-2 /hpf / WBC 0-2 /hpf   Sq Epi: x / Non Sq Epi: x / Bacteria: Few        EKG:   CXR:       RADIOLOGY & ADDITIONAL TESTS:    Imaging Personally Reviewed:    Consultant(s) Notes Reviewed:      Care Discussed with Consultants/Other Providers: [phone]

## 2023-02-22 NOTE — ED ADULT NURSE NOTE - NS ED PATIENT SAFETY CONCERN
Body Location Override (Optional - Billing Will Still Be Based On Selected Body Map Location If Applicable): left proximal forearm Detail Level: Detailed Depth Of Biopsy: dermis Was A Bandage Applied: Yes Size Of Lesion In Cm: 0 Biopsy Type: H and E Biopsy Method: sterile single edge surgical blade Anesthesia Type: 1% lidocaine with epinephrine and a 1:10 solution of 8.4% sodium bicarbonate Anesthesia Volume In Cc (Will Not Render If 0): 0.5 Hemostasis: Electrocautery Wound Care: Aquaphor Dressing: bandage Destruction After The Procedure: No Type Of Destruction Used: Curettage Curettage Text: The wound bed was treated with curettage after the biopsy was performed. Cryotherapy Text: The wound bed was treated with cryotherapy after the biopsy was performed. Electrodesiccation Text: The wound bed was treated with electrodesiccation after the biopsy was performed. Electrodesiccation And Curettage Text: The wound bed was treated with electrodesiccation and curettage after the biopsy was performed. Silver Nitrate Text: The wound bed was treated with silver nitrate after the biopsy was performed. Lab: [id number] Consent: Written consent was obtained and risks were reviewed including but not limited to scarring, infection, bleeding, scabbing, incomplete removal, nerve damage and allergy to anesthesia. Post-Care Instructions: I reviewed with the patient in detail post-care instructions. Patient is to keep the biopsy site dry overnight, and then apply bacitracin twice daily until healed. Patient may apply hydrogen peroxide soaks to remove any crusting. Notification Instructions: Patient will be notified of biopsy results. However, patient instructed to call the office if not contacted within 2 weeks. Billing Type: [employer] Information: Selecting Yes will display possible errors in your note based on the variables you have selected. This validation is only offered as a suggestion for you. PLEASE NOTE THAT THE VALIDATION TEXT WILL BE REMOVED WHEN YOU FINALIZE YOUR NOTE. IF YOU WANT TO FAX A PRELIMINARY NOTE YOU WILL NEED TO TOGGLE THIS TO 'NO' IF YOU DO NOT WANT IT IN YOUR FAXED NOTE. No

## 2023-03-23 NOTE — ED ADULT TRIAGE NOTE - AS O2 DELIVERY
For information on Fall & Injury Prevention, visit: [email or web address] OR  [email or web address] OR  https://www.cdc.gov/steadi/patient.html room air

## 2023-07-05 NOTE — DISCHARGE NOTE ADULT - NSFUCAREDSC_ALL_CORE_SIUH
VRE infection (vancomycin resistant Enterococcus) No, the patient is not being discharged from [facility]

## 2023-07-15 NOTE — ED ADULT NURSE REASSESSMENT NOTE - NS ED NURSE REASSESS COMMENT FT1
s/w Dr Singh regarding off tele order for patient. S/w tele tech- patient NSR with occasional PVCs. Dr Win aware and states ok for patient to go off tele to ultrasound. Repeated order again to MD for verification. States he will add order in computer. 0 (no pain/absence of nonverbal indicators of pain)

## 2023-10-02 NOTE — ED ADULT TRIAGE NOTE - NS ED TRIAGE EKG
Medication Question or Refill    Contacts         Type Contact Phone/Fax    10/02/2023 08:06 AM CDT Phone (Incoming) Kylah Ojeda (Self) [phone] (M)            What medication are you calling about (include dose and sig)?: amLODIPine (NORVASC) 10 MG tablet     Preferred Pharmacy:   [facility] - [place], [place] - [address] [place] [address]  Phone: [phone] Fax: [phone]      Controlled Substance Agreement on file:   CSA -- Patient Level:    CSA: None found at the patient level.       Who prescribed the medication?: primary    Do you need a refill? Yes    When did you use the medication last? 7/5    Patient offered an appointment? Yes: November    Do you have any questions or concerns?  No      Could we send this information to you in [facility] or would you prefer to receive a phone call?:   Patient would prefer a phone call   Okay to leave a detailed message?: Yes at Home number on file [phone] (home)     EKG completed

## 2024-01-01 NOTE — PATIENT PROFILE ADULT - NSPROPTRIGHTBILLOFRIGHTS_GEN_A_NUR
CDC VIS provided to and discussed with caregiver including risks and benefits of vaccines to be administered at today's visit (see vaccines below), reviewed signs and symptoms of vaccine reactions and when to call clinic.   
Growing and developing well  Age appropriate anticipatory guidance regarding growth, development, nutrition, vaccination, and safety discussed and handout given to caregiver.   
patient

## 2024-02-08 NOTE — DISCHARGE NOTE ADULT - IF YOU ARE A SMOKER, IT IS IMPORTANT FOR YOUR HEALTH TO STOP SMOKING. PLEASE BE AWARE THAT SECOND HAND SMOKE IS ALSO HARMFUL.
SUBJECTIVE:  Tevin Lema is a 2 year old male who presents with his mother regarding concern for cough, fever.    Tired Friday (6 days ago). Fever that evening. Cough, runny nose, congestion over the weekend. Seemed better symptom-wise by Monday but still with fever. Last fever yesterday mid-day. No medications today. No vomiting, diarrhea. Father and sister both with recent URIs.     Patient's medications, allergies, past medical, surgical, social, and family histories were reviewed and updated as appropriate       OBJECTIVE:  Visit Vitals  Pulse 110   Temp 98.5 °F (36.9 °C) (Temporal)   Resp (!) 16   Ht 3' 7.11\" (1.095 m)   Wt 16.9 kg (37 lb 4.1 oz)   SpO2 97%   BMI 14.09 kg/m²   GENERAL: Alert, ill but non-toxic appearing.   EARS: Tympanic membranes with normal light reflex and bony landmarks bilaterally, no redness or effusion. Ear canals negative.   EYES: Conjunctiva clear with scant drainage from left eye.   NOSE: Nasal mucosa pink with crusted discharge.   MOUTH/THROAT: Minimal pharyngeal erythema with postnasal drainage noted. No tonsillar exudate. Mucous membranes moist.   NECK: No nuchal rigidity.   RESPIRATORY: Lungs clear to auscultation bilaterally without wheezes or crackles. Easy work of breathing with no retractions. Transmitted upper airway sounds  CARDIOVASCULAR: Heart regular rate and rhythm with II/VI systolic murmur      ASSESSMENT:  Diagnoses:  1. Viral URI with cough         PLAN:   * Continue daily zyrtec and Flonase   * Continue supportive cares at home to allow for adequate rest   * Encourage fluid intake to maintain hydration   * Recommend coolmist humidifier, nasal saline, over-the-counter acetaminophen or ibuprofen as needed for discomfort or fever   * Monitor closely for progression of symptoms over the next few days and call or have Tevin seen with any new concerns or lack of improvement   * Return if symptoms worsen or fail to improve.       Sarah Ly,    2/8/2024   Statement Selected

## 2024-05-02 NOTE — PROVIDER CONTACT NOTE (CRITICAL VALUE NOTIFICATION) - SITUATION
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

## 2024-08-31 NOTE — ED PROVIDER NOTE - CRITICAL CARE PROVIDED
No consultation with other physicians/additional history taking/direct patient care (not related to procedure)/documentation/consult w/ pt's family directly relating to pts condition/interpretation of diagnostic studies

## 2024-09-24 NOTE — PROGRESS NOTE ADULT - ASSESSMENT
53M PMH EtOH cirrhosis c/b varices (no alc in 3.5 yrs), hepatic encephalopathy on transplant list at [facility] p/w AMS, hypotension to SBP 80's, febrile to 100.6 F, and hypoxic to 90% on RA with associated symptoms of abdominal pain/distension/ascites, gingival bleeding, portal colopathy on CT, and found to have SBP on dx paracentesis (WBC 37K) and E coli bacteremia.       Neuro  # Hepatic encephalopathy w/ SBP: Mild, AOx2-3 below baseline per brothers, now improving. ammonia 36.   - zinc level pending     Pulm  # Hypoxic respiratory failure: mild, saturating well on room air. CXR w/o radiolographic of pna  - may be 2/2 fluid overload in setting of moderate ascites and hypotension req'ing fluids  	  CVS  # Hypotension w/ possible distributive shock in setting of SBP  - cont w/ albumin     GI   # Cirrhosis w/ varcies c/b SBP. on txp list on [facility], hep A, B, C all non reactive in 2015.   - SBP with 37K WBCs,  MELD at 30  - FU hepatology recs for repeat tap in 48 hours (tonight at 11 PM)   - cont w/ albumin repletion  - cont w/ lactulose and titrate to BMs 2-3 per day   - Abdominal US Doppler:   Evidence of portal venous hypertension with reversal flow in the right   portal vein and shunting of flow in the left portal vein towards a   recannulated umbilical vein, as seen on CT. Splenic varices. Splenomegaly. Cirrhosis and moderate abdominal ascites.      # Portal hypertensive colopathy  - Found on CT a/p, serial abdominals exams, current abdom soft w/ mild LQ TTP.     Renal  # HypoNa: mild at 133, likely represent hypervolemic in setting of cirrhosis w/ ascites.     # Metabolic acidosi. Lactate initially 4, most recently 2.7   - likely 2/2 lactic acidosis in setting of sepsis w/ SBP.   # at high risk of decompensation into HRS, however sCr currently wnl.   - strict I/Os, albumin supplementation should help ppx against HRS    ID  # SBP w/36K nucleated cells on diagnostic paracentesis, received CTX and zosyn  - C/w Zosyn  - F/u blood cultures and urine cultures  - Dental consult for possible infection source  - ID Consult    Heme  # Coagulopathic w/ thrombocytopenia in setting of cirrhosis w/ gingival hemorrhage  - dental consult to optimize dentition.   # Macrocytic anemia: likely 2/2 chronic EtOH abuse c/b cirrhosis, b12 level in 2015 wnl  - Repeat B12 w/methylmalonic acid, folate    Endo  # no acute issues.     # DVT ppx: hold pharm AC given varices and high risk of bleed. ICD's for now. 53M PMH EtOH cirrhosis c/b varices (no alc in 3.5 yrs), hepatic encephalopathy on transplant list at [facility] p/w AMS, hypotension to SBP 80's, febrile to 100.6 F, and hypoxic to 90% on RA with associated symptoms of abdominal pain/distension/ascites, gingival bleeding, portal colopathy on CT, and found to have SBP on dx paracentesis (WBC 37K) and E coli bacteremia.       Neuro  # Hepatic encephalopathy w/ SBP: Mild, AOx2-3 below baseline per brothers, now improving. ammonia 36.   - zinc level pending     Pulm  # Hypoxic respiratory failure: mild, saturating well on room air. CXR w/o radiolographic of pna  - may be 2/2 fluid overload in setting of moderate ascites and hypotension req'ing fluids  	  CVS  # Hypotension w/ possible distributive shock in setting of SBP  - cont w/ albumin     GI   # Cirrhosis w/ varcies c/b SBP. on txp list on [facility], hep A, B, C all non reactive in 2015.   - SBP with 37K WBCs,  MELD at 30  - FU hepatology recs for repeat tap in 48 hours (tonight at 11 PM)   - cont w/ albumin repletion  - cont w/ lactulose and titrate to BMs 2-3 per day   - Abdominal US Doppler:   Evidence of portal venous hypertension with reversal flow in the right   portal vein and shunting of flow in the left portal vein towards a   recannulated umbilical vein, as seen on CT. Splenic varices. Splenomegaly. Cirrhosis and moderate abdominal ascites.  - Advance to regular diet     # Portal hypertensive colopathy  - Found on CT a/p, serial abdominals exams, current abdom soft w/ mild LQ TTP.     Renal  # HypoNa: mild at 133, likely represent hypervolemic in setting of cirrhosis w/ ascites.     # Metabolic acidosi. Lactate initially 4, most recently 2.7   - likely 2/2 lactic acidosis in setting of sepsis w/ SBP.   # at high risk of decompensation into HRS, however sCr currently wnl.   - strict I/Os, albumin supplementation should help ppx against HRS    ID  # SBP w/36K nucleated cells on diagnostic paracentesis, received CTX and zosyn  - E coli bacteremia   - C/w Zosyn  - F/u blood culture sensitivities   - Dental consult   - repeat blood cultures     Heme  # Coagulopathic w/ thrombocytopenia in setting of cirrhosis w/ gingival hemorrhage  - dental consult to optimize dentition.   # Macrocytic anemia: likely 2/2 chronic EtOH abuse c/b cirrhosis, b12 level in 2015 wnl  - Repeat B12 w/methylmalonic acid, folate    Endo  # no acute issues.     # DVT ppx: hold pharm AC given varices and high risk of bleed. ICD's for now. 53M PMH EtOH cirrhosis c/b varices (no alc in 3.5 yrs), hepatic encephalopathy on transplant list at [facility] p/w AMS, hypotension to SBP 80's, febrile to 100.6 F, and hypoxic to 90% on RA with associated symptoms of abdominal pain/distension/ascites, gingival bleeding, portal colopathy on CT, and found to have SBP on dx paracentesis (WBC 37K) and E coli bacteremia.       Neuro  # Hepatic encephalopathy w/ SBP: Mild, AOx2-3 below baseline per brothers, now improving. ammonia 36.   - zinc level pending     Pulm  # Hypoxic respiratory failure: mild, saturating well on room air. CXR w/o radiolographic of pna  - may be 2/2 fluid overload in setting of moderate ascites and hypotension req'ing fluids  	  CVS  # Hypotension w/ possible distributive shock in setting of SBP  - cont w/ albumin     GI   # Cirrhosis w/ varcies c/b SBP. on txp list on [facility], hep A, B, C all non reactive in 2015.   - SBP with 37K WBCs,  MELD at 30  - FU hepatology recs for repeat tap in 48 hours (tonight at 11 PM)   - cont w/ albumin repletion  - cont w/ lactulose and titrate to BMs 2-3 per day   - Abdominal US Doppler:   Evidence of portal venous hypertension with reversal flow in the right   portal vein and shunting of flow in the left portal vein towards a   recannulated umbilical vein, as seen on CT. Splenic varices. Splenomegaly. Cirrhosis and moderate abdominal ascites.  - Advance to regular diet     # Portal hypertensive colopathy  - Found on CT a/p, serial abdominals exams, current abdom soft w/ mild LQ TTP.     Renal  # HypoNa: mild at 133, likely represent hypervolemic in setting of cirrhosis w/ ascites.     # Metabolic acidosi. Lactate initially 4, most recently 2.7   - likely 2/2 lactic acidosis in setting of sepsis w/ SBP.   # at high risk of decompensation into HRS, however sCr currently wnl.   - strict I/Os, albumin supplementation should help ppx against HRS    ID  # SBP w/37K nucleated cells on diagnostic paracentesis, received CTX and zosyn  - E coli bacteremia likely from intraabdominal pathology   - C/w Zosyn  - F/u blood culture sensitivities   - repeat blood cultures pending   - ID on board  - Dental recs - no indication of oral infection, FU as outpt     Heme  # Coagulopathic w/ thrombocytopenia in setting of cirrhosis w/ gingival hemorrhage (dental recs to FU as outpt)   # Macrocytic anemia: likely 2/2 chronic EtOH abuse c/b cirrhosis, b12 level in 2015 wnl  - B12, folate WNL.     Endo  # no acute issues.     # DVT ppx: hold pharm AC given varices and high risk of bleed. ICD's for now. There are no Wet Read(s) to document.

## 2024-10-31 NOTE — H&P ADULT - PROBLEM SELECTOR PROBLEM 4
[FreeTextEntry1] : pt is here for follow up and flu shot [de-identified] : Pt is a 24 y.o. male with PMHx of acne in the past as well as upper airway cough syndrome presenting after seeing pulmonology and dermatology. Pt was recently started on long term doxycycline for his acne and had normal PFTs and DLCO on his pulmonology f/u. Pt states that he is due for a f/u with pulm again in december for continued evaluation of his possible asthma with a methacholine challenge. Denies any other complaints at this time. Congestive heart failure (CHF) Thrombocytopenia Hyperkalemia

## 2025-05-09 NOTE — H&P ADULT - NSCORESITESY/N_GEN_A_CORE_RD
Patient would like to do his labs before his CPE on 09/26/2025.  Please call to discuss.    [phone]     No

## 2025-06-05 NOTE — PATIENT PROFILE ADULT - FUNCTIONAL SCREEN CURRENT LEVEL: DRESSING, MLM
Subjective   Patient ID: Matheus is a 76 year old male.    77 Y/O M WITH PMH OF diastolic CHFs/p CardioMEMS implant 5/3/21, Severe Aortic stenosis  S/P TAVR 26 mm Mayda on 05/02/2022, chronic afib s/p ROD/DCCV 2017 & 2013, on eliquis , HLD, HTN, COVID 19 PNA 1/2021, COPD on  2 LITRES oxygen, CHRONIC LOW BACK PAIN S/P LESI, ED, IRON DEFI ANEMIA requiring blood transfusions, DAYANNA/DEPRESSION , Anemia, CKD   AND H/O TOBACCO USE, for f/u      Fell about 2 weeks ago and hurt his left knee , pain is dull ache, pain scale 5 /10, worse on walking for long   Has hydrocodone at home   Feels legs are weak  Feels whole body is weak  Has a cane and a walker at home, but does not use it all the time   Has done rehab in past  Declines rehab      Chief Complaint   Patient presents with   • Office Visit   • Knee Pain     Left knee pain, fell on his knee    • Blood Pressure   • Weight Problem   • Arthritis   • Back Pain     HPI    Patient's medications, allergies, past medical, surgical, social and family histories were reviewed and updated as appropriate.    Review of Systems   All other systems reviewed and are negative.      Objective      Visit Vitals  /89   Pulse 72   Temp 98.1 °F (36.7 °C)   Resp 16   Ht 5' 6\" (1.676 m)   Wt 81.6 kg (180 lb)   SpO2 97%   BMI 29.05 kg/m²       Physical Exam  HENT:      Head: Normocephalic and atraumatic.      Neck: Normal range of motion and neck supple.   Cardiovascular:      Rate and Rhythm: Normal rate and regular rhythm.   Pulmonary:      Effort: Pulmonary effort is normal.      Breath sounds: Normal breath sounds.   Abdominal:      Palpations: Abdomen is soft.   Musculoskeletal:      Right knee: Normal.      Left knee: Tenderness present over the lateral joint line.   Skin:     General: Skin is warm and dry.   Neurological:      Mental Status: He is alert.         Assessment         Call if not getting better or getting worse in next few days.  RTC 2-3 WEEKS / PRN   RTC as advised or as  needed         Problem List Items Addressed This Visit        Gastrointestinal and Abdominal    Hepatitis C carrier  (CMD)    Monitor: The problem is stable.  Evaluation: No labs/tests required today.  Assessment/Treatment:  Managed by specialist.            Mental Health    Major depressive disorder, recurrent episode, moderate (CMD)    Stable             Pulmonary and Pneumonias    Chronic respiratory failure, unsp w hypoxia or hypercapnia  (CMD)    Monitor: The problem is stable.  Evaluation: No labs/tests required today.  Assessment/Treatment:  Managed by specialist.        Other Visit Diagnoses       Contusion of left knee, initial encounter    -  Primary    Relevant Orders    XR KNEE 4 OR MORE VIEWS LEFT         2 = assistive person